# Patient Record
Sex: FEMALE | Race: WHITE | Employment: OTHER | URBAN - METROPOLITAN AREA
[De-identification: names, ages, dates, MRNs, and addresses within clinical notes are randomized per-mention and may not be internally consistent; named-entity substitution may affect disease eponyms.]

---

## 2017-01-19 ENCOUNTER — TRANSFERRED RECORDS (OUTPATIENT)
Dept: HEALTH INFORMATION MANAGEMENT | Facility: CLINIC | Age: 72
End: 2017-01-19

## 2017-01-20 ENCOUNTER — HOSPITAL ENCOUNTER (OUTPATIENT)
Facility: CLINIC | Age: 72
Setting detail: SPECIMEN
Discharge: HOME | End: 2017-01-20
Attending: FAMILY MEDICINE | Admitting: INTERNAL MEDICINE
Payer: MEDICARE

## 2017-01-20 ENCOUNTER — INFUSION THERAPY VISIT (OUTPATIENT)
Dept: INFUSION THERAPY | Facility: CLINIC | Age: 72
End: 2017-01-20
Attending: FAMILY MEDICINE
Payer: MEDICARE

## 2017-01-20 VITALS
HEART RATE: 86 BPM | RESPIRATION RATE: 18 BRPM | DIASTOLIC BLOOD PRESSURE: 65 MMHG | OXYGEN SATURATION: 98 % | TEMPERATURE: 97.8 F | SYSTOLIC BLOOD PRESSURE: 107 MMHG

## 2017-01-20 DIAGNOSIS — C54.1 ENDOMETRIAL CANCER (H): Primary | ICD-10-CM

## 2017-01-20 LAB
ABO + RH BLD: NORMAL
ABO + RH BLD: NORMAL
BLD GP AB SCN SERPL QL: NORMAL
BLD PROD TYP BPU: NORMAL
BLD PROD TYP BPU: NORMAL
BLD UNIT ID BPU: 0
BLOOD BANK CMNT PATIENT-IMP: NORMAL
BLOOD PRODUCT CODE: NORMAL
BPU ID: NORMAL
NUM BPU REQUESTED: 1
SPECIMEN EXP DATE BLD: NORMAL
TRANSFUSION STATUS PATIENT QL: NORMAL
TRANSFUSION STATUS PATIENT QL: NORMAL

## 2017-01-20 PROCEDURE — 36430 TRANSFUSION BLD/BLD COMPNT: CPT

## 2017-01-20 PROCEDURE — P9016 RBC LEUKOCYTES REDUCED: HCPCS | Performed by: INTERNAL MEDICINE

## 2017-01-20 PROCEDURE — 86923 COMPATIBILITY TEST ELECTRIC: CPT | Performed by: INTERNAL MEDICINE

## 2017-01-20 PROCEDURE — 86850 RBC ANTIBODY SCREEN: CPT | Performed by: INTERNAL MEDICINE

## 2017-01-20 PROCEDURE — 86901 BLOOD TYPING SEROLOGIC RH(D): CPT | Performed by: INTERNAL MEDICINE

## 2017-01-20 PROCEDURE — 86900 BLOOD TYPING SEROLOGIC ABO: CPT | Performed by: INTERNAL MEDICINE

## 2017-01-20 NOTE — PROGRESS NOTES
Infusion Nursing Note:  Jany Park presents today for 1 unit prbc.    Patient seen by provider today: No   present during visit today: Not Applicable.    Note: N/A.    Intravenous Access:  Lab draw site L chest, Needle type marquez, Gauge 20.  Labs drawn without difficulty.  Implanted Port.    Treatment Conditions:  Blood transfusion consent signed 1/20/17.  hgb 7.8.      Post Infusion Assessment:  Patient tolerated infusion without incident.  Blood return noted pre and post infusion.  Site patent and intact, free from redness, edema or discomfort.  No evidence of extravasations.  Access discontinued per protocol.    Discharge Plan:   Patient and/or family verbalized understanding of discharge instructions and all questions answered.  Copy of AVS reviewed with patient and/or family.  Patient will return prn for blood for next appointment.  Patient discharged in stable condition accompanied by: self and .  Departure Mode: Ambulatory.  Face to Face time: 10 minutes, review consent, reason for transfusion, side effects.    Estella Pastor RN

## 2017-03-06 ENCOUNTER — TRANSFERRED RECORDS (OUTPATIENT)
Dept: HEALTH INFORMATION MANAGEMENT | Facility: CLINIC | Age: 72
End: 2017-03-06

## 2017-03-06 ENCOUNTER — HOSPITAL ENCOUNTER (OUTPATIENT)
Dept: LAB | Facility: CLINIC | Age: 72
Discharge: HOME | End: 2017-03-06
Attending: INTERNAL MEDICINE
Payer: MEDICARE

## 2017-03-06 DIAGNOSIS — C54.1 ENDOMETRIAL CANCER (H): ICD-10-CM

## 2017-03-06 LAB
ABO + RH BLD: NORMAL
ABO + RH BLD: NORMAL
BLD GP AB SCN SERPL QL: NORMAL
BLD PROD TYP BPU: NORMAL
BLOOD BANK CMNT PATIENT-IMP: NORMAL
NUM BPU REQUESTED: 1
SPECIMEN EXP DATE BLD: NORMAL

## 2017-03-06 PROCEDURE — 86923 COMPATIBILITY TEST ELECTRIC: CPT | Performed by: INTERNAL MEDICINE

## 2017-03-06 PROCEDURE — 36591 DRAW BLOOD OFF VENOUS DEVICE: CPT

## 2017-03-06 PROCEDURE — 86901 BLOOD TYPING SEROLOGIC RH(D): CPT | Performed by: INTERNAL MEDICINE

## 2017-03-06 PROCEDURE — 86850 RBC ANTIBODY SCREEN: CPT | Performed by: INTERNAL MEDICINE

## 2017-03-06 PROCEDURE — 86900 BLOOD TYPING SEROLOGIC ABO: CPT | Performed by: INTERNAL MEDICINE

## 2017-03-06 NOTE — PROGRESS NOTES
"Maria Esther cath accessed with aseptic technique with Gripper needle 19 G X 3/4\". Good blood return, flushed with 10 ml's NS, amaya 6 ml waste then amaya 2 pink tubes. Labs amaya easily, flushed with 20 ml's of NS and then instilled Heparin 100 units per ml, total of 5 ml's and then deaccessed Maria Esther cath . Patient tolerated well. No bleeding noted at insertion site and dry sterile dressing applied. Patient had no question or concerns. Left ambulatory per self.  "

## 2017-03-07 ENCOUNTER — INFUSION THERAPY VISIT (OUTPATIENT)
Dept: INFUSION THERAPY | Facility: CLINIC | Age: 72
End: 2017-03-07
Attending: INTERNAL MEDICINE
Payer: MEDICARE

## 2017-03-07 VITALS
RESPIRATION RATE: 16 BRPM | OXYGEN SATURATION: 98 % | TEMPERATURE: 98.4 F | SYSTOLIC BLOOD PRESSURE: 129 MMHG | DIASTOLIC BLOOD PRESSURE: 83 MMHG | HEART RATE: 80 BPM

## 2017-03-07 DIAGNOSIS — C54.1 ENDOMETRIAL CANCER (H): Primary | ICD-10-CM

## 2017-03-07 LAB
BLD PROD TYP BPU: NORMAL
BLD UNIT ID BPU: 0
BLOOD PRODUCT CODE: NORMAL
BPU ID: NORMAL
TRANSFUSION STATUS PATIENT QL: NORMAL
TRANSFUSION STATUS PATIENT QL: NORMAL

## 2017-03-07 PROCEDURE — 25000128 H RX IP 250 OP 636: Performed by: INTERNAL MEDICINE

## 2017-03-07 PROCEDURE — P9016 RBC LEUKOCYTES REDUCED: HCPCS | Performed by: INTERNAL MEDICINE

## 2017-03-07 PROCEDURE — 36430 TRANSFUSION BLD/BLD COMPNT: CPT

## 2017-03-07 RX ORDER — HEPARIN SODIUM (PORCINE) LOCK FLUSH IV SOLN 100 UNIT/ML 100 UNIT/ML
500 SOLUTION INTRAVENOUS EVERY 8 HOURS
Status: DISCONTINUED | OUTPATIENT
Start: 2017-03-07 | End: 2017-03-07 | Stop reason: HOSPADM

## 2017-03-07 RX ADMIN — SODIUM CHLORIDE, PRESERVATIVE FREE 500 UNITS: 5 INJECTION INTRAVENOUS at 14:30

## 2017-03-07 NOTE — PROGRESS NOTES
Infusion Nursing Note:  Jany Park presents today for 1 unit packed red blood cells.    Patient seen by provider today: No   present during visit today: Not Applicable.    Intravenous Access:  Implanted Port.    Treatment Conditions:  Blood transfusion consent signed 1/20/17.  Hgb 7.7 3/6/17.      Post Infusion Assessment:  Patient tolerated infusion without incident.  Blood return noted pre and post infusion.  Site patent and intact, free from redness, edema or discomfort.  No evidence of extravasations.  Access discontinued per protocol.    Discharge Plan:   Patient declined prescription refills.  Copy of AVS reviewed with patient and/or family.  Patient will return prn for blood. No appt's scheduled at this time.      Tran Roth RN

## 2018-04-05 ENCOUNTER — TRANSFERRED RECORDS (OUTPATIENT)
Dept: HEALTH INFORMATION MANAGEMENT | Facility: CLINIC | Age: 73
End: 2018-04-05

## 2018-04-11 ENCOUNTER — TELEPHONE (OUTPATIENT)
Dept: INTERVENTIONAL RADIOLOGY/VASCULAR | Facility: CLINIC | Age: 73
End: 2018-04-11

## 2018-04-11 NOTE — TELEPHONE ENCOUNTER
Interventional Radiology   Interventional Radiology at [facility] has been requested to perform a Liver lesion biopsy from Dr Garcias.  Jany is a 72 year old woman with a PMH of hypertension, hyperlipidemia, asthma, fibromyalgia, GERD, DJD, and malignant melanoma without recurrence so far. 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 She had very intense chemotherapy with progression of disease per CT. Change to Paclitaxel and carboplatin 3/2017 seemed to help stabilize her disease but with increasing . The patient transitioned to a DNR/DNI but she would like to consider further treatment. Alternative treatments are being considered and a repeat biopsy is being requested to determine if she is a candidate for hormonal therapy or immunotherapy.   Reviewed imaging and clinical information with IR staff Dr Estrada who approved the biopsy with Ultrasound guidance.   Central scheduling has contacted the patient to schedule the biopsy and left a voicemail for the patient to call back.     Thanks Viktoria [facility] Interventional Radiology CNP ([phone])

## 2018-04-13 ENCOUNTER — HOSPITAL ENCOUNTER (OUTPATIENT)
Facility: CLINIC | Age: 73
Discharge: HOME | End: 2018-04-13
Attending: COLON & RECTAL SURGERY | Admitting: COLON & RECTAL SURGERY
Payer: MEDICARE

## 2018-04-13 ENCOUNTER — HOSPITAL ENCOUNTER (OUTPATIENT)
Dept: ULTRASOUND IMAGING | Facility: CLINIC | Age: 73
End: 2018-04-13
Attending: INTERNAL MEDICINE | Admitting: COLON & RECTAL SURGERY
Payer: MEDICARE

## 2018-04-13 VITALS
TEMPERATURE: 96 F | HEART RATE: 83 BPM | OXYGEN SATURATION: 93 % | SYSTOLIC BLOOD PRESSURE: 115 MMHG | DIASTOLIC BLOOD PRESSURE: 75 MMHG | RESPIRATION RATE: 18 BRPM

## 2018-04-13 DIAGNOSIS — C54.1: ICD-10-CM

## 2018-04-13 DIAGNOSIS — C79.11: ICD-10-CM

## 2018-04-13 LAB
INR PPP: 0.92 (ref 0.86–1.14)
PLATELET # BLD AUTO: 317 10E9/L (ref 150–450)

## 2018-04-13 PROCEDURE — 40000863 ZZH STATISTIC RADIOLOGY XRAY, US, CT, MAR, NM

## 2018-04-13 PROCEDURE — 88161 CYTOPATH SMEAR OTHER SOURCE: CPT | Mod: 26 | Performed by: RADIOLOGY

## 2018-04-13 PROCEDURE — 88341 IMHCHEM/IMCYTCHM EA ADD ANTB: CPT | Mod: 26 | Performed by: RADIOLOGY

## 2018-04-13 PROCEDURE — 88173 CYTOPATH EVAL FNA REPORT: CPT | Mod: 26 | Performed by: RADIOLOGY

## 2018-04-13 PROCEDURE — 88342 IMHCHEM/IMCYTCHM 1ST ANTB: CPT | Performed by: RADIOLOGY

## 2018-04-13 PROCEDURE — 88161 CYTOPATH SMEAR OTHER SOURCE: CPT | Performed by: RADIOLOGY

## 2018-04-13 PROCEDURE — 88342 IMHCHEM/IMCYTCHM 1ST ANTB: CPT | Mod: 26,XU | Performed by: RADIOLOGY

## 2018-04-13 PROCEDURE — 88360 TUMOR IMMUNOHISTOCHEM/MANUAL: CPT | Performed by: RADIOLOGY

## 2018-04-13 PROCEDURE — 88341 IMHCHEM/IMCYTCHM EA ADD ANTB: CPT | Performed by: RADIOLOGY

## 2018-04-13 PROCEDURE — 88173 CYTOPATH EVAL FNA REPORT: CPT | Performed by: RADIOLOGY

## 2018-04-13 PROCEDURE — 88305 TISSUE EXAM BY PATHOLOGIST: CPT | Performed by: RADIOLOGY

## 2018-04-13 PROCEDURE — 25000125 ZZHC RX 250: Performed by: RADIOLOGY

## 2018-04-13 PROCEDURE — 88360 TUMOR IMMUNOHISTOCHEM/MANUAL: CPT | Mod: 26 | Performed by: RADIOLOGY

## 2018-04-13 PROCEDURE — 88360 TUMOR IMMUNOHISTOCHEM/MANUAL: CPT | Mod: 26,76 | Performed by: RADIOLOGY

## 2018-04-13 PROCEDURE — 85610 PROTHROMBIN TIME: CPT | Performed by: RADIOLOGY

## 2018-04-13 PROCEDURE — 76942 ECHO GUIDE FOR BIOPSY: CPT

## 2018-04-13 PROCEDURE — 88305 TISSUE EXAM BY PATHOLOGIST: CPT | Mod: 26 | Performed by: RADIOLOGY

## 2018-04-13 PROCEDURE — 85049 AUTOMATED PLATELET COUNT: CPT | Performed by: RADIOLOGY

## 2018-04-13 RX ORDER — FENTANYL CITRATE 50 UG/ML
25-50 INJECTION, SOLUTION INTRAMUSCULAR; INTRAVENOUS EVERY 5 MIN PRN
Status: DISCONTINUED | OUTPATIENT
Start: 2018-04-13 | End: 2018-04-13 | Stop reason: HOSPADM

## 2018-04-13 RX ORDER — LIDOCAINE 40 MG/G
CREAM TOPICAL
Status: DISCONTINUED | OUTPATIENT
Start: 2018-04-13 | End: 2018-04-13 | Stop reason: HOSPADM

## 2018-04-13 RX ORDER — ACETAMINOPHEN 325 MG/1
650 TABLET ORAL EVERY 4 HOURS PRN
Status: DISCONTINUED | OUTPATIENT
Start: 2018-04-13 | End: 2018-04-13 | Stop reason: HOSPADM

## 2018-04-13 RX ORDER — LIDOCAINE HYDROCHLORIDE 10 MG/ML
6 INJECTION, SOLUTION EPIDURAL; INFILTRATION; INTRACAUDAL; PERINEURAL ONCE
Status: COMPLETED | OUTPATIENT
Start: 2018-04-13 | End: 2018-04-13

## 2018-04-13 RX ORDER — FLUMAZENIL 0.1 MG/ML
0.2 INJECTION, SOLUTION INTRAVENOUS
Status: DISCONTINUED | OUTPATIENT
Start: 2018-04-13 | End: 2018-04-13 | Stop reason: HOSPADM

## 2018-04-13 RX ORDER — NALOXONE HYDROCHLORIDE 0.4 MG/ML
.1-.4 INJECTION, SOLUTION INTRAMUSCULAR; INTRAVENOUS; SUBCUTANEOUS
Status: DISCONTINUED | OUTPATIENT
Start: 2018-04-13 | End: 2018-04-13 | Stop reason: HOSPADM

## 2018-04-13 RX ADMIN — LIDOCAINE HYDROCHLORIDE 6 ML: 10 INJECTION, SOLUTION INFILTRATION; PERINEURAL at 09:03

## 2018-04-13 NOTE — IP AVS SNAPSHOT
MRN:[id number]                      After Visit Summary   4/13/2018    Jany Park    MRN: [id number]           Visit Information        Department      4/13/2018  7:06 AM [facility]          Review of your medicines      UNREVIEWED medicines. Ask your doctor about these medicines        Dose / Directions    albuterol 90 MCG/ACT inhaler        Refills:  0       BIOTIN FORTE PO        Dose:  36778 mcg   Take 10,000 mcg by mouth daily   Refills:  0       CALTRATE 600 PO        2 daily   Refills:  0       ferrous gluconate 324 (38 Fe) MG tablet   Commonly known as:  FERGON   Used for:  Edema, Myalgia and myositis, unspecified, Mild persistent asthma, Female stress incontinence        Dose:  324 mg   Take 1 tablet by mouth daily (with breakfast).   Quantity:  30 tablet   Refills:  0       glutamine 500 MG capsule        Dose:  500 mg   Take 500 mg by mouth 4 times daily   Refills:  0       IPRATROPIUM BROMIDE IN        Refills:  0       LASIX PO        Dose:  20 mg   Take 20 mg by mouth daily   Refills:  0       lisinopril 10 MG tablet   Commonly known as:  PRINIVIL/ZESTRIL   Used for:  Unspecified essential hypertension        ONE DAILY   Quantity:  30   Refills:  2       MELOXICAM PO        Dose:  7.5 mg   Take 7.5 mg by mouth daily   Refills:  0       Multiple vitamin  s Tabs        Refills:  0       OMEPRAZOLE PO        Dose:  20 mg   Take 20 mg by mouth 2 times daily (before meals)   Refills:  0       traMADol 50 MG tablet   Commonly known as:  ULTRAM   Used for:  Knee pain, left, Failed total knee arthroplasty (H), S/P total knee arthroplasty        Dose:   mg   Take 1-2 tablets by mouth every 6 hours as needed.   Quantity:  60 tablet   Refills:  1       vitamin B complex with vitamin C Tabs tablet        Dose:  1 tablet   Take 1 tablet by mouth daily   Refills:  0                Protect others around you: Learn how to safely use, store and throw away your  medicines at www.disposemymeds.org.         Follow-ups after your visit        Your next 10 appointments already scheduled     Apr 13, 2018  8:30 AM CDT   (Arrive by 8:15 AM)   US BIOPSY LIVER with [facility] IMAGING NURSE, [facility]   [facility] Ultrasound ([facility])    [address] [place] [address]   [phone]           Tell us in advance if there s any chance you may be pregnant.  Bring a list of your medicines to the exam. Include vitamins, minerals and over-the-counter drugs.  No eating or drinking for 4 hours prior to exam.  If you take blood thinners, you may need to stop taking them a few days before treatment. Talk to your doctor before stopping these medicines. You will need a blood test the morning of your exam.   Stop taking Coumadin (warfarin) 3 days before your exam. Restart the day after your exam.   If you take aspirin, you may need to stop taking it 3 days before your scan.   If you take Plavix, Ticlid, Pletal or Persantine, you may need to stop taking them 5 days before your scan. Please talk to your doctor before stopping these medicines.  If you will receive sedation for this test (medicine to help you relax):   See your family doctor for an exam within 30 days of treatment.   Plan for an adult to drive you home and stay with you for at least 6 hours.   Follow the eating and drinking guidelines checked below:   No eating or drinking for 4 hours before your test. You may take medicine with small sips of water.   If you have diabetes:If you take insulin, call your diabetes care team. Do not take diabetes pills on the morning of your test. If you take metformin (Avandamet, Glucophage, Glucovance, Metaglip) and received contrast, wait 48 hours before re-starting this medicine.  Please call the Imaging Department at your exam site with any questions.               Care Instructions        Further instructions from your care team       **** Biopsy  Discharge Instructions     After you go home:      You may resume your normal diet    Have an adult stay with you for 6 hours if you received sedation       For 24 hours - due to the sedation you received:    Relax and take it easy    Do NOT make any important or legal decisions    Do NOT drive or operate machines at home or at work    Do NOT drink alcohol    Care of Puncture Site:      For the first 48 hrs, check your puncture site every couple hours while you are awake     You may remove/change the bandaid tomorrow    You may shower tomorrow    No tub baths, whirlpools or swimming until your puncture site has fully healed    Activity       You may go back to normal activity in 24 hours     Wait 48 hours before lifting, straining, exercise or other strenuous activity    Medicines:      You may resume all medications    Resume your Warfarin/Coumadin at your regular dose today. Follow up with your provider to have your INR rechecked    Resume your Platelet Inhibitors and Aspirin tomorrow at your regular dose    For minor pain, you may take Acetaminophen (Tylenol) or Ibuprofen (Advil)            Call the provider who ordered this test if:      Increased pain or a large or growing hard lump around the site    Blood or fluid is draining from the site    The site is red, swollen, hot or tender    Chills or a fever greater than 101 F (38 C)    Pain that is getting worse    Any questions or concerns    Call  911 or go to the Emergency Room if:      Severe pain or trouble breathing    Bleeding that you cannot control        If you have questions call:          [facility] @ [phone]      The provider who performed your procedure was _________________.             Additional Information About Your Visit        [employer] Information     [employer] lets you send messages to your doctor, view your test results, renew your prescriptions, schedule appointments and more. To sign up, go to  [email or web address] . Click on \"Log in\" on the left side of the screen, which will take you to the Welcome page. Then click on \"Sign up Now\" on the right side of the page.     You will be asked to enter the access code listed below, as well as some personal information. Please follow the directions to create your username and password.     Your access code is: [email or web address]  Expires: 2018  7:44 AM     Your access code will  in 90 days. If you need help or a new code, please call your [facility] clinic or [phone].        Care EveryWhere ID     This is your Care EveryWhere ID. This could be used by other organizations to access your [facility] medical records  [id number]        Your Vitals Were     Blood Pressure Pulse Temperature Respirations Pulse Oximetry       136/70 (BP Location: Left arm) 83 96  F (35.6  C) (Oral) 18 98%        Primary Care Provider Office Phone # Fax #    Selvin Car [phone]      Equal Access to Services     [facility]: Hadii krzysztof sargent hadkelsi Jauregui, waaxda adriano, qaybta beronica salcedo, coreen cain . So [facility] [phone].    ATENCIÓN: Si habla español, tiene a vanegas disposición servicios gratuitos de asistencia lingüística. [facility] [phone].    We comply with applicable federal civil rights laws and [place] laws. We do not discriminate on the basis of race, color, national origin, age, disability, sex, sexual orientation, or gender identity.            Thank you!     Thank you for choosing [facility] for your care. Our goal is always to provide you with excellent care. Hearing back from our patients is one way we can continue to improve our services. Please take a few minutes to complete the written survey that you may receive in the mail after you visit with us. Thank you!             Medication List: This is a list of all your medications and when to take them. Check marks below indicate your daily home " schedule. Keep this list as a reference.      Medications           Morning Afternoon Evening Bedtime As Needed    albuterol 90 MCG/ACT inhaler                                BIOTIN FORTE PO   Take 10,000 mcg by mouth daily                                CALTRATE 600 PO   2 daily                                ferrous gluconate 324 (38 Fe) MG tablet   Commonly known as:  FERGON   Take 1 tablet by mouth daily (with breakfast).                                glutamine 500 MG capsule   Take 500 mg by mouth 4 times daily                                IPRATROPIUM BROMIDE IN                                LASIX PO   Take 20 mg by mouth daily                                lisinopril 10 MG tablet   Commonly known as:  PRINIVIL/ZESTRIL   ONE DAILY                                MELOXICAM PO   Take 7.5 mg by mouth daily                                Multiple vitamin  s Tabs                                OMEPRAZOLE PO   Take 20 mg by mouth 2 times daily (before meals)                                traMADol 50 MG tablet   Commonly known as:  ULTRAM   Take 1-2 tablets by mouth every 6 hours as needed.                                vitamin B complex with vitamin C Tabs tablet   Take 1 tablet by mouth daily

## 2018-04-13 NOTE — IP AVS SNAPSHOT
[place] [address]    [place] [address]    Phone:  [phone]                                       After Visit Summary   4/13/2018    Jany Park    MRN: [id number]           After Visit Summary Signature Page     I have received my discharge instructions, and my questions have been answered. I have discussed any challenges I see with this plan with the nurse or doctor.    ..........................................................................................................................................  Patient/Patient Representative Signature      ..........................................................................................................................................  Patient Representative Print Name and Relationship to Patient    ..................................................               ................................................  Date                                            Time    ..........................................................................................................................................  Reviewed by Signature/Title    ...................................................              ..............................................  Date                                                            Time

## 2018-04-13 NOTE — DISCHARGE INSTRUCTIONS
**** Biopsy Discharge Instructions     After you go home:      You may resume your normal diet    Have an adult stay with you for 6 hours if you received sedation       For 24 hours - due to the sedation you received:    Relax and take it easy    Do NOT make any important or legal decisions    Do NOT drive or operate machines at home or at work    Do NOT drink alcohol    Care of Puncture Site:      For the first 48 hrs, check your puncture site every couple hours while you are awake     You may remove/change the bandaid tomorrow    You may shower tomorrow    No tub baths, whirlpools or swimming until your puncture site has fully healed    Activity       You may go back to normal activity in 24 hours     Wait 48 hours before lifting, straining, exercise or other strenuous activity    Medicines:      You may resume all medications    Resume your Warfarin/Coumadin at your regular dose today. Follow up with your provider to have your INR rechecked    Resume your Platelet Inhibitors and Aspirin tomorrow at your regular dose    For minor pain, you may take Acetaminophen (Tylenol) or Ibuprofen (Advil)            Call the provider who ordered this test if:      Increased pain or a large or growing hard lump around the site    Blood or fluid is draining from the site    The site is red, swollen, hot or tender    Chills or a fever greater than 101 F (38 C)    Pain that is getting worse    Any questions or concerns    Call  911 or go to the Emergency Room if:      Severe pain or trouble breathing    Bleeding that you cannot control        If you have questions call:          [facility] @ [phone]      The provider who performed your procedure was _________________.

## 2018-04-13 NOTE — PROGRESS NOTES
Band aid remains CDI, pain free.  D/c to home at 0950 in stable condition with instructions in hand.

## 2018-04-13 NOTE — PROGRESS NOTES
Return to CS.  Band aid intact, CDI.  No pain.  VSS.  Refused crackers, water given at request.  Will cont to monitor.

## 2018-04-13 NOTE — PROGRESS NOTES
Pt here for liver bx.  Assessment complete.  Labs WDL.  D/c instructions given pre procedure.  Call light in reach.

## 2018-04-13 NOTE — PROCEDURES
RADIOLOGY PROCEDURE NOTE  Patient name: Jany Park  MRN: [id number]  : 1945    Pre-procedure diagnosis: liver mets  Post-procedure diagnosis: Same    Procedure Date/Time: 2018  9:03 AM  Procedure: US guided liver biopsy  Estimated blood loss: None  Specimen(s) collected with description: 18ga cores  The patient tolerated the procedure well with no immediate complications.  Significant findings:liver mets    See imaging dictation for procedural details.    Provider name: Thee Rush  Assistant(s):None

## 2018-04-14 ENCOUNTER — HEALTH MAINTENANCE LETTER (OUTPATIENT)
Age: 73
End: 2018-04-14

## 2018-04-19 LAB — COPATH REPORT: NORMAL

## 2018-11-20 ENCOUNTER — TRANSFERRED RECORDS (OUTPATIENT)
Dept: HEALTH INFORMATION MANAGEMENT | Facility: CLINIC | Age: 73
End: 2018-11-20

## 2018-11-21 ENCOUNTER — HOSPITAL ENCOUNTER (EMERGENCY)
Facility: CLINIC | Age: 73
Discharge: HOME | End: 2018-11-21
Attending: EMERGENCY MEDICINE | Admitting: EMERGENCY MEDICINE
Payer: MEDICARE

## 2018-11-21 ENCOUNTER — TRANSFERRED RECORDS (OUTPATIENT)
Dept: HEALTH INFORMATION MANAGEMENT | Facility: CLINIC | Age: 73
End: 2018-11-21

## 2018-11-21 ENCOUNTER — HOSPITAL ENCOUNTER (OUTPATIENT)
Dept: LAB | Facility: CLINIC | Age: 73
Discharge: HOME | End: 2018-11-21
Attending: ALLERGY & IMMUNOLOGY | Admitting: ALLERGY & IMMUNOLOGY
Payer: MEDICARE

## 2018-11-21 ENCOUNTER — APPOINTMENT (OUTPATIENT)
Dept: CT IMAGING | Facility: CLINIC | Age: 73
End: 2018-11-21
Attending: EMERGENCY MEDICINE
Payer: MEDICARE

## 2018-11-21 VITALS
RESPIRATION RATE: 10 BRPM | OXYGEN SATURATION: 96 % | DIASTOLIC BLOOD PRESSURE: 82 MMHG | TEMPERATURE: 98.2 F | WEIGHT: 200 LBS | HEIGHT: 66 IN | BODY MASS INDEX: 32.14 KG/M2 | HEART RATE: 100 BPM | SYSTOLIC BLOOD PRESSURE: 129 MMHG

## 2018-11-21 DIAGNOSIS — R06.00 DYSPNEA, UNSPECIFIED TYPE: ICD-10-CM

## 2018-11-21 DIAGNOSIS — Z86.711 HISTORY OF PULMONARY EMBOLISM: ICD-10-CM

## 2018-11-21 DIAGNOSIS — R06.02 SHORTNESS OF BREATH: Primary | ICD-10-CM

## 2018-11-21 LAB
ANION GAP SERPL CALCULATED.3IONS-SCNC: 4 MMOL/L (ref 3–14)
BUN SERPL-MCNC: 23 MG/DL (ref 7–30)
CALCIUM SERPL-MCNC: 8.8 MG/DL (ref 8.5–10.1)
CHLORIDE SERPL-SCNC: 108 MMOL/L (ref 94–109)
CO2 SERPL-SCNC: 28 MMOL/L (ref 20–32)
CREAT SERPL-MCNC: 0.86 MG/DL (ref 0.52–1.04)
D DIMER PPP FEU-MCNC: 1.8 UG/ML FEU (ref 0–0.5)
DIFFERENTIAL METHOD BLD: ABNORMAL
EOSINOPHIL # BLD AUTO: 0.3 10E9/L (ref 0–0.7)
EOSINOPHIL NFR BLD AUTO: 4 %
ERYTHROCYTE [DISTWIDTH] IN BLOOD BY AUTOMATED COUNT: 13.4 % (ref 10–15)
GFR SERPL CREATININE-BSD FRML MDRD: NORMAL ML/MIN/1.7M2
GLUCOSE SERPL-MCNC: 73 MG/DL (ref 70–99)
HCT VFR BLD AUTO: 36.3 % (ref 35–47)
HGB BLD-MCNC: 11.5 G/DL (ref 11.7–15.7)
INR PPP: 1.05 (ref 0.86–1.14)
LYMPHOCYTES # BLD AUTO: 2.2 10E9/L (ref 0.8–5.3)
LYMPHOCYTES NFR BLD AUTO: 27 %
MCH RBC QN AUTO: 30.3 PG (ref 26.5–33)
MCHC RBC AUTO-ENTMCNC: 31.7 G/DL (ref 31.5–36.5)
MCV RBC AUTO: 96 FL (ref 78–100)
MONOCYTES # BLD AUTO: 0.7 10E9/L (ref 0–1.3)
MONOCYTES NFR BLD AUTO: 8 %
NEUTROPHILS # BLD AUTO: 5.1 10E9/L (ref 1.6–8.3)
NEUTROPHILS NFR BLD AUTO: 61 %
NT-PROBNP SERPL-MCNC: 196 PG/ML (ref 0–900)
PLATELET # BLD AUTO: 312 10E9/L (ref 150–450)
POTASSIUM SERPL-SCNC: 3.7 MMOL/L (ref 3.4–5.3)
RBC # BLD AUTO: 3.8 10E12/L (ref 3.8–5.2)
SODIUM SERPL-SCNC: 140 MMOL/L (ref 133–144)
TROPONIN I SERPL-MCNC: <0.015 UG/L (ref 0–0.04)
WBC # BLD AUTO: 8.3 10E9/L (ref 4–11)

## 2018-11-21 PROCEDURE — 85379 FIBRIN DEGRADATION QUANT: CPT | Performed by: ALLERGY & IMMUNOLOGY

## 2018-11-21 PROCEDURE — 84484 ASSAY OF TROPONIN QUANT: CPT | Performed by: EMERGENCY MEDICINE

## 2018-11-21 PROCEDURE — 93005 ELECTROCARDIOGRAM TRACING: CPT

## 2018-11-21 PROCEDURE — 85610 PROTHROMBIN TIME: CPT | Performed by: EMERGENCY MEDICINE

## 2018-11-21 PROCEDURE — 71260 CT THORAX DX C+: CPT

## 2018-11-21 PROCEDURE — 85025 COMPLETE CBC W/AUTO DIFF WBC: CPT | Performed by: EMERGENCY MEDICINE

## 2018-11-21 PROCEDURE — 25000128 H RX IP 250 OP 636: Performed by: EMERGENCY MEDICINE

## 2018-11-21 PROCEDURE — 80048 BASIC METABOLIC PNL TOTAL CA: CPT | Performed by: EMERGENCY MEDICINE

## 2018-11-21 PROCEDURE — 36415 COLL VENOUS BLD VENIPUNCTURE: CPT | Performed by: ALLERGY & IMMUNOLOGY

## 2018-11-21 PROCEDURE — 94640 AIRWAY INHALATION TREATMENT: CPT

## 2018-11-21 PROCEDURE — 25000125 ZZHC RX 250: Performed by: EMERGENCY MEDICINE

## 2018-11-21 PROCEDURE — 99285 EMERGENCY DEPT VISIT HI MDM: CPT | Mod: 25

## 2018-11-21 PROCEDURE — 83880 ASSAY OF NATRIURETIC PEPTIDE: CPT | Performed by: EMERGENCY MEDICINE

## 2018-11-21 RX ORDER — IOPAMIDOL 755 MG/ML
500 INJECTION, SOLUTION INTRAVASCULAR ONCE
Status: COMPLETED | OUTPATIENT
Start: 2018-11-21 | End: 2018-11-21

## 2018-11-21 RX ORDER — IPRATROPIUM BROMIDE AND ALBUTEROL SULFATE 2.5; .5 MG/3ML; MG/3ML
3 SOLUTION RESPIRATORY (INHALATION) ONCE
Status: COMPLETED | OUTPATIENT
Start: 2018-11-21 | End: 2018-11-21

## 2018-11-21 RX ADMIN — IOPAMIDOL 78 ML: 755 INJECTION, SOLUTION INTRAVENOUS at 18:22

## 2018-11-21 RX ADMIN — SODIUM CHLORIDE 96 ML: 9 INJECTION, SOLUTION INTRAVENOUS at 18:22

## 2018-11-21 RX ADMIN — IPRATROPIUM BROMIDE AND ALBUTEROL SULFATE 3 ML: .5; 3 SOLUTION RESPIRATORY (INHALATION) at 17:13

## 2018-11-21 ASSESSMENT — ENCOUNTER SYMPTOMS
SORE THROAT: 0
FEVER: 0
COUGH: 0
SHORTNESS OF BREATH: 1
ACTIVITY CHANGE: 0

## 2018-11-21 NOTE — ED TRIAGE NOTES
ABCs intact. Pt c/o increased SOB for the past few months. Pt is on hormone treatment for breast cancer since April. Pt saw her allergist today and had labs done. Pt's D-Dimer 1.8. Pt sent from clinic to R/O PE.

## 2018-11-21 NOTE — ED AVS SNAPSHOT
[facility] Emergency Department    [address]    [facility] [address]    Phone:  [phone]    Fax:  [phone]                                       Jany Park   MRN: [id number]    Department:  [facility] Emergency Department   Date of Visit:  11/21/2018           After Visit Summary Signature Page     I have received my discharge instructions, and my questions have been answered. I have discussed any challenges I see with this plan with the nurse or doctor.    ..........................................................................................................................................  Patient/Patient Representative Signature      ..........................................................................................................................................  Patient Representative Print Name and Relationship to Patient    ..................................................               ................................................  Date                                   Time    ..........................................................................................................................................  Reviewed by Signature/Title    ...................................................              ..............................................  Date                                               Time          22EPIC Rev 08/18

## 2018-11-21 NOTE — ED AVS SNAPSHOT
[facility] Emergency Department    [address] [place] [address]    Phone:  [phone]    Fax:  [phone]                                       Jany Park   MRN: [id number]    Department:  [facility] Emergency Department   Date of Visit:  11/21/2018           Patient Information     Date Of Birth          1945        Your diagnoses for this visit were:     Dyspnea, unspecified type        You were seen by Armando Hernandez MD.      Follow-up Information     Go to YOUR PULMONARY md.    Why:  NEXT WEEK        Discharge Instructions       Continue albuterol every 4 hours  Rest  Push fluids  Discharge Instructions  Asthma    Asthma is a condition causing narrowing and inflammation of the airways that can make it hard to breathe.  Asthma can also cause cough, wheezing, noisy breathing and tightness in the chest.  Asthma can be brought on or  triggered  by many things, including dust, mold, pollen, cigarette smoke, exercise, stress and infections (like the common cold).     Generally, every Emergency Department visit should have a follow-up clinic visit with either a primary or a specialty clinic/provider. Please follow-up as instructed by your emergency provider today.    Return to the Emergency Department if:    Your breathing gets worse.    You need to use your inhaler more often than every 4 hours, or cannot get relief from your inhaler.    You are very weak, or feel much more ill.    You develop new symptoms, such as chest pain.    You cough up blood.    You are vomiting (throwing up) enough that you cannot keep fluids or your medicine down.    What can I do to help myself?    Fill any prescriptions the provider gave you and take them right away--especially antibiotics. Be sure to finish the whole antibiotic prescription.    You may be given a prescription for an inhaler, which can help loosen tight air passages.  Use this as needed, but not more often  than directed. Inhalers work much better when used with a spacer.     You may be given a prescription for a steroid to reduce inflammation. Used long-term, these can have some side effects, but for short-term use they are safe. You may notice restlessness or increased appetite (eating more).        You may use non-prescription cough or cold medicines. Cough medicines may help, but do not make the cough go away completely.     Avoid smoke, because this can make you feel worse. If you smoke, this may be a good time to quit! Consider using nicotine lozenges, gum, or patches to reduce cravings.     If you have a fever, Tylenol  (acetaminophen), Motrin  (ibuprofen), or Advil  (ibuprofen), may help bring fever down and may help you feel more comfortable. Be sure to read and follow the package directions, and ask your provider if you have questions.    Be sure to get your flu shot each year.  For certain age groups, the pneumonia shot can help prevent pneumonia.  It is important that you follow up with your regular provider, to be sure that you are improving from this spell (an acute asthma exacerbation), and also to do what you can to keep from having trouble again. Sometimes you need long-term medicines to keep your asthma under control.   If you were given a prescription for medicine here today, be sure to read all of the information (including the package insert) that comes with your prescription.  This will include important information about the medicine, its side effects, and any warnings that you need to know about.  The pharmacist who fills the prescription can provide more information and answer questions you may have about the medicine.  If you have questions or concerns that the pharmacist cannot address, please call or return to the Emergency Department.   Remember that you can always come back to the Emergency Department if you are not able to see your regular provider in the amount of time listed above, if  you get any new symptoms, or if there is anything that worries you.    24 Hour Appointment Hotline       To make an appointment at any [facility], call [phone] ([phone]). If you don't have a family doctor or clinic, we will help you find one. [facility] are conveniently located to serve the needs of you and your family.             Review of your medicines      Our records show that you are taking the medicines listed below. If these are incorrect, please call your family doctor or clinic.        Dose / Directions Last dose taken    albuterol 90 MCG/ACT inhaler        Refills:  0        BIOTIN FORTE PO   Dose:  35775 mcg        Take 10,000 mcg by mouth daily   Refills:  0        CALTRATE 600 PO        2 daily   Refills:  0        ferrous gluconate 324 (38 Fe) MG tablet   Commonly known as:  FERGON   Dose:  324 mg   Quantity:  30 tablet        Take 1 tablet by mouth daily (with breakfast).   Refills:  0        glutamine 500 MG capsule   Dose:  500 mg        Take 500 mg by mouth 4 times daily   Refills:  0        IPRATROPIUM BROMIDE IN        Refills:  0        LASIX PO   Dose:  20 mg        Take 20 mg by mouth daily   Refills:  0        lisinopril 10 MG tablet   Commonly known as:  PRINIVIL/ZESTRIL   Quantity:  30        ONE DAILY   Refills:  2        MELOXICAM PO   Dose:  7.5 mg        Take 7.5 mg by mouth daily   Refills:  0        Multiple vitamin  s Tabs        Refills:  0        OMEPRAZOLE PO   Dose:  20 mg        Take 20 mg by mouth 2 times daily (before meals)   Refills:  0        traMADol 50 MG tablet   Commonly known as:  ULTRAM   Dose:   mg   Quantity:  60 tablet        Take 1-2 tablets by mouth every 6 hours as needed.   Refills:  1        vitamin B complex with vitamin C Tabs tablet   Dose:  1 tablet        Take 1 tablet by mouth daily   Refills:  0                Procedures and tests performed during your visit     Basic metabolic panel    CBC with platelets differential     CT Chest Pulmonary Embolism w Contrast    Cardiac Continuous Monitoring    EKG 12-lead, tracing only    INR    Nt probnp inpatient    Peripheral IV: Standard    Pulse oximetry nursing    Troponin I    Vital signs      Orders Needing Specimen Collection     None      Pending Results     Date and Time Order Name Status Description    11/21/2018 1652 EKG 12-lead, tracing only Preliminary             Pending Culture Results     No orders found from 11/19/2018 to 11/22/2018.            Pending Results Instructions     If you had any lab results that were not finalized at the time of your Discharge, you can call the ED Lab Result RN at [phone]. You will be contacted by this team for any positive Lab results or changes in treatment. The nurses are available 7 days a week from 10A to 6:30P.  You can leave a message 24 hours per day and they will return your call.        Test Results From Your Hospital Stay        11/21/2018  5:50 PM      Component Results     Component Value Ref Range & Units Status    WBC 8.3 4.0 - 11.0 10e9/L Final    RBC Count 3.80 3.8 - 5.2 10e12/L Final    Hemoglobin 11.5 (L) 11.7 - 15.7 g/dL Final    Hematocrit 36.3 35.0 - 47.0 % Final    MCV 96 78 - 100 fl Final    MCH 30.3 26.5 - 33.0 pg Final    MCHC 31.7 31.5 - 36.5 g/dL Final    RDW 13.4 10.0 - 15.0 % Final    Platelet Count 312 150 - 450 10e9/L Final    Diff Method Automated Method  Final    % Neutrophils 61.0 % Final    % Lymphocytes 27.0 % Final    % Monocytes 8.0 % Final    % Eosinophils 4.0 % Final    Absolute Neutrophil 5.1 1.6 - 8.3 10e9/L Final    Absolute Lymphocytes 2.2 0.8 - 5.3 10e9/L Final    Absolute Monocytes 0.7 0.0 - 1.3 10e9/L Final    Absolute Eosinophils 0.3 0.0 - 0.7 10e9/L Final         11/21/2018  6:11 PM      Component Results     Component Value Ref Range & Units Status    Sodium 140 133 - 144 mmol/L Final    Potassium 3.7 3.4 - 5.3 mmol/L Final    Chloride 108 94 - 109 mmol/L Final    Carbon Dioxide 28 20 - 32  mmol/L Final    Anion Gap 4 3 - 14 mmol/L Final    Glucose 73 70 - 99 mg/dL Final    Urea Nitrogen 23 7 - 30 mg/dL Final    Creatinine 0.86 0.52 - 1.04 mg/dL Final    GFR Estimate  >60 mL/min/1.7m2 Final    GFR not calculated, patient <16 years old.    Non  GFR Calc    GFR Estimate If Black  >60 mL/min/1.7m2 Final    GFR not calculated, patient <16 years old.     GFR Calc    Calcium 8.8 8.5 - 10.1 mg/dL Final         11/21/2018  5:41 PM      Component Results     Component Value Ref Range & Units Status    INR 1.05 0.86 - 1.14 Final         11/21/2018  5:56 PM      Component Results     Component Value Ref Range & Units Status    Troponin I ES <0.015 0.000 - 0.045 ug/L Final    The 99th percentile for upper reference range is 0.045 ug/L.  Troponin values   in the range of 0.045 - 0.120 ug/L may be associated with risks of adverse   clinical events.           11/21/2018  6:11 PM      Component Results     Component Value Ref Range & Units Status    N-Terminal Pro BNP Inpatient 196 0 - 900 pg/mL Final       Reference range shown and results flagged as abnormal are suggested inpatient   cut points for confirming diagnosis if CHF in an acute setting. Establishing a   baseline value for each individual patient is useful for follow-up. An   inpatient or emergency department NT-proPBNP <300 pg/mL effectively rules out   acute CHF, with 99% negative predictive value.  The outpatient non-acute reference range for ruling out CHF is:   0-125 pg/mL (age 18 to less than 75)   0-450 pg/mL (age 75 yrs and older)           11/21/2018  6:50 PM      Narrative     CT CHEST PULMONARY EMBOLISM WITH CONTRAST   11/21/2018 6:29 PM     HISTORY: Shortness of breath. High D-dimer.     TECHNIQUE: 78 mL Isovue-370. Radiation dose for this scan was reduced  using automated exposure control, adjustment of the mA and/or kV  according to patient size, or iterative reconstruction technique.    COMPARISON:  4/2/2018    FINDINGS: No evidence of pulmonary embolism or acute thoracic aortic  abnormality. No pneumothorax. No pleural or pericardial effusion. No  enlarged thoracic or axillary lymph nodes.    There are numerous scattered pulmonary nodules. A right upper lobe  nodule measures 0.4 cm (series 8, image 70), unchanged. A left lower  lobe pulmonary nodule measures 0.8 cm (series 8, image 171), not  significantly changed. Numerous other pulmonary nodules are not  significantly changed.    Images through the upper abdomen demonstrate innumerable liver masses  which enlarge the liver. This was better demonstrated on the prior  study with more appropriate timing of the contrast bolus for study of  the abdomen.    No sclerotic or lytic bone lesions are demonstrated.        Impression     IMPRESSION:  1. No evidence of pulmonary embolism or acute thoracic aortic  abnormality.  2. Metastatic disease demonstrated in the lungs and liver, grossly  similar to the prior study.    MARIAELENA ABDALLA MD                Clinical Quality Measure: Blood Pressure Screening     Your blood pressure was checked while you were in the emergency department today. The last reading we obtained was  BP: 129/82 . Please read the guidelines below about what these numbers mean and what you should do about them.  If your systolic blood pressure (the top number) is less than 120 and your diastolic blood pressure (the bottom number) is less than 80, then your blood pressure is normal. There is nothing more that you need to do about it.  If your systolic blood pressure (the top number) is 120-139 or your diastolic blood pressure (the bottom number) is 80-89, your blood pressure may be higher than it should be. You should have your blood pressure rechecked within a year by a primary care provider.  If your systolic blood pressure (the top number) is 140 or greater or your diastolic blood pressure (the bottom number) is 90 or greater, you may have high  "blood pressure. High blood pressure is treatable, but if left untreated over time it can put you at risk for heart attack, stroke, or kidney failure. You should have your blood pressure rechecked by a primary care provider within the next 4 weeks.  If your provider in the emergency department today gave you specific instructions to follow-up with your doctor or provider even sooner than that, you should follow that instruction and not wait for up to 4 weeks for your follow-up visit.        Thank you for choosing [facility]       Thank you for choosing [facility] for your care. Our goal is always to provide you with excellent care. Hearing back from our patients is one way we can continue to improve our services. Please take a few minutes to complete the written survey that you may receive in the mail after you visit with us. Thank you!        [facility] Information     [employer] lets you send messages to your doctor, view your test results, renew your prescriptions, schedule appointments and more. To sign up, go to [email or web address] . Click on \"Log in\" on the left side of the screen, which will take you to the Welcome page. Then click on \"Sign up Now\" on the right side of the page.     You will be asked to enter the access code listed below, as well as some personal information. Please follow the directions to create your username and password.     Your access code is: [email or web address]  Expires: 2019  7:24 PM     Your access code will  in 90 days. If you need help or a new code, please call your [facility] clinic or [phone].        Care EveryWhere ID     This is your Care EveryWhere ID. This could be used by other organizations to access your [facility] medical records  [id number]        Equal Access to Services     JUSTIN GERMAIN : luis Corey qaybta kaalmada adeegyada, waxay idiin hayaan adeeg kharash la'aan ah. So [facility] [phone].    ATENCIÓN: Si habla español, tiene a vanegas " disposición servicios gratuitos de asistencia lingüística. Maxim al [phone].    We comply with applicable federal civil rights laws and [place] laws. We do not discriminate on the basis of race, color, national origin, age, disability, sex, sexual orientation, or gender identity.            After Visit Summary       This is your record. Keep this with you and show to your community pharmacist(s) and doctor(s) at your next visit.

## 2018-11-21 NOTE — ED PROVIDER NOTES
History     Chief Complaint:  Shortness of Breath    HPI   Jany Park is a 73 year old female with a history of asthma and active cancer who presents to the emergency department today with shortness of breath. The patient reports that she has been having increased shortness of breath for the last few months that is constantly present. She thought this may be due to asthma, but during checkup today they noted her D Dimer was elevated and she was brought to the ED. She denies fever, cough, difficulty moving around. She denies sore throat, flu like symptoms. She denies recent travel, immobilizations, exposure to sick people.     D Dimer was 1.8.     Allergies:  Codeine    Medications:    Albuterol  Biotin   Caltrate   Fergon   Lasix  Glutamine   Ipratropium  Lisinopril  Meloxicam  Omeprazole   Ultram     Past Medical History:    Allergic rhinitis due to pollen  Anemia  Arthritis  Arthropathy, unspecified, site unspecified  Esophageal reflux  Endometrial cancer   Knee pain   Female incontinence   Fibromyalgia  Gastro-oesophageal reflux disease  Hypertension  Mild persistent asthma  Myalgia and myositis, unspecified  Osteopenia  Pain medication agreement    Past Surgical History:    Arthroplasty revision knee   delivery   Varicose vein surgery   Knee arthroplasty   Colonoscopy  Hysterectomy   Insert port vascular access     Family History:    Hypertension  Arthritis   Eye disorder   Psychotic disorder   Cancer     Social History:  The patient was accompanied to the ED by .  Smoking Status: Never  Alcohol Use: No    Marital Status:        Review of Systems   Constitutional: Negative for activity change and fever.   HENT: Negative for sore throat.    Respiratory: Positive for shortness of breath. Negative for cough.    All other systems reviewed and are negative.    Physical Exam     Patient Vitals for the past 24 hrs:   BP Temp Temp src Pulse Heart Rate Resp SpO2 Height Weight   18  "1800 113/66 - - - 107 11 95 % - -   11/21/18 1745 122/65 - - - 105 22 96 % - -   11/21/18 1730 116/69 - - - - - 96 % - -   11/21/18 1715 104/64 - - - - - 99 % - -   11/21/18 1700 119/82 - - - - - 100 % - -   11/21/18 1638 (!) 144/93 98.2  F (36.8  C) Temporal 100 - 18 97 % 1.676 m (5' 6\") 90.7 kg (200 lb)      Physical Exam   Constitutional: She appears well-developed and well-nourished.   HENT:   Right Ear: External ear normal.   Left Ear: External ear normal.   Mouth/Throat: Oropharynx is clear and moist. No oropharyngeal exudate.   TM's clear bilaterally   Eyes: Conjunctivae are normal. Pupils are equal, round, and reactive to light. No scleral icterus.   Neck: Normal range of motion. Neck supple. No JVD present.   Cardiovascular: Regular rhythm, normal heart sounds and intact distal pulses.  Exam reveals no gallop and no friction rub.    No murmur heard.  tachycardic   Pulmonary/Chest: Effort normal and breath sounds normal. No respiratory distress. She has no wheezes. She has no rales.   Abdominal: Soft. Bowel sounds are normal. She exhibits no distension and no mass. There is no tenderness.   Musculoskeletal: Normal range of motion. She exhibits no edema.   Neurological: She is alert.   Skin: Skin is warm and dry. No rash noted.   Psychiatric: She has a normal mood and affect.       Emergency Department Course     ECG:  Indication: shortness of breath   Completed at 1652.  Read at 1655.   Sinus tachycardia   Otherwise Normal ECG    Rate 103 bpm. MA interval 170. QRS duration 86. QT/QTc 338/442. P-R-T axes 49 40 36.     Imaging:  Radiology findings were communicated with the patient and family who voiced understanding of the findings.  CT Chest Pulmonary Embolism  IMPRESSION:  1. No evidence of pulmonary embolism or acute thoracic aortic abnormality.  2. Metastatic disease demonstrated in the lungs and liver, grossly similar to the prior study.  Report per radiology      Laboratory:  Laboratory findings were " "communicated with the patient and family who voiced understanding of the findings.  CBC: AWNL (WBC 8.3, HGB 11.5(L), )  BMP: AWNL (Creatinine 0.86)   INR: 1.05   Troponin (Collected 1714): <0.015   BNP: 196     Interventions:  1713: Duoneb, 3 mL, nebulization    1822: 0.9% NaCl 96mL IV Bolus     Emergency Department Course:  Nursing notes and vitals reviewed.  1647: I performed an exam of the patient as documented above.   IV was inserted and blood was drawn for laboratory testing, results above.  The patient was sent for a CT Chest while in the emergency department, results above.   Patient rechecked and updated. Patient is feeling better after Duoneb.   1857: Findings and plan explained to the Patient and spouse. Patient discharged home with instructions regarding supportive care, medications, and reasons to return. The importance of close follow-up was reviewed.    I personally reviewed the laboratory and imaging results with the Patient and spouse and answered all related questions prior to discharge.   Impression & Plan    Medical Decision Making:  Jany Park is a 73 year old female who was sent by her allergist after they found elevation of her D Dimer. She has had shortness of breath for several months now. She doesn't have any wheezing on exam, but after 1 Duoneb she felt \"a lot better\". I wonder if this is more of an asthma related issue than anything else. Her CT was negative for PE or aortic issues. She does have stable metastatic stable disease on CT. She did not exhibit any hypoxia and was feeling much improved on discharge. She does have an appointment with a pulmonologist on Monday next week, therefore I asked her to keep that appointment. I've instructed that she can do more frequent Nebs as needed. She can do up to every 4 hour albuterol if needed. For right now I will hold off on starting her on steroids. I will let the pulmonologist make that decision when she sees them on Monday. "         Diagnosis:    ICD-10-CM    1. Dyspnea, unspecified type R06.00        Disposition:  discharged to home    Scribe Disclosure:  I, Trista Jerrod, am serving as a scribe at 4:47 PM on 11/21/2018 to document services personally performed by Armando Hernandez MD based on my observations and the provider's statements to me.    11/21/2018   [facility] EMERGENCY DEPARTMENT       Armando Hernandez MD  11/21/18 2207

## 2018-11-22 NOTE — DISCHARGE INSTRUCTIONS
Continue albuterol every 4 hours  Rest  Push fluids  Discharge Instructions  Asthma    Asthma is a condition causing narrowing and inflammation of the airways that can make it hard to breathe.  Asthma can also cause cough, wheezing, noisy breathing and tightness in the chest.  Asthma can be brought on or  triggered  by many things, including dust, mold, pollen, cigarette smoke, exercise, stress and infections (like the common cold).     Generally, every Emergency Department visit should have a follow-up clinic visit with either a primary or a specialty clinic/provider. Please follow-up as instructed by your emergency provider today.    Return to the Emergency Department if:    Your breathing gets worse.    You need to use your inhaler more often than every 4 hours, or cannot get relief from your inhaler.    You are very weak, or feel much more ill.    You develop new symptoms, such as chest pain.    You cough up blood.    You are vomiting (throwing up) enough that you cannot keep fluids or your medicine down.    What can I do to help myself?    Fill any prescriptions the provider gave you and take them right away--especially antibiotics. Be sure to finish the whole antibiotic prescription.    You may be given a prescription for an inhaler, which can help loosen tight air passages.  Use this as needed, but not more often than directed. Inhalers work much better when used with a spacer.     You may be given a prescription for a steroid to reduce inflammation. Used long-term, these can have some side effects, but for short-term use they are safe. You may notice restlessness or increased appetite (eating more).        You may use non-prescription cough or cold medicines. Cough medicines may help, but do not make the cough go away completely.     Avoid smoke, because this can make you feel worse. If you smoke, this may be a good time to quit! Consider using nicotine lozenges, gum, or patches to reduce cravings.     If  you have a fever, Tylenol  (acetaminophen), Motrin  (ibuprofen), or Advil  (ibuprofen), may help bring fever down and may help you feel more comfortable. Be sure to read and follow the package directions, and ask your provider if you have questions.    Be sure to get your flu shot each year.  For certain age groups, the pneumonia shot can help prevent pneumonia.  It is important that you follow up with your regular provider, to be sure that you are improving from this spell (an acute asthma exacerbation), and also to do what you can to keep from having trouble again. Sometimes you need long-term medicines to keep your asthma under control.   If you were given a prescription for medicine here today, be sure to read all of the information (including the package insert) that comes with your prescription.  This will include important information about the medicine, its side effects, and any warnings that you need to know about.  The pharmacist who fills the prescription can provide more information and answer questions you may have about the medicine.  If you have questions or concerns that the pharmacist cannot address, please call or return to the Emergency Department.   Remember that you can always come back to the Emergency Department if you are not able to see your regular provider in the amount of time listed above, if you get any new symptoms, or if there is anything that worries you.

## 2018-11-23 LAB — INTERPRETATION ECG - MUSE: NORMAL

## 2018-12-21 ENCOUNTER — HOSPITAL ENCOUNTER (OUTPATIENT)
Dept: CARDIOLOGY | Facility: CLINIC | Age: 73
Discharge: HOME | End: 2018-12-21
Attending: INTERNAL MEDICINE | Admitting: INTERNAL MEDICINE
Payer: MEDICARE

## 2018-12-21 DIAGNOSIS — R06.9 UNSPECIFIED ABNORMALITIES OF BREATHING: ICD-10-CM

## 2018-12-21 DIAGNOSIS — R06.00 DYSPNEA: ICD-10-CM

## 2018-12-21 DIAGNOSIS — R60.9 EDEMA: ICD-10-CM

## 2018-12-21 PROCEDURE — 93306 TTE W/DOPPLER COMPLETE: CPT

## 2018-12-21 PROCEDURE — 93306 TTE W/DOPPLER COMPLETE: CPT | Mod: 26 | Performed by: INTERNAL MEDICINE

## 2020-06-08 ENCOUNTER — MEDICAL CORRESPONDENCE (OUTPATIENT)
Dept: HEALTH INFORMATION MANAGEMENT | Facility: CLINIC | Age: 75
End: 2020-06-08

## 2020-06-08 DIAGNOSIS — C54.1 ENDOMETRIAL SARCOMA (H): Primary | ICD-10-CM

## 2020-06-22 ENCOUNTER — HOSPITAL ENCOUNTER (OUTPATIENT)
Dept: CARDIOLOGY | Facility: CLINIC | Age: 75
Discharge: HOME | End: 2020-06-22
Attending: NURSE PRACTITIONER | Admitting: NURSE PRACTITIONER
Payer: MEDICARE

## 2020-06-22 ENCOUNTER — MEDICAL CORRESPONDENCE (OUTPATIENT)
Dept: HEALTH INFORMATION MANAGEMENT | Facility: CLINIC | Age: 75
End: 2020-06-22

## 2020-06-22 DIAGNOSIS — C54.1 ENDOMETRIAL SARCOMA (H): ICD-10-CM

## 2020-06-22 PROCEDURE — 93005 ELECTROCARDIOGRAM TRACING: CPT

## 2020-06-22 PROCEDURE — 93010 ELECTROCARDIOGRAM REPORT: CPT | Performed by: INTERNAL MEDICINE

## 2020-06-23 DIAGNOSIS — C54.1 ENDOMETRIAL SARCOMA (H): Primary | ICD-10-CM

## 2020-06-23 DIAGNOSIS — C54.1: ICD-10-CM

## 2020-06-25 LAB — INTERPRETATION ECG - MUSE: NORMAL

## 2020-07-13 ENCOUNTER — HOSPITAL ENCOUNTER (OUTPATIENT)
Dept: CARDIOLOGY | Facility: CLINIC | Age: 75
Discharge: HOME | End: 2020-07-13
Attending: INTERNAL MEDICINE | Admitting: INTERNAL MEDICINE
Payer: MEDICARE

## 2020-07-13 DIAGNOSIS — C54.1 ENDOMETRIAL SARCOMA (H): ICD-10-CM

## 2020-07-13 PROCEDURE — 93010 ELECTROCARDIOGRAM REPORT: CPT | Performed by: INTERNAL MEDICINE

## 2020-07-13 PROCEDURE — 93005 ELECTROCARDIOGRAM TRACING: CPT

## 2020-07-16 LAB — INTERPRETATION ECG - MUSE: NORMAL

## 2020-07-30 ENCOUNTER — APPOINTMENT (OUTPATIENT)
Dept: CT IMAGING | Facility: CLINIC | Age: 75
DRG: 372 | End: 2020-07-30
Attending: EMERGENCY MEDICINE
Payer: MEDICARE

## 2020-07-30 ENCOUNTER — HOSPITAL ENCOUNTER (INPATIENT)
Facility: CLINIC | Age: 75
LOS: 5 days | Discharge: HOME | DRG: 372 | End: 2020-08-04
Attending: EMERGENCY MEDICINE | Admitting: HOSPITALIST
Payer: MEDICARE

## 2020-07-30 DIAGNOSIS — C78.7 LIVER METASTASES: ICD-10-CM

## 2020-07-30 DIAGNOSIS — K82.9 GALLBLADDER DISEASE: ICD-10-CM

## 2020-07-30 DIAGNOSIS — C78.89 METASTASIS TO SPLEEN (H): ICD-10-CM

## 2020-07-30 DIAGNOSIS — T88.7XXA MEDICATION SIDE EFFECTS: ICD-10-CM

## 2020-07-30 DIAGNOSIS — E86.0 DEHYDRATION: ICD-10-CM

## 2020-07-30 DIAGNOSIS — R19.7 DIARRHEA, UNSPECIFIED TYPE: ICD-10-CM

## 2020-07-30 DIAGNOSIS — K51.00 PANCOLITIS (H): ICD-10-CM

## 2020-07-30 DIAGNOSIS — K43.9 VENTRAL HERNIA WITHOUT OBSTRUCTION OR GANGRENE: ICD-10-CM

## 2020-07-30 DIAGNOSIS — C78.6 PERITONEAL METASTASES: ICD-10-CM

## 2020-07-30 DIAGNOSIS — A04.72 C. DIFFICILE COLITIS: Primary | ICD-10-CM

## 2020-07-30 PROBLEM — K52.3 COLITIS, INDETERMINATE: Status: ACTIVE | Noted: 2020-07-30

## 2020-07-30 LAB
ALBUMIN SERPL-MCNC: 2 G/DL (ref 3.4–5)
ALP SERPL-CCNC: 195 U/L (ref 40–150)
ALT SERPL W P-5'-P-CCNC: 20 U/L (ref 0–50)
ANION GAP SERPL CALCULATED.3IONS-SCNC: 7 MMOL/L (ref 3–14)
AST SERPL W P-5'-P-CCNC: 11 U/L (ref 0–45)
BASOPHILS # BLD AUTO: 0 10E9/L (ref 0–0.2)
BASOPHILS NFR BLD AUTO: 0.1 %
BILIRUB SERPL-MCNC: 0.6 MG/DL (ref 0.2–1.3)
BUN SERPL-MCNC: 32 MG/DL (ref 7–30)
C DIFF TOX B STL QL: POSITIVE
CALCIUM SERPL-MCNC: 7.6 MG/DL (ref 8.5–10.1)
CHLORIDE SERPL-SCNC: 102 MMOL/L (ref 94–109)
CO2 SERPL-SCNC: 23 MMOL/L (ref 20–32)
CREAT SERPL-MCNC: 1.02 MG/DL (ref 0.52–1.04)
CRP SERPL-MCNC: 108 MG/L (ref 0–8)
DIFFERENTIAL METHOD BLD: ABNORMAL
EOSINOPHIL # BLD AUTO: 0 10E9/L (ref 0–0.7)
EOSINOPHIL NFR BLD AUTO: 0.1 %
ERYTHROCYTE [DISTWIDTH] IN BLOOD BY AUTOMATED COUNT: 14.6 % (ref 10–15)
GFR SERPL CREATININE-BSD FRML MDRD: 54 ML/MIN/{1.73_M2}
GLUCOSE SERPL-MCNC: 97 MG/DL (ref 70–99)
HCT VFR BLD AUTO: 34.9 % (ref 35–47)
HGB BLD-MCNC: 11.5 G/DL (ref 11.7–15.7)
IMM GRANULOCYTES # BLD: 0.1 10E9/L (ref 0–0.4)
IMM GRANULOCYTES NFR BLD: 0.5 %
LABORATORY COMMENT REPORT: NORMAL
LACTATE BLD-SCNC: 1.3 MMOL/L (ref 0.7–2)
LYMPHOCYTES # BLD AUTO: 1.4 10E9/L (ref 0.8–5.3)
LYMPHOCYTES NFR BLD AUTO: 6.8 %
MCH RBC QN AUTO: 27.8 PG (ref 26.5–33)
MCHC RBC AUTO-ENTMCNC: 33 G/DL (ref 31.5–36.5)
MCV RBC AUTO: 85 FL (ref 78–100)
MONOCYTES # BLD AUTO: 1.7 10E9/L (ref 0–1.3)
MONOCYTES NFR BLD AUTO: 8.5 %
NEUTROPHILS # BLD AUTO: 16.9 10E9/L (ref 1.6–8.3)
NEUTROPHILS NFR BLD AUTO: 84 %
NRBC # BLD AUTO: 0 10*3/UL
NRBC BLD AUTO-RTO: 0 /100
PLATELET # BLD AUTO: 425 10E9/L (ref 150–450)
POTASSIUM SERPL-SCNC: 4 MMOL/L (ref 3.4–5.3)
PROT SERPL-MCNC: 5.2 G/DL (ref 6.8–8.8)
RBC # BLD AUTO: 4.13 10E12/L (ref 3.8–5.2)
SARS-COV-2 RNA SPEC QL NAA+PROBE: NEGATIVE
SARS-COV-2 RNA SPEC QL NAA+PROBE: NORMAL
SODIUM SERPL-SCNC: 132 MMOL/L (ref 133–144)
SPECIMEN SOURCE: ABNORMAL
SPECIMEN SOURCE: NORMAL
SPECIMEN SOURCE: NORMAL
WBC # BLD AUTO: 20.1 10E9/L (ref 4–11)

## 2020-07-30 PROCEDURE — 87493 C DIFF AMPLIFIED PROBE: CPT | Performed by: EMERGENCY MEDICINE

## 2020-07-30 PROCEDURE — 25000128 H RX IP 250 OP 636: Performed by: EMERGENCY MEDICINE

## 2020-07-30 PROCEDURE — 25000132 ZZH RX MED GY IP 250 OP 250 PS 637: Performed by: PHYSICIAN ASSISTANT

## 2020-07-30 PROCEDURE — 12000011 ZZH R&B MS OVERFLOW

## 2020-07-30 PROCEDURE — 25800030 ZZH RX IP 258 OP 636: Performed by: HOSPITALIST

## 2020-07-30 PROCEDURE — 86140 C-REACTIVE PROTEIN: CPT | Performed by: EMERGENCY MEDICINE

## 2020-07-30 PROCEDURE — 80053 COMPREHEN METABOLIC PANEL: CPT | Performed by: EMERGENCY MEDICINE

## 2020-07-30 PROCEDURE — 83605 ASSAY OF LACTIC ACID: CPT | Performed by: EMERGENCY MEDICINE

## 2020-07-30 PROCEDURE — 87040 BLOOD CULTURE FOR BACTERIA: CPT | Performed by: EMERGENCY MEDICINE

## 2020-07-30 PROCEDURE — 25000125 ZZHC RX 250: Performed by: EMERGENCY MEDICINE

## 2020-07-30 PROCEDURE — 96361 HYDRATE IV INFUSION ADD-ON: CPT

## 2020-07-30 PROCEDURE — C9803 HOPD COVID-19 SPEC COLLECT: HCPCS

## 2020-07-30 PROCEDURE — 74177 CT ABD & PELVIS W/CONTRAST: CPT

## 2020-07-30 PROCEDURE — 87506 IADNA-DNA/RNA PROBE TQ 6-11: CPT | Performed by: EMERGENCY MEDICINE

## 2020-07-30 PROCEDURE — 96374 THER/PROPH/DIAG INJ IV PUSH: CPT | Mod: 59

## 2020-07-30 PROCEDURE — 36415 COLL VENOUS BLD VENIPUNCTURE: CPT | Performed by: EMERGENCY MEDICINE

## 2020-07-30 PROCEDURE — 25000132 ZZH RX MED GY IP 250 OP 250 PS 637: Performed by: HOSPITALIST

## 2020-07-30 PROCEDURE — U0003 INFECTIOUS AGENT DETECTION BY NUCLEIC ACID (DNA OR RNA); SEVERE ACUTE RESPIRATORY SYNDROME CORONAVIRUS 2 (SARS-COV-2) (CORONAVIRUS DISEASE [COVID-19]), AMPLIFIED PROBE TECHNIQUE, MAKING USE OF HIGH THROUGHPUT TECHNOLOGIES AS DESCRIBED BY CMS-2020-01-R: HCPCS | Performed by: EMERGENCY MEDICINE

## 2020-07-30 PROCEDURE — 99223 1ST HOSP IP/OBS HIGH 75: CPT | Mod: AI | Performed by: HOSPITALIST

## 2020-07-30 PROCEDURE — 25800030 ZZH RX IP 258 OP 636: Performed by: EMERGENCY MEDICINE

## 2020-07-30 PROCEDURE — 99285 EMERGENCY DEPT VISIT HI MDM: CPT | Mod: 25

## 2020-07-30 PROCEDURE — 85025 COMPLETE CBC W/AUTO DIFF WBC: CPT | Performed by: EMERGENCY MEDICINE

## 2020-07-30 RX ORDER — POTASSIUM CHLORIDE 1500 MG/1
20-40 TABLET, EXTENDED RELEASE ORAL
Status: DISCONTINUED | OUTPATIENT
Start: 2020-07-30 | End: 2020-08-04 | Stop reason: HOSPADM

## 2020-07-30 RX ORDER — CELECOXIB 100 MG/1
100 CAPSULE ORAL 2 TIMES DAILY
Status: DISCONTINUED | OUTPATIENT
Start: 2020-07-30 | End: 2020-08-04 | Stop reason: HOSPADM

## 2020-07-30 RX ORDER — MAGNESIUM SULFATE HEPTAHYDRATE 40 MG/ML
4 INJECTION, SOLUTION INTRAVENOUS EVERY 4 HOURS PRN
Status: DISCONTINUED | OUTPATIENT
Start: 2020-07-30 | End: 2020-08-04 | Stop reason: HOSPADM

## 2020-07-30 RX ORDER — BUDESONIDE AND FORMOTEROL FUMARATE DIHYDRATE 160; 4.5 UG/1; UG/1
2 AEROSOL RESPIRATORY (INHALATION) DAILY
COMMUNITY
Start: 2020-02-13 | End: 2024-01-01

## 2020-07-30 RX ORDER — ACETAMINOPHEN 650 MG/1
650 SUPPOSITORY RECTAL EVERY 4 HOURS PRN
Status: DISCONTINUED | OUTPATIENT
Start: 2020-07-30 | End: 2020-08-04 | Stop reason: HOSPADM

## 2020-07-30 RX ORDER — ONDANSETRON 2 MG/ML
4 INJECTION INTRAMUSCULAR; INTRAVENOUS EVERY 6 HOURS PRN
Status: DISCONTINUED | OUTPATIENT
Start: 2020-07-30 | End: 2020-08-04 | Stop reason: HOSPADM

## 2020-07-30 RX ORDER — POTASSIUM CL/LIDO/0.9 % NACL 10MEQ/0.1L
10 INTRAVENOUS SOLUTION, PIGGYBACK (ML) INTRAVENOUS
Status: DISCONTINUED | OUTPATIENT
Start: 2020-07-30 | End: 2020-08-04 | Stop reason: HOSPADM

## 2020-07-30 RX ORDER — SODIUM CHLORIDE 9 MG/ML
INJECTION, SOLUTION INTRAVENOUS CONTINUOUS
Status: DISCONTINUED | OUTPATIENT
Start: 2020-07-30 | End: 2020-08-01

## 2020-07-30 RX ORDER — ACETAMINOPHEN 325 MG/1
650 TABLET ORAL EVERY 4 HOURS PRN
Status: DISCONTINUED | OUTPATIENT
Start: 2020-07-30 | End: 2020-08-04 | Stop reason: HOSPADM

## 2020-07-30 RX ORDER — POTASSIUM CHLORIDE 1.5 G/1.58G
20-40 POWDER, FOR SOLUTION ORAL
Status: DISCONTINUED | OUTPATIENT
Start: 2020-07-30 | End: 2020-08-04 | Stop reason: HOSPADM

## 2020-07-30 RX ORDER — TRAMADOL HYDROCHLORIDE 50 MG/1
50-100 TABLET ORAL EVERY 6 HOURS PRN
Status: DISCONTINUED | OUTPATIENT
Start: 2020-07-30 | End: 2020-08-04 | Stop reason: HOSPADM

## 2020-07-30 RX ORDER — NALOXONE HYDROCHLORIDE 0.4 MG/ML
.1-.4 INJECTION, SOLUTION INTRAMUSCULAR; INTRAVENOUS; SUBCUTANEOUS
Status: DISCONTINUED | OUTPATIENT
Start: 2020-07-30 | End: 2020-08-04 | Stop reason: HOSPADM

## 2020-07-30 RX ORDER — IOPAMIDOL 755 MG/ML
101 INJECTION, SOLUTION INTRAVASCULAR ONCE
Status: COMPLETED | OUTPATIENT
Start: 2020-07-30 | End: 2020-07-30

## 2020-07-30 RX ORDER — POTASSIUM CHLORIDE 7.45 MG/ML
10 INJECTION INTRAVENOUS
Status: DISCONTINUED | OUTPATIENT
Start: 2020-07-30 | End: 2020-08-04 | Stop reason: HOSPADM

## 2020-07-30 RX ORDER — ONDANSETRON 4 MG/1
4 TABLET, ORALLY DISINTEGRATING ORAL EVERY 6 HOURS PRN
Status: DISCONTINUED | OUTPATIENT
Start: 2020-07-30 | End: 2020-08-04 | Stop reason: HOSPADM

## 2020-07-30 RX ORDER — DEXTROSE MONOHYDRATE, SODIUM CHLORIDE, AND POTASSIUM CHLORIDE 50; 1.49; 4.5 G/1000ML; G/1000ML; G/1000ML
INJECTION, SOLUTION INTRAVENOUS ONCE
Status: DISCONTINUED | OUTPATIENT
Start: 2020-07-30 | End: 2020-07-30

## 2020-07-30 RX ORDER — SODIUM CHLORIDE 9 MG/ML
INJECTION, SOLUTION INTRAVENOUS CONTINUOUS
Status: DISCONTINUED | OUTPATIENT
Start: 2020-07-30 | End: 2020-07-30

## 2020-07-30 RX ORDER — POTASSIUM CHLORIDE 29.8 MG/ML
20 INJECTION INTRAVENOUS
Status: DISCONTINUED | OUTPATIENT
Start: 2020-07-30 | End: 2020-07-30

## 2020-07-30 RX ORDER — VANCOMYCIN HYDROCHLORIDE 125 MG/1
125 CAPSULE ORAL 4 TIMES DAILY
Status: DISCONTINUED | OUTPATIENT
Start: 2020-07-30 | End: 2020-08-04 | Stop reason: HOSPADM

## 2020-07-30 RX ORDER — ONDANSETRON 2 MG/ML
4 INJECTION INTRAMUSCULAR; INTRAVENOUS ONCE
Status: COMPLETED | OUTPATIENT
Start: 2020-07-30 | End: 2020-07-30

## 2020-07-30 RX ORDER — LIDOCAINE 40 MG/G
CREAM TOPICAL
Status: DISCONTINUED | OUTPATIENT
Start: 2020-07-30 | End: 2020-08-04 | Stop reason: HOSPADM

## 2020-07-30 RX ADMIN — IOPAMIDOL 101 ML: 755 INJECTION, SOLUTION INTRAVENOUS at 15:35

## 2020-07-30 RX ADMIN — CELECOXIB 100 MG: 100 CAPSULE ORAL at 21:26

## 2020-07-30 RX ADMIN — SODIUM CHLORIDE 69 ML: 9 INJECTION, SOLUTION INTRAVENOUS at 15:35

## 2020-07-30 RX ADMIN — SODIUM CHLORIDE, PRESERVATIVE FREE: 5 INJECTION INTRAVENOUS at 18:43

## 2020-07-30 RX ADMIN — SODIUM CHLORIDE 1000 ML: 9 INJECTION, SOLUTION INTRAVENOUS at 14:35

## 2020-07-30 RX ADMIN — SODIUM CHLORIDE, POTASSIUM CHLORIDE, SODIUM LACTATE AND CALCIUM CHLORIDE 1000 ML: 600; 310; 30; 20 INJECTION, SOLUTION INTRAVENOUS at 14:35

## 2020-07-30 RX ADMIN — VANCOMYCIN HYDROCHLORIDE 125 MG: 125 CAPSULE ORAL at 21:26

## 2020-07-30 RX ADMIN — ONDANSETRON 4 MG: 2 INJECTION INTRAMUSCULAR; INTRAVENOUS at 14:35

## 2020-07-30 ASSESSMENT — ENCOUNTER SYMPTOMS
BLOOD IN STOOL: 0
ABDOMINAL PAIN: 1
VOMITING: 0
WEAKNESS: 1
NAUSEA: 1
FEVER: 1
DIARRHEA: 1
SHORTNESS OF BREATH: 0
MYALGIAS: 0
COUGH: 0

## 2020-07-30 ASSESSMENT — MIFFLIN-ST. JEOR: SCORE: 1266.31

## 2020-07-30 ASSESSMENT — ACTIVITIES OF DAILY LIVING (ADL): ADLS_ACUITY_SCORE: 15

## 2020-07-30 NOTE — ED PROVIDER NOTES
History     Chief Complaint:  Diarrhea    HPI   Jany Park is a 75 year old female with a history of endometrial cancer, hypertension, and hyperlipidemia who presents with diarrhea. The patient states that she has had 2.5 weeks on diarrhea, leading to nausea without vomiting and weakness. She states that she has already had 6-7 episodes today with abdominal pain only with intermittent cramping. She also reports a fever reaching 101.7 this morning that was lowered to 100.5 after treatment with Tylenol. She denies any history of CDIF. The patient reports that she was started on a new oral endometrial cancer treatment, Lenvatinib, by her oncologist, Dr. Katerine MD at the beginning of . She is also receiving a Keytruda infusion every three weeks. The patient states that she consulted with her oncologist, who stopped the Lenvatinib and began her on Prednisone to treat the diarrhea, without relief. She ended the Prednisone last week. She reports that she had a hysterectomy in  and that her endometrial cancer has spread to her colon and liver. She denies any shortness of breath, cough, chest pain, myalgia or blood in stool. She reports loss of taste, but states it is a known side effect of her medications.     Allergies:  Codeine  Cephalexin   Trazodone      Medications:    Ultram   Lisinopril   Mobic   Symbicort  Prilosec  Albuterol   Compazine   Lasix   Ambien   Aspirin 81 mg   Lenvatinib   Keytruda    Past Medical History:    Endometrial cancer  Arthritis   Esophageal reflux  GERD  Hypertension   Asthma   DJD  Fibromyalgia   Osteopenia   Hyperlipidemia   Female stress incontinence    Past Surgical History:    Arthroplasty revision knee   delivery   Total knee arthroplasty rt and lt   Colonoscopy thru stoma   Hysterectomy   Insert port vascular access     Family History:    Father: hypertension, arthritis, cataracts  Mother: alzheimer's disease  Brother: cancer     Social History:  Smoking  status: no  Alcohol use: no  Drug use: no  The patient presents to the emergency department alone by personal vehicle     PCP: Selvin Car  Marital Status:   [2]    Review of Systems   Constitutional: Positive for fever.   Respiratory: Negative for cough and shortness of breath.    Cardiovascular: Negative for chest pain.   Gastrointestinal: Positive for abdominal pain, diarrhea and nausea. Negative for blood in stool and vomiting.   Musculoskeletal: Negative for myalgias.   Neurological: Positive for weakness.   All other systems reviewed and are negative.      Physical Exam     Patient Vitals for the past 24 hrs:   BP Temp Temp src Pulse Heart Rate Resp SpO2   07/30/20 1308 98/60 97.8  F (36.6  C) Oral 115 115 20 100 %       Physical Exam  General: Resting uncomfortably on the gurney  Head:  The scalp, face, and head appear normal  Eyes:  The pupils are equal, round, and reactive to light    There is no nystagmus    Extraocular muscles are intact    Conjunctivae and sclerae are normal  ENT:    The nose is normal    Pinnae are normal    The oropharynx is normal    Uvula is in the midline  Neck:  Normal range of motion    There is no rigidity noted    There is no midline cervical spine pain/tenderness    Trachea is in the midline    No mass is detected  CV:  Tachycardic rate and underlying rhythm     Normal S1/S2, no S3/S4    No pathological murmur detected  Resp:  Lungs are clear    There is no tachypnea    Non-labored    No rales    No wheezing   GI:  Abdomen is soft, there is no rigidity    There is a well healed surgical scar to the low abdomen. No focal area of pain.     There is mild hepatomegaly    There is a probable ventral hernia in the midline    No distension    No tympani    No rebound tenderness     Non-surgical without peritoneal features  MS:  Normal muscular tone    Symmetric motor strength    No major joint effusions    No asymmetric leg swelling, no calf tenderness  Skin:  No rash  or acute skin lesions noted    There is a port noted to the upper chest.   Neuro: Speech is normal and fluent  Psych:  Awake. Alert.      Normal affect.  Appropriate interactions.  Lymph: No anterior cervical lymphadenopathy noted    Emergency Department Course   Imaging:  Radiology findings were communicated with the patient who voiced understanding of the findings.    CT Abdomen Pelvic w Contrast  1. Diffuse colonic wall prominence and ill definition which could be  related to nonspecific colitis.  2. Nodular soft tissue 3.8 x 1.4 x 4.2 mass between the spleen and  splenic flexure of the colon which may represent peritoneal  metastasis.  3. Numerous hepatic metastasis. There is also a hypodense lesion  anteriorly in the spleen which is increased to 3.7 cm from 2.7 cm in  2018.  4. Anterior abdominal midline broad-based hernia containing a portion  of the transverse colon. No evidence of bowel incarceration or  obstruction.  5. Marked gallbladder distention of uncertain significance.  6. Left lower lobe 0.7 cm pulmonary nodule, stable since 4/2/2018 and  therefore presumably benign.    As read by Radiology.    Laboratory:  Laboratory findings were communicated with the patient who voiced understanding of the findings.    CMP: Sodium: 132 (L), Urea nitrogen: 32 (H), GFR Estimate: 54 (L), calcium: 7.6 (L), albumin: 2.0 (L), protein total: 5.2 (L), alkaline phosphatase: 195 (H) o/w WNL (Creatinine 1.02)    CBC: WBC 20.1 (H), HGB 11.5 (L) o/w WNL ()    Lactic acid whole blood: 1.3     Blood culture x2: pending     Clostridium difficile toxin B PCR: pending    Enteric bacteria and virus panel by [facility] stool: pending     Interventions:  1435 NS 1L IV Bolus  1435 Lactated ringers Bolus 1L IV  1435 Zofran 4 mg IV    Emergency Department Course:  Past medical records, nursing notes, and vitals reviewed.  1401: I performed an exam of the patient and obtained history, as documented above.     IV inserted and blood  drawn.    The patient was sent for a abdominal and pelvic CT while in the emergency department, results above.     4:21 PM: I rechecked the patient. I reviewed the results with the Patient and answered all related questions prior to admission.     4:21 PM   I discussed the patient with Dr. To, of hospitalist.      Impression & Plan   Medical Decision Making:  This patient presents to the emergency department with approximately 2 weeks of diarrhea as noted above.  It was believed that this diarrhea was attributed to the lenvatinib however that medication was stopped about a week ago and the patient still having fairly significant diarrhea.  There is no recent antibiotic use.  No history of C. difficile.  The patient does have extensive metastatic disease from endometrial cancer.  She has metastases in the liver and spleen and possibly a peritoneal metastasis as well.  She comes in mainly with fairly recalcitrant diarrhea.  She has some intermittent abdominal cramps but there is really no significant abdominal pain or focality.    The patient underwent CT scan of the abdomen which shows extensive metastases to the liver.  A splenic metastasis.  A probable peritoneal metastasis.  There is a distended gallbladder of unclear significance.  The possibility of a calculus cholecystitis is in the differential diagnosis but does not entirely fit with the clinical presentation.  Patient does have a leukocytosis and trace left shift.  Her monocytes are elevated as well.  The differential diagnosis includes viral etiologies, COVID-19, bacterial etiologies, and C. difficile colitis.  The patient has enteric panel and C. difficile pending at this time.    The patient has no cardiopulmonary symptoms.  There is no upper respiratory tract symptoms.  COVID-19 is less likely.  The patient does have prerenal azotemia and dehydration.  She is hydrated in the emergency department with 2 L of crystalloid and this will be  continued.    Patient's lactate is not significantly elevated, there is no evidence of severe sepsis or septic shock at this juncture.    The patient's Keytruda and lenvatinib have been stopped at this time.  Lenvatinib has a very high incidence of decreased appetite, nausea and a 40 to 60% incidence of diarrhea.  1 would think that the diarrhea would have settled down by now if it was caused by the lenvatinib    Diagnosis:    ICD-10-CM    1. Pancolitis (H)  K51.00    2. Liver metastases (H)  C78.7    3. Gallbladder disease  K82.9    4. 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 Peritoneal metastases (H)  C78.6    6. Ventral hernia without obstruction or gangrene  K43.9    7. Dehydration  E86.0    8. Diarrhea, unspecified type  R19.7    9. Medication side effects  T88.7XXA        Disposition:  Admitted to the hospitalist service, Dr. Yousuf Henderson  7/30/2020    EMERGENCY DEPARTMENT  Scribe Disclosure:  I, Danna Henderson, am serving as a scribe at 2:01 PM on 7/30/2020 to document services personally performed by Abran Elizondo MD based on my observations and the provider's statements to me.          Abran Eliznodo MD  07/30/20 2913

## 2020-07-30 NOTE — H&P
[facility]    History and Physical - Hospitalist Service       Date of Admission:  7/30/2020    Assessment & Plan   Jany Park is a 75 year old female admitted on 7/30/2020 with pancolitis and history of 2.5 weeks of watery diarrhea and fever that started today.    Diarrhea, watery  Pancolitis on CT  Distended gallbladder, nontender exam (likely incidental, unrelated finding)  2 and half weeks of watery diarrhea, nausea without vomiting, and generalized weakness.  Some cramping but not much abdominal pain.  Fever started this morning.  As below patient was on Keytruda and lenvatinib, oncologist started prednisone about 2 weeks ago because the diarrhea but this did not improve anything.  The past 5 days she has been off her prednisone and lenvatinib.  - Enteric pathogens including C. difficile pending from ED  - COVID-19 pending, this will be symptomatic particular due to the fever this morning although I have low suspicion of COVID-19 at this point. If negative, COVID precautions can be discontinued.   - Status post IV fluid resuscitation in ED, will continue maintenance fluid  - Electrolyte monitoring and replacement    History of endometrial cancer status post hysterectomy in 2015  Metastases to the colon and liver  Patient was follows with Dr. Garcias or oncology.  Patient recently on Keytruda and lenvatinib.  Diarrhea noted to start about 2 and half weeks ago and oncology started prednisone with no improvement.  The past week she has been off of lenvatinib and prednisone.  Some nausea without vomiting not much significant abdominal pain.  Fever started this morning.  - We will consult oncology to alert that the patient is hospitalized    Incidentally seen LLL 7mm nodule  Stable since April 2018.    Anterior midline hernia, non incarcerated  Nontender exam. Incidental finding. Consider surgery consultation if symptomatic.     Hypertension  Hyperlipidemia  Stable upon admission.   Appears to previously been on lisinopril which was recently stopped.  PRN Lasix noted and will not be ordered at this time.  - Continue to monitor    Degenerative joint disease  PTA meloxicam continued for now, unclear if this could play a role in her colitis however seems less likely.  PRN tramadol prn to continue.    Asthma  Stable upon admission.  - PTA inhaler to continue prn    GERD  Stable upon admission.  - PTA PPI to continue      Diet: regular  DVT Prophylaxis: Pneumatic Compression Devices  Ochoa Catheter: not present  Code Status: Full Code -discussed in detail and confirmed upon admission  Rule Out COVID-19 Handoff:  Jany is a LOW SUSPICION PUI.  Follow these instructions:    If COVID test positive -> continue isolation precautions    If COVID test negative -> discontinue COVID-specific isolation precautions       Disposition Plan   Expected discharge: 2 days possibly, pending improvement and test results  Entered: Vinod To MD 07/30/2020, 5:16 PM     The patient's care was discussed with the Patient and ED MD.    Vinod To MD  [facility]    ______________________________________________________________________    Chief Complaint   Watery diarrhea and fever     History is obtained from the patient    History of Present Illness   Jany Park is a very pleasant 75 year old female with history of endometrial cancer status post hysterectomy 2015 and metastases in the abdomen recently on Keytruda and lenvatinib, GERD, hypertension, hyperlipidemia, arthritis and asthma who presented to the ED with 2 and half weeks of watery diarrhea.  Associated with this was nausea and weakness but she has not had any vomiting.  She had between 5 and 10 episodes a day with some cramping prior to bowel movement.  This morning was the first issue to fever and was as high as 101.7 at home before Tylenol decreased it to around 100.  She denies any chest pain, palpitations, shortness of  breath, coughing, headache, significant abdominal pain, dysuria or lower extremity swelling.  She has been taking her medications as directed.  She follows with Dr. Garcias for oncology and was started on lenvatinib beginning of last month in addition to Keytruda infusion every 3 weeks.  Her oncologist started her on prednisone for about a week ending 5 days ago while continuing her lenvatinib because the diarrhea however this is that improvement.  Since that time she has been off of lenvatinib and prednisone.  She has no known sick contacts or recent travel.  Of note she does report a loss of taste but this is a known side effect of her cancer medication.    In the ED she was seen by Dr. Elizondo with whom I discussed the case.  Patient is afebrile, hemodynamically stable and saturating normally on room air.  She has had a few sips of water and been able to tolerated okay.  She has had 2 more bouts of watery diarrhea in the ED.  She has a leukocytosis of 20.1 her sodium is mildly low at 132.  Additionally BUN 32 creatinine 1.02.  Albumin low at 2.0, bilirubin 0.6, alkaline phosphatase 195, other transaminases normal.  Lactic acid 1.3.  He has received IV fluids with some improvement of weakness sensation.  Enteric pathogens including C. difficile are pending.  CT abdomen pelvis completed-details as below-essentially showing pancolitis either infectious or inflammatory possibly, metastases as below which patient appears to know about, distended gallbladder, and a left lower lobe 0.7 cm pulmonary nodule which appears stable since April 2018.  Plan for now is admission for continued IV fluid hydration, enteric pathogen results, and COVID-19 results.    Review of Systems    The 10 point Review of Systems is negative other than noted in the HPI or here.     Past Medical History    I have reviewed this patient's medical history and updated it with pertinent information if needed.   Past Medical History:   Diagnosis Date      "Allergic rhinitis due to pollen      Anemia      Arthritis      Arthropathy, unspecified, site unspecified     Follows with rheumatologist, Dr Romo     Cancer (H)     endometrial cancer     Esophageal reflux      Fibromyalgia      Gastro-oesophageal reflux disease      Hypertension      Mild persistent asthma 2005     Myalgia and myositis, unspecified     Fibromyalgia      Osteopenia      Pain medication agreement        Past Surgical History   I have reviewed this patient's surgical history and updated it with pertinent information if needed.  Past Surgical History:   Procedure Laterality Date     ARTHROPLASTY REVISION KNEE  2013    Procedure: ARTHROPLASTY REVISION KNEE;  REVISION LEFT TOTAL KNEE (Biomet);  Surgeon: Efrain Lopez MD;  Location:  OR       DELIVERY ONLY       C NONSPECIFIC PROCEDURE  2005    Left lower leg varicose vein surgery     C TOTAL KNEE ARTHROPLASTY      Rt. knee     C TOTAL KNEE ARTHROPLASTY      Lt. Knee     HC COLONOSCOPY THRU STOMA, DIAGNOSTIC  2005    Normal. Had \"twisting\" of colon requiring medical attention after procedure, no surgery.      HYSTERECTOMY       INSERT PORT VASCULAR ACCESS N/A 10/2/2015    Procedure: INSERT PORT VASCULAR ACCESS;  Surgeon: Christopher Gamez MD;  Location:  OR       Social History   I have reviewed this patient's social history and updated it with pertinent information if needed.      Family History   I have reviewed this patient's family history and updated it with pertinent information if needed.  Family History   Problem Relation Age of Onset     Hypertension Father          age 91.      Arthritis Father      Eye Disorder Father         cataracts     Psychotic Disorder Mother         Alzheimers,  age 83     Cancer Brother         cancer (mesothelioma),  age 64       Prior to Admission Medications   Prior to Admission Medications   Prescriptions Last Dose Informant Patient " Reported? Taking?   ALBUTEROL 90 MCG/ACT IN AERS 7/30/2020 at 0800  No Yes   Patient taking differently: Inhale 1-2 puffs into the lungs every 6 hours as needed for shortness of breath / dyspnea    Biotin (BIOTIN FORTE) 5 MG TABS 7/30/2020 at 0800  Yes Yes   Sig: Take 10,000 mcg by mouth daily    Furosemide (LASIX PO) More than a month at Unknown time  Yes No   Sig: Take 20 mg by mouth daily as needed    LISINOPRIL 10 MG OR TABS   No No   Sig: ONE DAILY   MULTIPLE VITAMINS OR TABS 7/30/2020 at 0800  No Yes   Patient taking differently: Take 1 tablet by mouth daily    budesonide-formoterol (SYMBICORT) 160-4.5 MCG/ACT Inhaler 7/30/2020 at 0800  Yes Yes   Sig: Inhale 2 puffs into the lungs daily   ferrous gluconate (FERGON) 324 (38 FE) MG tablet More than a month at Unknown time  No No   Sig: Take 1 tablet by mouth daily (with breakfast).   meloxicam (MOBIC) 7.5 MG tablet 7/30/2020 at 0800  Yes Yes   Sig: Take 7.5 mg by mouth 2 times daily    omeprazole (PRILOSEC) 40 MG DR capsule 7/30/2020 at 0800  Yes Yes   Sig: Take 40 mg by mouth every morning    traMADol (ULTRAM) 50 MG tablet 7/30/2020 at 1200  No Yes   Sig: Take 1-2 tablets by mouth every 6 hours as needed.      Facility-Administered Medications: None     Allergies   Allergies   Allergen Reactions     Codeine Nausea and Vomiting       Physical Exam   Vital Signs: Temp: 97.8  F (36.6  C) Temp src: Oral BP: 125/77 Pulse: 108 Heart Rate: 115 Resp: 20 SpO2: 100 %      Weight: 0 lbs 0 oz    Gen: NAD, pleasant  HEENT: Normocephalic, EOMI, MMM  Resp: no crackles,  no wheezes, no increased work of resp  CV: S1S2 heard, reg rhythm, reg rate, no pedal edema  Abdo: soft, nontender, nondistended, bowel sounds present  Ext: calves nontender, well perfused  Neuro: AAOx3, CN grossly intact, no facial asymmetry      Data   Data reviewed today: I reviewed all medications, new labs and imaging results over the last 24 hours. I personally reviewed no images or EKG's  today.    Recent Labs   Lab 07/30/20  1343   WBC 20.1*   HGB 11.5*   MCV 85      *   POTASSIUM 4.0   CHLORIDE 102   CO2 23   BUN 32*   CR 1.02   ANIONGAP 7   LOUISA 7.6*   GLC 97   ALBUMIN 2.0*   PROTTOTAL 5.2*   BILITOTAL 0.6   ALKPHOS 195*   ALT 20   AST 11     Recent Results (from the past 24 hour(s))   CT Abdomen Pelvis w Contrast    Narrative    CT ABDOMEN AND PELVIS WITH CONTRAST 7/30/2020 3:40 PM     HISTORY: Severe diarrhea, 2 weeks, abdominal cramps.  History of  metastatic endometrial cancer to liver.  Colitis.    COMPARISON: 11/21/2018 chest CT    CONTRAST DOSE:  101mL Isovue-[id number]    Radiation dose for this scan was reduced using automated exposure  control, adjustment of the mA and/or kV according to patient size, or  iterative reconstruction technique.    FINDINGS: Numerous diffuse metastasis are noted throughout the liver.  3.7 cm hypodense lesion is noted within the anterior aspect of the  spleen, increased from 2.7 cm in 2018. The gallbladder is markedly  distended but otherwise unremarkable. The kidneys, adrenal glands, and  pancreas appear within normal limits. Broad-based anterior midline  abdominal supraumbilical midline hernia is noted with a portion of the  transverse colon protruding into the hernia. Diffuse colonic wall  prominence is noted. There is no evidence of bowel obstruction. No  free peritoneal fluid or air. Nodular 3.8 x 1.7 x 4.2 cm soft tissue  mass is noted between the splenic flexure and spleen with a small  eccentric calcification. Pelvic contents appear within normal limits.      Impression    IMPRESSION:  1. Diffuse colonic wall prominence and ill definition which could be  related to nonspecific colitis.  2. Nodular soft tissue 3.8 x 1.4 x 4.2 mass between the spleen and  splenic flexure of the colon which may represent peritoneal  metastasis.  3. Numerous hepatic metastasis. There is also a hypodense lesion  anteriorly in the spleen which is increased to 3.7 cm  from 2.7 cm in  2018.  4. Anterior abdominal midline broad-based hernia containing a portion  of the transverse colon. No evidence of bowel incarceration or  obstruction.  5. Marked gallbladder distention of uncertain significance.  6. Left lower lobe 0.7 cm pulmonary nodule, stable since 4/2/2018 and  therefore presumably benign.    REYNALDO REINOSO MD

## 2020-07-30 NOTE — PHARMACY-ADMISSION MEDICATION HISTORY
Pharmacy Medication History  Admission medication history interview status for the 7/30/2020  admission is complete. See EPIC admission navigator for prior to admission medications     Medication history sources: Patient and Surescripts  Medication history source reliability: Good  Adherence assessment: Good    Significant changes made to the medication list:    ---Patient states lisinopril is currently on hold and has not x3 days.     Medication reconciliation completed by provider prior to medication history? No    Time spent in this activity: 10 minutes      Prior to Admission medications    Medication Sig Last Dose Taking? Auth Provider   ALBUTEROL 90 MCG/ACT IN AERS  7/30/2020 at 0800 Yes    Biotin (BIOTIN FORTE) 5 MG TABS Take 10,000 mcg by mouth daily  7/30/2020 at 0800 Yes Reported, Patient   budesonide-formoterol (SYMBICORT) 160-4.5 MCG/ACT Inhaler Inhale 2 puffs into the lungs daily 7/30/2020 at 0800 Yes Unknown, Entered By History   meloxicam (MOBIC) 7.5 MG tablet Take 7.5 mg by mouth 2 times daily  7/30/2020 at 0800 Yes Reported, Patient   MULTIPLE VITAMINS OR TABS  7/30/2020 at 0800 Yes    omeprazole (PRILOSEC) 40 MG DR capsule Take 40 mg by mouth every morning  7/30/2020 at 0800 Yes Reported, Patient   traMADol (ULTRAM) 50 MG tablet Take 1-2 tablets by mouth every 6 hours as needed. 7/30/2020 at 1200 Yes Ric Gupta PA-C   ferrous gluconate (FERGON) 324 (38 FE) MG tablet Take 1 tablet by mouth daily (with breakfast). More than a month at Unknown time  Efrain Lopez MD   Furosemide (LASIX PO) Take 20 mg by mouth daily as needed  More than a month at Unknown time  Reported, Patient   LISINOPRIL 10 MG OR TABS ONE DAILY   Alek Medina MD

## 2020-07-30 NOTE — PROGRESS NOTES
RECEIVING UNIT ED HANDOFF REVIEW    ED Nurse Handoff Report was reviewed by: Christiane Turner RN on July 30, 2020 at 5:54 PM

## 2020-07-30 NOTE — ED NOTES
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 Diarrhea for the past 3 days, fever of 101.7 today  Focused Assessment:  Alert, oriented.      Treatments and/or interventions provided: labs, imaging  Patient's response to treatments and/or interventions:   Labs Ordered and Resulted from Time of ED Arrival Up to the Time of Departure from the ED   CBC WITH PLATELETS DIFFERENTIAL - Abnormal; Notable for the following components:       Result Value    WBC 20.1 (*)     Hemoglobin 11.5 (*)     Hematocrit 34.9 (*)     Absolute Neutrophil 16.9 (*)     Absolute Monocytes 1.7 (*)     All other components within normal limits   COMPREHENSIVE METABOLIC PANEL - Abnormal; Notable for the following components:    Sodium 132 (*)     Urea Nitrogen 32 (*)     GFR Estimate 54 (*)     Calcium 7.6 (*)     Albumin 2.0 (*)     Protein Total 5.2 (*)     Alkaline Phosphatase 195 (*)     All other components within normal limits   LACTIC ACID WHOLE BLOOD   CRP INFLAMMATION   COVID-19 VIRUS (CORONAVIRUS) BY PCR   IV ACCESS   BLOOD CULTURE   ENTERIC BACTERIA AND VIRUS PANEL BY MINGO STOOL   CLOSTRIDIUM DIFFICILE TOXIN B   BLOOD CULTURE         To be done/followed up on inpatient unit:      Does this patient have any cognitive concerns?: none    Activity level - Baseline/Home:  Independent  Activity Level - Current:   Stand with Assist    Patient's Preferred language: English   Needed?: No    Isolation: None and Other: r/o covid  Infection: Not Applicable  R/0  covid  Bariatric?: No    Vital Signs:   Vitals:    07/30/20 1308 07/30/20 1400 07/30/20 1500   BP: 98/60 123/64 125/77   Pulse: 115 110 108   Resp: 20     Temp: 97.8  F (36.6  C)     TempSrc: Oral     SpO2: 100% 100% 100%       Cardiac Rhythm:     Was the PSS-3 completed:   Yes  What interventions are required if any?               Family Comments:   OBS brochure/video discussed/provided to patient/family: N/A              Name of person given brochure if not patient:               Relationship to patient:     For the majority of the shift this patient's behavior was Green.   Behavioral interventions performed were .    ED NURSE PHONE NUMBER: [phone]

## 2020-07-31 LAB
ANION GAP SERPL CALCULATED.3IONS-SCNC: 6 MMOL/L (ref 3–14)
BUN SERPL-MCNC: 21 MG/DL (ref 7–30)
C COLI+JEJUNI+LARI FUSA STL QL NAA+PROBE: NOT DETECTED
CALCIUM SERPL-MCNC: 7.3 MG/DL (ref 8.5–10.1)
CHLORIDE SERPL-SCNC: 103 MMOL/L (ref 94–109)
CO2 SERPL-SCNC: 23 MMOL/L (ref 20–32)
CREAT SERPL-MCNC: 0.84 MG/DL (ref 0.52–1.04)
EC STX1 GENE STL QL NAA+PROBE: NOT DETECTED
EC STX2 GENE STL QL NAA+PROBE: NOT DETECTED
ENTERIC PATHOGEN COMMENT: NORMAL
ERYTHROCYTE [DISTWIDTH] IN BLOOD BY AUTOMATED COUNT: 14.6 % (ref 10–15)
GFR SERPL CREATININE-BSD FRML MDRD: 68 ML/MIN/{1.73_M2}
GLUCOSE SERPL-MCNC: 84 MG/DL (ref 70–99)
HCT VFR BLD AUTO: 32.3 % (ref 35–47)
HGB BLD-MCNC: 10.5 G/DL (ref 11.7–15.7)
LACTATE BLD-SCNC: 1 MMOL/L (ref 0.7–2)
MAGNESIUM SERPL-MCNC: 1.2 MG/DL (ref 1.6–2.3)
MCH RBC QN AUTO: 27.7 PG (ref 26.5–33)
MCHC RBC AUTO-ENTMCNC: 32.5 G/DL (ref 31.5–36.5)
MCV RBC AUTO: 85 FL (ref 78–100)
NOROV GI+II ORF1-ORF2 JNC STL QL NAA+PR: NOT DETECTED
PLATELET # BLD AUTO: 415 10E9/L (ref 150–450)
POTASSIUM SERPL-SCNC: 3.6 MMOL/L (ref 3.4–5.3)
RBC # BLD AUTO: 3.79 10E12/L (ref 3.8–5.2)
RVA NSP5 STL QL NAA+PROBE: NOT DETECTED
SALMONELLA SP RPOD STL QL NAA+PROBE: NOT DETECTED
SHIGELLA SP+EIEC IPAH STL QL NAA+PROBE: NOT DETECTED
SODIUM SERPL-SCNC: 132 MMOL/L (ref 133–144)
V CHOL+PARA RFBL+TRKH+TNAA STL QL NAA+PR: NOT DETECTED
WBC # BLD AUTO: 17.4 10E9/L (ref 4–11)
Y ENTERO RECN STL QL NAA+PROBE: NOT DETECTED

## 2020-07-31 PROCEDURE — 83735 ASSAY OF MAGNESIUM: CPT | Performed by: HOSPITALIST

## 2020-07-31 PROCEDURE — 25000132 ZZH RX MED GY IP 250 OP 250 PS 637: Performed by: PHYSICIAN ASSISTANT

## 2020-07-31 PROCEDURE — 83605 ASSAY OF LACTIC ACID: CPT | Performed by: HOSPITALIST

## 2020-07-31 PROCEDURE — 80048 BASIC METABOLIC PNL TOTAL CA: CPT | Performed by: HOSPITALIST

## 2020-07-31 PROCEDURE — 85027 COMPLETE CBC AUTOMATED: CPT | Performed by: HOSPITALIST

## 2020-07-31 PROCEDURE — 99232 SBSQ HOSP IP/OBS MODERATE 35: CPT | Performed by: HOSPITALIST

## 2020-07-31 PROCEDURE — 12000000 ZZH R&B MED SURG/OB

## 2020-07-31 PROCEDURE — 25800030 ZZH RX IP 258 OP 636: Performed by: HOSPITALIST

## 2020-07-31 PROCEDURE — 25000132 ZZH RX MED GY IP 250 OP 250 PS 637: Performed by: HOSPITALIST

## 2020-07-31 RX ADMIN — FLUTICASONE FUROATE AND VILANTEROL TRIFENATATE 1 PUFF: 200; 25 POWDER RESPIRATORY (INHALATION) at 08:27

## 2020-07-31 RX ADMIN — VANCOMYCIN HYDROCHLORIDE 125 MG: 125 CAPSULE ORAL at 11:49

## 2020-07-31 RX ADMIN — SODIUM CHLORIDE, PRESERVATIVE FREE: 5 INJECTION INTRAVENOUS at 04:39

## 2020-07-31 RX ADMIN — OMEPRAZOLE 40 MG: 20 CAPSULE, DELAYED RELEASE ORAL at 08:24

## 2020-07-31 RX ADMIN — TRAMADOL HYDROCHLORIDE 100 MG: 50 TABLET, FILM COATED ORAL at 15:26

## 2020-07-31 RX ADMIN — TRAMADOL HYDROCHLORIDE 100 MG: 50 TABLET, FILM COATED ORAL at 22:04

## 2020-07-31 RX ADMIN — VANCOMYCIN HYDROCHLORIDE 125 MG: 125 CAPSULE ORAL at 08:24

## 2020-07-31 RX ADMIN — VANCOMYCIN HYDROCHLORIDE 125 MG: 125 CAPSULE ORAL at 17:20

## 2020-07-31 RX ADMIN — CELECOXIB 100 MG: 100 CAPSULE ORAL at 08:25

## 2020-07-31 RX ADMIN — TRAMADOL HYDROCHLORIDE 100 MG: 50 TABLET, FILM COATED ORAL at 08:26

## 2020-07-31 RX ADMIN — VANCOMYCIN HYDROCHLORIDE 125 MG: 125 CAPSULE ORAL at 21:48

## 2020-07-31 RX ADMIN — CELECOXIB 100 MG: 100 CAPSULE ORAL at 21:48

## 2020-07-31 RX ADMIN — SODIUM CHLORIDE, PRESERVATIVE FREE: 5 INJECTION INTRAVENOUS at 14:33

## 2020-07-31 ASSESSMENT — ACTIVITIES OF DAILY LIVING (ADL)
AMBULATION: 0-->INDEPENDENT
TRANSFERRING: 2-->ASSISTIVE PERSON
ADLS_ACUITY_SCORE: 17
ADLS_ACUITY_SCORE: 19
BATHING: 0-->INDEPENDENT
TOILETING: 2-->ASSISTIVE PERSON
COGNITION: 0 - NO COGNITION ISSUES REPORTED
WHICH_OF_THE_ABOVE_FUNCTIONAL_RISKS_HAD_A_RECENT_ONSET_OR_CHANGE?: TOILETING
FALL_HISTORY_WITHIN_LAST_SIX_MONTHS: NO
ADLS_ACUITY_SCORE: 17
SWALLOWING: 0-->SWALLOWS FOODS/LIQUIDS WITHOUT DIFFICULTY
ADLS_ACUITY_SCORE: 18
ADLS_ACUITY_SCORE: 17
RETIRED_EATING: 0-->INDEPENDENT
RETIRED_COMMUNICATION: 0-->UNDERSTANDS/COMMUNICATES WITHOUT DIFFICULTY
DRESS: 0-->INDEPENDENT
ADLS_ACUITY_SCORE: 17

## 2020-07-31 NOTE — CONSULTS
[facility]    Oncology Consultation     Date of Admission:  7/30/2020  Date of Consult (When I saw the patient): 07/31/20    Assessment & Plan   Jany Park is a 75 year old female who was admitted on 7/30/2020. I was asked to see the patient for endometrial cancer.    Endometrial Cancer  --s/p debulking surgery 2/2015 followed by paclitaxel and carboplatin  --liver metastases noted 8/2015  --s/p doxil, topotecan, further paclitaxel and carboplatin, hormone therapy with megace/tamoxifen  --began pembrolizumab with lenvatinib 6/1/10  --CT 7/30 noted with colitis and malignancy  --will have CT on admission compared to prior CT SI from about 2-3 months ago to evaluate disease response  --hold current treatment until colitis resolved, and can be re-evaluated as out patient    Colitis  --developed 2-3 weeks prior to admission  --did not respond to empiric streroids for 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    Leukocytosis  --reactive  --monitor    COVID  --negative    Jason Garcias        Code Status    Full Code    Primary Care Physician   Selvin Torres    History of Present Illness   Jany Park is a 75 year old female who presents with diarrhea    Past Medical History   I have reviewed this patient's medical history and updated it with pertinent information if needed.   Past Medical History:   Diagnosis Date     Allergic rhinitis due to pollen      Anemia      Arthritis      Arthropathy, unspecified, site unspecified     Follows with rheumatologist, Dr Romo     Cancer (H)     endometrial cancer     Esophageal reflux      Fibromyalgia      Gastro-oesophageal reflux disease      Hypertension      Mild persistent asthma 4/1/2005     Myalgia and myositis, unspecified     Fibromyalgia      Osteopenia      Pain medication agreement   "      Past Surgical History   I have reviewed this patient's surgical history and updated it with pertinent information if needed.  Past Surgical History:   Procedure Laterality Date     ARTHROPLASTY REVISION KNEE  2013    Procedure: ARTHROPLASTY REVISION KNEE;  REVISION LEFT TOTAL KNEE (Biomet);  Surgeon: Efrain Lopez MD;  Location:  OR     C  DELIVERY ONLY       C NONSPECIFIC PROCEDURE  2005    Left lower leg varicose vein surgery     C TOTAL KNEE ARTHROPLASTY      Rt. knee     C TOTAL KNEE ARTHROPLASTY      Lt. Knee     HC COLONOSCOPY THRU STOMA, DIAGNOSTIC  2005    Normal. Had \"twisting\" of colon requiring medical attention after procedure, no surgery.      HYSTERECTOMY       INSERT PORT VASCULAR ACCESS N/A 10/2/2015    Procedure: INSERT PORT VASCULAR ACCESS;  Surgeon: Christopher Gamez MD;  Location:  OR       Prior to Admission Medications   Prior to Admission Medications   Prescriptions Last Dose Informant Patient Reported? Taking?   ALBUTEROL 90 MCG/ACT IN AERS 2020 at 0800  No Yes   Patient taking differently: Inhale 1-2 puffs into the lungs every 6 hours as needed for shortness of breath / dyspnea    Biotin (BIOTIN FORTE) 5 MG TABS 2020 at 0800  Yes Yes   Sig: Take 10,000 mcg by mouth daily    Furosemide (LASIX PO) More than a month at Unknown time  Yes No   Sig: Take 20 mg by mouth daily as needed    LISINOPRIL 10 MG OR TABS   No No   Sig: ONE DAILY   MULTIPLE VITAMINS OR TABS 2020 at 0800  No Yes   Patient taking differently: Take 1 tablet by mouth daily    budesonide-formoterol (SYMBICORT) 160-4.5 MCG/ACT Inhaler 2020 at 0800  Yes Yes   Sig: Inhale 2 puffs into the lungs daily   ferrous gluconate (FERGON) 324 (38 FE) MG tablet More than a month at Unknown time  No No   Sig: Take 1 tablet by mouth daily (with breakfast).   meloxicam (MOBIC) 7.5 MG tablet 2020 at 0800  Yes Yes   Sig: Take 7.5 mg by mouth 2 times daily  "   omeprazole (PRILOSEC) 40 MG DR capsule 2020 at 0800  Yes Yes   Sig: Take 40 mg by mouth every morning    traMADol (ULTRAM) 50 MG tablet 2020 at 1200  No Yes   Sig: Take 1-2 tablets by mouth every 6 hours as needed.      Facility-Administered Medications: None     Allergies   Allergies   Allergen Reactions     Codeine Nausea and Vomiting       Social History   I have reviewed this patient's social history and updated it with pertinent information if needed. Jany Park  reports that she has never smoked. She does not have any smokeless tobacco history on file. She reports that she does not drink alcohol or use drugs.    Family History   I have reviewed this patient's family history and updated it with pertinent information if needed.   Family History   Problem Relation Age of Onset     Hypertension Father          age 91.      Arthritis Father      Eye Disorder Father         cataracts     Psychotic Disorder Mother         Alzheimers,  age 83     Cancer Brother         cancer (mesothelioma),  age 64       Review of Systems   The 5 point Review of Systems is negative other than noted in the HPI or here.     Physical Exam   Temp: 96.4  F (35.8  C) Temp src: Oral BP: 108/62 Pulse: 108 Heart Rate: 108 Resp: 18 SpO2: 96 % O2 Device: None (Room air)    Vital Signs with Ranges  Temp:  [96.4  F (35.8  C)-98.7  F (37.1  C)] 96.4  F (35.8  C)  Pulse:  [108-115] 108  Heart Rate:  [106-126] 108  Resp:  [16-20] 18  BP: ()/(59-77) 108/62  SpO2:  [95 %-100 %] 96 %  180 lbs 15.96 oz    Constitutional: awake, cooperative, no acute distress  GI:  soft, non-distended, non-tender  Musculoskeletal: no pedal edema    Data   Results for orders placed or performed during the hospital encounter of 20 (from the past 24 hour(s))   CBC with platelets differential   Result Value Ref Range    WBC 20.1 (H) 4.0 - 11.0 10e9/L    RBC Count 4.13 3.8 - 5.2 10e12/L    Hemoglobin 11.5 (L) 11.7 - 15.7 g/dL     Hematocrit 34.9 (L) 35.0 - 47.0 %    MCV 85 78 - 100 fl    MCH 27.8 26.5 - 33.0 pg    MCHC 33.0 31.5 - 36.5 g/dL    RDW 14.6 10.0 - 15.0 %    Platelet Count 425 150 - 450 10e9/L    Diff Method Automated Method     % Neutrophils 84.0 %    % Lymphocytes 6.8 %    % Monocytes 8.5 %    % Eosinophils 0.1 %    % Basophils 0.1 %    % Immature Granulocytes 0.5 %    Nucleated RBCs 0 0 /100    Absolute Neutrophil 16.9 (H) 1.6 - 8.3 10e9/L    Absolute Lymphocytes 1.4 0.8 - 5.3 10e9/L    Absolute Monocytes 1.7 (H) 0.0 - 1.3 10e9/L    Absolute Eosinophils 0.0 0.0 - 0.7 10e9/L    Absolute Basophils 0.0 0.0 - 0.2 10e9/L    Abs Immature Granulocytes 0.1 0 - 0.4 10e9/L    Absolute Nucleated RBC 0.0    Comprehensive metabolic panel   Result Value Ref Range    Sodium 132 (L) 133 - 144 mmol/L    Potassium 4.0 3.4 - 5.3 mmol/L    Chloride 102 94 - 109 mmol/L    Carbon Dioxide 23 20 - 32 mmol/L    Anion Gap 7 3 - 14 mmol/L    Glucose 97 70 - 99 mg/dL    Urea Nitrogen 32 (H) 7 - 30 mg/dL    Creatinine 1.02 0.52 - 1.04 mg/dL    GFR Estimate 54 (L) >60 mL/min/[1.73_m2]    GFR Estimate If Black 63 >60 mL/min/[1.73_m2]    Calcium 7.6 (L) 8.5 - 10.1 mg/dL    Bilirubin Total 0.6 0.2 - 1.3 mg/dL    Albumin 2.0 (L) 3.4 - 5.0 g/dL    Protein Total 5.2 (L) 6.8 - 8.8 g/dL    Alkaline Phosphatase 195 (H) 40 - 150 U/L    ALT 20 0 - 50 U/L    AST 11 0 - 45 U/L   Lactic acid whole blood   Result Value Ref Range    Lactic Acid 1.3 0.7 - 2.0 mmol/L   Blood culture    Specimen: Blood    Port~Chest   Result Value Ref Range    Specimen Description Blood Port Chest     Culture Micro No growth after 14 hours    CRP inflammation   Result Value Ref Range    CRP Inflammation 108.0 (H) 0.0 - 8.0 mg/L   Enteric Bacteria and Virus Panel by MINGO Stool    Specimen: Feces   Result Value Ref Range    Campylobacter group by MINGO Not Detected NDET^Not Detected    Salmonella species by MINGO Not Detected NDET^Not Detected    Shigella species by MINGO Not Detected NDET^Not  Detected    Vibrio group by MINGO Not Detected NDET^Not Detected    Rotavirus A by MINGO Not Detected NDET^Not Detected    Shiga toxin 1 gene by MINGO Not Detected NDET^Not Detected    Shiga toxin 2 gene by MINGO Not Detected NDET^Not Detected    Norovirus I and II by MINGO Not Detected NDET^Not Detected    Yersinia enterocolitica by MINGO Not Detected NDET^Not Detected    Enteric pathogen comment       Testing performed by multiplexed, qualitative PCR using the [employer] Enteric   Pathogens Nucleic Acid Test. Results should not be used as the sole basis for diagnosis,   treatment, or other patient management decisions.     Clostridium difficile toxin B PCR    Specimen: Feces   Result Value Ref Range    Specimen Description Feces     C Diff Toxin B PCR Positive (A) NEG^Negative   CT Abdomen Pelvis w Contrast    Narrative    CT ABDOMEN AND PELVIS WITH CONTRAST 7/30/2020 3:40 PM     HISTORY: Severe diarrhea, 2 weeks, abdominal cramps.  History of  metastatic endometrial cancer to liver.  Colitis.    COMPARISON: 11/21/2018 chest CT    CONTRAST DOSE:  101mL [id number]    Radiation dose for this scan was reduced using automated exposure  control, adjustment of the mA and/or kV according to patient size, or  iterative reconstruction technique.    FINDINGS: Numerous diffuse metastasis are noted throughout the liver.  3.7 cm hypodense lesion is noted within the anterior aspect of the  spleen, increased from 2.7 cm in 2018. The gallbladder is markedly  distended but otherwise unremarkable. The kidneys, adrenal glands, and  pancreas appear within normal limits. Broad-based anterior midline  abdominal supraumbilical midline hernia is noted with a portion of the  transverse colon protruding into the hernia. Diffuse colonic wall  prominence is noted. There is no evidence of bowel obstruction. No  free peritoneal fluid or air. Nodular 3.8 x 1.7 x 4.2 cm soft tissue  mass is noted between the splenic flexure and spleen with a  small  eccentric calcification. Pelvic contents appear within normal limits.      Impression    IMPRESSION:  1. Diffuse colonic wall prominence and ill definition which could be  related to nonspecific colitis.  2. Nodular soft tissue 3.8 x 1.4 x 4.2 mass between the spleen and  splenic flexure of the colon which may represent peritoneal  metastasis.  3. Numerous hepatic metastasis. There is also a hypodense lesion  anteriorly in the spleen which is increased to 3.7 cm from 2.7 cm in  2018.  4. Anterior abdominal midline broad-based hernia containing a portion  of the transverse colon. No evidence of bowel incarceration or  obstruction.  5. Marked gallbladder distention of uncertain significance.  6. Left lower lobe 0.7 cm pulmonary nodule, stable since 4/2/2018 and  therefore presumably benign.    REYNALDO REINOSO MD   Symptomatic COVID-19 Virus (Coronavirus) by PCR    Specimen: Nasopharyngeal   Result Value Ref Range    COVID-19 Virus PCR to [facility] - Source Nasopharyngeal     COVID-19 Virus PCR to [facility] - Result       Test received-See reflex to IDDL test SARS CoV2 (COVID-19) Virus RT-PCR   SARS-CoV-2 COVID-19 Virus (Coronavirus) RT-PCR Nasopharyngeal    Specimen: Nasopharyngeal   Result Value Ref Range    SARS-CoV-2 Virus Specimen Source Nasopharyngeal     SARS-CoV-2 PCR Result NEGATIVE     SARS-CoV-2 PCR Comment       Testing was performed using the Xpert Xpress SARS-CoV-2 Assay on the Cepheid Gene-Xpert   Instrument Systems. Additional information about this Emergency Use Authorization (EUA)   assay can be found via the Lab Guide.     Blood culture    Specimen: Blood   Result Value Ref Range    Specimen Description Blood     Special Requests Right Arm     Culture Micro No growth after 8 hours    Lactic acid whole blood   Result Value Ref Range    Lactic Acid 1.0 0.7 - 2.0 mmol/L   Basic metabolic panel   Result Value Ref Range    Sodium 132 (L) 133 - 144 mmol/L    Potassium 3.6 3.4 - 5.3 mmol/L    Chloride 103  94 - 109 mmol/L    Carbon Dioxide 23 20 - 32 mmol/L    Anion Gap 6 3 - 14 mmol/L    Glucose 84 70 - 99 mg/dL    Urea Nitrogen 21 7 - 30 mg/dL    Creatinine 0.84 0.52 - 1.04 mg/dL    GFR Estimate 68 >60 mL/min/[1.73_m2]    GFR Estimate If Black 79 >60 mL/min/[1.73_m2]    Calcium 7.3 (L) 8.5 - 10.1 mg/dL   CBC with platelets   Result Value Ref Range    WBC 17.4 (H) 4.0 - 11.0 10e9/L    RBC Count 3.79 (L) 3.8 - 5.2 10e12/L    Hemoglobin 10.5 (L) 11.7 - 15.7 g/dL    Hematocrit 32.3 (L) 35.0 - 47.0 %    MCV 85 78 - 100 fl    MCH 27.7 26.5 - 33.0 pg    MCHC 32.5 31.5 - 36.5 g/dL    RDW 14.6 10.0 - 15.0 %    Platelet Count 415 150 - 450 10e9/L

## 2020-07-31 NOTE — PROGRESS NOTES
[facility]    Cdiff +. Ordered oral vancomycin 125mg 4/xday.    Nancy Renner), PA-C  Hospitalist [facility]  Pager: [phone]

## 2020-07-31 NOTE — PLAN OF CARE
COVID neg. C.diff results: positive: MD notified: Vanco started. Aox4. VSS on RA. SBA + GB. Reg diet: tolerating. Loose BM x3: bedside commode. No c/o pain. Home medications locked in med room. L port infusing:  mL/hr. Hema/oncology consult pending. Labs to be drawn in AM. Okay to transfer. Continue to monitor.

## 2020-07-31 NOTE — PROGRESS NOTES
A&O, VsS,RA. Denied pain. SBA with transfer to [facility]. Frequent LS. C-diff positive. On oral abx. Report given to station 88 RN, Patient will be moved to  soon.

## 2020-07-31 NOTE — PROGRESS NOTES
[facility]    Medicine Progress Note - Hospitalist Service       Date of Admission:  7/30/2020  Assessment & Plan   C difficile colitis   2 and half weeks of watery diarrhea, nausea without vomiting, and generalized weakness.  Some cramping but not much abdominal pain.  Fever started this morning.  As below patient was on Keytruda and lenvatinib, oncologist started prednisone about 2 weeks ago because the diarrhea but this did not improve anything.  The past 5 days she has been off her prednisone and lenvatinib.  Positive for c diff, started oral vanco  - complete course of oral vancomycin  - enteric precautions  - continue IVF today     History of endometrial cancer status post hysterectomy in 2015  Metastases to the colon and liver  Patient was follows with Dr. Garcias or oncology.  Patient recently on Keytruda and lenvatinib.  Diarrhea noted to start about 2 and half weeks ago and oncology started prednisone with no improvement.  The past week she has been off of lenvatinib and prednisone.    Plan  - MOHPA rounded     Incidentally seen LLL 7mm nodule  Stable since April 2018.     Anterior midline hernia, non incarcerated  Nontender exam. Incidental finding. Consider surgery consultation if symptomatic.      Hypertension  Hyperlipidemia  Stable upon admission.  Appears to previously been on lisinopril which was recently stopped.  PRN Lasix noted and will not be ordered at this time.  - Continue to monitor     Degenerative joint disease  PTA meloxicam continued for now, unclear if this could play a role in her colitis however seems less likely.  PRN tramadol prn to continue.     Asthma  Stable upon admission.  - PTA inhaler to continue prn     GERD  Stable upon admission.  - PTA PPI to continue          Diet: Combination Diet Regular Diet Adult    DVT Prophylaxis: Pneumatic Compression Devices  Ochoa Catheter: not present  Code Status: Full Code           Disposition Plan   Expected discharge: 1-3  days, recommended to prior living arrangement once diarrhea improving and can maintain better oral intake.  Entered: Jason Ann DO 07/31/2020, 4:31 PM       The patient's care was discussed with the Patient.    Jason Ann DO  Hospitalist Service  [facility]    ______________________________________________________________________    Interval History   Frequent BM, not tolerating much orally. Started vancomycin    Data reviewed today: I reviewed all medications, new labs and imaging results over the last 24 hours. I personally reviewed no images or EKG's today.    Physical Exam   Vital Signs: Temp: 96.5  F (35.8  C) Temp src: Oral BP: 111/66   Heart Rate: 104 Resp: 18 SpO2: 98 % O2 Device: None (Room air)    Weight: 180 lbs 15.96 oz  Gen: NAD, pleasant  HEENT: Normocephalic, EOMI, MMM  Resp: no crackles,  no wheezes, no increased work of resp  CV: S1S2 heard, reg rhythm, reg rate, no pedal edema  Abdo: soft, nontender, nondistended, bowel sounds present  Ext: calves nontender, well perfused  Neuro: AAOx3, CN grossly intact, no facial asymmetry    Data   Recent Labs   Lab 07/31/20  0600 07/30/20  1343   WBC 17.4* 20.1*   HGB 10.5* 11.5*   MCV 85 85    425   * 132*   POTASSIUM 3.6 4.0   CHLORIDE 103 102   CO2 23 23   BUN 21 32*   CR 0.84 1.02   ANIONGAP 6 7   LOUISA 7.3* 7.6*   GLC 84 97   ALBUMIN  --  2.0*   PROTTOTAL  --  5.2*   BILITOTAL  --  0.6   ALKPHOS  --  195*   ALT  --  20   AST  --  11     No results found for this or any previous visit (from the past 24 hour(s)).  Medications     sodium chloride 100 mL/hr at 07/31/20 1433       celecoxib  100 mg Oral BID     fluticasone-vilanterol  1 puff Inhalation Daily     omeprazole  40 mg Oral QAM     sodium chloride (PF)  3 mL Intracatheter Q8H     vancomycin  125 mg Oral 4x Daily

## 2020-07-31 NOTE — PLAN OF CARE
Enteric precautions maintained. A&Ox4. Sepsis protocol fired, lactic acid came back at 1.0. Tachycardic, all other VSS on RA. Up SBA to BSC. Incontinent of bowel at times. Loose/watery stool x5. IVF @100 via port. Awaiting morning labs. C/o abdominal cramping after having bowel movement x1. Denies SOB, nausea, dizziness/lightheadedness. Tolerating regular diet. Hematology/oncology consult in place.

## 2020-08-01 LAB
ANION GAP SERPL CALCULATED.3IONS-SCNC: 5 MMOL/L (ref 3–14)
BUN SERPL-MCNC: 15 MG/DL (ref 7–30)
CALCIUM SERPL-MCNC: 7.4 MG/DL (ref 8.5–10.1)
CHLORIDE SERPL-SCNC: 107 MMOL/L (ref 94–109)
CO2 SERPL-SCNC: 24 MMOL/L (ref 20–32)
CREAT SERPL-MCNC: 0.74 MG/DL (ref 0.52–1.04)
ERYTHROCYTE [DISTWIDTH] IN BLOOD BY AUTOMATED COUNT: 14.7 % (ref 10–15)
GFR SERPL CREATININE-BSD FRML MDRD: 80 ML/MIN/{1.73_M2}
GLUCOSE SERPL-MCNC: 96 MG/DL (ref 70–99)
HCT VFR BLD AUTO: 32.6 % (ref 35–47)
HGB BLD-MCNC: 10.7 G/DL (ref 11.7–15.7)
LACTATE BLD-SCNC: 0.8 MMOL/L (ref 0.7–2)
MAGNESIUM SERPL-MCNC: 2.3 MG/DL (ref 1.6–2.3)
MCH RBC QN AUTO: 28 PG (ref 26.5–33)
MCHC RBC AUTO-ENTMCNC: 32.8 G/DL (ref 31.5–36.5)
MCV RBC AUTO: 85 FL (ref 78–100)
PLATELET # BLD AUTO: 410 10E9/L (ref 150–450)
POTASSIUM SERPL-SCNC: 3.3 MMOL/L (ref 3.4–5.3)
POTASSIUM SERPL-SCNC: 3.9 MMOL/L (ref 3.4–5.3)
RBC # BLD AUTO: 3.82 10E12/L (ref 3.8–5.2)
SODIUM SERPL-SCNC: 136 MMOL/L (ref 133–144)
WBC # BLD AUTO: 12.5 10E9/L (ref 4–11)

## 2020-08-01 PROCEDURE — 25800030 ZZH RX IP 258 OP 636: Performed by: HOSPITALIST

## 2020-08-01 PROCEDURE — 83605 ASSAY OF LACTIC ACID: CPT | Performed by: INTERNAL MEDICINE

## 2020-08-01 PROCEDURE — 84132 ASSAY OF SERUM POTASSIUM: CPT | Performed by: INTERNAL MEDICINE

## 2020-08-01 PROCEDURE — 25000132 ZZH RX MED GY IP 250 OP 250 PS 637: Performed by: HOSPITALIST

## 2020-08-01 PROCEDURE — 25000132 ZZH RX MED GY IP 250 OP 250 PS 637: Performed by: INTERNAL MEDICINE

## 2020-08-01 PROCEDURE — 99232 SBSQ HOSP IP/OBS MODERATE 35: CPT | Performed by: INTERNAL MEDICINE

## 2020-08-01 PROCEDURE — 12000000 ZZH R&B MED SURG/OB

## 2020-08-01 PROCEDURE — 25000128 H RX IP 250 OP 636: Performed by: HOSPITALIST

## 2020-08-01 PROCEDURE — 80048 BASIC METABOLIC PNL TOTAL CA: CPT | Performed by: HOSPITALIST

## 2020-08-01 PROCEDURE — 83735 ASSAY OF MAGNESIUM: CPT | Performed by: HOSPITALIST

## 2020-08-01 PROCEDURE — 25000128 H RX IP 250 OP 636: Performed by: INTERNAL MEDICINE

## 2020-08-01 PROCEDURE — 25000132 ZZH RX MED GY IP 250 OP 250 PS 637: Performed by: PHYSICIAN ASSISTANT

## 2020-08-01 PROCEDURE — 85027 COMPLETE CBC AUTOMATED: CPT | Performed by: HOSPITALIST

## 2020-08-01 RX ORDER — HEPARIN SODIUM,PORCINE 10 UNIT/ML
5-10 VIAL (ML) INTRAVENOUS EVERY 24 HOURS
Status: DISCONTINUED | OUTPATIENT
Start: 2020-08-01 | End: 2020-08-04 | Stop reason: HOSPADM

## 2020-08-01 RX ORDER — HEPARIN SODIUM (PORCINE) LOCK FLUSH IV SOLN 100 UNIT/ML 100 UNIT/ML
5 SOLUTION INTRAVENOUS
Status: DISCONTINUED | OUTPATIENT
Start: 2020-08-01 | End: 2020-08-04 | Stop reason: HOSPADM

## 2020-08-01 RX ORDER — HEPARIN SODIUM,PORCINE 10 UNIT/ML
5-10 VIAL (ML) INTRAVENOUS
Status: DISCONTINUED | OUTPATIENT
Start: 2020-08-01 | End: 2020-08-04 | Stop reason: HOSPADM

## 2020-08-01 RX ADMIN — VANCOMYCIN HYDROCHLORIDE 125 MG: 125 CAPSULE ORAL at 09:07

## 2020-08-01 RX ADMIN — TRAMADOL HYDROCHLORIDE 100 MG: 50 TABLET, FILM COATED ORAL at 15:24

## 2020-08-01 RX ADMIN — Medication 5 ML: at 17:09

## 2020-08-01 RX ADMIN — POTASSIUM CHLORIDE 20 MEQ: 1500 TABLET, EXTENDED RELEASE ORAL at 12:05

## 2020-08-01 RX ADMIN — TRAMADOL HYDROCHLORIDE 100 MG: 50 TABLET, FILM COATED ORAL at 09:07

## 2020-08-01 RX ADMIN — OMEPRAZOLE 40 MG: 20 CAPSULE, DELAYED RELEASE ORAL at 09:07

## 2020-08-01 RX ADMIN — CELECOXIB 100 MG: 100 CAPSULE ORAL at 09:07

## 2020-08-01 RX ADMIN — VANCOMYCIN HYDROCHLORIDE 125 MG: 125 CAPSULE ORAL at 12:05

## 2020-08-01 RX ADMIN — VANCOMYCIN HYDROCHLORIDE 125 MG: 125 CAPSULE ORAL at 19:57

## 2020-08-01 RX ADMIN — ACETAMINOPHEN 650 MG: 325 TABLET, FILM COATED ORAL at 12:05

## 2020-08-01 RX ADMIN — VANCOMYCIN HYDROCHLORIDE 125 MG: 125 CAPSULE ORAL at 15:24

## 2020-08-01 RX ADMIN — MAGNESIUM SULFATE IN WATER 4 G: 40 INJECTION, SOLUTION INTRAVENOUS at 02:57

## 2020-08-01 RX ADMIN — CELECOXIB 100 MG: 100 CAPSULE ORAL at 19:57

## 2020-08-01 RX ADMIN — POTASSIUM CHLORIDE 40 MEQ: 1500 TABLET, EXTENDED RELEASE ORAL at 09:07

## 2020-08-01 RX ADMIN — TRAMADOL HYDROCHLORIDE 100 MG: 50 TABLET, FILM COATED ORAL at 21:13

## 2020-08-01 RX ADMIN — SODIUM CHLORIDE, PRESERVATIVE FREE: 5 INJECTION INTRAVENOUS at 00:03

## 2020-08-01 RX ADMIN — Medication 1 SPRAY: at 23:09

## 2020-08-01 ASSESSMENT — ACTIVITIES OF DAILY LIVING (ADL)
ADLS_ACUITY_SCORE: 17
ADLS_ACUITY_SCORE: 17
ADLS_ACUITY_SCORE: 19
ADLS_ACUITY_SCORE: 17

## 2020-08-01 ASSESSMENT — MIFFLIN-ST. JEOR: SCORE: 1291.27

## 2020-08-01 NOTE — PROGRESS NOTES
[facility]    Oncology Progress Note    Date of Service (when I saw the patient): 08/01/2020     Assessment & Plan   Jany Park is a 75 year old female who was admitted on 7/30/2020.       Endometrial Cancer  --s/p debulking surgery 2/2015 followed by paclitaxel and carboplatin  --liver metastases noted 8/2015  --s/p doxil, topotecan, further paclitaxel and carboplatin, hormone therapy with megace/tamoxifen  --began pembrolizumab with lenvatinib 6/1/10  --CT 7/30 noted with colitis and malignancy  --will have CT on admission compared to prior CT SI from about 2-3 months ago to evaluate disease response  --hold current treatment until colitis resolved, and can be re-evaluated as out patient     Colitis  --developed 2-3 weeks prior to admission  --did not respond to empiric streroids for 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     Leukocytosis  --reactive  --monitor    Anemia  --due to acute/chronic disease  --monitor     COVID  --negative    Disposition: Home on PO vanco once diarrhea controlled and tolerating PO per hospitalist. Hold chemotherapy and follow up with me in 1-2 weeks    Code Status: Full Code    Jason Garcias    Interval History   improving    Physical Exam   Temp: 96.1  F (35.6  C) Temp src: Oral BP: 118/74   Heart Rate: 90 Resp: 16 SpO2: 97 % O2 Device: None (Room air)    Vitals:    07/30/20 1829 08/01/20 0719   Weight: 82.1 kg (181 lb) 84.6 kg (186 lb 8 oz)     Vital Signs with Ranges  Temp:  [96.1  F (35.6  C)-98.4  F (36.9  C)] 96.1  F (35.6  C)  Heart Rate:  [] 90  Resp:  [16-18] 16  BP: (107-118)/(64-74) 118/74  SpO2:  [96 %-98 %] 97 %  No intake/output data recorded.    Constitutional: awake, cooperative, no apparent distress  Musculoskeletal: no pedal edema    Medications       celecoxib  100 mg Oral BID      fluticasone-vilanterol  1 puff Inhalation Daily     omeprazole  40 mg Oral QAM     sodium chloride (PF)  3 mL Intracatheter Q8H     vancomycin  125 mg Oral 4x Daily       Data   Results for orders placed or performed during the hospital encounter of 07/30/20 (from the past 24 hour(s))   Lactic acid level STAT   Result Value Ref Range    Lactate for Sepsis Protocol 0.8 0.7 - 2.0 mmol/L   CBC with platelets   Result Value Ref Range    WBC 12.5 (H) 4.0 - 11.0 10e9/L    RBC Count 3.82 3.8 - 5.2 10e12/L    Hemoglobin 10.7 (L) 11.7 - 15.7 g/dL    Hematocrit 32.6 (L) 35.0 - 47.0 %    MCV 85 78 - 100 fl    MCH 28.0 26.5 - 33.0 pg    MCHC 32.8 31.5 - 36.5 g/dL    RDW 14.7 10.0 - 15.0 %    Platelet Count 410 150 - 450 10e9/L   Basic metabolic panel   Result Value Ref Range    Sodium 136 133 - 144 mmol/L    Potassium 3.3 (L) 3.4 - 5.3 mmol/L    Chloride 107 94 - 109 mmol/L    Carbon Dioxide 24 20 - 32 mmol/L    Anion Gap 5 3 - 14 mmol/L    Glucose 96 70 - 99 mg/dL    Urea Nitrogen 15 7 - 30 mg/dL    Creatinine 0.74 0.52 - 1.04 mg/dL    GFR Estimate 80 >60 mL/min/[1.73_m2]    GFR Estimate If Black >90 >60 mL/min/[1.73_m2]    Calcium 7.4 (L) 8.5 - 10.1 mg/dL   Magnesium   Result Value Ref Range    Magnesium 2.3 1.6 - 2.3 mg/dL

## 2020-08-01 NOTE — PLAN OF CARE
A&Ox4. VSS, ex tachycardia. C/o pain from fibromyalgia & arthritis; PRN Tramadol given x1, effective. Regular diet; tolerating well. Continent of B&B; using BSC, frequent loose stools. BM x3-4 this shift. Lactic fired, 0.8. Mg 1.2; replaced. L port infusing NS @ 100mL/hr. Up independently in room. Discharge pending.

## 2020-08-01 NOTE — PROGRESS NOTES
[facility]    Hospitalist Progress Note    Assessment & Plan   Jany Park is a 75 year old female with PMHx of hypertension, hyperlipidemia, hx of endometrial Ca with mets to the liver and colon, asthma and GERD who was admitted on 7/30/2020 with findings of pancolitis dt cdiff.    C difficile colitis   Presented with 2wk hx of watery diarrhea, abd cramping, nausea (no vomiting) and generalized weakness. Is on treatment for cancer as below, with Keytruda and lenvatinib. Had been started on course of steroids per oncology for her diarrhea but didn't help. Reportedly developed a fever on the morning of admission. On admission, was afebrile and hemodynamically stable. WBC 20.1 (in setting of steroids), lactate nl. . Started on IVFs. Stool +cdiff so was started on oral vancomycin. Enteric virus/bacteria panel neg. Blood cultures neg. WBC improved.   -- cont oral vancomycin -- anticipate 10-14d course  -- supportive cares as needed, will discontinue IVFs and encourage po intake today  -- enteric precautions      Hx of endometrial cancer s/p  Hysterectomy/debulking surgery in 2015  Metastases to the colon and liver  Follows with Dr. Garcias of [facility].  Recently on treatment with Keytruda and lenvatinib. 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   -- [facility] following this hospitalization, to compare CT done on admission to prior CTs to monitor disease response  -- current treatments on hold until colitis resolved -- will follow up in clinic after disease     Incidentally seen LLL 7mm nodule  Stable since 4/2018     Anterior midline hernia, non incarcerated  Incidental finding. Nontender exam.   Consider surgery consultation if symptomatic.      Hypertension  Hyperlipidemia  PTA meds: lisinopril 10mg daily but reports this had been recently stoped  Also takes Lasix as needed  -- stable off  "meds this stay, resume as needed    Asthma  Chronic and stable with inhalers. No s/sx of exacerbation.     GERD  Chronic and stable on PPI    DJD  Chronic and stable with Mobic BID and Tramadol prn    Asymptomatic COVID19 swab neg on 7/30    FEN: discontinue IVFs, K 3.3 -- replace, lytes stable, regular diet  DVT Prophylaxis: PCDs  Code Status: Full Code    Disposition: Anticipate discharge home tomorrow if condition continues to improve and tolerating po intake    Ivett Hensley    Interval History   Seen this morning. Feels a little foggy this morning but was up 5 times overnight dt ongoing loose stools. Denies abd pain/n/v. Appetite okay -- mostly taking liquids, food \"doesn't seem to want to go down\". Mostly been ambulating to/from commode.    -Data reviewed today: I reviewed all new labs and imaging results over the last 24 hours. I personally reviewed no images or EKG's today.    Physical Exam   Temp: 96.1  F (35.6  C) Temp src: Oral BP: 118/74   Heart Rate: 90 Resp: 16 SpO2: 97 % O2 Device: None (Room air)    Vitals:    07/30/20 1829 08/01/20 0719   Weight: 82.1 kg (181 lb) 84.6 kg (186 lb 8 oz)     Vital Signs with Ranges  Temp:  [96.1  F (35.6  C)-98.4  F (36.9  C)] 96.1  F (35.6  C)  Heart Rate:  [] 90  Resp:  [16-18] 16  BP: (107-118)/(64-74) 118/74  SpO2:  [96 %-98 %] 97 %  No intake/output data recorded.    Constitutional: Resting comfortably, alert and answering questions appropriately, NAD  Respiratory: CTAB, no wheeze/rales/rhonchi, no increased work of breathing  Cardiovascular: HRRR, no MGR, no LE edema  GI: S, NT, ND, +BS  Skin/Integumen: warm/dry  Other: +port in L chest wall free of erythema    Medications     sodium chloride 100 mL/hr at 08/01/20 0003       celecoxib  100 mg Oral BID     fluticasone-vilanterol  1 puff Inhalation Daily     omeprazole  40 mg Oral QAM     sodium chloride (PF)  3 mL Intracatheter Q8H     vancomycin  125 mg Oral 4x Daily       Data   Recent Labs   Lab " 08/01/20  0619 07/31/20  0600 07/30/20  1343   WBC 12.5* 17.4* 20.1*   HGB 10.7* 10.5* 11.5*   MCV 85 85 85    415 425    132* 132*   POTASSIUM 3.3* 3.6 4.0   CHLORIDE 107 103 102   CO2 24 23 23   BUN 15 21 32*   CR 0.74 0.84 1.02   ANIONGAP 5 6 7   LOUISA 7.4* 7.3* 7.6*   GLC 96 84 97   ALBUMIN  --   --  2.0*   PROTTOTAL  --   --  5.2*   BILITOTAL  --   --  0.6   ALKPHOS  --   --  195*   ALT  --   --  20   AST  --   --  11       No results found for this or any previous visit (from the past 24 hour(s)).

## 2020-08-01 NOTE — PLAN OF CARE
Shift Note: VSS, afebrile, baseline generalized pain d/t RA, Takes Tramadol q6h at home, she gets anxious about missing a dose. Stools and appetite are better today, she's up independently in room, using toilet during day, wants BSC at bedtime. K+ was 3.3 today, replaced and re-check is pending, IVF stopped and port HL. Will likely be able to go home tomorrow or mon.

## 2020-08-01 NOTE — PROGRESS NOTES
Transfer from OBS. A/O and indep to [facility], pt will call if needs assist, no alarm. Admit w-Diarrhea [id number], thought d/t chemo, now found C-Diff, started Vanco PO yesterday, says stools are already slowing. VSS, chronic pain, takes her Tramadol TID at home, it is ordered PRN here. Likely will be able to discharge home when stools are better controlled

## 2020-08-02 LAB
ANION GAP SERPL CALCULATED.3IONS-SCNC: 6 MMOL/L (ref 3–14)
BASOPHILS # BLD AUTO: 0 10E9/L (ref 0–0.2)
BASOPHILS NFR BLD AUTO: 0.3 %
BUN SERPL-MCNC: 16 MG/DL (ref 7–30)
CALCIUM SERPL-MCNC: 7.6 MG/DL (ref 8.5–10.1)
CHLORIDE SERPL-SCNC: 104 MMOL/L (ref 94–109)
CO2 SERPL-SCNC: 23 MMOL/L (ref 20–32)
CREAT SERPL-MCNC: 0.83 MG/DL (ref 0.52–1.04)
DIFFERENTIAL METHOD BLD: ABNORMAL
EOSINOPHIL # BLD AUTO: 0.2 10E9/L (ref 0–0.7)
EOSINOPHIL NFR BLD AUTO: 1.6 %
ERYTHROCYTE [DISTWIDTH] IN BLOOD BY AUTOMATED COUNT: 14.6 % (ref 10–15)
GFR SERPL CREATININE-BSD FRML MDRD: 69 ML/MIN/{1.73_M2}
GLUCOSE SERPL-MCNC: 90 MG/DL (ref 70–99)
HCT VFR BLD AUTO: 34.4 % (ref 35–47)
HGB BLD-MCNC: 11.3 G/DL (ref 11.7–15.7)
IMM GRANULOCYTES # BLD: 0.1 10E9/L (ref 0–0.4)
IMM GRANULOCYTES NFR BLD: 0.5 %
LYMPHOCYTES # BLD AUTO: 0.9 10E9/L (ref 0.8–5.3)
LYMPHOCYTES NFR BLD AUTO: 7.5 %
MAGNESIUM SERPL-MCNC: 1.7 MG/DL (ref 1.6–2.3)
MCH RBC QN AUTO: 28 PG (ref 26.5–33)
MCHC RBC AUTO-ENTMCNC: 32.8 G/DL (ref 31.5–36.5)
MCV RBC AUTO: 85 FL (ref 78–100)
MONOCYTES # BLD AUTO: 1.2 10E9/L (ref 0–1.3)
MONOCYTES NFR BLD AUTO: 9.8 %
NEUTROPHILS # BLD AUTO: 9.6 10E9/L (ref 1.6–8.3)
NEUTROPHILS NFR BLD AUTO: 80.3 %
NRBC # BLD AUTO: 0 10*3/UL
NRBC BLD AUTO-RTO: 0 /100
PLATELET # BLD AUTO: 532 10E9/L (ref 150–450)
POTASSIUM SERPL-SCNC: 3.9 MMOL/L (ref 3.4–5.3)
RBC # BLD AUTO: 4.03 10E12/L (ref 3.8–5.2)
SODIUM SERPL-SCNC: 133 MMOL/L (ref 133–144)
WBC # BLD AUTO: 11.9 10E9/L (ref 4–11)

## 2020-08-02 PROCEDURE — 25000132 ZZH RX MED GY IP 250 OP 250 PS 637: Performed by: PHYSICIAN ASSISTANT

## 2020-08-02 PROCEDURE — 83735 ASSAY OF MAGNESIUM: CPT | Performed by: INTERNAL MEDICINE

## 2020-08-02 PROCEDURE — 80048 BASIC METABOLIC PNL TOTAL CA: CPT | Performed by: INTERNAL MEDICINE

## 2020-08-02 PROCEDURE — 25000128 H RX IP 250 OP 636: Performed by: HOSPITALIST

## 2020-08-02 PROCEDURE — 25000128 H RX IP 250 OP 636: Performed by: INTERNAL MEDICINE

## 2020-08-02 PROCEDURE — 12000000 ZZH R&B MED SURG/OB

## 2020-08-02 PROCEDURE — 99232 SBSQ HOSP IP/OBS MODERATE 35: CPT | Performed by: INTERNAL MEDICINE

## 2020-08-02 PROCEDURE — 25000132 ZZH RX MED GY IP 250 OP 250 PS 637: Performed by: HOSPITALIST

## 2020-08-02 PROCEDURE — 85025 COMPLETE CBC W/AUTO DIFF WBC: CPT | Performed by: INTERNAL MEDICINE

## 2020-08-02 RX ADMIN — VANCOMYCIN HYDROCHLORIDE 125 MG: 125 CAPSULE ORAL at 16:30

## 2020-08-02 RX ADMIN — TRAMADOL HYDROCHLORIDE 100 MG: 50 TABLET, FILM COATED ORAL at 08:16

## 2020-08-02 RX ADMIN — TRAMADOL HYDROCHLORIDE 100 MG: 50 TABLET, FILM COATED ORAL at 15:06

## 2020-08-02 RX ADMIN — TRAMADOL HYDROCHLORIDE 50 MG: 50 TABLET, FILM COATED ORAL at 20:36

## 2020-08-02 RX ADMIN — VANCOMYCIN HYDROCHLORIDE 125 MG: 125 CAPSULE ORAL at 19:44

## 2020-08-02 RX ADMIN — ONDANSETRON 4 MG: 4 TABLET, ORALLY DISINTEGRATING ORAL at 08:14

## 2020-08-02 RX ADMIN — Medication 5 ML: at 05:58

## 2020-08-02 RX ADMIN — CELECOXIB 100 MG: 100 CAPSULE ORAL at 19:45

## 2020-08-02 RX ADMIN — VANCOMYCIN HYDROCHLORIDE 125 MG: 125 CAPSULE ORAL at 12:45

## 2020-08-02 RX ADMIN — CELECOXIB 100 MG: 100 CAPSULE ORAL at 08:16

## 2020-08-02 RX ADMIN — FLUTICASONE FUROATE AND VILANTEROL TRIFENATATE 1 PUFF: 200; 25 POWDER RESPIRATORY (INHALATION) at 09:00

## 2020-08-02 RX ADMIN — VANCOMYCIN HYDROCHLORIDE 125 MG: 125 CAPSULE ORAL at 08:16

## 2020-08-02 RX ADMIN — OMEPRAZOLE 40 MG: 20 CAPSULE, DELAYED RELEASE ORAL at 08:16

## 2020-08-02 RX ADMIN — ONDANSETRON 4 MG: 4 TABLET, ORALLY DISINTEGRATING ORAL at 23:00

## 2020-08-02 ASSESSMENT — ACTIVITIES OF DAILY LIVING (ADL)
ADLS_ACUITY_SCORE: 17
ADLS_ACUITY_SCORE: 15
ADLS_ACUITY_SCORE: 17
ADLS_ACUITY_SCORE: 17
ADLS_ACUITY_SCORE: 15
ADLS_ACUITY_SCORE: 15

## 2020-08-02 ASSESSMENT — MIFFLIN-ST. JEOR: SCORE: 1271.76

## 2020-08-02 NOTE — PROGRESS NOTES
[facility]    Hospitalist Progress Note    Assessment & Plan   Jany Park is a 75 year old female with PMHx of hypertension, hyperlipidemia, hx of endometrial Ca with mets to the liver and colon, asthma and GERD who was admitted on 7/30/2020 with findings of pancolitis dt cdiff.    C difficile colitis   Presented with 2wk hx of watery diarrhea, abd cramping, nausea (no vomiting) and generalized weakness. Is on treatment for cancer as below, with Keytruda and lenvatinib. Had been started on course of steroids per oncology for her diarrhea but didn't help. Reportedly developed a fever on the morning of admission. On admission, was afebrile and hemodynamically stable. WBC 20.1 (in setting of steroids), lactate nl. . Started on IVFs. Stool +cdiff so was started on oral vancomycin. Enteric virus/bacteria panel neg. Blood cultures neg. WBC improved.   -- cont oral vancomycin (started 7/31) -- anticipate 10-14d course  -- supportive cares as needed, encourage po intake  -- enteric precautions      Hx of endometrial cancer s/p  Hysterectomy/debulking surgery in 2015  Metastases to the colon and liver  Follows with Dr. Garcias of [facility].  Recently on treatment with Keytruda and lenvatinib. 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   -- [facility] following this hospitalization, to compare CT done on admission to prior CTs to monitor disease response  -- current treatments on hold until colitis resolved -- will follow up in clinic after disease     Incidentally seen LLL 7mm nodule  Stable since 4/2018     Anterior midline hernia, non incarcerated  Incidental finding. Nontender exam.   Consider surgery consultation if symptomatic.      Hypertension  Hyperlipidemia  PTA meds: lisinopril 10mg daily but reports this had been recently stoped  Also takes Lasix as needed  -- stable off meds this stay,  resume as needed    Asthma  Chronic and stable with inhalers. No s/sx of exacerbation.     GERD  Chronic and stable on PPI    DJD  Chronic and stable with Mobic BID and Tramadol prn    Asymptomatic COVID19 swab neg on 7/30    FEN: IVFs dc'd on 8/1, lytes stable, regular diet   DVT Prophylaxis: PCDs   Code Status: Full Code     Disposition: Anticipate discharge home in next 1-2d, pending improvement in stools and adequate po intake.    Ivett Hensley    Interval History    Seen this afternoon. Not feeling as good today as she did yesterday. Didn't sleep well so has been napping on and off today. Ongoing loose stools and some bloating. Appetite not great, mostly taking liquids and few bites of solids. No n/v. No cp/sob/cough. Mostly ambulating around room.     -Data reviewed today: I reviewed all new labs and imaging results over the last 24 hours. I personally reviewed no images or EKG's today.    Physical Exam   Temp: 98.2  F (36.8  C) Temp src: Oral BP: 114/74   Heart Rate: 98 Resp: 16 SpO2: 96 % O2 Device: None (Room air)    Vitals:    07/30/20 1829 08/01/20 0719 08/02/20 0607   Weight: 82.1 kg (181 lb) 84.6 kg (186 lb 8 oz) 82.6 kg (182 lb 3.2 oz)     Vital Signs with Ranges  Temp:  [96.2  F (35.7  C)-98.2  F (36.8  C)] 98.2  F (36.8  C)  Heart Rate:  [98] 98  Resp:  [16] 16  BP: (114-130)/(74-75) 114/74  SpO2:  [96 %-97 %] 96 %  I/O last 3 completed shifts:  In: 240 [P.O.:240]  Out: -     Constitutional: Resting comfortably, alert and answering questions appropriately, NAD  Respiratory: CTAB, no wheeze/rales/rhonchi, no increased work of breathing  Cardiovascular: HRRR, no MGR, no LE edema  GI: S, NT, ND, +BS  Skin/Integumen: warm/dry  Other: +port in L chest wall free of erythema    Medications       celecoxib  100 mg Oral BID     fluticasone-vilanterol  1 puff Inhalation Daily     heparin  5 mL Intracatheter Q28 Days     heparin lock flush  5-10 mL Intracatheter Q24H     omeprazole  40 mg Oral QAM      vancomycin  125 mg Oral 4x Daily       Data   Recent Labs   Lab 08/02/20  0600 08/01/20  1700 08/01/20  0619 07/31/20  0600 07/30/20  1343   WBC 11.9*  --  12.5* 17.4* 20.1*   HGB 11.3*  --  10.7* 10.5* 11.5*   MCV 85  --  85 85 85   *  --  410 415 425     --  136 132* 132*   POTASSIUM 3.9 3.9 3.3* 3.6 4.0   CHLORIDE 104  --  107 103 102   CO2 23  --  24 23 23   BUN 16  --  15 21 32*   CR 0.83  --  0.74 0.84 1.02   ANIONGAP 6  --  5 6 7   LOUISA 7.6*  --  7.4* 7.3* 7.6*   GLC 90  --  96 84 97   ALBUMIN  --   --   --   --  2.0*   PROTTOTAL  --   --   --   --  5.2*   BILITOTAL  --   --   --   --  0.6   ALKPHOS  --   --   --   --  195*   ALT  --   --   --   --  20   AST  --   --   --   --  11       No results found for this or any previous visit (from the past 24 hour(s)).

## 2020-08-02 NOTE — PLAN OF CARE
VSS on RA. Generalized pain, gave prn tramadol x1. C/o dry mouth, gave prn biotene spray. Up indep in room. Using BSC during the night, but ambulates to toilet during the day. Gave education handout on c.diff, questions encouraged/answered. Port-hep locked. Possible discharge home today or Monday.

## 2020-08-02 NOTE — PLAN OF CARE
"Shift Note: VSS, afebrile, pain controlled w-\"scheduled\" Ultram. Up indep to toilet, and ambulating in room, tired and not feeling as well as yesterday. K+ and mag were good today, appetite was a little better. Hoping to feel well enough to go home tomorrow.  "

## 2020-08-02 NOTE — PROGRESS NOTES
[facility]    Oncology Progress Note    Date of Service (when I saw the patient): 08/02/2020     Assessment & Plan   Jany Park is a 75 year old female who was admitted on 7/30/2020.       Endometrial Cancer  --s/p debulking surgery 2/2015 followed by paclitaxel and carboplatin  --liver metastases noted 8/2015  --s/p doxil, topotecan, further paclitaxel and carboplatin, hormone therapy with megace/tamoxifen  --began pembrolizumab with lenvatinib 6/1/10  --CT 7/30 noted with colitis and malignancy  --will have CT on admission compared to prior CT SI from about 2-3 months ago to evaluate disease response  --hold current treatment until colitis resolved, and can be re-evaluated as out patient     Colitis  --developed 2-3 weeks prior to admission  --did not respond to empiric streroids for 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     Leukocytosis  --reactive  --monitor    Anemia  --due to acute/chronic disease  --monitor     COVID  --negative    Disposition: Home on PO vanco once diarrhea controlled and tolerating PO per hospitalist. Hold chemotherapy and follow up with me in 1-2 weeks    Code Status: Full Code    Jason Garcias    Interval History   Improving but a little more abdominal bloating and still with multiple loose stools    Physical Exam   Temp: 98.2  F (36.8  C) Temp src: Oral BP: 114/74   Heart Rate: 98 Resp: 16 SpO2: 96 % O2 Device: None (Room air)    Vitals:    07/30/20 1829 08/01/20 0719 08/02/20 0607   Weight: 82.1 kg (181 lb) 84.6 kg (186 lb 8 oz) 82.6 kg (182 lb 3.2 oz)     Vital Signs with Ranges  Temp:  [96.2  F (35.7  C)-98.2  F (36.8  C)] 98.2  F (36.8  C)  Heart Rate:  [98] 98  Resp:  [16] 16  BP: (114-130)/(74-75) 114/74  SpO2:  [96 %-97 %] 96 %  I/O last 3 completed shifts:  In: 240 [P.O.:240]  Out: -     Constitutional: awake,  cooperative, no apparent distress  Abdomen: mild distended, non tender  Musculoskeletal: no pedal edema    Medications       celecoxib  100 mg Oral BID     fluticasone-vilanterol  1 puff Inhalation Daily     heparin  5 mL Intracatheter Q28 Days     heparin lock flush  5-10 mL Intracatheter Q24H     omeprazole  40 mg Oral QAM     vancomycin  125 mg Oral 4x Daily       Data   Results for orders placed or performed during the hospital encounter of 07/30/20 (from the past 24 hour(s))   Potassium   Result Value Ref Range    Potassium 3.9 3.4 - 5.3 mmol/L   Basic metabolic panel   Result Value Ref Range    Sodium 133 133 - 144 mmol/L    Potassium 3.9 3.4 - 5.3 mmol/L    Chloride 104 94 - 109 mmol/L    Carbon Dioxide 23 20 - 32 mmol/L    Anion Gap 6 3 - 14 mmol/L    Glucose 90 70 - 99 mg/dL    Urea Nitrogen 16 7 - 30 mg/dL    Creatinine 0.83 0.52 - 1.04 mg/dL    GFR Estimate 69 >60 mL/min/[1.73_m2]    GFR Estimate If Black 80 >60 mL/min/[1.73_m2]    Calcium 7.6 (L) 8.5 - 10.1 mg/dL   CBC with platelets differential   Result Value Ref Range    WBC 11.9 (H) 4.0 - 11.0 10e9/L    RBC Count 4.03 3.8 - 5.2 10e12/L    Hemoglobin 11.3 (L) 11.7 - 15.7 g/dL    Hematocrit 34.4 (L) 35.0 - 47.0 %    MCV 85 78 - 100 fl    MCH 28.0 26.5 - 33.0 pg    MCHC 32.8 31.5 - 36.5 g/dL    RDW 14.6 10.0 - 15.0 %    Platelet Count 532 (H) 150 - 450 10e9/L    Diff Method Automated Method     % Neutrophils 80.3 %    % Lymphocytes 7.5 %    % Monocytes 9.8 %    % Eosinophils 1.6 %    % Basophils 0.3 %    % Immature Granulocytes 0.5 %    Nucleated RBCs 0 0 /100    Absolute Neutrophil 9.6 (H) 1.6 - 8.3 10e9/L    Absolute Lymphocytes 0.9 0.8 - 5.3 10e9/L    Absolute Monocytes 1.2 0.0 - 1.3 10e9/L    Absolute Eosinophils 0.2 0.0 - 0.7 10e9/L    Absolute Basophils 0.0 0.0 - 0.2 10e9/L    Abs Immature Granulocytes 0.1 0 - 0.4 10e9/L    Absolute Nucleated RBC 0.0    Magnesium   Result Value Ref Range    Magnesium 1.7 1.6 - 2.3 mg/dL

## 2020-08-03 PROCEDURE — 12000000 ZZH R&B MED SURG/OB

## 2020-08-03 PROCEDURE — 25000128 H RX IP 250 OP 636: Performed by: INTERNAL MEDICINE

## 2020-08-03 PROCEDURE — 25000132 ZZH RX MED GY IP 250 OP 250 PS 637: Performed by: PHYSICIAN ASSISTANT

## 2020-08-03 PROCEDURE — 25000132 ZZH RX MED GY IP 250 OP 250 PS 637: Performed by: HOSPITALIST

## 2020-08-03 PROCEDURE — 99232 SBSQ HOSP IP/OBS MODERATE 35: CPT | Performed by: INTERNAL MEDICINE

## 2020-08-03 RX ADMIN — VANCOMYCIN HYDROCHLORIDE 125 MG: 125 CAPSULE ORAL at 08:14

## 2020-08-03 RX ADMIN — CELECOXIB 100 MG: 100 CAPSULE ORAL at 19:54

## 2020-08-03 RX ADMIN — TRAMADOL HYDROCHLORIDE 100 MG: 50 TABLET, FILM COATED ORAL at 22:12

## 2020-08-03 RX ADMIN — ACETAMINOPHEN 650 MG: 325 TABLET, FILM COATED ORAL at 19:59

## 2020-08-03 RX ADMIN — OMEPRAZOLE 40 MG: 20 CAPSULE, DELAYED RELEASE ORAL at 08:14

## 2020-08-03 RX ADMIN — Medication 5 ML: at 11:57

## 2020-08-03 RX ADMIN — VANCOMYCIN HYDROCHLORIDE 125 MG: 125 CAPSULE ORAL at 16:09

## 2020-08-03 RX ADMIN — VANCOMYCIN HYDROCHLORIDE 125 MG: 125 CAPSULE ORAL at 19:54

## 2020-08-03 RX ADMIN — ACETAMINOPHEN 650 MG: 325 TABLET, FILM COATED ORAL at 03:34

## 2020-08-03 RX ADMIN — TRAMADOL HYDROCHLORIDE 50 MG: 50 TABLET, FILM COATED ORAL at 08:19

## 2020-08-03 RX ADMIN — VANCOMYCIN HYDROCHLORIDE 125 MG: 125 CAPSULE ORAL at 11:56

## 2020-08-03 RX ADMIN — FLUTICASONE FUROATE AND VILANTEROL TRIFENATATE 1 PUFF: 200; 25 POWDER RESPIRATORY (INHALATION) at 08:15

## 2020-08-03 RX ADMIN — TRAMADOL HYDROCHLORIDE 100 MG: 50 TABLET, FILM COATED ORAL at 15:25

## 2020-08-03 RX ADMIN — CELECOXIB 100 MG: 100 CAPSULE ORAL at 08:14

## 2020-08-03 ASSESSMENT — ACTIVITIES OF DAILY LIVING (ADL)
ADLS_ACUITY_SCORE: 15

## 2020-08-03 ASSESSMENT — MIFFLIN-ST. JEOR: SCORE: 1274.94

## 2020-08-03 NOTE — PLAN OF CARE
Aox4. VSS on RA. C/o generalized pain during the evening gave prn tramadol x1-effective. During the night, pt having more lower abdominal pain, post stool, gave prn tylenol x1 and heat pack. Had mild nausesa- gave prn PO zofran- with relief. Had 4 loose stool on writers shift. Up independently in room. Regular diet-minimal appetite yesterday. Port-hep locked. Discharge pending improvement in stool and PO intake.

## 2020-08-03 NOTE — PLAN OF CARE
VSS. Patient feeling very fatigued and wiped out today. 2 stools so far today. Difficult to track UO d/t stool. Intermittent nausea. Appetite fair. Up independently to BR. Port hep locked. Tramadol given x1 for fibromyalgia pain. Plan likely for discharge tomorrow pending progress. Continue to monitor.

## 2020-08-03 NOTE — PROGRESS NOTES
[facility]    Hospitalist Progress Note    Assessment & Plan   Jany Park is a 75 year old female with PMHx of hypertension, hyperlipidemia, hx of endometrial Ca with mets to the liver and colon, asthma and GERD who was admitted on 7/30/2020 with findings of pancolitis dt cdiff.    C difficile colitis   Presented with 2wk hx of watery diarrhea, abd cramping, nausea (no vomiting) and generalized weakness. Is on treatment for cancer as below, with Keytruda and lenvatinib. Had been started on course of steroids per oncology for her diarrhea but didn't help. Reportedly developed a fever on the morning of admission. On admission, was afebrile and hemodynamically stable. WBC 20.1 (in setting of steroids), lactate nl. . Started on IVFs. Stool +cdiff so was started on oral vancomycin. Enteric virus/bacteria panel neg. Blood cultures neg. WBC improved.   -- cont oral vancomycin (started 7/31) -- anticipate 10-14d course  -- supportive cares as needed, encourage po intake  -- enteric precautions      Hx of endometrial cancer s/p  Hysterectomy/debulking surgery in 2015  Metastases to the colon and liver  Follows with Dr. Garcias of [facility].  Recently on treatment with Keytruda and lenvatinib. 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   -- [facility] following this hospitalization, to compare CT done on admission to prior CTs to monitor disease response  -- current treatments on hold until colitis resolved -- will follow up in clinic after disease     Incidentally seen LLL 7mm nodule  Stable since 4/2018     Anterior midline hernia, non incarcerated  Incidental finding. Nontender exam.   Consider surgery consultation if symptomatic.      Hypertension  Hyperlipidemia  PTA meds: lisinopril 10mg daily but reports this had been recently stoped  Also takes Lasix as needed  -- stable off meds this stay,  resume as needed    Asthma  Chronic and stable with inhalers. No s/sx of exacerbation.     GERD  Chronic and stable on PPI    DJD  Chronic and stable with Mobic BID and Tramadol prn    Asymptomatic COVID19 swab neg on 7/30    FEN: IVFs dc'd on 8/1, lytes stable, regular diet   DVT Prophylaxis: PCDs   Code Status: Full Code     Disposition: Will monitor in hospital one more day. Anticipate discharge home tomorrow if condition continues to improve and consistently taking po.     Ivett Hensley    Interval History    Seen this morning. Feeling better, has brighter affect this morning. Stooling frequency starting to decrease -- notes only 4 stools in past 12hrs. PO intake starting to improve. No cp/sob/cough.    -Data reviewed today: I reviewed all new labs and imaging results over the last 24 hours. I personally reviewed no images or EKG's today.    Physical Exam   Temp: 96.3  F (35.7  C) Temp src: Oral BP: 115/65   Heart Rate: 93 Resp: 16 SpO2: 97 % O2 Device: None (Room air)    Vitals:    08/01/20 0719 08/02/20 0607 08/03/20 0509   Weight: 84.6 kg (186 lb 8 oz) 82.6 kg (182 lb 3.2 oz) 83 kg (182 lb 14.4 oz)     Vital Signs with Ranges  Temp:  [96.3  F (35.7  C)-98.2  F (36.8  C)] 96.3  F (35.7  C)  Heart Rate:  [] 93  Resp:  [16-18] 16  BP: (111-138)/(65-84) 115/65  SpO2:  [97 %] 97 %  No intake/output data recorded.    Constitutional: Resting comfortably, alert and answering questions appropriately, NAD  Respiratory: CTAB, no wheeze/rales/rhonchi, no increased work of breathing  Cardiovascular: HRRR, no MGR, no LE edema  GI: S, NT, ND, +BS  Skin/Integumen: warm/dry  Other: +port in L chest wall free of erythema    Medications       celecoxib  100 mg Oral BID     fluticasone-vilanterol  1 puff Inhalation Daily     heparin  5 mL Intracatheter Q28 Days     heparin lock flush  5-10 mL Intracatheter Q24H     omeprazole  40 mg Oral QAM     vancomycin  125 mg Oral 4x Daily       Data   Recent Labs   Lab  08/02/20  0600 08/01/20  1700 08/01/20  0619 07/31/20  0600 07/30/20  1343   WBC 11.9*  --  12.5* 17.4* 20.1*   HGB 11.3*  --  10.7* 10.5* 11.5*   MCV 85  --  85 85 85   *  --  410 415 425     --  136 132* 132*   POTASSIUM 3.9 3.9 3.3* 3.6 4.0   CHLORIDE 104  --  107 103 102   CO2 23  --  24 23 23   BUN 16  --  15 21 32*   CR 0.83  --  0.74 0.84 1.02   ANIONGAP 6  --  5 6 7   LOUISA 7.6*  --  7.4* 7.3* 7.6*   GLC 90  --  96 84 97   ALBUMIN  --   --   --   --  2.0*   PROTTOTAL  --   --   --   --  5.2*   BILITOTAL  --   --   --   --  0.6   ALKPHOS  --   --   --   --  195*   ALT  --   --   --   --  20   AST  --   --   --   --  11       No results found for this or any previous visit (from the past 24 hour(s)).

## 2020-08-03 NOTE — PROGRESS NOTES
Progress Note     Primary Oncologist/Hematologist:  Dr. Garcias          Assessment and Plan:New actions/orders today (08/03/2020) are underlined.     Jany Park is a 75 year old female who was admitted on 7/30/2020.      Endometrial Cancer  --s/p debulking surgery 2/2015 followed by paclitaxel and carboplatin  --liver metastases noted 8/2015  --s/p doxil, topotecan, further paclitaxel and carboplatin, hormone therapy with megace/tamoxifen  --began pembrolizumab with lenvatinib 6/1/10  --CT 7/30 noted with colitis and malignancy  --will have CT on admission compared to prior CT SI from about 2-3 months ago to evaluate disease response  --hold current treatment until colitis resolved, and can be re-evaluated as an outpatient     Colitis  --developed 2-3 weeks prior to admission  --did not respond to empiric streroids for 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     Leukocytosis  --reactive  --monitor     Anemia  --due to acute/chronic disease  --monitor     COVID  --negative     Disposition: Home on PO vanco once diarrhea controlled and tolerating PO per hospitalist. Hold chemotherapy and follow up with Dr. Garcias in 1-2 weeks    Anastacio KU, CNP  [facility]  [phone] (office), [phone] (cell)        Interval History:     Still having diarrhea and GI upset although it is better. Up in room and feeling okay. Legs are swollen.              Review of Systems:     The 5 point Review of Systems is negative other than noted in the HPI             Medications:   Scheduled Medications    celecoxib  100 mg Oral BID     fluticasone-vilanterol  1 puff Inhalation Daily     heparin  5 mL Intracatheter Q28 Days     heparin lock flush  5-10 mL Intracatheter Q24H     omeprazole  40 mg Oral QAM     vancomycin  125 mg Oral 4x Daily     PRN  "Medications  acetaminophen, acetaminophen, artificial saliva, heparin lock flush, lidocaine 4%, lidocaine (buffered or not buffered), magnesium sulfate, melatonin, naloxone, ondansetron **OR** ondansetron, potassium chloride, potassium chloride with lidocaine, potassium chloride, potassium chloride, sodium chloride (PF), sodium chloride (PF), traMADol               Physical Exam:   Vitals were reviewed  Blood pressure 115/70, pulse 105, temperature 98.4  F (36.9  C), temperature source Oral, resp. rate 18, height 1.562 m (5' 1.5\"), weight 83 kg (182 lb 14.4 oz), SpO2 99 %.  Wt Readings from Last 4 Encounters:   08/03/20 83 kg (182 lb 14.4 oz)   11/21/18 90.7 kg (200 lb)   03/25/16 86.2 kg (190 lb)   10/02/15 89.7 kg (197 lb 12.8 oz)       No intake/output data recorded.      Constitutional: Awake, alert, cooperative, no apparent distress     Lungs: Clear to auscultation bilaterally, no crackles or wheezing   Cardiovascular: Regular rate and rhythm, normal S1 and S2, and no murmur noted   Abdomen: Hyperactive bowel sounds, soft, non-distended, non-tender   Skin: Bilateral lower extremity edema.   Other:               Data:   All laboratory data and imaging studies reviewed.    CMP  Recent Labs   Lab 08/02/20  0600 08/01/20  1700 08/01/20  0619 07/31/20  0600 07/30/20  1343     --  136 132* 132*   POTASSIUM 3.9 3.9 3.3* 3.6 4.0   CHLORIDE 104  --  107 103 102   CO2 23  --  24 23 23   ANIONGAP 6  --  5 6 7   GLC 90  --  96 84 97   BUN 16  --  15 21 32*   CR 0.83  --  0.74 0.84 1.02   GFRESTIMATED 69  --  80 68 54*   GFRESTBLACK 80  --  >90 79 63   LOUISA 7.6*  --  7.4* 7.3* 7.6*   MAG 1.7  --  2.3 1.2*  --    PROTTOTAL  --   --   --   --  5.2*   ALBUMIN  --   --   --   --  2.0*   BILITOTAL  --   --   --   --  0.6   ALKPHOS  --   --   --   --  195*   AST  --   --   --   --  11   ALT  --   --   --   --  20     CBC  Recent Labs   Lab 08/02/20  0600 08/01/20 0619 07/31/20  0600 07/30/20  1343   WBC 11.9* 12.5* 17.4* " 20.1*   RBC 4.03 3.82 3.79* 4.13   HGB 11.3* 10.7* 10.5* 11.5*   HCT 34.4* 32.6* 32.3* 34.9*   MCV 85 85 85 85   MCH 28.0 28.0 27.7 27.8   MCHC 32.8 32.8 32.5 33.0   RDW 14.6 14.7 14.6 14.6   * [phone]

## 2020-08-04 VITALS
SYSTOLIC BLOOD PRESSURE: 133 MMHG | HEIGHT: 62 IN | HEART RATE: 105 BPM | RESPIRATION RATE: 16 BRPM | OXYGEN SATURATION: 99 % | WEIGHT: 181 LBS | BODY MASS INDEX: 33.31 KG/M2 | TEMPERATURE: 95.6 F | DIASTOLIC BLOOD PRESSURE: 78 MMHG

## 2020-08-04 LAB
MAGNESIUM SERPL-MCNC: 1.3 MG/DL (ref 1.6–2.3)
POTASSIUM SERPL-SCNC: 3.8 MMOL/L (ref 3.4–5.3)

## 2020-08-04 PROCEDURE — 84132 ASSAY OF SERUM POTASSIUM: CPT | Performed by: INTERNAL MEDICINE

## 2020-08-04 PROCEDURE — 25000128 H RX IP 250 OP 636: Performed by: HOSPITALIST

## 2020-08-04 PROCEDURE — 25000128 H RX IP 250 OP 636: Performed by: INTERNAL MEDICINE

## 2020-08-04 PROCEDURE — 25000132 ZZH RX MED GY IP 250 OP 250 PS 637: Performed by: PHYSICIAN ASSISTANT

## 2020-08-04 PROCEDURE — 25000132 ZZH RX MED GY IP 250 OP 250 PS 637: Performed by: HOSPITALIST

## 2020-08-04 PROCEDURE — 99239 HOSP IP/OBS DSCHRG MGMT >30: CPT | Performed by: INTERNAL MEDICINE

## 2020-08-04 PROCEDURE — 83735 ASSAY OF MAGNESIUM: CPT | Performed by: INTERNAL MEDICINE

## 2020-08-04 RX ORDER — VANCOMYCIN HYDROCHLORIDE 125 MG/1
125 CAPSULE ORAL 4 TIMES DAILY
Qty: 40 CAPSULE | Refills: 0 | Status: SHIPPED | OUTPATIENT
Start: 2020-08-04 | End: 2020-08-14

## 2020-08-04 RX ORDER — ALBUTEROL SULFATE 90 UG/1
2 AEROSOL, METERED RESPIRATORY (INHALATION) 4 TIMES DAILY PRN
Status: DISCONTINUED | OUTPATIENT
Start: 2020-08-04 | End: 2020-08-04 | Stop reason: HOSPADM

## 2020-08-04 RX ADMIN — ACETAMINOPHEN 650 MG: 325 TABLET, FILM COATED ORAL at 01:55

## 2020-08-04 RX ADMIN — FLUTICASONE FUROATE AND VILANTEROL TRIFENATATE 1 PUFF: 200; 25 POWDER RESPIRATORY (INHALATION) at 09:21

## 2020-08-04 RX ADMIN — CELECOXIB 100 MG: 100 CAPSULE ORAL at 08:15

## 2020-08-04 RX ADMIN — VANCOMYCIN HYDROCHLORIDE 125 MG: 125 CAPSULE ORAL at 08:14

## 2020-08-04 RX ADMIN — HEPARIN SODIUM (PORCINE) LOCK FLUSH IV SOLN 100 UNIT/ML 5 ML: 100 SOLUTION at 11:54

## 2020-08-04 RX ADMIN — VANCOMYCIN HYDROCHLORIDE 125 MG: 125 CAPSULE ORAL at 12:19

## 2020-08-04 RX ADMIN — ALBUTEROL SULFATE 2 PUFF: 90 INHALANT RESPIRATORY (INHALATION) at 04:16

## 2020-08-04 RX ADMIN — MAGNESIUM SULFATE IN WATER 4 G: 40 INJECTION, SOLUTION INTRAVENOUS at 09:21

## 2020-08-04 RX ADMIN — HEPARIN SODIUM (PORCINE) LOCK FLUSH IV SOLN 100 UNIT/ML 5 ML: 100 SOLUTION at 12:20

## 2020-08-04 RX ADMIN — TRAMADOL HYDROCHLORIDE 100 MG: 50 TABLET, FILM COATED ORAL at 08:14

## 2020-08-04 RX ADMIN — OMEPRAZOLE 40 MG: 20 CAPSULE, DELAYED RELEASE ORAL at 08:14

## 2020-08-04 RX ADMIN — Medication 5 ML: at 06:30

## 2020-08-04 ASSESSMENT — ACTIVITIES OF DAILY LIVING (ADL)
ADLS_ACUITY_SCORE: 15

## 2020-08-04 ASSESSMENT — MIFFLIN-ST. JEOR: SCORE: 1266.32

## 2020-08-04 NOTE — PLAN OF CARE
VSS, no fevers, AVS reviewed and questions answered. C-Diff education re-inforced, Wooo filled and delivered to patient and port hep/locked and de-accessed. She will follow up w/ONC clinic and her PCP as needed.

## 2020-08-04 NOTE — PROVIDER NOTIFICATION
Brief update:    Paged re: pt request to use home inhaler     Albuterol inhaler ordered, ginna for pharmacy to relabel home inhaler for hospital use    Baldev Vazquez MD  4:05 AM

## 2020-08-04 NOTE — PROGRESS NOTES
[facility]  88    SH contacted Pt per length of stay. Pt shared she will be leaving soon today and is very excited to be going home. Pt named no SH needs at this time, but is aware of SH availability.     SH will remain available as needed.     Connie Constantino  Chaplain Resident

## 2020-08-04 NOTE — PLAN OF CARE
A/ox4. VSS on RA. C/o lower abdominal pain-gave prn tylenol/heat pack. Pt requested PTA albuterol inhaler for some SOB. Gave inhaler x1-with relief.  LS clear. BS active, +flatus. Voiding via bathroom. Up independent. +2 edema to BLE. Port-hep locked. Had 2 loose stools overnight. Regular diet- per pt report, has not had much of an appetite, been sticking with thin liquids. Possible discharge today.

## 2020-08-04 NOTE — DISCHARGE SUMMARY
[facility]    Discharge Summary  Hospitalist    Date of Admission:  7/30/2020  Date of Discharge:  8/4/2020  Discharging Provider: Ivett Hensley    Discharge Diagnoses   C diff colitis   Hx of endometrial cancer s/p hysterectomy/debulking surgery in 2015  Metastases to the colon and liver  Incidentally seen LLL 7mm nodule  Anterior midline hernia, non incarcerated  Hypertension  Hyperlipidemia  Asthma  GERD  DJD  Asymptomatic COVID19 swab neg on 7/30    History of Present Illness   Jany Park is a 75 year old female with PMHx of hypertension, hyperlipidemia, hx of endometrial Ca with mets to the liver and colon, asthma and GERD who was admitted on 7/30/2020 with findings of pancolitis dt cdiff.    Hospital Course   Jany Park was admitted on 7/30/2020.  The following problems were addressed during her hospitalization:    C diff colitis   Presented with 2wk hx of watery diarrhea, abd cramping, nausea (no vomiting) and generalized weakness. Is on treatment for cancer as below, with Keytruda and lenvatinib. Had been started on course of steroids per oncology for her diarrhea but didn't help. Reportedly developed a fever on the morning of admission. On admission, was afebrile and hemodynamically stable. WBC 20.1 (in setting of steroids), lactate nl. . Started on IVFs. Stool +cdiff so was started on oral vancomycin. Enteric virus/bacteria panel neg. Blood cultures neg. WBC improved.     Will complete course of oral vancomycin after discharge. Encouraged oral intake. Follow up with oncology in clinic.       Hx of endometrial cancer s/p hysterectomy/debulking surgery in 2015  Metastases to the colon and liver  Follows with Dr. Garcias of [facility].  Recently on treatment with Keytruda and lenvatinib. 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     [facility]. Current treatments on hold until colitis resolved -- will follow up in clinic after disease     Incidentally seen LLL 7mm nodule  Stable since 4/2018     Anterior midline hernia, non incarcerated  Incidental finding. Nontender exam.      Hypertension  Hyperlipidemia  PTA meds: lisinopril 10mg daily but reports this had been recently stoped  Also takes Lasix as needed  Remained stable off meds this stay, resume as needed after discharge.     Asthma  Chronic and stable with inhalers. No s/sx of exacerbation.      GERD  Chronic and stable on PPI     DJD  Chronic and stable with Mobic BID and Tramadol prn     Asymptomatic COVID19 swab neg on 7/30    Ivett Hensley DO    Code Status   Full Code       Primary Care Physician   Selvin Torres    Physical Exam   Temp: 96.8  F (36  C) Temp src: Oral BP: 112/69 Pulse: 105 Heart Rate: 81 Resp: 16 SpO2: 98 % O2 Device: None (Room air)    Vitals:    08/02/20 0607 08/03/20 0509 08/04/20 0500   Weight: 82.6 kg (182 lb 3.2 oz) 83 kg (182 lb 14.4 oz) 82.1 kg (181 lb)     Vital Signs with Ranges  Temp:  [96.3  F (35.7  C)-98.4  F (36.9  C)] 96.8  F (36  C)  Pulse:  [105] 105  Heart Rate:  [81-93] 81  Resp:  [16-18] 16  BP: (112-115)/(65-70) 112/69  SpO2:  [97 %-99 %] 98 %  No intake/output data recorded.    General: Resting comfortably, alert and answering questions appropriately, NAD  Respiratory: CTAB, no wheeze/rales/rhonchi, no increased work of breathing  Cardiovascular: HRRR, no MGR, no LE edema  GI: S, NT, ND, +BS  Skin/Integumen: warm/dry  Other:  +port in L chest wall free of erythema    Discharge Disposition   Discharged to home  Condition at discharge: Stable    Consultations This Hospital Stay   HEMATOLOGY & ONCOLOGY IP CONSULT    Time Spent on this Encounter   Ivett PEREIRA DO, personally saw the patient today and spent greater than 30 minutes discharging this patient.    Discharge Orders      Reason for your hospital stay     Evaluation of your loose stools, which was found to be secondary to an infection called cdiff. You were started on treatment with an oral antibiotic called vancomycin.     Follow-up and recommended labs and tests     Follow up with Dr. Garcias or colleagues in clinic as advised.   Follow up with your PCP as needed.     Activity    Your activity upon discharge: activity as tolerated     Diet    Follow this diet upon discharge: Regular     Discharge Medications   Current Discharge Medication List      START taking these medications    Details   vancomycin (VANCOCIN) 125 MG capsule Take 1 capsule (125 mg) by mouth 4 times daily for 10 days  Qty: 40 capsule, Refills: 0    Associated Diagnoses: C. difficile colitis         CONTINUE these medications which have NOT CHANGED    Details   ALBUTEROL 90 MCG/ACT IN AERS       Biotin (BIOTIN FORTE) 5 MG TABS Take 10,000 mcg by mouth daily       budesonide-formoterol (SYMBICORT) 160-4.5 MCG/ACT Inhaler Inhale 2 puffs into the lungs daily      meloxicam (MOBIC) 7.5 MG tablet Take 7.5 mg by mouth 2 times daily       MULTIPLE VITAMINS OR TABS       omeprazole (PRILOSEC) 40 MG DR capsule Take 40 mg by mouth every morning       traMADol (ULTRAM) 50 MG tablet Take 1-2 tablets by mouth every 6 hours as needed.  Qty: 60 tablet, Refills: 1    Associated Diagnoses: Knee pain, left; Failed total knee arthroplasty (H); S/P total knee arthroplasty      ferrous gluconate (FERGON) 324 (38 FE) MG tablet Take 1 tablet by mouth daily (with breakfast).  Qty: 30 tablet, Refills: 0    Associated Diagnoses: Edema; Myalgia and myositis, unspecified; Mild persistent asthma; Female stress incontinence      Furosemide (LASIX PO) Take 20 mg by mouth daily as needed          STOP taking these medications       LISINOPRIL 10 MG OR TABS Comments:   Reason for Stopping:             Allergies   Allergies   Allergen Reactions     Codeine Nausea and Vomiting     Data   Most Recent 3 CBC's:  Recent Labs   Lab  Test 08/02/20  0600 08/01/20  0619 07/31/20  0600   WBC 11.9* 12.5* 17.4*   HGB 11.3* 10.7* 10.5*   MCV 85 85 85   * 410 415      Most Recent 3 BMP's:  Recent Labs   Lab Test 08/04/20  0630 08/02/20  0600 08/01/20  1700 08/01/20  0619 07/31/20  0600   NA  --  133  --  136 132*   POTASSIUM 3.8 3.9 3.9 3.3* 3.6   CHLORIDE  --  104  --  107 103   CO2  --  23  --  24 23   BUN  --  16  --  15 21   CR  --  0.83  --  0.74 0.84   ANIONGAP  --  6  --  5 6   LOUISA  --  7.6*  --  7.4* 7.3*   GLC  --  90  --  96 84     Most Recent 2 LFT's:  Recent Labs   Lab Test 07/30/20  1343   AST 11   ALT 20   ALKPHOS 195*   BILITOTAL 0.6     Most Recent 6 Bacteria Isolates From Any Culture (See EPIC Reports for Culture Details):  Recent Labs   Lab Test 07/30/20 2003 07/30/20  1343 05/22/13  0821 05/22/13  0820   CULT No growth after 5 days No growth after 5 days No anaerobes isolated  No growth No anaerobes isolated  No anaerobes isolated  No growth  No growth       Results for orders placed or performed during the hospital encounter of 07/30/20   CT Abdomen Pelvis w Contrast    Addendum: 8/3/2020    SHELBI CONCEPCION  Accession # [id number]    The original report on this patient was dictated by Dr. Henry. A CT  of the chest, abdomen and pelvis from [facility]  dated 5/11/2020 has become available for comparison. The 7 mm  indeterminate nodule in the left posterior lung base has not changed  since the prior exam. The number and size of hepatic metastatic  lesions has probably not changed. A metastatic lesion in the anterior  spleen also appears stable. Diffuse thickening of the colonic wall on  the current exam is a new finding since the prior study. Anterior  abdominal wall hernia is unchanged. Gallbladder distention is new  since the prior exam. There is a region of focal gallbladder wall  thickening which could be a tumor related to the gallbladder wall  (image 38 of series 3 on the current exam). This is  unchanged.  Overall, diffuse colonic wall thickening and gallbladder distention  are new but the remainder of the scan is unchanged.    JAMES ST MD (Date of Addendum: 8/3/2020 12:17 PM)        JAMES ST MD      Narrative    CT ABDOMEN AND PELVIS WITH CONTRAST 7/30/2020 3:40 PM     HISTORY: Severe diarrhea, 2 weeks, abdominal cramps.  History of  metastatic endometrial cancer to liver.  Colitis.    COMPARISON: 11/21/2018 chest CT    CONTRAST DOSE:  101mL Isovue-370    Radiation dose for this scan was reduced using automated exposure  control, adjustment of the mA and/or kV according to patient size, or  iterative reconstruction technique.    FINDINGS: Numerous diffuse metastasis are noted throughout the liver.  3.7 cm hypodense lesion is noted within the anterior aspect of the  spleen, increased from 2.7 cm in 2018. The gallbladder is markedly  distended but otherwise unremarkable. The kidneys, adrenal glands, and  pancreas appear within normal limits. Broad-based anterior midline  abdominal supraumbilical midline hernia is noted with a portion of the  transverse colon protruding into the hernia. Diffuse colonic wall  prominence is noted. There is no evidence of bowel obstruction. No  free peritoneal fluid or air. Nodular 3.8 x 1.7 x 4.2 cm soft tissue  mass is noted between the splenic flexure and spleen with a small  eccentric calcification. Pelvic contents appear within normal limits.      Impression    IMPRESSION:  1. Diffuse colonic wall prominence and ill definition which could be  related to nonspecific colitis.  2. Nodular soft tissue 3.8 x 1.4 x 4.2 mass between the spleen and  splenic flexure of the colon which may represent peritoneal  metastasis.  3. Numerous hepatic metastasis. There is also a hypodense lesion  anteriorly in the spleen which is increased to 3.7 cm from 2.7 cm in  2018.  4. Anterior abdominal midline broad-based hernia containing a portion  of the transverse colon. No  evidence of bowel incarceration or  obstruction.  5. Marked gallbladder distention of uncertain significance.  6. Left lower lobe 0.7 cm pulmonary nodule, stable since 4/2/2018 and  therefore presumably benign.    REYNALDO REINOSO MD

## 2020-08-04 NOTE — PROGRESS NOTES
Progress Note     Primary Oncologist/Hematologist:  Dr. Garcias          Assessment and Plan:New actions/orders today (08/04/2020) are underlined.     Jany Park is a 75 year old female who was admitted on 7/30/2020.      Endometrial Cancer  --s/p debulking surgery 2/2015 followed by paclitaxel and carboplatin  --liver metastases noted 8/2015  --s/p doxil, topotecan, further paclitaxel and carboplatin, hormone therapy with megace/tamoxifen  --began pembrolizumab with lenvatinib 6/1/10  --CT 7/30 noted with colitis and malignancy  --CT on admission compared to prior CT SI in May 2020 shows stable disease  --Has appointment 8/26/2020, but will try to move up a week. The clinic will call her with an appointment day and time.     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     Leukocytosis  --reactive  --monitor     Anemia  --due to acute/chronic disease  --monitor     COVID  --negative     Disposition: Home on PO vanco once diarrhea controlled and tolerating PO per hospitalist. Hold chemotherapy and follow up with Dr. Garcias in 1-2 weeks    Anastacio KU, CNP  [facility]  [phone] (office), [phone] (cell)        Interval History:     Less diarrhea. Mild abdominal discomfort. Ready to go home.              Review of Systems:     The 5 point Review of Systems is negative other than noted in the HPI             Medications:   Scheduled Medications    celecoxib  100 mg Oral BID     fluticasone-vilanterol  1 puff Inhalation Daily     heparin  5 mL Intracatheter Q28 Days     heparin lock flush  5-10 mL Intracatheter Q24H     omeprazole  40 mg Oral QAM     vancomycin  125 mg Oral 4x Daily     PRN Medications  acetaminophen, acetaminophen, albuterol,  "artificial saliva, heparin lock flush, lidocaine 4%, lidocaine (buffered or not buffered), magnesium sulfate, melatonin, naloxone, ondansetron **OR** ondansetron, potassium chloride, potassium chloride with lidocaine, potassium chloride, potassium chloride, sodium chloride (PF), sodium chloride (PF), traMADol               Physical Exam:   Vitals were reviewed  Blood pressure 133/78, pulse 105, temperature 95.6  F (35.3  C), temperature source Oral, resp. rate 16, height 1.562 m (5' 1.5\"), weight 82.1 kg (181 lb), SpO2 99 %.  Wt Readings from Last 4 Encounters:   08/04/20 82.1 kg (181 lb)   11/21/18 90.7 kg (200 lb)   03/25/16 86.2 kg (190 lb)   10/02/15 89.7 kg (197 lb 12.8 oz)       No intake/output data recorded.      Constitutional: Awake, alert, cooperative, no apparent distress     Lungs: Clear to auscultation bilaterally, no crackles or wheezing   Cardiovascular: Regular rate and rhythm, normal S1 and S2, and no murmur noted   Abdomen: Normal bowel sounds, soft, non-distended, non-tender   Skin: Bilateral lower extremity edema   Other:               Data:   All laboratory data and imaging studies reviewed.    CMP  Recent Labs   Lab 08/04/20  0630 08/02/20  0600 08/01/20  1700 08/01/20  0619 07/31/20  0600 07/30/20  1343   NA  --  133  --  136 132* 132*   POTASSIUM 3.8 3.9 3.9 3.3* 3.6 4.0   CHLORIDE  --  104  --  107 103 102   CO2  --  23  --  24 23 23   ANIONGAP  --  6  --  5 6 7   GLC  --  90  --  96 84 97   BUN  --  16  --  15 21 32*   CR  --  0.83  --  0.74 0.84 1.02   GFRESTIMATED  --  69  --  80 68 54*   GFRESTBLACK  --  80  --  >90 79 63   LOUISA  --  7.6*  --  7.4* 7.3* 7.6*   MAG 1.3* 1.7  --  2.3 1.2*  --    PROTTOTAL  --   --   --   --   --  5.2*   ALBUMIN  --   --   --   --   --  2.0*   BILITOTAL  --   --   --   --   --  0.6   ALKPHOS  --   --   --   --   --  195*   AST  --   --   --   --   --  11   ALT  --   --   --   --   --  20     CBC  Recent Labs   Lab 08/02/20  0600 08/01/20  0619 " 07/31/20  0600 07/30/20  1343   WBC 11.9* 12.5* 17.4* 20.1*   RBC 4.03 3.82 3.79* 4.13   HGB 11.3* 10.7* 10.5* 11.5*   HCT 34.4* 32.6* 32.3* 34.9*   MCV 85 85 85 85   MCH 28.0 28.0 27.7 27.8   MCHC 32.8 32.8 32.5 33.0   RDW 14.6 14.7 14.6 14.6   * [phone]

## 2020-08-04 NOTE — PROVIDER NOTIFICATION
MD Notification    Notified Person: MD    Notified Person Name: Dr. Vazquez    Notification Date/Time: 8/4/2020., 04:00am    Notification Interaction: telephone    Purpose of Notification: pt requesting to use PTA albuterol inhaler    Orders Received: PRN albuterol inhaler 4 times daily, okay for pharmacy to relabel home inhaler for hospital use.     Comments:

## 2020-08-04 NOTE — PLAN OF CARE
A&Ox4. VSS on RA. Up independently in room to bathroom. 3 loose stools today per pt. Continue enteric precautions. C/o fibromyalgia pain, tramadol given X2. No N/V. Port HL. +2 BLE edema. Firmness to umbilicus d/t hernia or scar tissue from past hysterectomy. Regular diet, tolerating well. Plan to discharge home on PO vanco tomorrow.

## 2020-08-05 LAB
BACTERIA SPEC CULT: NO GROWTH
BACTERIA SPEC CULT: NO GROWTH
Lab: NORMAL
SPECIMEN SOURCE: NORMAL
SPECIMEN SOURCE: NORMAL

## 2021-09-08 ENCOUNTER — TRANSFERRED RECORDS (OUTPATIENT)
Dept: HEALTH INFORMATION MANAGEMENT | Facility: CLINIC | Age: 76
End: 2021-09-08

## 2024-01-01 ENCOUNTER — APPOINTMENT (OUTPATIENT)
Dept: OCCUPATIONAL THERAPY | Facility: CLINIC | Age: 79
DRG: 683 | End: 2024-01-01
Attending: INTERNAL MEDICINE
Payer: MEDICARE

## 2024-01-01 ENCOUNTER — APPOINTMENT (OUTPATIENT)
Dept: OCCUPATIONAL THERAPY | Facility: CLINIC | Age: 79
DRG: 683 | End: 2024-01-01
Payer: MEDICARE

## 2024-01-01 ENCOUNTER — APPOINTMENT (OUTPATIENT)
Dept: PHYSICAL THERAPY | Facility: CLINIC | Age: 79
DRG: 683 | End: 2024-01-01
Payer: MEDICARE

## 2024-01-01 ENCOUNTER — APPOINTMENT (OUTPATIENT)
Dept: GENERAL RADIOLOGY | Facility: CLINIC | Age: 79
DRG: 683 | End: 2024-01-01
Attending: INTERNAL MEDICINE
Payer: MEDICARE

## 2024-01-01 ENCOUNTER — APPOINTMENT (OUTPATIENT)
Dept: ULTRASOUND IMAGING | Facility: CLINIC | Age: 79
DRG: 683 | End: 2024-01-01
Attending: INTERNAL MEDICINE
Payer: MEDICARE

## 2024-01-01 ENCOUNTER — APPOINTMENT (OUTPATIENT)
Dept: CT IMAGING | Facility: CLINIC | Age: 79
DRG: 683 | End: 2024-01-01
Attending: INTERNAL MEDICINE
Payer: MEDICARE

## 2024-01-01 ENCOUNTER — APPOINTMENT (OUTPATIENT)
Dept: MRI IMAGING | Facility: CLINIC | Age: 79
DRG: 683 | End: 2024-01-01
Attending: INTERNAL MEDICINE
Payer: MEDICARE

## 2024-01-01 ENCOUNTER — APPOINTMENT (OUTPATIENT)
Dept: PHYSICAL THERAPY | Facility: CLINIC | Age: 79
DRG: 683 | End: 2024-01-01
Attending: INTERNAL MEDICINE
Payer: MEDICARE

## 2024-01-01 ENCOUNTER — HOSPITAL ENCOUNTER (INPATIENT)
Facility: CLINIC | Age: 79
LOS: 17 days | Discharge: HOSPICE | DRG: 683 | End: 2024-09-23
Attending: EMERGENCY MEDICINE | Admitting: INTERNAL MEDICINE
Payer: MEDICARE

## 2024-01-01 VITALS
WEIGHT: 169.4 LBS | OXYGEN SATURATION: 98 % | BODY MASS INDEX: 27.23 KG/M2 | HEIGHT: 66 IN | TEMPERATURE: 97.1 F | SYSTOLIC BLOOD PRESSURE: 122 MMHG | DIASTOLIC BLOOD PRESSURE: 70 MMHG | HEART RATE: 89 BPM | RESPIRATION RATE: 16 BRPM

## 2024-01-01 DIAGNOSIS — K21.9 GASTROESOPHAGEAL REFLUX DISEASE WITHOUT ESOPHAGITIS: ICD-10-CM

## 2024-01-01 DIAGNOSIS — D72.829 LEUKOCYTOSIS, UNSPECIFIED TYPE: ICD-10-CM

## 2024-01-01 DIAGNOSIS — N17.9 ACUTE KIDNEY INJURY (H): ICD-10-CM

## 2024-01-01 DIAGNOSIS — R10.84 ABDOMINAL PAIN, GENERALIZED: ICD-10-CM

## 2024-01-01 DIAGNOSIS — S31.000A WOUND OF SACRAL REGION, INITIAL ENCOUNTER: ICD-10-CM

## 2024-01-01 DIAGNOSIS — R16.0 HEPATOMEGALY: ICD-10-CM

## 2024-01-01 DIAGNOSIS — K59.03 DRUG-INDUCED CONSTIPATION: Primary | ICD-10-CM

## 2024-01-01 DIAGNOSIS — C54.1 ENDOMETRIAL CANCER (H): ICD-10-CM

## 2024-01-01 DIAGNOSIS — R53.81 PHYSICAL DECONDITIONING: ICD-10-CM

## 2024-01-01 LAB
ALBUMIN SERPL BCG-MCNC: 2.6 G/DL (ref 3.5–5.2)
ALBUMIN SERPL BCG-MCNC: 2.7 G/DL (ref 3.5–5.2)
ALBUMIN SERPL BCG-MCNC: 2.8 G/DL (ref 3.5–5.2)
ALBUMIN SERPL BCG-MCNC: 3 G/DL (ref 3.5–5.2)
ALBUMIN SERPL BCG-MCNC: 3 G/DL (ref 3.5–5.2)
ALBUMIN SERPL BCG-MCNC: 3.4 G/DL (ref 3.5–5.2)
ALBUMIN UR-MCNC: NEGATIVE MG/DL
ALP SERPL-CCNC: 1427 U/L (ref 40–150)
ALP SERPL-CCNC: 1440 U/L (ref 40–150)
ALP SERPL-CCNC: 1498 U/L (ref 40–150)
ALP SERPL-CCNC: 1529 U/L (ref 40–150)
ALP SERPL-CCNC: 1542 U/L (ref 40–150)
ALP SERPL-CCNC: 1633 U/L (ref 40–150)
ALP SERPL-CCNC: 1859 U/L (ref 40–150)
ALP SERPL-CCNC: 1875 U/L (ref 40–150)
ALP SERPL-CCNC: 2272 U/L (ref 40–150)
ALT SERPL W P-5'-P-CCNC: 16 U/L (ref 0–50)
ALT SERPL W P-5'-P-CCNC: 17 U/L (ref 0–50)
ALT SERPL W P-5'-P-CCNC: 18 U/L (ref 0–50)
ALT SERPL W P-5'-P-CCNC: 18 U/L (ref 0–50)
ALT SERPL W P-5'-P-CCNC: 22 U/L (ref 0–50)
ALT SERPL W P-5'-P-CCNC: 23 U/L (ref 0–50)
ALT SERPL W P-5'-P-CCNC: 28 U/L (ref 0–50)
ANION GAP SERPL CALCULATED.3IONS-SCNC: 11 MMOL/L (ref 7–15)
ANION GAP SERPL CALCULATED.3IONS-SCNC: 12 MMOL/L (ref 7–15)
ANION GAP SERPL CALCULATED.3IONS-SCNC: 12 MMOL/L (ref 7–15)
ANION GAP SERPL CALCULATED.3IONS-SCNC: 13 MMOL/L (ref 7–15)
ANION GAP SERPL CALCULATED.3IONS-SCNC: 14 MMOL/L (ref 7–15)
ANION GAP SERPL CALCULATED.3IONS-SCNC: 14 MMOL/L (ref 7–15)
ANION GAP SERPL CALCULATED.3IONS-SCNC: 16 MMOL/L (ref 7–15)
ANION GAP SERPL CALCULATED.3IONS-SCNC: 18 MMOL/L (ref 7–15)
APPEARANCE FLD: CLEAR
APPEARANCE UR: CLEAR
AST SERPL W P-5'-P-CCNC: 103 U/L (ref 0–45)
AST SERPL W P-5'-P-CCNC: 147 U/L (ref 0–45)
AST SERPL W P-5'-P-CCNC: 58 U/L (ref 0–45)
AST SERPL W P-5'-P-CCNC: 59 U/L (ref 0–45)
AST SERPL W P-5'-P-CCNC: 69 U/L (ref 0–45)
AST SERPL W P-5'-P-CCNC: 70 U/L (ref 0–45)
AST SERPL W P-5'-P-CCNC: 70 U/L (ref 0–45)
AST SERPL W P-5'-P-CCNC: 77 U/L (ref 0–45)
AST SERPL W P-5'-P-CCNC: 98 U/L (ref 0–45)
ATRIAL RATE - MUSE: 88 BPM
BACTERIA PLR CULT: NO GROWTH
BASOPHILS # BLD AUTO: 0 10E3/UL (ref 0–0.2)
BASOPHILS # BLD AUTO: 0.1 10E3/UL (ref 0–0.2)
BASOPHILS NFR BLD AUTO: 0 %
BASOPHILS NFR BLD AUTO: 1 %
BASOPHILS NFR BLD AUTO: 1 %
BILIRUB DIRECT SERPL-MCNC: 0.84 MG/DL (ref 0–0.3)
BILIRUB DIRECT SERPL-MCNC: 0.99 MG/DL (ref 0–0.3)
BILIRUB DIRECT SERPL-MCNC: 1.46 MG/DL (ref 0–0.3)
BILIRUB DIRECT SERPL-MCNC: 2.18 MG/DL (ref 0–0.3)
BILIRUB SERPL-MCNC: 1.2 MG/DL
BILIRUB SERPL-MCNC: 1.4 MG/DL
BILIRUB SERPL-MCNC: 1.4 MG/DL
BILIRUB SERPL-MCNC: 1.6 MG/DL
BILIRUB SERPL-MCNC: 1.9 MG/DL
BILIRUB SERPL-MCNC: 2.1 MG/DL
BILIRUB SERPL-MCNC: 2.6 MG/DL
BILIRUB SERPL-MCNC: 2.7 MG/DL
BILIRUB SERPL-MCNC: 3.4 MG/DL
BILIRUB UR QL STRIP: NEGATIVE
BUN SERPL-MCNC: 13.2 MG/DL (ref 8–23)
BUN SERPL-MCNC: 13.5 MG/DL (ref 8–23)
BUN SERPL-MCNC: 14 MG/DL (ref 8–23)
BUN SERPL-MCNC: 15.4 MG/DL (ref 8–23)
BUN SERPL-MCNC: 19.8 MG/DL (ref 8–23)
BUN SERPL-MCNC: 20.3 MG/DL (ref 8–23)
BUN SERPL-MCNC: 20.8 MG/DL (ref 8–23)
BUN SERPL-MCNC: 26.4 MG/DL (ref 8–23)
BUN SERPL-MCNC: 27.4 MG/DL (ref 8–23)
BUN SERPL-MCNC: 34.3 MG/DL (ref 8–23)
BUN SERPL-MCNC: 36.7 MG/DL (ref 8–23)
BUN SERPL-MCNC: 42.4 MG/DL (ref 8–23)
BUN SERPL-MCNC: 49.5 MG/DL (ref 8–23)
CALCIUM SERPL-MCNC: 7.7 MG/DL (ref 8.8–10.4)
CALCIUM SERPL-MCNC: 7.8 MG/DL (ref 8.8–10.4)
CALCIUM SERPL-MCNC: 7.9 MG/DL (ref 8.8–10.4)
CALCIUM SERPL-MCNC: 8.2 MG/DL (ref 8.8–10.4)
CALCIUM SERPL-MCNC: 8.2 MG/DL (ref 8.8–10.4)
CALCIUM SERPL-MCNC: 8.3 MG/DL (ref 8.8–10.4)
CALCIUM SERPL-MCNC: 8.7 MG/DL (ref 8.8–10.4)
CELL COUNT BODY FLUID SOURCE: NORMAL
CHLORIDE SERPL-SCNC: 100 MMOL/L (ref 98–107)
CHLORIDE SERPL-SCNC: 86 MMOL/L (ref 98–107)
CHLORIDE SERPL-SCNC: 87 MMOL/L (ref 98–107)
CHLORIDE SERPL-SCNC: 88 MMOL/L (ref 98–107)
CHLORIDE SERPL-SCNC: 88 MMOL/L (ref 98–107)
CHLORIDE SERPL-SCNC: 89 MMOL/L (ref 98–107)
CHLORIDE SERPL-SCNC: 91 MMOL/L (ref 98–107)
CHLORIDE SERPL-SCNC: 93 MMOL/L (ref 98–107)
CHLORIDE SERPL-SCNC: 95 MMOL/L (ref 98–107)
CHLORIDE SERPL-SCNC: 95 MMOL/L (ref 98–107)
CHLORIDE SERPL-SCNC: 97 MMOL/L (ref 98–107)
CHLORIDE SERPL-SCNC: 97 MMOL/L (ref 98–107)
CHLORIDE SERPL-SCNC: 98 MMOL/L (ref 98–107)
COLOR FLD: YELLOW
COLOR UR AUTO: YELLOW
CREAT SERPL-MCNC: 0.73 MG/DL (ref 0.51–0.95)
CREAT SERPL-MCNC: 0.76 MG/DL (ref 0.51–0.95)
CREAT SERPL-MCNC: 0.81 MG/DL (ref 0.51–0.95)
CREAT SERPL-MCNC: 0.85 MG/DL (ref 0.51–0.95)
CREAT SERPL-MCNC: 1.01 MG/DL (ref 0.51–0.95)
CREAT SERPL-MCNC: 1.02 MG/DL (ref 0.51–0.95)
CREAT SERPL-MCNC: 1.03 MG/DL (ref 0.51–0.95)
CREAT SERPL-MCNC: 1.04 MG/DL (ref 0.51–0.95)
CREAT SERPL-MCNC: 1.05 MG/DL (ref 0.51–0.95)
CREAT SERPL-MCNC: 1.07 MG/DL (ref 0.51–0.95)
CREAT SERPL-MCNC: 1.18 MG/DL (ref 0.51–0.95)
CREAT SERPL-MCNC: 1.68 MG/DL (ref 0.51–0.95)
CREAT SERPL-MCNC: 2.1 MG/DL (ref 0.51–0.95)
DIASTOLIC BLOOD PRESSURE - MUSE: NORMAL MMHG
EGFRCR SERPLBLD CKD-EPI 2021: 24 ML/MIN/1.73M2
EGFRCR SERPLBLD CKD-EPI 2021: 31 ML/MIN/1.73M2
EGFRCR SERPLBLD CKD-EPI 2021: 47 ML/MIN/1.73M2
EGFRCR SERPLBLD CKD-EPI 2021: 53 ML/MIN/1.73M2
EGFRCR SERPLBLD CKD-EPI 2021: 54 ML/MIN/1.73M2
EGFRCR SERPLBLD CKD-EPI 2021: 55 ML/MIN/1.73M2
EGFRCR SERPLBLD CKD-EPI 2021: 55 ML/MIN/1.73M2
EGFRCR SERPLBLD CKD-EPI 2021: 56 ML/MIN/1.73M2
EGFRCR SERPLBLD CKD-EPI 2021: 57 ML/MIN/1.73M2
EGFRCR SERPLBLD CKD-EPI 2021: 70 ML/MIN/1.73M2
EGFRCR SERPLBLD CKD-EPI 2021: 74 ML/MIN/1.73M2
EGFRCR SERPLBLD CKD-EPI 2021: 80 ML/MIN/1.73M2
EGFRCR SERPLBLD CKD-EPI 2021: 84 ML/MIN/1.73M2
EOSINOPHIL # BLD AUTO: 0 10E3/UL (ref 0–0.7)
EOSINOPHIL # BLD AUTO: 0.1 10E3/UL (ref 0–0.7)
EOSINOPHIL # BLD AUTO: 0.1 10E3/UL (ref 0–0.7)
EOSINOPHIL NFR BLD AUTO: 0 %
EOSINOPHIL NFR BLD AUTO: 1 %
EOSINOPHIL NFR BLD AUTO: 1 %
ERYTHROCYTE [DISTWIDTH] IN BLOOD BY AUTOMATED COUNT: 14.7 % (ref 10–15)
ERYTHROCYTE [DISTWIDTH] IN BLOOD BY AUTOMATED COUNT: 14.9 % (ref 10–15)
ERYTHROCYTE [DISTWIDTH] IN BLOOD BY AUTOMATED COUNT: 15.2 % (ref 10–15)
ERYTHROCYTE [DISTWIDTH] IN BLOOD BY AUTOMATED COUNT: 15.2 % (ref 10–15)
ERYTHROCYTE [DISTWIDTH] IN BLOOD BY AUTOMATED COUNT: 15.3 % (ref 10–15)
ERYTHROCYTE [DISTWIDTH] IN BLOOD BY AUTOMATED COUNT: 15.4 % (ref 10–15)
ERYTHROCYTE [DISTWIDTH] IN BLOOD BY AUTOMATED COUNT: 15.4 % (ref 10–15)
ERYTHROCYTE [DISTWIDTH] IN BLOOD BY AUTOMATED COUNT: 15.7 % (ref 10–15)
GLUCOSE BODY FLUID SOURCE: NORMAL
GLUCOSE FLD-MCNC: 105 MG/DL
GLUCOSE SERPL-MCNC: 100 MG/DL (ref 70–99)
GLUCOSE SERPL-MCNC: 101 MG/DL (ref 70–99)
GLUCOSE SERPL-MCNC: 104 MG/DL (ref 70–99)
GLUCOSE SERPL-MCNC: 104 MG/DL (ref 70–99)
GLUCOSE SERPL-MCNC: 115 MG/DL (ref 70–99)
GLUCOSE SERPL-MCNC: 115 MG/DL (ref 70–99)
GLUCOSE SERPL-MCNC: 124 MG/DL (ref 70–99)
GLUCOSE SERPL-MCNC: 127 MG/DL (ref 70–99)
GLUCOSE SERPL-MCNC: 89 MG/DL (ref 70–99)
GLUCOSE SERPL-MCNC: 92 MG/DL (ref 70–99)
GLUCOSE SERPL-MCNC: 93 MG/DL (ref 70–99)
GLUCOSE SERPL-MCNC: 96 MG/DL (ref 70–99)
GLUCOSE SERPL-MCNC: 99 MG/DL (ref 70–99)
GLUCOSE UR STRIP-MCNC: NEGATIVE MG/DL
GRAM STAIN RESULT: NORMAL
GRAM STAIN RESULT: NORMAL
HCO3 SERPL-SCNC: 23 MMOL/L (ref 22–29)
HCO3 SERPL-SCNC: 24 MMOL/L (ref 22–29)
HCO3 SERPL-SCNC: 25 MMOL/L (ref 22–29)
HCO3 SERPL-SCNC: 26 MMOL/L (ref 22–29)
HCT VFR BLD AUTO: 28.7 % (ref 35–47)
HCT VFR BLD AUTO: 29.1 % (ref 35–47)
HCT VFR BLD AUTO: 29.2 % (ref 35–47)
HCT VFR BLD AUTO: 29.6 % (ref 35–47)
HCT VFR BLD AUTO: 30.4 % (ref 35–47)
HCT VFR BLD AUTO: 30.7 % (ref 35–47)
HCT VFR BLD AUTO: 30.7 % (ref 35–47)
HCT VFR BLD AUTO: 30.8 % (ref 35–47)
HCT VFR BLD AUTO: 31.1 % (ref 35–47)
HCT VFR BLD AUTO: 31.4 % (ref 35–47)
HCT VFR BLD AUTO: 31.4 % (ref 35–47)
HCT VFR BLD AUTO: 32.4 % (ref 35–47)
HGB BLD-MCNC: 10 G/DL (ref 11.7–15.7)
HGB BLD-MCNC: 10 G/DL (ref 11.7–15.7)
HGB BLD-MCNC: 10.1 G/DL (ref 11.7–15.7)
HGB BLD-MCNC: 10.1 G/DL (ref 11.7–15.7)
HGB BLD-MCNC: 10.3 G/DL (ref 11.7–15.7)
HGB BLD-MCNC: 10.3 G/DL (ref 11.7–15.7)
HGB BLD-MCNC: 10.5 G/DL (ref 11.7–15.7)
HGB BLD-MCNC: 9.2 G/DL (ref 11.7–15.7)
HGB BLD-MCNC: 9.6 G/DL (ref 11.7–15.7)
HGB BLD-MCNC: 9.9 G/DL (ref 11.7–15.7)
HGB UR QL STRIP: NEGATIVE
HYALINE CASTS: 19 /LPF
IMM GRANULOCYTES # BLD: 0.1 10E3/UL
IMM GRANULOCYTES # BLD: 0.2 10E3/UL
IMM GRANULOCYTES NFR BLD: 1 %
INTERPRETATION ECG - MUSE: NORMAL
KETONES UR STRIP-MCNC: NEGATIVE MG/DL
LD BODY BODY FLUID SOURCE: NORMAL
LDH FLD L TO P-CCNC: 257 U/L
LEUKOCYTE ESTERASE UR QL STRIP: ABNORMAL
LYMPHOCYTES # BLD AUTO: 1 10E3/UL (ref 0.8–5.3)
LYMPHOCYTES # BLD AUTO: 1.1 10E3/UL (ref 0.8–5.3)
LYMPHOCYTES # BLD AUTO: 1.3 10E3/UL (ref 0.8–5.3)
LYMPHOCYTES # BLD AUTO: 1.4 10E3/UL (ref 0.8–5.3)
LYMPHOCYTES NFR BLD AUTO: 14 %
LYMPHOCYTES NFR BLD AUTO: 15 %
LYMPHOCYTES NFR BLD AUTO: 5 %
LYMPHOCYTES NFR BLD AUTO: 6 %
LYMPHOCYTES NFR BLD AUTO: 7 %
LYMPHOCYTES NFR BLD AUTO: 8 %
MCH RBC QN AUTO: 27.8 PG (ref 26.5–33)
MCH RBC QN AUTO: 27.8 PG (ref 26.5–33)
MCH RBC QN AUTO: 28 PG (ref 26.5–33)
MCH RBC QN AUTO: 28 PG (ref 26.5–33)
MCH RBC QN AUTO: 28.4 PG (ref 26.5–33)
MCH RBC QN AUTO: 28.4 PG (ref 26.5–33)
MCH RBC QN AUTO: 28.5 PG (ref 26.5–33)
MCH RBC QN AUTO: 28.5 PG (ref 26.5–33)
MCH RBC QN AUTO: 28.7 PG (ref 26.5–33)
MCH RBC QN AUTO: 28.8 PG (ref 26.5–33)
MCH RBC QN AUTO: 29.1 PG (ref 26.5–33)
MCH RBC QN AUTO: 29.3 PG (ref 26.5–33)
MCHC RBC AUTO-ENTMCNC: 31.5 G/DL (ref 31.5–36.5)
MCHC RBC AUTO-ENTMCNC: 31.6 G/DL (ref 31.5–36.5)
MCHC RBC AUTO-ENTMCNC: 32.1 G/DL (ref 31.5–36.5)
MCHC RBC AUTO-ENTMCNC: 32.4 G/DL (ref 31.5–36.5)
MCHC RBC AUTO-ENTMCNC: 32.5 G/DL (ref 31.5–36.5)
MCHC RBC AUTO-ENTMCNC: 32.5 G/DL (ref 31.5–36.5)
MCHC RBC AUTO-ENTMCNC: 32.8 G/DL (ref 31.5–36.5)
MCHC RBC AUTO-ENTMCNC: 32.9 G/DL (ref 31.5–36.5)
MCHC RBC AUTO-ENTMCNC: 33.6 G/DL (ref 31.5–36.5)
MCHC RBC AUTO-ENTMCNC: 33.8 G/DL (ref 31.5–36.5)
MCHC RBC AUTO-ENTMCNC: 33.9 G/DL (ref 31.5–36.5)
MCHC RBC AUTO-ENTMCNC: 34 G/DL (ref 31.5–36.5)
MCV RBC AUTO: 84 FL (ref 78–100)
MCV RBC AUTO: 85 FL (ref 78–100)
MCV RBC AUTO: 87 FL (ref 78–100)
MCV RBC AUTO: 87 FL (ref 78–100)
MCV RBC AUTO: 88 FL (ref 78–100)
MCV RBC AUTO: 89 FL (ref 78–100)
MCV RBC AUTO: 89 FL (ref 78–100)
MCV RBC AUTO: 90 FL (ref 78–100)
MONOCYTES # BLD AUTO: 0.9 10E3/UL (ref 0–1.3)
MONOCYTES # BLD AUTO: 0.9 10E3/UL (ref 0–1.3)
MONOCYTES # BLD AUTO: 1 10E3/UL (ref 0–1.3)
MONOCYTES # BLD AUTO: 1.3 10E3/UL (ref 0–1.3)
MONOCYTES # BLD AUTO: 1.5 10E3/UL (ref 0–1.3)
MONOCYTES # BLD AUTO: 1.5 10E3/UL (ref 0–1.3)
MONOCYTES NFR BLD AUTO: 10 %
MONOCYTES NFR BLD AUTO: 10 %
MONOCYTES NFR BLD AUTO: 8 %
MONOCYTES NFR BLD AUTO: 9 %
NEUTROPHILS # BLD AUTO: 13 10E3/UL (ref 1.6–8.3)
NEUTROPHILS # BLD AUTO: 15 10E3/UL (ref 1.6–8.3)
NEUTROPHILS # BLD AUTO: 15.8 10E3/UL (ref 1.6–8.3)
NEUTROPHILS # BLD AUTO: 6.7 10E3/UL (ref 1.6–8.3)
NEUTROPHILS # BLD AUTO: 7.1 10E3/UL (ref 1.6–8.3)
NEUTROPHILS # BLD AUTO: 9.8 10E3/UL (ref 1.6–8.3)
NEUTROPHILS NFR BLD AUTO: 73 %
NEUTROPHILS NFR BLD AUTO: 74 %
NEUTROPHILS NFR BLD AUTO: 81 %
NEUTROPHILS NFR BLD AUTO: 83 %
NEUTROPHILS NFR BLD AUTO: 84 %
NEUTROPHILS NFR BLD AUTO: 85 %
NITRATE UR QL: NEGATIVE
NRBC # BLD AUTO: 0 10E3/UL
NRBC BLD AUTO-RTO: 0 /100
OSMOLALITY SERPL: 270 MMOL/KG (ref 280–301)
OSMOLALITY UR: 333 MMOL/KG (ref 100–1200)
OSMOLALITY UR: 520 MMOL/KG (ref 100–1200)
P AXIS - MUSE: 44 DEGREES
PH UR STRIP: 5.5 [PH] (ref 5–7)
PLATELET # BLD AUTO: 515 10E3/UL (ref 150–450)
PLATELET # BLD AUTO: 517 10E3/UL (ref 150–450)
PLATELET # BLD AUTO: 542 10E3/UL (ref 150–450)
PLATELET # BLD AUTO: 562 10E3/UL (ref 150–450)
PLATELET # BLD AUTO: 577 10E3/UL (ref 150–450)
PLATELET # BLD AUTO: 588 10E3/UL (ref 150–450)
PLATELET # BLD AUTO: 590 10E3/UL (ref 150–450)
PLATELET # BLD AUTO: 599 10E3/UL (ref 150–450)
PLATELET # BLD AUTO: 603 10E3/UL (ref 150–450)
PLATELET # BLD AUTO: 610 10E3/UL (ref 150–450)
PLATELET # BLD AUTO: 618 10E3/UL (ref 150–450)
PLATELET # BLD AUTO: 625 10E3/UL (ref 150–450)
POTASSIUM SERPL-SCNC: 3 MMOL/L (ref 3.4–5.3)
POTASSIUM SERPL-SCNC: 3.5 MMOL/L (ref 3.4–5.3)
POTASSIUM SERPL-SCNC: 3.6 MMOL/L (ref 3.4–5.3)
POTASSIUM SERPL-SCNC: 3.6 MMOL/L (ref 3.4–5.3)
POTASSIUM SERPL-SCNC: 3.7 MMOL/L (ref 3.4–5.3)
POTASSIUM SERPL-SCNC: 3.7 MMOL/L (ref 3.4–5.3)
POTASSIUM SERPL-SCNC: 3.8 MMOL/L (ref 3.4–5.3)
POTASSIUM SERPL-SCNC: 3.9 MMOL/L (ref 3.4–5.3)
POTASSIUM SERPL-SCNC: 3.9 MMOL/L (ref 3.4–5.3)
POTASSIUM SERPL-SCNC: 4 MMOL/L (ref 3.4–5.3)
POTASSIUM SERPL-SCNC: 4 MMOL/L (ref 3.4–5.3)
POTASSIUM SERPL-SCNC: 4.1 MMOL/L (ref 3.4–5.3)
POTASSIUM SERPL-SCNC: 4.4 MMOL/L (ref 3.4–5.3)
POTASSIUM SERPL-SCNC: 4.4 MMOL/L (ref 3.4–5.3)
PR INTERVAL - MUSE: 176 MS
PROCALCITONIN SERPL IA-MCNC: 1.06 NG/ML
PROT FLD-MCNC: 2 G/DL
PROT SERPL-MCNC: 5.8 G/DL (ref 6.4–8.3)
PROT SERPL-MCNC: 5.8 G/DL (ref 6.4–8.3)
PROT SERPL-MCNC: 5.9 G/DL (ref 6.4–8.3)
PROT SERPL-MCNC: 5.9 G/DL (ref 6.4–8.3)
PROT SERPL-MCNC: 6 G/DL (ref 6.4–8.3)
PROT SERPL-MCNC: 6 G/DL (ref 6.4–8.3)
PROT SERPL-MCNC: 6.1 G/DL (ref 6.4–8.3)
PROT SERPL-MCNC: 6.1 G/DL (ref 6.4–8.3)
PROT SERPL-MCNC: 6.8 G/DL (ref 6.4–8.3)
PROTEIN BODY FLUID SOURCE: NORMAL
QRS DURATION - MUSE: 94 MS
QT - MUSE: 390 MS
QTC - MUSE: 471 MS
R AXIS - MUSE: 23 DEGREES
RBC # BLD AUTO: 3.31 10E6/UL (ref 3.8–5.2)
RBC # BLD AUTO: 3.45 10E6/UL (ref 3.8–5.2)
RBC # BLD AUTO: 3.45 10E6/UL (ref 3.8–5.2)
RBC # BLD AUTO: 3.47 10E6/UL (ref 3.8–5.2)
RBC # BLD AUTO: 3.48 10E6/UL (ref 3.8–5.2)
RBC # BLD AUTO: 3.51 10E6/UL (ref 3.8–5.2)
RBC # BLD AUTO: 3.52 10E6/UL (ref 3.8–5.2)
RBC # BLD AUTO: 3.53 10E6/UL (ref 3.8–5.2)
RBC # BLD AUTO: 3.54 10E6/UL (ref 3.8–5.2)
RBC # BLD AUTO: 3.59 10E6/UL (ref 3.8–5.2)
RBC # BLD AUTO: 3.61 10E6/UL (ref 3.8–5.2)
RBC # BLD AUTO: 3.65 10E6/UL (ref 3.8–5.2)
RBC URINE: <1 /HPF
SODIUM SERPL-SCNC: 123 MMOL/L (ref 135–145)
SODIUM SERPL-SCNC: 124 MMOL/L (ref 135–145)
SODIUM SERPL-SCNC: 125 MMOL/L (ref 135–145)
SODIUM SERPL-SCNC: 126 MMOL/L (ref 135–145)
SODIUM SERPL-SCNC: 127 MMOL/L (ref 135–145)
SODIUM SERPL-SCNC: 128 MMOL/L (ref 135–145)
SODIUM SERPL-SCNC: 131 MMOL/L (ref 135–145)
SODIUM SERPL-SCNC: 133 MMOL/L (ref 135–145)
SODIUM SERPL-SCNC: 134 MMOL/L (ref 135–145)
SODIUM SERPL-SCNC: 135 MMOL/L (ref 135–145)
SODIUM SERPL-SCNC: 136 MMOL/L (ref 135–145)
SODIUM UR-SCNC: 45 MMOL/L
SODIUM UR-SCNC: <20 MMOL/L
SP GR UR STRIP: 1.01 (ref 1–1.03)
SQUAMOUS EPITHELIAL: <1 /HPF
SYSTOLIC BLOOD PRESSURE - MUSE: NORMAL MMHG
T AXIS - MUSE: 31 DEGREES
UROBILINOGEN UR STRIP-MCNC: 2 MG/DL
VENTRICULAR RATE- MUSE: 88 BPM
WBC # BLD AUTO: 10.4 10E3/UL (ref 4–11)
WBC # BLD AUTO: 10.9 10E3/UL (ref 4–11)
WBC # BLD AUTO: 12.1 10E3/UL (ref 4–11)
WBC # BLD AUTO: 13.3 10E3/UL (ref 4–11)
WBC # BLD AUTO: 15.7 10E3/UL (ref 4–11)
WBC # BLD AUTO: 15.9 10E3/UL (ref 4–11)
WBC # BLD AUTO: 17.8 10E3/UL (ref 4–11)
WBC # BLD AUTO: 18.5 10E3/UL (ref 4–11)
WBC # BLD AUTO: 18.8 10E3/UL (ref 4–11)
WBC # BLD AUTO: 8.7 10E3/UL (ref 4–11)
WBC # BLD AUTO: 9.1 10E3/UL (ref 4–11)
WBC # BLD AUTO: 9.7 10E3/UL (ref 4–11)
WBC # FLD AUTO: NORMAL 10*3/UL
WBC URINE: 6 /HPF

## 2024-01-01 PROCEDURE — 258N000003 HC RX IP 258 OP 636: Performed by: STUDENT IN AN ORGANIZED HEALTH CARE EDUCATION/TRAINING PROGRAM

## 2024-01-01 PROCEDURE — 71045 X-RAY EXAM CHEST 1 VIEW: CPT

## 2024-01-01 PROCEDURE — 120N000001 HC R&B MED SURG/OB

## 2024-01-01 PROCEDURE — 99223 1ST HOSP IP/OBS HIGH 75: CPT | Performed by: STUDENT IN AN ORGANIZED HEALTH CARE EDUCATION/TRAINING PROGRAM

## 2024-01-01 PROCEDURE — 250N000011 HC RX IP 250 OP 636: Performed by: INTERNAL MEDICINE

## 2024-01-01 PROCEDURE — 97110 THERAPEUTIC EXERCISES: CPT | Mod: GP

## 2024-01-01 PROCEDURE — 99239 HOSP IP/OBS DSCHRG MGMT >30: CPT | Performed by: INTERNAL MEDICINE

## 2024-01-01 PROCEDURE — 99232 SBSQ HOSP IP/OBS MODERATE 35: CPT | Performed by: INTERNAL MEDICINE

## 2024-01-01 PROCEDURE — 80048 BASIC METABOLIC PNL TOTAL CA: CPT | Performed by: INTERNAL MEDICINE

## 2024-01-01 PROCEDURE — 85027 COMPLETE CBC AUTOMATED: CPT | Performed by: INTERNAL MEDICINE

## 2024-01-01 PROCEDURE — 250N000013 HC RX MED GY IP 250 OP 250 PS 637: Performed by: INTERNAL MEDICINE

## 2024-01-01 PROCEDURE — 89051 BODY FLUID CELL COUNT: CPT | Performed by: INTERNAL MEDICINE

## 2024-01-01 PROCEDURE — 97140 MANUAL THERAPY 1/> REGIONS: CPT | Mod: GO

## 2024-01-01 PROCEDURE — A9585 GADOBUTROL INJECTION: HCPCS | Performed by: INTERNAL MEDICINE

## 2024-01-01 PROCEDURE — 258N000003 HC RX IP 258 OP 636: Performed by: INTERNAL MEDICINE

## 2024-01-01 PROCEDURE — 49083 ABD PARACENTESIS W/IMAGING: CPT

## 2024-01-01 PROCEDURE — 97530 THERAPEUTIC ACTIVITIES: CPT | Mod: GP

## 2024-01-01 PROCEDURE — 250N000013 HC RX MED GY IP 250 OP 250 PS 637: Performed by: NURSE PRACTITIONER

## 2024-01-01 PROCEDURE — 84295 ASSAY OF SERUM SODIUM: CPT | Performed by: INTERNAL MEDICINE

## 2024-01-01 PROCEDURE — 84145 PROCALCITONIN (PCT): CPT | Performed by: INTERNAL MEDICINE

## 2024-01-01 PROCEDURE — 250N000009 HC RX 250: Performed by: INTERNAL MEDICINE

## 2024-01-01 PROCEDURE — G0378 HOSPITAL OBSERVATION PER HR: HCPCS

## 2024-01-01 PROCEDURE — 97110 THERAPEUTIC EXERCISES: CPT | Mod: GP | Performed by: PHYSICAL THERAPIST

## 2024-01-01 PROCEDURE — 93005 ELECTROCARDIOGRAM TRACING: CPT

## 2024-01-01 PROCEDURE — 85025 COMPLETE CBC W/AUTO DIFF WBC: CPT | Performed by: INTERNAL MEDICINE

## 2024-01-01 PROCEDURE — 70553 MRI BRAIN STEM W/O & W/DYE: CPT | Mod: MG

## 2024-01-01 PROCEDURE — 99232 SBSQ HOSP IP/OBS MODERATE 35: CPT | Performed by: STUDENT IN AN ORGANIZED HEALTH CARE EDUCATION/TRAINING PROGRAM

## 2024-01-01 PROCEDURE — 97530 THERAPEUTIC ACTIVITIES: CPT | Mod: GO | Performed by: OCCUPATIONAL THERAPIST

## 2024-01-01 PROCEDURE — 97535 SELF CARE MNGMENT TRAINING: CPT | Mod: GO | Performed by: OCCUPATIONAL THERAPIST

## 2024-01-01 PROCEDURE — 32555 ASPIRATE PLEURA W/ IMAGING: CPT

## 2024-01-01 PROCEDURE — 85025 COMPLETE CBC W/AUTO DIFF WBC: CPT | Performed by: EMERGENCY MEDICINE

## 2024-01-01 PROCEDURE — 97116 GAIT TRAINING THERAPY: CPT | Mod: GP

## 2024-01-01 PROCEDURE — 255N000002 HC RX 255 OP 636: Performed by: INTERNAL MEDICINE

## 2024-01-01 PROCEDURE — 96360 HYDRATION IV INFUSION INIT: CPT

## 2024-01-01 PROCEDURE — 97165 OT EVAL LOW COMPLEX 30 MIN: CPT | Mod: GO

## 2024-01-01 PROCEDURE — 99233 SBSQ HOSP IP/OBS HIGH 50: CPT | Performed by: INTERNAL MEDICINE

## 2024-01-01 PROCEDURE — 81003 URINALYSIS AUTO W/O SCOPE: CPT | Performed by: EMERGENCY MEDICINE

## 2024-01-01 PROCEDURE — 83615 LACTATE (LD) (LDH) ENZYME: CPT | Performed by: INTERNAL MEDICINE

## 2024-01-01 PROCEDURE — 97535 SELF CARE MNGMENT TRAINING: CPT | Mod: GO

## 2024-01-01 PROCEDURE — 84132 ASSAY OF SERUM POTASSIUM: CPT | Performed by: INTERNAL MEDICINE

## 2024-01-01 PROCEDURE — 84132 ASSAY OF SERUM POTASSIUM: CPT | Performed by: STUDENT IN AN ORGANIZED HEALTH CARE EDUCATION/TRAINING PROGRAM

## 2024-01-01 PROCEDURE — 99223 1ST HOSP IP/OBS HIGH 75: CPT | Mod: AI | Performed by: INTERNAL MEDICINE

## 2024-01-01 PROCEDURE — 84300 ASSAY OF URINE SODIUM: CPT | Performed by: STUDENT IN AN ORGANIZED HEALTH CARE EDUCATION/TRAINING PROGRAM

## 2024-01-01 PROCEDURE — 82945 GLUCOSE OTHER FLUID: CPT | Performed by: INTERNAL MEDICINE

## 2024-01-01 PROCEDURE — 250N000013 HC RX MED GY IP 250 OP 250 PS 637: Performed by: STUDENT IN AN ORGANIZED HEALTH CARE EDUCATION/TRAINING PROGRAM

## 2024-01-01 PROCEDURE — 85004 AUTOMATED DIFF WBC COUNT: CPT | Performed by: INTERNAL MEDICINE

## 2024-01-01 PROCEDURE — 36415 COLL VENOUS BLD VENIPUNCTURE: CPT | Performed by: EMERGENCY MEDICINE

## 2024-01-01 PROCEDURE — 71260 CT THORAX DX C+: CPT | Mod: MG

## 2024-01-01 PROCEDURE — 250N000013 HC RX MED GY IP 250 OP 250 PS 637: Performed by: HOSPITALIST

## 2024-01-01 PROCEDURE — 83930 ASSAY OF BLOOD OSMOLALITY: CPT | Performed by: INTERNAL MEDICINE

## 2024-01-01 PROCEDURE — 82040 ASSAY OF SERUM ALBUMIN: CPT | Performed by: EMERGENCY MEDICINE

## 2024-01-01 PROCEDURE — 93010 ELECTROCARDIOGRAM REPORT: CPT | Performed by: INTERNAL MEDICINE

## 2024-01-01 PROCEDURE — 80053 COMPREHEN METABOLIC PANEL: CPT | Performed by: INTERNAL MEDICINE

## 2024-01-01 PROCEDURE — 96361 HYDRATE IV INFUSION ADD-ON: CPT

## 2024-01-01 PROCEDURE — 83935 ASSAY OF URINE OSMOLALITY: CPT | Performed by: STUDENT IN AN ORGANIZED HEALTH CARE EDUCATION/TRAINING PROGRAM

## 2024-01-01 PROCEDURE — 82435 ASSAY OF BLOOD CHLORIDE: CPT | Performed by: INTERNAL MEDICINE

## 2024-01-01 PROCEDURE — 84155 ASSAY OF PROTEIN SERUM: CPT | Performed by: INTERNAL MEDICINE

## 2024-01-01 PROCEDURE — 84295 ASSAY OF SERUM SODIUM: CPT | Performed by: STUDENT IN AN ORGANIZED HEALTH CARE EDUCATION/TRAINING PROGRAM

## 2024-01-01 PROCEDURE — G0463 HOSPITAL OUTPT CLINIC VISIT: HCPCS

## 2024-01-01 PROCEDURE — 83935 ASSAY OF URINE OSMOLALITY: CPT | Performed by: INTERNAL MEDICINE

## 2024-01-01 PROCEDURE — 258N000003 HC RX IP 258 OP 636: Performed by: EMERGENCY MEDICINE

## 2024-01-01 PROCEDURE — 82248 BILIRUBIN DIRECT: CPT | Performed by: INTERNAL MEDICINE

## 2024-01-01 PROCEDURE — 84300 ASSAY OF URINE SODIUM: CPT | Performed by: INTERNAL MEDICINE

## 2024-01-01 PROCEDURE — 250N000009 HC RX 250: Performed by: RADIOLOGY

## 2024-01-01 PROCEDURE — 85014 HEMATOCRIT: CPT | Performed by: INTERNAL MEDICINE

## 2024-01-01 PROCEDURE — 84157 ASSAY OF PROTEIN OTHER: CPT | Performed by: INTERNAL MEDICINE

## 2024-01-01 PROCEDURE — 87205 SMEAR GRAM STAIN: CPT | Performed by: INTERNAL MEDICINE

## 2024-01-01 PROCEDURE — 99285 EMERGENCY DEPT VISIT HI MDM: CPT | Mod: 25

## 2024-01-01 PROCEDURE — 97161 PT EVAL LOW COMPLEX 20 MIN: CPT | Mod: GP

## 2024-01-01 PROCEDURE — 97530 THERAPEUTIC ACTIVITIES: CPT | Mod: GO

## 2024-01-01 PROCEDURE — 0W993ZZ DRAINAGE OF RIGHT PLEURAL CAVITY, PERCUTANEOUS APPROACH: ICD-10-PCS | Performed by: RADIOLOGY

## 2024-01-01 RX ORDER — BISACODYL 10 MG
10 SUPPOSITORY, RECTAL RECTAL
Qty: 10 SUPPOSITORY | Refills: 0 | Status: SHIPPED | OUTPATIENT
Start: 2024-01-01

## 2024-01-01 RX ORDER — CALCIUM CARBONATE 500 MG/1
500-1000 TABLET, CHEWABLE ORAL 3 TIMES DAILY PRN
Status: DISCONTINUED | OUTPATIENT
Start: 2024-01-01 | End: 2024-01-01 | Stop reason: HOSPADM

## 2024-01-01 RX ORDER — OXYCODONE HYDROCHLORIDE 5 MG/1
5 TABLET ORAL EVERY 8 HOURS PRN
Qty: 15 TABLET | Refills: 0 | Status: SHIPPED | OUTPATIENT
Start: 2024-01-01 | End: 2024-01-01

## 2024-01-01 RX ORDER — POLYETHYLENE GLYCOL 3350 17 G/17G
17 POWDER, FOR SOLUTION ORAL 2 TIMES DAILY
Status: DISCONTINUED | OUTPATIENT
Start: 2024-01-01 | End: 2024-01-01

## 2024-01-01 RX ORDER — POLYETHYLENE GLYCOL 3350 17 G/17G
17 POWDER, FOR SOLUTION ORAL 2 TIMES DAILY PRN
Status: DISCONTINUED | OUTPATIENT
Start: 2024-01-01 | End: 2024-01-01

## 2024-01-01 RX ORDER — OXYCODONE HYDROCHLORIDE 5 MG/1
5 TABLET ORAL EVERY 8 HOURS PRN
Status: ON HOLD | COMMUNITY
End: 2024-01-01

## 2024-01-01 RX ORDER — NALOXONE HYDROCHLORIDE 0.4 MG/ML
0.2 INJECTION, SOLUTION INTRAMUSCULAR; INTRAVENOUS; SUBCUTANEOUS
Status: DISCONTINUED | OUTPATIENT
Start: 2024-01-01 | End: 2024-01-01 | Stop reason: HOSPADM

## 2024-01-01 RX ORDER — POTASSIUM CHLORIDE 29.8 MG/ML
20 INJECTION INTRAVENOUS
Status: COMPLETED | OUTPATIENT
Start: 2024-01-01 | End: 2024-01-01

## 2024-01-01 RX ORDER — AMOXICILLIN 250 MG
1 CAPSULE ORAL 2 TIMES DAILY PRN
Status: DISCONTINUED | OUTPATIENT
Start: 2024-01-01 | End: 2024-01-01

## 2024-01-01 RX ORDER — POLYETHYLENE GLYCOL 3350 17 G/17G
17 POWDER, FOR SOLUTION ORAL DAILY
Status: DISCONTINUED | OUTPATIENT
Start: 2024-01-01 | End: 2024-01-01

## 2024-01-01 RX ORDER — ASPIRIN 81 MG/1
81 TABLET ORAL DAILY
COMMUNITY

## 2024-01-01 RX ORDER — NALOXONE HYDROCHLORIDE 0.4 MG/ML
0.1 INJECTION, SOLUTION INTRAMUSCULAR; INTRAVENOUS; SUBCUTANEOUS
Status: DISCONTINUED | OUTPATIENT
Start: 2024-01-01 | End: 2024-01-01 | Stop reason: HOSPADM

## 2024-01-01 RX ORDER — PROCHLORPERAZINE MALEATE 5 MG
5 TABLET ORAL EVERY 6 HOURS PRN
Status: DISCONTINUED | OUTPATIENT
Start: 2024-01-01 | End: 2024-01-01 | Stop reason: HOSPADM

## 2024-01-01 RX ORDER — HEPARIN SODIUM (PORCINE) LOCK FLUSH IV SOLN 100 UNIT/ML 100 UNIT/ML
5-10 SOLUTION INTRAVENOUS
Status: DISCONTINUED | OUTPATIENT
Start: 2024-01-01 | End: 2024-01-01 | Stop reason: HOSPADM

## 2024-01-01 RX ORDER — NALOXONE HYDROCHLORIDE 0.4 MG/ML
0.4 INJECTION, SOLUTION INTRAMUSCULAR; INTRAVENOUS; SUBCUTANEOUS
Status: DISCONTINUED | OUTPATIENT
Start: 2024-01-01 | End: 2024-01-01

## 2024-01-01 RX ORDER — SUCRALFATE ORAL 1 G/10ML
1 SUSPENSION ORAL
Qty: 1200 ML | Refills: 1 | Status: SHIPPED | OUTPATIENT
Start: 2024-01-01

## 2024-01-01 RX ORDER — HEPARIN SODIUM,PORCINE 10 UNIT/ML
5-10 VIAL (ML) INTRAVENOUS
Status: DISCONTINUED | OUTPATIENT
Start: 2024-01-01 | End: 2024-01-01 | Stop reason: HOSPADM

## 2024-01-01 RX ORDER — PROCHLORPERAZINE MALEATE 10 MG
10 TABLET ORAL EVERY 6 HOURS PRN
COMMUNITY

## 2024-01-01 RX ORDER — SUCRALFATE ORAL 1 G/10ML
1 SUSPENSION ORAL
Status: DISCONTINUED | OUTPATIENT
Start: 2024-01-01 | End: 2024-01-01 | Stop reason: HOSPADM

## 2024-01-01 RX ORDER — CIPROFLOXACIN 500 MG/1
500 TABLET, FILM COATED ORAL 2 TIMES DAILY
Qty: 4 TABLET | Refills: 0 | Status: SHIPPED | OUTPATIENT
Start: 2024-01-01

## 2024-01-01 RX ORDER — LIDOCAINE HYDROCHLORIDE 10 MG/ML
10 INJECTION, SOLUTION EPIDURAL; INFILTRATION; INTRACAUDAL; PERINEURAL ONCE
Status: COMPLETED | OUTPATIENT
Start: 2024-01-01 | End: 2024-01-01

## 2024-01-01 RX ORDER — FLUTICASONE PROPIONATE AND SALMETEROL 250; 50 UG/1; UG/1
1-2 POWDER RESPIRATORY (INHALATION) DAILY
COMMUNITY

## 2024-01-01 RX ORDER — TRAMADOL HYDROCHLORIDE 50 MG/1
100 TABLET ORAL EVERY 8 HOURS
Qty: 10 TABLET | Refills: 0 | Status: SHIPPED | OUTPATIENT
Start: 2024-01-01

## 2024-01-01 RX ORDER — CARBOXYMETHYLCELLULOSE SODIUM 5 MG/ML
2 SOLUTION/ DROPS OPHTHALMIC 4 TIMES DAILY PRN
Status: DISCONTINUED | OUTPATIENT
Start: 2024-01-01 | End: 2024-01-01

## 2024-01-01 RX ORDER — LORAZEPAM 0.5 MG/1
0.5 TABLET ORAL ONCE
Status: COMPLETED | OUTPATIENT
Start: 2024-01-01 | End: 2024-01-01

## 2024-01-01 RX ORDER — TRAMADOL HYDROCHLORIDE 50 MG/1
50 TABLET ORAL ONCE
Status: COMPLETED | OUTPATIENT
Start: 2024-01-01 | End: 2024-01-01

## 2024-01-01 RX ORDER — FLUORIDE TOOTHPASTE
10 TOOTHPASTE DENTAL 4 TIMES DAILY PRN
Status: DISCONTINUED | OUTPATIENT
Start: 2024-01-01 | End: 2024-01-01 | Stop reason: HOSPADM

## 2024-01-01 RX ORDER — HYDROMORPHONE HYDROCHLORIDE 1 MG/ML
.3-.5 INJECTION, SOLUTION INTRAMUSCULAR; INTRAVENOUS; SUBCUTANEOUS EVERY 4 HOURS PRN
Status: DISCONTINUED | OUTPATIENT
Start: 2024-01-01 | End: 2024-01-01 | Stop reason: HOSPADM

## 2024-01-01 RX ORDER — FLUTICASONE PROPIONATE 50 MCG
2 SPRAY, SUSPENSION (ML) NASAL DAILY PRN
COMMUNITY

## 2024-01-01 RX ORDER — BISACODYL 10 MG
10 SUPPOSITORY, RECTAL RECTAL DAILY
Status: COMPLETED | OUTPATIENT
Start: 2024-01-01 | End: 2024-01-01

## 2024-01-01 RX ORDER — METHYLPHENIDATE HYDROCHLORIDE 10 MG/1
10 TABLET ORAL 2 TIMES DAILY
Status: DISCONTINUED | OUTPATIENT
Start: 2024-01-01 | End: 2024-01-01

## 2024-01-01 RX ORDER — ATROPINE SULFATE 10 MG/ML
2 SOLUTION/ DROPS OPHTHALMIC EVERY 4 HOURS PRN
Status: DISCONTINUED | OUTPATIENT
Start: 2024-01-01 | End: 2024-01-01 | Stop reason: HOSPADM

## 2024-01-01 RX ORDER — PANTOPRAZOLE SODIUM 40 MG/1
40 TABLET, DELAYED RELEASE ORAL
Status: DISCONTINUED | OUTPATIENT
Start: 2024-01-01 | End: 2024-01-01 | Stop reason: HOSPADM

## 2024-01-01 RX ORDER — CALCIUM CARBONATE 500 MG/1
2 TABLET, CHEWABLE ORAL 3 TIMES DAILY PRN
Qty: 60 TABLET | Refills: 1 | Status: SHIPPED | OUTPATIENT
Start: 2024-01-01

## 2024-01-01 RX ORDER — POLYETHYLENE GLYCOL 3350 17 G/17G
17 POWDER, FOR SOLUTION ORAL DAILY
Qty: 510 G | Refills: 0 | Status: SHIPPED | OUTPATIENT
Start: 2024-01-01

## 2024-01-01 RX ORDER — POLYETHYLENE GLYCOL 3350 17 G/17G
17 POWDER, FOR SOLUTION ORAL 2 TIMES DAILY
Status: DISCONTINUED | OUTPATIENT
Start: 2024-01-01 | End: 2024-01-01 | Stop reason: HOSPADM

## 2024-01-01 RX ORDER — OXYCODONE HYDROCHLORIDE 5 MG/1
5 TABLET ORAL EVERY 4 HOURS PRN
Status: DISCONTINUED | OUTPATIENT
Start: 2024-01-01 | End: 2024-01-01

## 2024-01-01 RX ORDER — NALOXONE HYDROCHLORIDE 0.4 MG/ML
0.2 INJECTION, SOLUTION INTRAMUSCULAR; INTRAVENOUS; SUBCUTANEOUS
Status: DISCONTINUED | OUTPATIENT
Start: 2024-01-01 | End: 2024-01-01

## 2024-01-01 RX ORDER — GADOBUTROL 604.72 MG/ML
7 INJECTION INTRAVENOUS ONCE
Status: COMPLETED | OUTPATIENT
Start: 2024-01-01 | End: 2024-01-01

## 2024-01-01 RX ORDER — SODIUM CHLORIDE 9 MG/ML
INJECTION, SOLUTION INTRAVENOUS CONTINUOUS
Status: ACTIVE | OUTPATIENT
Start: 2024-01-01 | End: 2024-01-01

## 2024-01-01 RX ORDER — BISACODYL 10 MG
10 SUPPOSITORY, RECTAL RECTAL
Status: DISCONTINUED | OUTPATIENT
Start: 2024-01-01 | End: 2024-01-01 | Stop reason: HOSPADM

## 2024-01-01 RX ORDER — POTASSIUM CHLORIDE 7.45 MG/ML
10 INJECTION INTRAVENOUS
Status: DISCONTINUED | OUTPATIENT
Start: 2024-01-01 | End: 2024-01-01 | Stop reason: ALTCHOICE

## 2024-01-01 RX ORDER — HEPARIN SODIUM,PORCINE 10 UNIT/ML
5-10 VIAL (ML) INTRAVENOUS EVERY 24 HOURS
Status: DISCONTINUED | OUTPATIENT
Start: 2024-01-01 | End: 2024-01-01 | Stop reason: HOSPADM

## 2024-01-01 RX ORDER — LISINOPRIL 10 MG/1
10 TABLET ORAL DAILY
COMMUNITY

## 2024-01-01 RX ORDER — AMOXICILLIN 250 MG
1-2 CAPSULE ORAL 2 TIMES DAILY
Status: DISCONTINUED | OUTPATIENT
Start: 2024-01-01 | End: 2024-01-01 | Stop reason: HOSPADM

## 2024-01-01 RX ORDER — CEFTRIAXONE 1 G/1
1 INJECTION, POWDER, FOR SOLUTION INTRAMUSCULAR; INTRAVENOUS EVERY 24 HOURS
Status: COMPLETED | OUTPATIENT
Start: 2024-01-01 | End: 2024-01-01

## 2024-01-01 RX ORDER — POLYETHYLENE GLYCOL 3350 17 G/17G
17 POWDER, FOR SOLUTION ORAL DAILY PRN
Status: DISCONTINUED | OUTPATIENT
Start: 2024-01-01 | End: 2024-01-01

## 2024-01-01 RX ORDER — BUMETANIDE 1 MG/1
1 TABLET ORAL 2 TIMES DAILY PRN
Status: ON HOLD | COMMUNITY
End: 2024-01-01

## 2024-01-01 RX ORDER — ONDANSETRON 2 MG/ML
4 INJECTION INTRAMUSCULAR; INTRAVENOUS EVERY 6 HOURS PRN
Status: DISCONTINUED | OUTPATIENT
Start: 2024-01-01 | End: 2024-01-01 | Stop reason: HOSPADM

## 2024-01-01 RX ORDER — SODIUM CHLORIDE 1 G/1
1 TABLET ORAL
Status: DISCONTINUED | OUTPATIENT
Start: 2024-01-01 | End: 2024-01-01 | Stop reason: HOSPADM

## 2024-01-01 RX ORDER — TRAMADOL HYDROCHLORIDE 50 MG/1
50 TABLET ORAL 3 TIMES DAILY
Status: DISCONTINUED | OUTPATIENT
Start: 2024-01-01 | End: 2024-01-01

## 2024-01-01 RX ORDER — METHYLPHENIDATE HYDROCHLORIDE 10 MG/1
10 TABLET ORAL 2 TIMES DAILY
Status: DISCONTINUED | OUTPATIENT
Start: 2024-01-01 | End: 2024-01-01 | Stop reason: HOSPADM

## 2024-01-01 RX ORDER — ALBUTEROL SULFATE 90 UG/1
2 AEROSOL, METERED RESPIRATORY (INHALATION) EVERY 6 HOURS PRN
Status: DISCONTINUED | OUTPATIENT
Start: 2024-01-01 | End: 2024-01-01 | Stop reason: HOSPADM

## 2024-01-01 RX ORDER — ONDANSETRON 4 MG/1
4 TABLET, ORALLY DISINTEGRATING ORAL EVERY 6 HOURS PRN
Status: DISCONTINUED | OUTPATIENT
Start: 2024-01-01 | End: 2024-01-01 | Stop reason: HOSPADM

## 2024-01-01 RX ORDER — HYDRALAZINE HYDROCHLORIDE 20 MG/ML
10 INJECTION INTRAMUSCULAR; INTRAVENOUS EVERY 4 HOURS PRN
Status: DISCONTINUED | OUTPATIENT
Start: 2024-01-01 | End: 2024-01-01 | Stop reason: HOSPADM

## 2024-01-01 RX ORDER — ASPIRIN 81 MG/1
81 TABLET ORAL DAILY
Status: DISCONTINUED | OUTPATIENT
Start: 2024-01-01 | End: 2024-01-01 | Stop reason: HOSPADM

## 2024-01-01 RX ORDER — METHYLPHENIDATE HYDROCHLORIDE 10 MG/1
10 TABLET ORAL 2 TIMES DAILY PRN
COMMUNITY

## 2024-01-01 RX ORDER — SENNOSIDES 8.6 MG
1 TABLET ORAL 2 TIMES DAILY PRN
Status: DISCONTINUED | OUTPATIENT
Start: 2024-01-01 | End: 2024-01-01 | Stop reason: HOSPADM

## 2024-01-01 RX ORDER — IOPAMIDOL 755 MG/ML
84 INJECTION, SOLUTION INTRAVASCULAR ONCE
Status: COMPLETED | OUTPATIENT
Start: 2024-01-01 | End: 2024-01-01

## 2024-01-01 RX ORDER — POLYETHYLENE GLYCOL 3350 17 G/17G
17 POWDER, FOR SOLUTION ORAL DAILY
Qty: 510 G | Refills: 1 | Status: SHIPPED | OUTPATIENT
Start: 2024-01-01

## 2024-01-01 RX ORDER — HYDROMORPHONE HCL IN WATER/PF 6 MG/30 ML
0.4 PATIENT CONTROLLED ANALGESIA SYRINGE INTRAVENOUS
Status: DISCONTINUED | OUTPATIENT
Start: 2024-01-01 | End: 2024-01-01

## 2024-01-01 RX ORDER — ONDANSETRON 8 MG/1
8 TABLET, ORALLY DISINTEGRATING ORAL EVERY 8 HOURS PRN
COMMUNITY

## 2024-01-01 RX ORDER — BISACODYL 10 MG
10 SUPPOSITORY, RECTAL RECTAL DAILY PRN
Status: DISCONTINUED | OUTPATIENT
Start: 2024-01-01 | End: 2024-01-01 | Stop reason: HOSPADM

## 2024-01-01 RX ORDER — TRAMADOL HYDROCHLORIDE 50 MG/1
50 TABLET ORAL EVERY 6 HOURS PRN
Status: DISCONTINUED | OUTPATIENT
Start: 2024-01-01 | End: 2024-01-01

## 2024-01-01 RX ORDER — CLOBETASOL PROPIONATE 0.5 MG/G
CREAM TOPICAL DAILY PRN
COMMUNITY

## 2024-01-01 RX ORDER — AMOXICILLIN 250 MG
1-2 CAPSULE ORAL 2 TIMES DAILY
Status: DISCONTINUED | OUTPATIENT
Start: 2024-01-01 | End: 2024-01-01

## 2024-01-01 RX ORDER — BIOTIN 5 MG
1 TABLET ORAL DAILY
Status: DISCONTINUED | OUTPATIENT
Start: 2024-01-01 | End: 2024-01-01

## 2024-01-01 RX ORDER — ACETAMINOPHEN 325 MG/1
650 TABLET ORAL EVERY 4 HOURS PRN
Status: DISCONTINUED | OUTPATIENT
Start: 2024-01-01 | End: 2024-01-01

## 2024-01-01 RX ORDER — AMOXICILLIN 250 MG
1-2 CAPSULE ORAL 2 TIMES DAILY PRN
Qty: 60 TABLET | Refills: 1 | Status: SHIPPED | OUTPATIENT
Start: 2024-01-01

## 2024-01-01 RX ORDER — CARBOXYMETHYLCELLULOSE SODIUM 5 MG/ML
1-2 SOLUTION/ DROPS OPHTHALMIC
Status: DISCONTINUED | OUTPATIENT
Start: 2024-01-01 | End: 2024-01-01 | Stop reason: HOSPADM

## 2024-01-01 RX ORDER — FLUTICASONE FUROATE AND VILANTEROL 100; 25 UG/1; UG/1
1 POWDER RESPIRATORY (INHALATION) DAILY
Status: DISCONTINUED | OUTPATIENT
Start: 2024-01-01 | End: 2024-01-01 | Stop reason: HOSPADM

## 2024-01-01 RX ORDER — HYDROMORPHONE HCL IN WATER/PF 6 MG/30 ML
0.2 PATIENT CONTROLLED ANALGESIA SYRINGE INTRAVENOUS
Status: DISCONTINUED | OUTPATIENT
Start: 2024-01-01 | End: 2024-01-01

## 2024-01-01 RX ORDER — POLYETHYLENE GLYCOL 3350 17 G/17G
17 POWDER, FOR SOLUTION ORAL DAILY PRN
Status: DISCONTINUED | OUTPATIENT
Start: 2024-01-01 | End: 2024-01-01 | Stop reason: HOSPADM

## 2024-01-01 RX ORDER — OXYCODONE HYDROCHLORIDE 5 MG/1
5 TABLET ORAL EVERY 8 HOURS PRN
Qty: 10 TABLET | Refills: 0 | Status: SHIPPED | OUTPATIENT
Start: 2024-01-01

## 2024-01-01 RX ORDER — LORAZEPAM 2 MG/ML
1 INJECTION INTRAMUSCULAR ONCE
Status: DISCONTINUED | OUTPATIENT
Start: 2024-01-01 | End: 2024-01-01

## 2024-01-01 RX ORDER — TRAMADOL HYDROCHLORIDE 50 MG/1
100 TABLET ORAL EVERY 8 HOURS
Status: DISCONTINUED | OUTPATIENT
Start: 2024-01-01 | End: 2024-01-01

## 2024-01-01 RX ORDER — HYDRALAZINE HYDROCHLORIDE 10 MG/1
10 TABLET, FILM COATED ORAL EVERY 4 HOURS PRN
Status: DISCONTINUED | OUTPATIENT
Start: 2024-01-01 | End: 2024-01-01 | Stop reason: HOSPADM

## 2024-01-01 RX ORDER — AMOXICILLIN 250 MG
2 CAPSULE ORAL 2 TIMES DAILY PRN
Status: DISCONTINUED | OUTPATIENT
Start: 2024-01-01 | End: 2024-01-01

## 2024-01-01 RX ORDER — PROCHLORPERAZINE 25 MG
12.5 SUPPOSITORY, RECTAL RECTAL EVERY 12 HOURS PRN
Status: DISCONTINUED | OUTPATIENT
Start: 2024-01-01 | End: 2024-01-01 | Stop reason: HOSPADM

## 2024-01-01 RX ORDER — MINERAL OIL/HYDROPHIL PETROLAT
OINTMENT (GRAM) TOPICAL
Status: DISCONTINUED | OUTPATIENT
Start: 2024-01-01 | End: 2024-01-01 | Stop reason: HOSPADM

## 2024-01-01 RX ORDER — ATROPINE SULFATE 10 MG/ML
2 SOLUTION/ DROPS OPHTHALMIC EVERY 4 HOURS PRN
Qty: 1 ML | Refills: 0 | Status: SHIPPED | OUTPATIENT
Start: 2024-01-01

## 2024-01-01 RX ORDER — CEFTRIAXONE 1 G/1
1 INJECTION, POWDER, FOR SOLUTION INTRAMUSCULAR; INTRAVENOUS EVERY 24 HOURS
Status: DISCONTINUED | OUTPATIENT
Start: 2024-01-01 | End: 2024-01-01

## 2024-01-01 RX ORDER — SODIUM CHLORIDE, SODIUM LACTATE, POTASSIUM CHLORIDE, CALCIUM CHLORIDE 600; 310; 30; 20 MG/100ML; MG/100ML; MG/100ML; MG/100ML
INJECTION, SOLUTION INTRAVENOUS CONTINUOUS
Status: DISCONTINUED | OUTPATIENT
Start: 2024-01-01 | End: 2024-01-01

## 2024-01-01 RX ORDER — ACETAMINOPHEN 650 MG/1
650 SUPPOSITORY RECTAL EVERY 4 HOURS PRN
Status: DISCONTINUED | OUTPATIENT
Start: 2024-01-01 | End: 2024-01-01

## 2024-01-01 RX ORDER — TRAMADOL HYDROCHLORIDE 50 MG/1
100 TABLET ORAL 3 TIMES DAILY
Status: DISCONTINUED | OUTPATIENT
Start: 2024-01-01 | End: 2024-01-01 | Stop reason: HOSPADM

## 2024-01-01 RX ORDER — AMOXICILLIN 250 MG
1-2 CAPSULE ORAL 2 TIMES DAILY PRN
Status: DISCONTINUED | OUTPATIENT
Start: 2024-01-01 | End: 2024-01-01 | Stop reason: HOSPADM

## 2024-01-01 RX ORDER — LIDOCAINE HYDROCHLORIDE 10 MG/ML
10 INJECTION, SOLUTION EPIDURAL; INFILTRATION; INTRACAUDAL; PERINEURAL ONCE
Status: DISCONTINUED | OUTPATIENT
Start: 2024-01-01 | End: 2024-01-01 | Stop reason: HOSPADM

## 2024-01-01 RX ORDER — IPRATROPIUM BROMIDE 21 UG/1
2 SPRAY, METERED NASAL EVERY 12 HOURS
COMMUNITY

## 2024-01-01 RX ORDER — TRAMADOL HYDROCHLORIDE 50 MG/1
100 TABLET ORAL EVERY 8 HOURS
Status: ON HOLD | COMMUNITY
End: 2024-01-01

## 2024-01-01 RX ORDER — BUMETANIDE 1 MG/1
1 TABLET ORAL
Status: DISCONTINUED | OUTPATIENT
Start: 2024-01-01 | End: 2024-01-01 | Stop reason: HOSPADM

## 2024-01-01 RX ORDER — METHYLPHENIDATE HYDROCHLORIDE 10 MG/1
10 TABLET ORAL DAILY PRN
Status: DISCONTINUED | OUTPATIENT
Start: 2024-01-01 | End: 2024-01-01 | Stop reason: HOSPADM

## 2024-01-01 RX ADMIN — TRAMADOL HYDROCHLORIDE 100 MG: 50 TABLET, COATED ORAL at 16:44

## 2024-01-01 RX ADMIN — TRAMADOL HYDROCHLORIDE 100 MG: 50 TABLET, COATED ORAL at 01:13

## 2024-01-01 RX ADMIN — SODIUM CHLORIDE: 9 INJECTION, SOLUTION INTRAVENOUS at 14:23

## 2024-01-01 RX ADMIN — TRAMADOL HYDROCHLORIDE 100 MG: 50 TABLET, COATED ORAL at 16:33

## 2024-01-01 RX ADMIN — TRAMADOL HYDROCHLORIDE 100 MG: 50 TABLET, COATED ORAL at 21:39

## 2024-01-01 RX ADMIN — ASPIRIN 81 MG: 81 TABLET, COATED ORAL at 09:19

## 2024-01-01 RX ADMIN — TRAMADOL HYDROCHLORIDE 50 MG: 50 TABLET, COATED ORAL at 09:27

## 2024-01-01 RX ADMIN — Medication 5 ML: at 05:55

## 2024-01-01 RX ADMIN — PANTOPRAZOLE SODIUM 40 MG: 40 TABLET, DELAYED RELEASE ORAL at 16:18

## 2024-01-01 RX ADMIN — CARBOXYMETHYLCELLULOSE SODIUM 2 DROP: 5 SOLUTION/ DROPS OPHTHALMIC at 10:13

## 2024-01-01 RX ADMIN — GADOBUTROL 7 ML: 604.72 INJECTION INTRAVENOUS at 20:47

## 2024-01-01 RX ADMIN — SUCRALFATE 1 G: 1 SUSPENSION ORAL at 12:07

## 2024-01-01 RX ADMIN — Medication 5 ML: at 05:56

## 2024-01-01 RX ADMIN — Medication 5 ML: at 08:32

## 2024-01-01 RX ADMIN — HYDROMORPHONE HYDROCHLORIDE 0.3 MG: 1 INJECTION, SOLUTION INTRAMUSCULAR; INTRAVENOUS; SUBCUTANEOUS at 23:29

## 2024-01-01 RX ADMIN — Medication 5 ML: at 06:49

## 2024-01-01 RX ADMIN — METHYLPHENIDATE HYDROCHLORIDE 10 MG: 10 TABLET ORAL at 12:52

## 2024-01-01 RX ADMIN — TRAMADOL HYDROCHLORIDE 50 MG: 50 TABLET, COATED ORAL at 22:32

## 2024-01-01 RX ADMIN — FLUTICASONE FUROATE AND VILANTEROL TRIFENATATE 1 PUFF: 100; 25 POWDER RESPIRATORY (INHALATION) at 08:23

## 2024-01-01 RX ADMIN — POLYETHYLENE GLYCOL 3350 17 G: 17 POWDER, FOR SOLUTION ORAL at 21:39

## 2024-01-01 RX ADMIN — Medication 5 ML: at 19:26

## 2024-01-01 RX ADMIN — SUCRALFATE 1 G: 1 SUSPENSION ORAL at 22:14

## 2024-01-01 RX ADMIN — ASPIRIN 81 MG: 81 TABLET, COATED ORAL at 08:23

## 2024-01-01 RX ADMIN — CALCIUM CARBONATE (ANTACID) CHEW TAB 500 MG 1000 MG: 500 CHEW TAB at 19:44

## 2024-01-01 RX ADMIN — Medication 5 ML: at 08:57

## 2024-01-01 RX ADMIN — SUCRALFATE 1 G: 1 SUSPENSION ORAL at 17:01

## 2024-01-01 RX ADMIN — FLUTICASONE FUROATE AND VILANTEROL TRIFENATATE 1 PUFF: 100; 25 POWDER RESPIRATORY (INHALATION) at 10:21

## 2024-01-01 RX ADMIN — METHYLPHENIDATE HYDROCHLORIDE 10 MG: 10 TABLET ORAL at 10:03

## 2024-01-01 RX ADMIN — PANTOPRAZOLE SODIUM 40 MG: 40 TABLET, DELAYED RELEASE ORAL at 16:00

## 2024-01-01 RX ADMIN — PANTOPRAZOLE SODIUM 40 MG: 40 TABLET, DELAYED RELEASE ORAL at 05:52

## 2024-01-01 RX ADMIN — SENNOSIDES AND DOCUSATE SODIUM 2 TABLET: 50; 8.6 TABLET ORAL at 09:27

## 2024-01-01 RX ADMIN — ASPIRIN 81 MG: 81 TABLET, COATED ORAL at 09:07

## 2024-01-01 RX ADMIN — SUCRALFATE 1 G: 1 SUSPENSION ORAL at 22:04

## 2024-01-01 RX ADMIN — HYDROMORPHONE HYDROCHLORIDE 0.5 MG: 1 INJECTION, SOLUTION INTRAMUSCULAR; INTRAVENOUS; SUBCUTANEOUS at 06:42

## 2024-01-01 RX ADMIN — TRAMADOL HYDROCHLORIDE 50 MG: 50 TABLET, COATED ORAL at 22:04

## 2024-01-01 RX ADMIN — ONDANSETRON 4 MG: 4 TABLET, ORALLY DISINTEGRATING ORAL at 06:31

## 2024-01-01 RX ADMIN — LIDOCAINE HYDROCHLORIDE 10 ML: 10 INJECTION, SOLUTION EPIDURAL; INFILTRATION; INTRACAUDAL; PERINEURAL at 15:42

## 2024-01-01 RX ADMIN — ASPIRIN 81 MG: 81 TABLET, COATED ORAL at 09:27

## 2024-01-01 RX ADMIN — Medication 5 ML: at 12:52

## 2024-01-01 RX ADMIN — METHYLPHENIDATE HYDROCHLORIDE 10 MG: 10 TABLET ORAL at 08:00

## 2024-01-01 RX ADMIN — CARBOXYMETHYLCELLULOSE SODIUM 2 DROP: 5 SOLUTION/ DROPS OPHTHALMIC at 12:52

## 2024-01-01 RX ADMIN — HEPARIN SODIUM (PORCINE) LOCK FLUSH IV SOLN 100 UNIT/ML 5 ML: 100 SOLUTION at 10:43

## 2024-01-01 RX ADMIN — ASPIRIN 81 MG: 81 TABLET, COATED ORAL at 11:27

## 2024-01-01 RX ADMIN — CARBOXYMETHYLCELLULOSE SODIUM 2 DROP: 5 SOLUTION/ DROPS OPHTHALMIC at 21:31

## 2024-01-01 RX ADMIN — SUCRALFATE 1 G: 1 SUSPENSION ORAL at 12:18

## 2024-01-01 RX ADMIN — Medication 5 ML: at 17:47

## 2024-01-01 RX ADMIN — ONDANSETRON 4 MG: 4 TABLET, ORALLY DISINTEGRATING ORAL at 07:55

## 2024-01-01 RX ADMIN — TRAMADOL HYDROCHLORIDE 50 MG: 50 TABLET, COATED ORAL at 08:00

## 2024-01-01 RX ADMIN — FLUTICASONE FUROATE AND VILANTEROL TRIFENATATE 1 PUFF: 100; 25 POWDER RESPIRATORY (INHALATION) at 08:10

## 2024-01-01 RX ADMIN — Medication 5 ML: at 14:53

## 2024-01-01 RX ADMIN — SUCRALFATE 1 G: 1 SUSPENSION ORAL at 17:07

## 2024-01-01 RX ADMIN — SENNOSIDES AND DOCUSATE SODIUM 2 TABLET: 50; 8.6 TABLET ORAL at 21:05

## 2024-01-01 RX ADMIN — SODIUM CHLORIDE TAB 1 GM 1 G: 1 TAB at 09:00

## 2024-01-01 RX ADMIN — Medication 10 ML: at 13:06

## 2024-01-01 RX ADMIN — Medication 5 ML: at 23:53

## 2024-01-01 RX ADMIN — PANTOPRAZOLE SODIUM 40 MG: 40 TABLET, DELAYED RELEASE ORAL at 16:23

## 2024-01-01 RX ADMIN — TRAMADOL HYDROCHLORIDE 50 MG: 50 TABLET, COATED ORAL at 17:00

## 2024-01-01 RX ADMIN — PANTOPRAZOLE SODIUM 40 MG: 40 TABLET, DELAYED RELEASE ORAL at 09:10

## 2024-01-01 RX ADMIN — PROCHLORPERAZINE MALEATE 5 MG: 5 TABLET ORAL at 10:02

## 2024-01-01 RX ADMIN — SODIUM CHLORIDE TAB 1 GM 1 G: 1 TAB at 09:19

## 2024-01-01 RX ADMIN — POTASSIUM CHLORIDE 20 MEQ: 29.8 INJECTION, SOLUTION INTRAVENOUS at 14:42

## 2024-01-01 RX ADMIN — Medication 5 ML: at 05:40

## 2024-01-01 RX ADMIN — TRAMADOL HYDROCHLORIDE 50 MG: 50 TABLET, COATED ORAL at 09:48

## 2024-01-01 RX ADMIN — Medication 5 ML: at 21:59

## 2024-01-01 RX ADMIN — Medication 5 ML: at 06:00

## 2024-01-01 RX ADMIN — BUMETANIDE 1 MG: 1 TABLET ORAL at 09:06

## 2024-01-01 RX ADMIN — TRAMADOL HYDROCHLORIDE 50 MG: 50 TABLET, COATED ORAL at 09:06

## 2024-01-01 RX ADMIN — PANTOPRAZOLE SODIUM 40 MG: 40 TABLET, DELAYED RELEASE ORAL at 16:34

## 2024-01-01 RX ADMIN — CEFTRIAXONE SODIUM 1 G: 1 INJECTION, POWDER, FOR SOLUTION INTRAMUSCULAR; INTRAVENOUS at 11:37

## 2024-01-01 RX ADMIN — SENNOSIDES AND DOCUSATE SODIUM 2 TABLET: 50; 8.6 TABLET ORAL at 21:17

## 2024-01-01 RX ADMIN — TRAMADOL HYDROCHLORIDE 50 MG: 50 TABLET, COATED ORAL at 22:13

## 2024-01-01 RX ADMIN — PANTOPRAZOLE SODIUM 40 MG: 40 TABLET, DELAYED RELEASE ORAL at 15:31

## 2024-01-01 RX ADMIN — SODIUM CHLORIDE TAB 1 GM 1 G: 1 TAB at 18:09

## 2024-01-01 RX ADMIN — SENNOSIDES AND DOCUSATE SODIUM 1 TABLET: 50; 8.6 TABLET ORAL at 09:26

## 2024-01-01 RX ADMIN — PANTOPRAZOLE SODIUM 40 MG: 40 TABLET, DELAYED RELEASE ORAL at 16:36

## 2024-01-01 RX ADMIN — HYDROMORPHONE HYDROCHLORIDE 0.5 MG: 1 INJECTION, SOLUTION INTRAMUSCULAR; INTRAVENOUS; SUBCUTANEOUS at 23:46

## 2024-01-01 RX ADMIN — PANTOPRAZOLE SODIUM 40 MG: 40 TABLET, DELAYED RELEASE ORAL at 06:46

## 2024-01-01 RX ADMIN — Medication 15 G: at 21:15

## 2024-01-01 RX ADMIN — MICONAZOLE NITRATE: 2 POWDER TOPICAL at 21:18

## 2024-01-01 RX ADMIN — TRAMADOL HYDROCHLORIDE 50 MG: 50 TABLET, COATED ORAL at 15:31

## 2024-01-01 RX ADMIN — PANTOPRAZOLE SODIUM 40 MG: 40 TABLET, DELAYED RELEASE ORAL at 16:44

## 2024-01-01 RX ADMIN — TRAMADOL HYDROCHLORIDE 50 MG: 50 TABLET, COATED ORAL at 23:01

## 2024-01-01 RX ADMIN — ONDANSETRON 4 MG: 2 INJECTION INTRAMUSCULAR; INTRAVENOUS at 03:07

## 2024-01-01 RX ADMIN — SUCRALFATE 1 G: 1 SUSPENSION ORAL at 21:05

## 2024-01-01 RX ADMIN — OXYCODONE HYDROCHLORIDE 2.5 MG: 5 TABLET ORAL at 17:49

## 2024-01-01 RX ADMIN — PANTOPRAZOLE SODIUM 40 MG: 40 TABLET, DELAYED RELEASE ORAL at 06:28

## 2024-01-01 RX ADMIN — TRAMADOL HYDROCHLORIDE 50 MG: 50 TABLET, COATED ORAL at 16:16

## 2024-01-01 RX ADMIN — SUCRALFATE 1 G: 1 SUSPENSION ORAL at 15:31

## 2024-01-01 RX ADMIN — SUCRALFATE 1 G: 1 SUSPENSION ORAL at 06:46

## 2024-01-01 RX ADMIN — PANTOPRAZOLE SODIUM 40 MG: 40 TABLET, DELAYED RELEASE ORAL at 16:16

## 2024-01-01 RX ADMIN — OXYCODONE HYDROCHLORIDE 5 MG: 5 TABLET ORAL at 03:50

## 2024-01-01 RX ADMIN — POLYETHYLENE GLYCOL 3350 17 G: 17 POWDER, FOR SOLUTION ORAL at 20:37

## 2024-01-01 RX ADMIN — HYDROMORPHONE HYDROCHLORIDE 0.5 MG: 1 INJECTION, SOLUTION INTRAMUSCULAR; INTRAVENOUS; SUBCUTANEOUS at 00:03

## 2024-01-01 RX ADMIN — ALBUTEROL SULFATE 2 PUFF: 108 INHALANT RESPIRATORY (INHALATION) at 12:26

## 2024-01-01 RX ADMIN — METHYLPHENIDATE HYDROCHLORIDE 10 MG: 10 TABLET ORAL at 11:50

## 2024-01-01 RX ADMIN — TRAMADOL HYDROCHLORIDE 50 MG: 50 TABLET, COATED ORAL at 09:07

## 2024-01-01 RX ADMIN — Medication 5 ML: at 06:09

## 2024-01-01 RX ADMIN — SODIUM CHLORIDE, POTASSIUM CHLORIDE, SODIUM LACTATE AND CALCIUM CHLORIDE: 600; 310; 30; 20 INJECTION, SOLUTION INTRAVENOUS at 12:01

## 2024-01-01 RX ADMIN — Medication 5 ML: at 06:35

## 2024-01-01 RX ADMIN — POLYETHYLENE GLYCOL 3350 17 G: 17 POWDER, FOR SOLUTION ORAL at 02:51

## 2024-01-01 RX ADMIN — Medication 5 ML: at 09:39

## 2024-01-01 RX ADMIN — ONDANSETRON 4 MG: 4 TABLET, ORALLY DISINTEGRATING ORAL at 20:06

## 2024-01-01 RX ADMIN — SUCRALFATE 1 G: 1 SUSPENSION ORAL at 16:44

## 2024-01-01 RX ADMIN — SODIUM CHLORIDE TAB 1 GM 1 G: 1 TAB at 13:45

## 2024-01-01 RX ADMIN — Medication 5 ML: at 13:25

## 2024-01-01 RX ADMIN — SENNOSIDES AND DOCUSATE SODIUM 2 TABLET: 50; 8.6 TABLET ORAL at 22:30

## 2024-01-01 RX ADMIN — SENNOSIDES AND DOCUSATE SODIUM 1 TABLET: 50; 8.6 TABLET ORAL at 21:00

## 2024-01-01 RX ADMIN — HYDROMORPHONE HYDROCHLORIDE 0.5 MG: 1 INJECTION, SOLUTION INTRAMUSCULAR; INTRAVENOUS; SUBCUTANEOUS at 23:53

## 2024-01-01 RX ADMIN — FLUTICASONE FUROATE AND VILANTEROL TRIFENATATE 1 PUFF: 100; 25 POWDER RESPIRATORY (INHALATION) at 09:50

## 2024-01-01 RX ADMIN — POLYETHYLENE GLYCOL 3350 17 G: 17 POWDER, FOR SOLUTION ORAL at 09:28

## 2024-01-01 RX ADMIN — ONDANSETRON 4 MG: 2 INJECTION INTRAMUSCULAR; INTRAVENOUS at 05:40

## 2024-01-01 RX ADMIN — OXYCODONE HYDROCHLORIDE 2.5 MG: 5 TABLET ORAL at 10:02

## 2024-01-01 RX ADMIN — TRAMADOL HYDROCHLORIDE 100 MG: 50 TABLET, COATED ORAL at 09:26

## 2024-01-01 RX ADMIN — Medication 5 ML: at 17:34

## 2024-01-01 RX ADMIN — PANTOPRAZOLE SODIUM 40 MG: 40 TABLET, DELAYED RELEASE ORAL at 15:46

## 2024-01-01 RX ADMIN — MICONAZOLE NITRATE: 2 POWDER TOPICAL at 21:41

## 2024-01-01 RX ADMIN — Medication 5 ML: at 03:46

## 2024-01-01 RX ADMIN — SUCRALFATE 1 G: 1 SUSPENSION ORAL at 06:42

## 2024-01-01 RX ADMIN — SUCRALFATE 1 G: 1 SUSPENSION ORAL at 21:18

## 2024-01-01 RX ADMIN — POLYETHYLENE GLYCOL 3350 17 G: 17 POWDER, FOR SOLUTION ORAL at 12:26

## 2024-01-01 RX ADMIN — HYDROMORPHONE HYDROCHLORIDE 0.5 MG: 1 INJECTION, SOLUTION INTRAMUSCULAR; INTRAVENOUS; SUBCUTANEOUS at 19:44

## 2024-01-01 RX ADMIN — FLUTICASONE FUROATE AND VILANTEROL TRIFENATATE 1 PUFF: 100; 25 POWDER RESPIRATORY (INHALATION) at 09:06

## 2024-01-01 RX ADMIN — PANTOPRAZOLE SODIUM 40 MG: 40 TABLET, DELAYED RELEASE ORAL at 06:34

## 2024-01-01 RX ADMIN — SUCRALFATE 1 G: 1 SUSPENSION ORAL at 06:28

## 2024-01-01 RX ADMIN — SUCRALFATE 1 G: 1 SUSPENSION ORAL at 22:31

## 2024-01-01 RX ADMIN — SUCRALFATE 1 G: 1 SUSPENSION ORAL at 16:19

## 2024-01-01 RX ADMIN — SODIUM CHLORIDE TAB 1 GM 1 G: 1 TAB at 12:05

## 2024-01-01 RX ADMIN — TRAMADOL HYDROCHLORIDE 100 MG: 50 TABLET, COATED ORAL at 22:14

## 2024-01-01 RX ADMIN — SENNOSIDES AND DOCUSATE SODIUM 1 TABLET: 50; 8.6 TABLET ORAL at 22:14

## 2024-01-01 RX ADMIN — ONDANSETRON 4 MG: 4 TABLET, ORALLY DISINTEGRATING ORAL at 04:42

## 2024-01-01 RX ADMIN — SUCRALFATE 1 G: 1 SUSPENSION ORAL at 06:09

## 2024-01-01 RX ADMIN — HYDROMORPHONE HYDROCHLORIDE 0.5 MG: 1 INJECTION, SOLUTION INTRAMUSCULAR; INTRAVENOUS; SUBCUTANEOUS at 01:06

## 2024-01-01 RX ADMIN — TRAMADOL HYDROCHLORIDE 50 MG: 50 TABLET, COATED ORAL at 17:01

## 2024-01-01 RX ADMIN — PANTOPRAZOLE SODIUM 40 MG: 40 TABLET, DELAYED RELEASE ORAL at 16:27

## 2024-01-01 RX ADMIN — PANTOPRAZOLE SODIUM 40 MG: 40 TABLET, DELAYED RELEASE ORAL at 06:26

## 2024-01-01 RX ADMIN — FLUTICASONE FUROATE AND VILANTEROL TRIFENATATE 1 PUFF: 100; 25 POWDER RESPIRATORY (INHALATION) at 09:00

## 2024-01-01 RX ADMIN — MICONAZOLE NITRATE: 2 POWDER TOPICAL at 08:21

## 2024-01-01 RX ADMIN — TRAMADOL HYDROCHLORIDE 100 MG: 50 TABLET, COATED ORAL at 21:19

## 2024-01-01 RX ADMIN — POLYETHYLENE GLYCOL 3350 17 G: 17 POWDER, FOR SOLUTION ORAL at 09:19

## 2024-01-01 RX ADMIN — SUCRALFATE 1 G: 1 SUSPENSION ORAL at 21:39

## 2024-01-01 RX ADMIN — Medication 5 ML: at 22:33

## 2024-01-01 RX ADMIN — ONDANSETRON 4 MG: 2 INJECTION INTRAMUSCULAR; INTRAVENOUS at 14:53

## 2024-01-01 RX ADMIN — POTASSIUM CHLORIDE 20 MEQ: 29.8 INJECTION, SOLUTION INTRAVENOUS at 16:38

## 2024-01-01 RX ADMIN — TRAMADOL HYDROCHLORIDE 100 MG: 50 TABLET, COATED ORAL at 09:01

## 2024-01-01 RX ADMIN — ALBUTEROL SULFATE 2 PUFF: 108 INHALANT RESPIRATORY (INHALATION) at 20:55

## 2024-01-01 RX ADMIN — BUMETANIDE 1 MG: 1 TABLET ORAL at 08:29

## 2024-01-01 RX ADMIN — TRAMADOL HYDROCHLORIDE 50 MG: 50 TABLET, COATED ORAL at 16:23

## 2024-01-01 RX ADMIN — TRAMADOL HYDROCHLORIDE 100 MG: 50 TABLET, COATED ORAL at 09:00

## 2024-01-01 RX ADMIN — Medication 5 ML: at 22:42

## 2024-01-01 RX ADMIN — PANTOPRAZOLE SODIUM 40 MG: 40 TABLET, DELAYED RELEASE ORAL at 05:56

## 2024-01-01 RX ADMIN — SUCRALFATE 1 G: 1 SUSPENSION ORAL at 09:06

## 2024-01-01 RX ADMIN — SENNOSIDES AND DOCUSATE SODIUM 1 TABLET: 50; 8.6 TABLET ORAL at 22:23

## 2024-01-01 RX ADMIN — TRAMADOL HYDROCHLORIDE 100 MG: 50 TABLET, COATED ORAL at 08:17

## 2024-01-01 RX ADMIN — PANTOPRAZOLE SODIUM 40 MG: 40 TABLET, DELAYED RELEASE ORAL at 15:43

## 2024-01-01 RX ADMIN — TRAMADOL HYDROCHLORIDE 50 MG: 50 TABLET, COATED ORAL at 21:05

## 2024-01-01 RX ADMIN — POLYETHYLENE GLYCOL 3350 17 G: 17 POWDER, FOR SOLUTION ORAL at 21:18

## 2024-01-01 RX ADMIN — SUCRALFATE 1 G: 1 SUSPENSION ORAL at 07:01

## 2024-01-01 RX ADMIN — PANTOPRAZOLE SODIUM 40 MG: 40 TABLET, DELAYED RELEASE ORAL at 06:10

## 2024-01-01 RX ADMIN — LORAZEPAM 0.5 MG: 0.5 TABLET ORAL at 19:03

## 2024-01-01 RX ADMIN — TRAMADOL HYDROCHLORIDE 50 MG: 50 TABLET, COATED ORAL at 08:23

## 2024-01-01 RX ADMIN — HYDROMORPHONE HYDROCHLORIDE 0.5 MG: 1 INJECTION, SOLUTION INTRAMUSCULAR; INTRAVENOUS; SUBCUTANEOUS at 22:42

## 2024-01-01 RX ADMIN — TRAMADOL HYDROCHLORIDE 100 MG: 50 TABLET, COATED ORAL at 15:43

## 2024-01-01 RX ADMIN — PANTOPRAZOLE SODIUM 40 MG: 40 TABLET, DELAYED RELEASE ORAL at 08:32

## 2024-01-01 RX ADMIN — METHYLPHENIDATE HYDROCHLORIDE 10 MG: 10 TABLET ORAL at 11:26

## 2024-01-01 RX ADMIN — TRAMADOL HYDROCHLORIDE 100 MG: 50 TABLET, COATED ORAL at 09:19

## 2024-01-01 RX ADMIN — Medication 5 ML: at 12:43

## 2024-01-01 RX ADMIN — METHYLPHENIDATE HYDROCHLORIDE 10 MG: 10 TABLET ORAL at 10:21

## 2024-01-01 RX ADMIN — ONDANSETRON 4 MG: 4 TABLET, ORALLY DISINTEGRATING ORAL at 16:38

## 2024-01-01 RX ADMIN — ASPIRIN 81 MG: 81 TABLET, COATED ORAL at 09:43

## 2024-01-01 RX ADMIN — Medication 5 ML: at 09:46

## 2024-01-01 RX ADMIN — METHYLPHENIDATE HYDROCHLORIDE 10 MG: 10 TABLET ORAL at 08:43

## 2024-01-01 RX ADMIN — FLUTICASONE FUROATE AND VILANTEROL TRIFENATATE 1 PUFF: 100; 25 POWDER RESPIRATORY (INHALATION) at 09:01

## 2024-01-01 RX ADMIN — SUCRALFATE 1 G: 1 SUSPENSION ORAL at 06:48

## 2024-01-01 RX ADMIN — CEFTRIAXONE SODIUM 1 G: 1 INJECTION, POWDER, FOR SOLUTION INTRAMUSCULAR; INTRAVENOUS at 12:30

## 2024-01-01 RX ADMIN — Medication 5 ML: at 15:51

## 2024-01-01 RX ADMIN — Medication 5 ML: at 03:07

## 2024-01-01 RX ADMIN — PANTOPRAZOLE SODIUM 40 MG: 40 TABLET, DELAYED RELEASE ORAL at 16:46

## 2024-01-01 RX ADMIN — Medication 5 ML: at 05:23

## 2024-01-01 RX ADMIN — FLUTICASONE FUROATE AND VILANTEROL TRIFENATATE 1 PUFF: 100; 25 POWDER RESPIRATORY (INHALATION) at 08:33

## 2024-01-01 RX ADMIN — ASPIRIN 81 MG: 81 TABLET, COATED ORAL at 09:48

## 2024-01-01 RX ADMIN — SUCRALFATE 1 G: 1 SUSPENSION ORAL at 05:53

## 2024-01-01 RX ADMIN — TRAMADOL HYDROCHLORIDE 100 MG: 50 TABLET, COATED ORAL at 16:19

## 2024-01-01 RX ADMIN — ONDANSETRON 4 MG: 4 TABLET, ORALLY DISINTEGRATING ORAL at 09:00

## 2024-01-01 RX ADMIN — TRAMADOL HYDROCHLORIDE 50 MG: 50 TABLET, COATED ORAL at 16:34

## 2024-01-01 RX ADMIN — FLUTICASONE FUROATE AND VILANTEROL TRIFENATATE 1 PUFF: 100; 25 POWDER RESPIRATORY (INHALATION) at 09:20

## 2024-01-01 RX ADMIN — HYDROMORPHONE HYDROCHLORIDE 0.5 MG: 1 INJECTION, SOLUTION INTRAMUSCULAR; INTRAVENOUS; SUBCUTANEOUS at 03:46

## 2024-01-01 RX ADMIN — FLUTICASONE FUROATE AND VILANTEROL TRIFENATATE 1 PUFF: 100; 25 POWDER RESPIRATORY (INHALATION) at 10:44

## 2024-01-01 RX ADMIN — PROCHLORPERAZINE EDISYLATE 5 MG: 5 INJECTION INTRAMUSCULAR; INTRAVENOUS at 21:17

## 2024-01-01 RX ADMIN — Medication 5 ML: at 12:18

## 2024-01-01 RX ADMIN — SODIUM CHLORIDE TAB 1 GM 1 G: 1 TAB at 18:03

## 2024-01-01 RX ADMIN — PANTOPRAZOLE SODIUM 40 MG: 40 TABLET, DELAYED RELEASE ORAL at 17:01

## 2024-01-01 RX ADMIN — SUCRALFATE 1 G: 1 SUSPENSION ORAL at 21:22

## 2024-01-01 RX ADMIN — PANTOPRAZOLE SODIUM 40 MG: 40 TABLET, DELAYED RELEASE ORAL at 17:00

## 2024-01-01 RX ADMIN — Medication 5 ML: at 23:46

## 2024-01-01 RX ADMIN — ONDANSETRON 4 MG: 2 INJECTION INTRAMUSCULAR; INTRAVENOUS at 06:46

## 2024-01-01 RX ADMIN — SUCRALFATE 1 G: 1 SUSPENSION ORAL at 06:34

## 2024-01-01 RX ADMIN — SENNOSIDES AND DOCUSATE SODIUM 1 TABLET: 50; 8.6 TABLET ORAL at 08:21

## 2024-01-01 RX ADMIN — ONDANSETRON 4 MG: 4 TABLET, ORALLY DISINTEGRATING ORAL at 23:06

## 2024-01-01 RX ADMIN — ONDANSETRON 4 MG: 2 INJECTION INTRAMUSCULAR; INTRAVENOUS at 03:08

## 2024-01-01 RX ADMIN — SUCRALFATE 1 G: 1 SUSPENSION ORAL at 21:20

## 2024-01-01 RX ADMIN — TRAMADOL HYDROCHLORIDE 50 MG: 50 TABLET, COATED ORAL at 08:48

## 2024-01-01 RX ADMIN — FLUTICASONE FUROATE AND VILANTEROL TRIFENATATE 1 PUFF: 100; 25 POWDER RESPIRATORY (INHALATION) at 08:57

## 2024-01-01 RX ADMIN — SENNOSIDES AND DOCUSATE SODIUM 2 TABLET: 50; 8.6 TABLET ORAL at 22:17

## 2024-01-01 RX ADMIN — CALCIUM CARBONATE (ANTACID) CHEW TAB 500 MG 1000 MG: 500 CHEW TAB at 00:06

## 2024-01-01 RX ADMIN — SENNOSIDES AND DOCUSATE SODIUM 2 TABLET: 50; 8.6 TABLET ORAL at 09:35

## 2024-01-01 RX ADMIN — SUCRALFATE 1 G: 1 SUSPENSION ORAL at 16:22

## 2024-01-01 RX ADMIN — SENNOSIDES AND DOCUSATE SODIUM 1 TABLET: 50; 8.6 TABLET ORAL at 21:58

## 2024-01-01 RX ADMIN — CALCIUM CARBONATE (ANTACID) CHEW TAB 500 MG 500 MG: 500 CHEW TAB at 08:48

## 2024-01-01 RX ADMIN — ONDANSETRON 4 MG: 4 TABLET, ORALLY DISINTEGRATING ORAL at 09:25

## 2024-01-01 RX ADMIN — PANTOPRAZOLE SODIUM 40 MG: 40 TABLET, DELAYED RELEASE ORAL at 17:07

## 2024-01-01 RX ADMIN — POLYETHYLENE GLYCOL 3350 17 G: 17 POWDER, FOR SOLUTION ORAL at 09:35

## 2024-01-01 RX ADMIN — HYDROMORPHONE HYDROCHLORIDE 0.5 MG: 1 INJECTION, SOLUTION INTRAMUSCULAR; INTRAVENOUS; SUBCUTANEOUS at 19:26

## 2024-01-01 RX ADMIN — TRAMADOL HYDROCHLORIDE 50 MG: 50 TABLET, COATED ORAL at 09:43

## 2024-01-01 RX ADMIN — CEFTRIAXONE SODIUM 1 G: 1 INJECTION, POWDER, FOR SOLUTION INTRAMUSCULAR; INTRAVENOUS at 12:37

## 2024-01-01 RX ADMIN — TRAMADOL HYDROCHLORIDE 50 MG: 50 TABLET, COATED ORAL at 08:29

## 2024-01-01 RX ADMIN — SODIUM CHLORIDE 66 ML: 9 INJECTION, SOLUTION INTRAVENOUS at 23:15

## 2024-01-01 RX ADMIN — SUCRALFATE 1 G: 1 SUSPENSION ORAL at 11:41

## 2024-01-01 RX ADMIN — METHYLPHENIDATE HYDROCHLORIDE 10 MG: 10 TABLET ORAL at 13:00

## 2024-01-01 RX ADMIN — TRAMADOL HYDROCHLORIDE 50 MG: 50 TABLET, COATED ORAL at 21:58

## 2024-01-01 RX ADMIN — MICONAZOLE NITRATE: 2 POWDER TOPICAL at 11:25

## 2024-01-01 RX ADMIN — SENNOSIDES AND DOCUSATE SODIUM 1 TABLET: 50; 8.6 TABLET ORAL at 09:00

## 2024-01-01 RX ADMIN — PANTOPRAZOLE SODIUM 40 MG: 40 TABLET, DELAYED RELEASE ORAL at 05:23

## 2024-01-01 RX ADMIN — SUCRALFATE 1 G: 1 SUSPENSION ORAL at 17:00

## 2024-01-01 RX ADMIN — ONDANSETRON 4 MG: 2 INJECTION INTRAMUSCULAR; INTRAVENOUS at 08:30

## 2024-01-01 RX ADMIN — BUMETANIDE 1 MG: 1 TABLET ORAL at 09:07

## 2024-01-01 RX ADMIN — TRAMADOL HYDROCHLORIDE 100 MG: 50 TABLET, COATED ORAL at 08:08

## 2024-01-01 RX ADMIN — TRAMADOL HYDROCHLORIDE 100 MG: 50 TABLET, COATED ORAL at 21:22

## 2024-01-01 RX ADMIN — CARBOXYMETHYLCELLULOSE SODIUM 2 DROP: 5 SOLUTION/ DROPS OPHTHALMIC at 18:10

## 2024-01-01 RX ADMIN — TRAMADOL HYDROCHLORIDE 50 MG: 50 TABLET, COATED ORAL at 16:00

## 2024-01-01 RX ADMIN — SUCRALFATE 1 G: 1 SUSPENSION ORAL at 21:17

## 2024-01-01 RX ADMIN — PANTOPRAZOLE SODIUM 40 MG: 40 TABLET, DELAYED RELEASE ORAL at 05:55

## 2024-01-01 RX ADMIN — FLUTICASONE FUROATE AND VILANTEROL TRIFENATATE 1 PUFF: 100; 25 POWDER RESPIRATORY (INHALATION) at 09:43

## 2024-01-01 RX ADMIN — ASPIRIN 81 MG: 81 TABLET, COATED ORAL at 09:05

## 2024-01-01 RX ADMIN — TRAMADOL HYDROCHLORIDE 50 MG: 50 TABLET, COATED ORAL at 22:23

## 2024-01-01 RX ADMIN — ONDANSETRON 4 MG: 4 TABLET, ORALLY DISINTEGRATING ORAL at 19:25

## 2024-01-01 RX ADMIN — Medication 5 ML: at 09:32

## 2024-01-01 RX ADMIN — Medication 5 ML: at 06:28

## 2024-01-01 RX ADMIN — TRAMADOL HYDROCHLORIDE 50 MG: 50 TABLET, COATED ORAL at 16:22

## 2024-01-01 RX ADMIN — FLUTICASONE FUROATE AND VILANTEROL TRIFENATATE 1 PUFF: 100; 25 POWDER RESPIRATORY (INHALATION) at 09:26

## 2024-01-01 RX ADMIN — SUCRALFATE 1 G: 1 SUSPENSION ORAL at 16:18

## 2024-01-01 RX ADMIN — SENNOSIDES AND DOCUSATE SODIUM 1 TABLET: 50; 8.6 TABLET ORAL at 21:22

## 2024-01-01 RX ADMIN — Medication 5 ML: at 14:54

## 2024-01-01 RX ADMIN — PANTOPRAZOLE SODIUM 40 MG: 40 TABLET, DELAYED RELEASE ORAL at 06:37

## 2024-01-01 RX ADMIN — Medication 5 ML: at 05:52

## 2024-01-01 RX ADMIN — TRAMADOL HYDROCHLORIDE 50 MG: 50 TABLET, COATED ORAL at 22:17

## 2024-01-01 RX ADMIN — BUMETANIDE 1 MG: 1 TABLET ORAL at 16:22

## 2024-01-01 RX ADMIN — PROCHLORPERAZINE EDISYLATE 5 MG: 5 INJECTION INTRAMUSCULAR; INTRAVENOUS at 02:24

## 2024-01-01 RX ADMIN — SENNOSIDES AND DOCUSATE SODIUM 2 TABLET: 50; 8.6 TABLET ORAL at 20:37

## 2024-01-01 RX ADMIN — SENNOSIDES AND DOCUSATE SODIUM 2 TABLET: 50; 8.6 TABLET ORAL at 21:39

## 2024-01-01 RX ADMIN — METHYLPHENIDATE HYDROCHLORIDE 10 MG: 10 TABLET ORAL at 08:17

## 2024-01-01 RX ADMIN — SUCRALFATE 1 G: 1 SUSPENSION ORAL at 06:40

## 2024-01-01 RX ADMIN — BISACODYL 10 MG: 10 SUPPOSITORY RECTAL at 09:54

## 2024-01-01 RX ADMIN — SUCRALFATE 1 G: 1 SUSPENSION ORAL at 22:26

## 2024-01-01 RX ADMIN — MICONAZOLE NITRATE: 2 POWDER TOPICAL at 09:38

## 2024-01-01 RX ADMIN — SENNOSIDES AND DOCUSATE SODIUM 1 TABLET: 50; 8.6 TABLET ORAL at 21:18

## 2024-01-01 RX ADMIN — TRAMADOL HYDROCHLORIDE 50 MG: 50 TABLET, COATED ORAL at 16:36

## 2024-01-01 RX ADMIN — SUCRALFATE 1 G: 1 SUSPENSION ORAL at 22:13

## 2024-01-01 RX ADMIN — TRAMADOL HYDROCHLORIDE 100 MG: 50 TABLET, COATED ORAL at 21:15

## 2024-01-01 RX ADMIN — TRAMADOL HYDROCHLORIDE 50 MG: 50 TABLET, COATED ORAL at 21:17

## 2024-01-01 RX ADMIN — Medication 5 ML: at 19:43

## 2024-01-01 RX ADMIN — CEFTRIAXONE SODIUM 1 G: 1 INJECTION, POWDER, FOR SOLUTION INTRAMUSCULAR; INTRAVENOUS at 12:40

## 2024-01-01 RX ADMIN — ASPIRIN 81 MG: 81 TABLET, COATED ORAL at 09:26

## 2024-01-01 RX ADMIN — Medication 5 ML: at 06:44

## 2024-01-01 RX ADMIN — Medication 15 G: at 09:26

## 2024-01-01 RX ADMIN — TRAMADOL HYDROCHLORIDE 50 MG: 50 TABLET, COATED ORAL at 22:47

## 2024-01-01 RX ADMIN — METHYLPHENIDATE HYDROCHLORIDE 10 MG: 10 TABLET ORAL at 12:29

## 2024-01-01 RX ADMIN — SUCRALFATE 1 G: 1 SUSPENSION ORAL at 08:30

## 2024-01-01 RX ADMIN — SENNOSIDES AND DOCUSATE SODIUM 2 TABLET: 50; 8.6 TABLET ORAL at 03:51

## 2024-01-01 RX ADMIN — FLUTICASONE FUROATE AND VILANTEROL TRIFENATATE 1 PUFF: 100; 25 POWDER RESPIRATORY (INHALATION) at 08:00

## 2024-01-01 RX ADMIN — SUCRALFATE 1 G: 1 SUSPENSION ORAL at 12:33

## 2024-01-01 RX ADMIN — ASPIRIN 81 MG: 81 TABLET, COATED ORAL at 08:48

## 2024-01-01 RX ADMIN — HYDROMORPHONE HYDROCHLORIDE 0.5 MG: 1 INJECTION, SOLUTION INTRAMUSCULAR; INTRAVENOUS; SUBCUTANEOUS at 22:33

## 2024-01-01 RX ADMIN — TRAMADOL HYDROCHLORIDE 50 MG: 50 TABLET, COATED ORAL at 21:28

## 2024-01-01 RX ADMIN — SODIUM CHLORIDE 500 ML: 9 INJECTION, SOLUTION INTRAVENOUS at 10:49

## 2024-01-01 RX ADMIN — PANTOPRAZOLE SODIUM 40 MG: 40 TABLET, DELAYED RELEASE ORAL at 06:45

## 2024-01-01 RX ADMIN — LIDOCAINE HYDROCHLORIDE 10 ML: 10 INJECTION, SOLUTION EPIDURAL; INFILTRATION; INTRACAUDAL; PERINEURAL at 15:58

## 2024-01-01 RX ADMIN — SUCRALFATE 1 G: 1 SUSPENSION ORAL at 12:30

## 2024-01-01 RX ADMIN — PANTOPRAZOLE SODIUM 40 MG: 40 TABLET, DELAYED RELEASE ORAL at 06:40

## 2024-01-01 RX ADMIN — HEPARIN SODIUM (PORCINE) LOCK FLUSH IV SOLN 100 UNIT/ML 5 ML: 100 SOLUTION at 07:03

## 2024-01-01 RX ADMIN — TRAMADOL HYDROCHLORIDE 100 MG: 50 TABLET, COATED ORAL at 16:46

## 2024-01-01 RX ADMIN — SODIUM CHLORIDE TAB 1 GM 1 G: 1 TAB at 18:05

## 2024-01-01 RX ADMIN — ONDANSETRON 4 MG: 4 TABLET, ORALLY DISINTEGRATING ORAL at 06:17

## 2024-01-01 RX ADMIN — BUMETANIDE 1 MG: 1 TABLET ORAL at 16:16

## 2024-01-01 RX ADMIN — PROCHLORPERAZINE EDISYLATE 5 MG: 5 INJECTION INTRAMUSCULAR; INTRAVENOUS at 11:37

## 2024-01-01 RX ADMIN — METHYLPHENIDATE HYDROCHLORIDE 10 MG: 10 TABLET ORAL at 14:42

## 2024-01-01 RX ADMIN — Medication 5 ML: at 06:26

## 2024-01-01 RX ADMIN — CEFTRIAXONE SODIUM 1 G: 1 INJECTION, POWDER, FOR SOLUTION INTRAMUSCULAR; INTRAVENOUS at 11:41

## 2024-01-01 RX ADMIN — ONDANSETRON 4 MG: 2 INJECTION INTRAMUSCULAR; INTRAVENOUS at 09:46

## 2024-01-01 RX ADMIN — SUCRALFATE 1 G: 1 SUSPENSION ORAL at 21:15

## 2024-01-01 RX ADMIN — ASPIRIN 81 MG: 81 TABLET, COATED ORAL at 08:59

## 2024-01-01 RX ADMIN — TRAMADOL HYDROCHLORIDE 100 MG: 50 TABLET, COATED ORAL at 17:07

## 2024-01-01 RX ADMIN — SENNOSIDES AND DOCUSATE SODIUM 2 TABLET: 50; 8.6 TABLET ORAL at 09:19

## 2024-01-01 RX ADMIN — Medication 5 ML: at 06:42

## 2024-01-01 RX ADMIN — TRAMADOL HYDROCHLORIDE 100 MG: 50 TABLET, COATED ORAL at 21:20

## 2024-01-01 RX ADMIN — PANTOPRAZOLE SODIUM 40 MG: 40 TABLET, DELAYED RELEASE ORAL at 06:42

## 2024-01-01 RX ADMIN — ONDANSETRON 4 MG: 2 INJECTION INTRAMUSCULAR; INTRAVENOUS at 09:12

## 2024-01-01 RX ADMIN — CALCIUM CARBONATE (ANTACID) CHEW TAB 500 MG 1000 MG: 500 CHEW TAB at 22:13

## 2024-01-01 RX ADMIN — PANTOPRAZOLE SODIUM 40 MG: 40 TABLET, DELAYED RELEASE ORAL at 08:10

## 2024-01-01 RX ADMIN — ASPIRIN 81 MG: 81 TABLET, COATED ORAL at 16:00

## 2024-01-01 RX ADMIN — CARBOXYMETHYLCELLULOSE SODIUM 2 DROP: 5 SOLUTION/ DROPS OPHTHALMIC at 12:05

## 2024-01-01 RX ADMIN — SODIUM CHLORIDE: 9 INJECTION, SOLUTION INTRAVENOUS at 04:32

## 2024-01-01 RX ADMIN — CARBOXYMETHYLCELLULOSE SODIUM 2 DROP: 5 SOLUTION/ DROPS OPHTHALMIC at 16:55

## 2024-01-01 RX ADMIN — Medication 5 ML: at 02:29

## 2024-01-01 RX ADMIN — TRAMADOL HYDROCHLORIDE 50 MG: 50 TABLET, COATED ORAL at 22:26

## 2024-01-01 RX ADMIN — SENNOSIDES AND DOCUSATE SODIUM 1 TABLET: 50; 8.6 TABLET ORAL at 09:07

## 2024-01-01 RX ADMIN — SODIUM CHLORIDE 1000 ML: 9 INJECTION, SOLUTION INTRAVENOUS at 18:32

## 2024-01-01 RX ADMIN — ONDANSETRON 4 MG: 2 INJECTION INTRAMUSCULAR; INTRAVENOUS at 23:09

## 2024-01-01 RX ADMIN — SENNOSIDES AND DOCUSATE SODIUM 1 TABLET: 50; 8.6 TABLET ORAL at 21:16

## 2024-01-01 RX ADMIN — Medication 5 ML: at 09:12

## 2024-01-01 RX ADMIN — ONDANSETRON 4 MG: 2 INJECTION INTRAMUSCULAR; INTRAVENOUS at 15:09

## 2024-01-01 RX ADMIN — OXYCODONE HYDROCHLORIDE 5 MG: 5 TABLET ORAL at 00:05

## 2024-01-01 RX ADMIN — ASPIRIN 81 MG: 81 TABLET, COATED ORAL at 08:00

## 2024-01-01 RX ADMIN — PROCHLORPERAZINE MALEATE 5 MG: 5 TABLET ORAL at 14:14

## 2024-01-01 RX ADMIN — ONDANSETRON 4 MG: 2 INJECTION INTRAMUSCULAR; INTRAVENOUS at 09:27

## 2024-01-01 RX ADMIN — SUCRALFATE 1 G: 1 SUSPENSION ORAL at 11:23

## 2024-01-01 RX ADMIN — ONDANSETRON 4 MG: 2 INJECTION INTRAMUSCULAR; INTRAVENOUS at 09:36

## 2024-01-01 RX ADMIN — ASPIRIN 81 MG: 81 TABLET, COATED ORAL at 08:29

## 2024-01-01 RX ADMIN — MICONAZOLE NITRATE: 2 POWDER TOPICAL at 21:08

## 2024-01-01 RX ADMIN — PANTOPRAZOLE SODIUM 40 MG: 40 TABLET, DELAYED RELEASE ORAL at 06:49

## 2024-01-01 RX ADMIN — SUCRALFATE 1 G: 1 SUSPENSION ORAL at 08:48

## 2024-01-01 RX ADMIN — SODIUM CHLORIDE TAB 1 GM 1 G: 1 TAB at 12:19

## 2024-01-01 RX ADMIN — POTASSIUM CHLORIDE 20 MEQ: 29.8 INJECTION, SOLUTION INTRAVENOUS at 15:46

## 2024-01-01 RX ADMIN — SUCRALFATE 1 G: 1 SUSPENSION ORAL at 16:17

## 2024-01-01 RX ADMIN — PANTOPRAZOLE SODIUM 40 MG: 40 TABLET, DELAYED RELEASE ORAL at 16:22

## 2024-01-01 RX ADMIN — SUCRALFATE 1 G: 1 SUSPENSION ORAL at 12:37

## 2024-01-01 RX ADMIN — SODIUM CHLORIDE, POTASSIUM CHLORIDE, SODIUM LACTATE AND CALCIUM CHLORIDE: 600; 310; 30; 20 INJECTION, SOLUTION INTRAVENOUS at 03:44

## 2024-01-01 RX ADMIN — IOPAMIDOL 84 ML: 755 INJECTION, SOLUTION INTRAVENOUS at 23:15

## 2024-01-01 RX ADMIN — SUCRALFATE 1 G: 1 SUSPENSION ORAL at 12:43

## 2024-01-01 RX ADMIN — ASPIRIN 81 MG: 81 TABLET, COATED ORAL at 09:35

## 2024-01-01 RX ADMIN — SENNOSIDES AND DOCUSATE SODIUM 2 TABLET: 50; 8.6 TABLET ORAL at 08:09

## 2024-01-01 RX ADMIN — SODIUM CHLORIDE, POTASSIUM CHLORIDE, SODIUM LACTATE AND CALCIUM CHLORIDE: 600; 310; 30; 20 INJECTION, SOLUTION INTRAVENOUS at 20:41

## 2024-01-01 ASSESSMENT — ACTIVITIES OF DAILY LIVING (ADL)
ADLS_ACUITY_SCORE: 35
ADLS_ACUITY_SCORE: 27
ADLS_ACUITY_SCORE: 28
ADLS_ACUITY_SCORE: 25
ADLS_ACUITY_SCORE: 28
ADLS_ACUITY_SCORE: 27
ADLS_ACUITY_SCORE: 32
ADLS_ACUITY_SCORE: 34
ADLS_ACUITY_SCORE: 32
ADLS_ACUITY_SCORE: 34
ADLS_ACUITY_SCORE: 25
ADLS_ACUITY_SCORE: 31
ADLS_ACUITY_SCORE: 32
ADLS_ACUITY_SCORE: 33
ADLS_ACUITY_SCORE: 31
ADLS_ACUITY_SCORE: 33
ADLS_ACUITY_SCORE: 25
ADLS_ACUITY_SCORE: 32
ADLS_ACUITY_SCORE: 27
ADLS_ACUITY_SCORE: 33
ADLS_ACUITY_SCORE: 27
ADLS_ACUITY_SCORE: 27
ADLS_ACUITY_SCORE: 32
ADLS_ACUITY_SCORE: 31
ADLS_ACUITY_SCORE: 31
ADLS_ACUITY_SCORE: 27
ADLS_ACUITY_SCORE: 32
ADLS_ACUITY_SCORE: 27
ADLS_ACUITY_SCORE: 28
ADLS_ACUITY_SCORE: 33
ADLS_ACUITY_SCORE: 32
ADLS_ACUITY_SCORE: 31
ADLS_ACUITY_SCORE: 33
ADLS_ACUITY_SCORE: 32
ADLS_ACUITY_SCORE: 32
ADLS_ACUITY_SCORE: 27
ADLS_ACUITY_SCORE: 32
ADLS_ACUITY_SCORE: 31
ADLS_ACUITY_SCORE: 32
ADLS_ACUITY_SCORE: 27
ADLS_ACUITY_SCORE: 31
ADLS_ACUITY_SCORE: 28
ADLS_ACUITY_SCORE: 31
ADLS_ACUITY_SCORE: 25
ADLS_ACUITY_SCORE: 33
ADLS_ACUITY_SCORE: 37
ADLS_ACUITY_SCORE: 25
ADLS_ACUITY_SCORE: 32
ADLS_ACUITY_SCORE: 33
ADLS_ACUITY_SCORE: 27
ADLS_ACUITY_SCORE: 32
ADLS_ACUITY_SCORE: 33
ADLS_ACUITY_SCORE: 25
ADLS_ACUITY_SCORE: 25
ADLS_ACUITY_SCORE: 31
ADLS_ACUITY_SCORE: 30
ADLS_ACUITY_SCORE: 27
ADLS_ACUITY_SCORE: 33
ADLS_ACUITY_SCORE: 27
ADLS_ACUITY_SCORE: 31
ADLS_ACUITY_SCORE: 32
ADLS_ACUITY_SCORE: 31
DRESSING/BATHING_DIFFICULTY: NO
ADLS_ACUITY_SCORE: 25
ADLS_ACUITY_SCORE: 33
ADLS_ACUITY_SCORE: 27
ADLS_ACUITY_SCORE: 28
ADLS_ACUITY_SCORE: 25
CONCENTRATING,_REMEMBERING_OR_MAKING_DECISIONS_DIFFICULTY: NO
ADLS_ACUITY_SCORE: 27
ADLS_ACUITY_SCORE: 32
ADLS_ACUITY_SCORE: 32
ADLS_ACUITY_SCORE: 25
DIFFICULTY_COMMUNICATING: NO
ADLS_ACUITY_SCORE: 25
ADLS_ACUITY_SCORE: 33
ADLS_ACUITY_SCORE: 32
ADLS_ACUITY_SCORE: 27
ADLS_ACUITY_SCORE: 32
ADLS_ACUITY_SCORE: 31
ADLS_ACUITY_SCORE: 39
ADLS_ACUITY_SCORE: 25
HEARING_DIFFICULTY_OR_DEAF: NO
ADLS_ACUITY_SCORE: 25
ADLS_ACUITY_SCORE: 27
ADLS_ACUITY_SCORE: 32
ADLS_ACUITY_SCORE: 28
ADLS_ACUITY_SCORE: 33
ADLS_ACUITY_SCORE: 31
ADLS_ACUITY_SCORE: 25
ADLS_ACUITY_SCORE: 31
ADLS_ACUITY_SCORE: 25
ADLS_ACUITY_SCORE: 27
ADLS_ACUITY_SCORE: 33
ADLS_ACUITY_SCORE: 31
ADLS_ACUITY_SCORE: 27
ADLS_ACUITY_SCORE: 32
ADLS_ACUITY_SCORE: 33
WALKING_OR_CLIMBING_STAIRS_DIFFICULTY: NO
ADLS_ACUITY_SCORE: 28
ADLS_ACUITY_SCORE: 33
ADLS_ACUITY_SCORE: 33
ADLS_ACUITY_SCORE: 31
ADLS_ACUITY_SCORE: 33
ADLS_ACUITY_SCORE: 30
ADLS_ACUITY_SCORE: 27
VISION_MANAGEMENT: GLASSES
ADLS_ACUITY_SCORE: 31
ADLS_ACUITY_SCORE: 31
ADLS_ACUITY_SCORE: 32
ADLS_ACUITY_SCORE: 34
ADLS_ACUITY_SCORE: 32
FALL_HISTORY_WITHIN_LAST_SIX_MONTHS: NO
ADLS_ACUITY_SCORE: 31
ADLS_ACUITY_SCORE: 31
DIFFICULTY_EATING/SWALLOWING: NO
ADLS_ACUITY_SCORE: 25
ADLS_ACUITY_SCORE: 32
ADLS_ACUITY_SCORE: 27
ADLS_ACUITY_SCORE: 27
ADLS_ACUITY_SCORE: 25
ADLS_ACUITY_SCORE: 27
ADLS_ACUITY_SCORE: 33
ADLS_ACUITY_SCORE: 31
ADLS_ACUITY_SCORE: 33
ADLS_ACUITY_SCORE: 27
ADLS_ACUITY_SCORE: 25
ADLS_ACUITY_SCORE: 31
ADLS_ACUITY_SCORE: 27
ADLS_ACUITY_SCORE: 27
ADLS_ACUITY_SCORE: 28
ADLS_ACUITY_SCORE: 25
ADLS_ACUITY_SCORE: 33
ADLS_ACUITY_SCORE: 32
ADLS_ACUITY_SCORE: 32
ADLS_ACUITY_SCORE: 31
ADLS_ACUITY_SCORE: 33
ADLS_ACUITY_SCORE: 27
ADLS_ACUITY_SCORE: 37
ADLS_ACUITY_SCORE: 34
ADLS_ACUITY_SCORE: 32
ADLS_ACUITY_SCORE: 28
ADLS_ACUITY_SCORE: 25
ADLS_ACUITY_SCORE: 28
ADLS_ACUITY_SCORE: 27
ADLS_ACUITY_SCORE: 39
ADLS_ACUITY_SCORE: 32
ADLS_ACUITY_SCORE: 35
ADLS_ACUITY_SCORE: 25
ADLS_ACUITY_SCORE: 31
ADLS_ACUITY_SCORE: 32
ADLS_ACUITY_SCORE: 31
ADLS_ACUITY_SCORE: 33
ADLS_ACUITY_SCORE: 32
ADLS_ACUITY_SCORE: 33
ADLS_ACUITY_SCORE: 33
ADLS_ACUITY_SCORE: 31
ADLS_ACUITY_SCORE: 25
ADLS_ACUITY_SCORE: 25
ADLS_ACUITY_SCORE: 31
ADLS_ACUITY_SCORE: 33
ADLS_ACUITY_SCORE: 32
ADLS_ACUITY_SCORE: 25
ADLS_ACUITY_SCORE: 32
ADLS_ACUITY_SCORE: 28
ADLS_ACUITY_SCORE: 31
ADLS_ACUITY_SCORE: 28
ADLS_ACUITY_SCORE: 32
ADLS_ACUITY_SCORE: 27
ADLS_ACUITY_SCORE: 28
ADLS_ACUITY_SCORE: 33
ADLS_ACUITY_SCORE: 28
ADLS_ACUITY_SCORE: 27
ADLS_ACUITY_SCORE: 31
ADLS_ACUITY_SCORE: 32
ADLS_ACUITY_SCORE: 33
ADLS_ACUITY_SCORE: 33
ADLS_ACUITY_SCORE: 25
ADLS_ACUITY_SCORE: 39
ADLS_ACUITY_SCORE: 28
ADLS_ACUITY_SCORE: 31
ADLS_ACUITY_SCORE: 33
ADLS_ACUITY_SCORE: 31
ADLS_ACUITY_SCORE: 31
ADLS_ACUITY_SCORE: 25
ADLS_ACUITY_SCORE: 33
ADLS_ACUITY_SCORE: 33
ADLS_ACUITY_SCORE: 27
ADLS_ACUITY_SCORE: 28
ADLS_ACUITY_SCORE: 31
ADLS_ACUITY_SCORE: 32
ADLS_ACUITY_SCORE: 35
ADLS_ACUITY_SCORE: 25
ADLS_ACUITY_SCORE: 32
ADLS_ACUITY_SCORE: 31
ADLS_ACUITY_SCORE: 27
ADLS_ACUITY_SCORE: 33
ADLS_ACUITY_SCORE: 33
ADLS_ACUITY_SCORE: 28
ADLS_ACUITY_SCORE: 33
ADLS_ACUITY_SCORE: 25
ADLS_ACUITY_SCORE: 33
ADLS_ACUITY_SCORE: 31
ADLS_ACUITY_SCORE: 33
ADLS_ACUITY_SCORE: 27
ADLS_ACUITY_SCORE: 31
ADLS_ACUITY_SCORE: 28
ADLS_ACUITY_SCORE: 32
ADLS_ACUITY_SCORE: 27
ADLS_ACUITY_SCORE: 33
ADLS_ACUITY_SCORE: 32
ADLS_ACUITY_SCORE: 33
ADLS_ACUITY_SCORE: 25
ADLS_ACUITY_SCORE: 31
ADLS_ACUITY_SCORE: 33
ADLS_ACUITY_SCORE: 33
ADLS_ACUITY_SCORE: 28
ADLS_ACUITY_SCORE: 31
ADLS_ACUITY_SCORE: 32
DEPENDENT_IADLS:: CLEANING;COOKING;LAUNDRY;SHOPPING;TRANSPORTATION
ADLS_ACUITY_SCORE: 31
ADLS_ACUITY_SCORE: 28
ADLS_ACUITY_SCORE: 32
ADLS_ACUITY_SCORE: 28
ADLS_ACUITY_SCORE: 31
ADLS_ACUITY_SCORE: 31
ADLS_ACUITY_SCORE: 28
ADLS_ACUITY_SCORE: 39
ADLS_ACUITY_SCORE: 27
ADLS_ACUITY_SCORE: 31
ADLS_ACUITY_SCORE: 27
ADLS_ACUITY_SCORE: 28
ADLS_ACUITY_SCORE: 37
ADLS_ACUITY_SCORE: 28
ADLS_ACUITY_SCORE: 39
ADLS_ACUITY_SCORE: 27
ADLS_ACUITY_SCORE: 32
ADLS_ACUITY_SCORE: 32
ADLS_ACUITY_SCORE: 35
ADLS_ACUITY_SCORE: 28
ADLS_ACUITY_SCORE: 27
ADLS_ACUITY_SCORE: 33
ADLS_ACUITY_SCORE: 32
ADLS_ACUITY_SCORE: 25
ADLS_ACUITY_SCORE: 31
ADLS_ACUITY_SCORE: 39
ADLS_ACUITY_SCORE: 34
ADLS_ACUITY_SCORE: 32
ADLS_ACUITY_SCORE: 31
ADLS_ACUITY_SCORE: 33
ADLS_ACUITY_SCORE: 39
ADLS_ACUITY_SCORE: 25
WEAR_GLASSES_OR_BLIND: YES
DOING_ERRANDS_INDEPENDENTLY_DIFFICULTY: NO
ADLS_ACUITY_SCORE: 37
ADLS_ACUITY_SCORE: 28
ADLS_ACUITY_SCORE: 27
ADLS_ACUITY_SCORE: 31
ADLS_ACUITY_SCORE: 28
ADLS_ACUITY_SCORE: 33
ADLS_ACUITY_SCORE: 28
ADLS_ACUITY_SCORE: 37
ADLS_ACUITY_SCORE: 34
ADLS_ACUITY_SCORE: 25
ADLS_ACUITY_SCORE: 33
ADLS_ACUITY_SCORE: 31
ADLS_ACUITY_SCORE: 25
ADLS_ACUITY_SCORE: 32
ADLS_ACUITY_SCORE: 33
ADLS_ACUITY_SCORE: 27
ADLS_ACUITY_SCORE: 25
ADLS_ACUITY_SCORE: 31
ADLS_ACUITY_SCORE: 31
ADLS_ACUITY_SCORE: 32
ADLS_ACUITY_SCORE: 32
ADLS_ACUITY_SCORE: 28
ADLS_ACUITY_SCORE: 25
ADLS_ACUITY_SCORE: 31
ADLS_ACUITY_SCORE: 31
ADLS_ACUITY_SCORE: 27
ADLS_ACUITY_SCORE: 33
ADLS_ACUITY_SCORE: 25
ADLS_ACUITY_SCORE: 25
ADLS_ACUITY_SCORE: 37
ADLS_ACUITY_SCORE: 31
ADLS_ACUITY_SCORE: 33
ADLS_ACUITY_SCORE: 27
ADLS_ACUITY_SCORE: 33
ADLS_ACUITY_SCORE: 33
CHANGE_IN_FUNCTIONAL_STATUS_SINCE_ONSET_OF_CURRENT_ILLNESS/INJURY: NO
ADLS_ACUITY_SCORE: 25
ADLS_ACUITY_SCORE: 39
ADLS_ACUITY_SCORE: 37
ADLS_ACUITY_SCORE: 28
ADLS_ACUITY_SCORE: 34
ADLS_ACUITY_SCORE: 33
ADLS_ACUITY_SCORE: 32
TOILETING_ISSUES: NO
ADLS_ACUITY_SCORE: 28
ADLS_ACUITY_SCORE: 32
ADLS_ACUITY_SCORE: 33
ADLS_ACUITY_SCORE: 31
ADLS_ACUITY_SCORE: 32
ADLS_ACUITY_SCORE: 33
ADLS_ACUITY_SCORE: 31
ADLS_ACUITY_SCORE: 35
ADLS_ACUITY_SCORE: 33
ADLS_ACUITY_SCORE: 25
ADLS_ACUITY_SCORE: 31
ADLS_ACUITY_SCORE: 32
ADLS_ACUITY_SCORE: 31
ADLS_ACUITY_SCORE: 25
ADLS_ACUITY_SCORE: 31
ADLS_ACUITY_SCORE: 39
ADLS_ACUITY_SCORE: 25
ADLS_ACUITY_SCORE: 33
ADLS_ACUITY_SCORE: 27
ADLS_ACUITY_SCORE: 32
ADLS_ACUITY_SCORE: 27
ADLS_ACUITY_SCORE: 31
ADLS_ACUITY_SCORE: 31
ADLS_ACUITY_SCORE: 37
ADLS_ACUITY_SCORE: 25
ADLS_ACUITY_SCORE: 32
ADLS_ACUITY_SCORE: 25
ADLS_ACUITY_SCORE: 32
ADLS_ACUITY_SCORE: 27
ADLS_ACUITY_SCORE: 33
ADLS_ACUITY_SCORE: 25
ADLS_ACUITY_SCORE: 25
ADLS_ACUITY_SCORE: 27
ADLS_ACUITY_SCORE: 31
ADLS_ACUITY_SCORE: 32
ADLS_ACUITY_SCORE: 31
ADLS_ACUITY_SCORE: 33
ADLS_ACUITY_SCORE: 27
ADLS_ACUITY_SCORE: 27
ADLS_ACUITY_SCORE: 28
ADLS_ACUITY_SCORE: 31
ADLS_ACUITY_SCORE: 32
ADLS_ACUITY_SCORE: 31
ADLS_ACUITY_SCORE: 34
EQUIPMENT_CURRENTLY_USED_AT_HOME: WALKER, ROLLING
ADLS_ACUITY_SCORE: 27
ADLS_ACUITY_SCORE: 25
ADLS_ACUITY_SCORE: 25
ADLS_ACUITY_SCORE: 32
ADLS_ACUITY_SCORE: 34
ADLS_ACUITY_SCORE: 35
ADLS_ACUITY_SCORE: 25
ADLS_ACUITY_SCORE: 33
ADLS_ACUITY_SCORE: 32
ADLS_ACUITY_SCORE: 33
ADLS_ACUITY_SCORE: 33
ADLS_ACUITY_SCORE: 31
ADLS_ACUITY_SCORE: 25
ADLS_ACUITY_SCORE: 32
ADLS_ACUITY_SCORE: 32
ADLS_ACUITY_SCORE: 31
ADLS_ACUITY_SCORE: 27
ADLS_ACUITY_SCORE: 31
ADLS_ACUITY_SCORE: 33
ADLS_ACUITY_SCORE: 33
ADLS_ACUITY_SCORE: 27
ADLS_ACUITY_SCORE: 25
ADLS_ACUITY_SCORE: 28
ADLS_ACUITY_SCORE: 25
ADLS_ACUITY_SCORE: 31
ADLS_ACUITY_SCORE: 32
ADLS_ACUITY_SCORE: 32
ADLS_ACUITY_SCORE: 33
ADLS_ACUITY_SCORE: 27
ADLS_ACUITY_SCORE: 27
ADLS_ACUITY_SCORE: 32
ADLS_ACUITY_SCORE: 27
ADLS_ACUITY_SCORE: 32
ADLS_ACUITY_SCORE: 31
ADLS_ACUITY_SCORE: 32
ADLS_ACUITY_SCORE: 32
ADLS_ACUITY_SCORE: 31
ADLS_ACUITY_SCORE: 27
ADLS_ACUITY_SCORE: 33
ADLS_ACUITY_SCORE: 31
ADLS_ACUITY_SCORE: 33
ADLS_ACUITY_SCORE: 32
ADLS_ACUITY_SCORE: 39
ADLS_ACUITY_SCORE: 34
ADLS_ACUITY_SCORE: 32
ADLS_ACUITY_SCORE: 27
ADLS_ACUITY_SCORE: 35
ADLS_ACUITY_SCORE: 32
ADLS_ACUITY_SCORE: 32
ADLS_ACUITY_SCORE: 27
ADLS_ACUITY_SCORE: 33
ADLS_ACUITY_SCORE: 33
ADLS_ACUITY_SCORE: 31
ADLS_ACUITY_SCORE: 32
ADLS_ACUITY_SCORE: 28
ADLS_ACUITY_SCORE: 39
ADLS_ACUITY_SCORE: 31
ADLS_ACUITY_SCORE: 25
ADLS_ACUITY_SCORE: 39
ADLS_ACUITY_SCORE: 25
ADLS_ACUITY_SCORE: 31
ADLS_ACUITY_SCORE: 27
ADLS_ACUITY_SCORE: 32
ADLS_ACUITY_SCORE: 31
ADLS_ACUITY_SCORE: 27
ADLS_ACUITY_SCORE: 31
ADLS_ACUITY_SCORE: 25
ADLS_ACUITY_SCORE: 31
ADLS_ACUITY_SCORE: 25

## 2024-01-01 ASSESSMENT — COLUMBIA-SUICIDE SEVERITY RATING SCALE - C-SSRS
2. HAVE YOU ACTUALLY HAD ANY THOUGHTS OF KILLING YOURSELF IN THE PAST MONTH?: NO
6. HAVE YOU EVER DONE ANYTHING, STARTED TO DO ANYTHING, OR PREPARED TO DO ANYTHING TO END YOUR LIFE?: NO
1. IN THE PAST MONTH, HAVE YOU WISHED YOU WERE DEAD OR WISHED YOU COULD GO TO SLEEP AND NOT WAKE UP?: NO

## 2024-09-05 PROBLEM — N17.9 ACUTE KIDNEY INJURY (H): Status: ACTIVE | Noted: 2024-01-01

## 2024-09-05 NOTE — ED PROVIDER NOTES
"  Emergency Department Note      History of Present Illness     Chief Complaint   Abnormal Labs      HPI   Jany Park is a 79 year old female with a history of endometrial carcinoma who presents due to abnormal labs. She is accompanied with her . Patient has had endometrial carcinoma diagnosed in 2015. Her last chemotherapy was in May and was supposed to start again on Monday. Her oncologist is Dr. Patrick Dreyer from [facility]. Yesterday, patient had blood tests including kidney and liver labs, as well as a bone biopsy. From these results, they found that the liver, kidney, and bone biopsy results were abnormal. She was told to come to the ER due to this. Jany has also been feeling \"rotten\" for the past month. She has been experiencing dry heaves that started 3 weeks ago which got worse in the past 1.5 weeks. She notes being unable to keep any food or drink down.  Today, she has had just half of a bagel. In addition, patient endorses constipation, weight loss - although she is unsure how much she has lost, and lymphedema. Jany denies diarrhea.     Independent Historian   None    Review of External Notes   Hgb 11.2 3/11/24.    Past Medical History     Medical History and Problem List   Acquired lymphedema of lower extremity  Adenocarcinoma of endometrium  Agranulocytosis due to and not concurrent with administration of antineoplastic agent  Anemia  Inflammatory disease of mucous membrane  Mucositis following chemotherapy  Neutropenia due to and following chemotherapy  Asthma  Candidiasis of mouth disorder  Chronic constipation  Blood coagulation disorder  Dehydration  DJD  Dyspnea on exertion  Non-menopausal hot flash  Hypertension  Hyperlipidemia  Long-term current use of selective estrogen receptor modulator  Lymphedema  Malignant melanoma of back and tibia  Malignant neoplasms  Skin cancer  Narcotic dependence  Neurogenic pruritus  Osteopenia  Endometrial " "cancer  GERD  Colitis  Knee pain    Medications   Diphenhydramine  Prednisone  Vancomycin  Famotidine  Ventolin  Alendronate  Bumex  Flonase  Hydrochlorothiazide  Zestril  Mobic  Lasix  Prilosec  tramadol    Surgical History   Past Surgical History:   Procedure Laterality Date    ARTHROPLASTY REVISION KNEE  2013    Procedure: ARTHROPLASTY REVISION KNEE;  REVISION LEFT TOTAL KNEE (Biomet);  Surgeon: Efrain Lopez MD;  Location:  OR      DELIVERY ONLY      C TOTAL KNEE ARTHROPLASTY      Rt. knee    C TOTAL KNEE ARTHROPLASTY      Lt. Knee    HC COLONOSCOPY THRU STOMA, DIAGNOSTIC  2005    Normal. Had \"twisting\" of colon requiring medical attention after procedure, no surgery.     HYSTERECTOMY      INSERT PORT VASCULAR ACCESS N/A 10/2/2015    Procedure: INSERT PORT VASCULAR ACCESS;  Surgeon: Christopher Gamez MD;  Location:  OR    [facility] NONSPECIFIC PROCEDURE  2005    Left lower leg varicose vein surgery       Physical Exam     Patient Vitals for the past 24 hrs:   BP Temp Temp src Pulse Resp SpO2 Weight   24 1638 -- -- -- -- 18 -- --   24 1637 100/55 97.2  F (36.2  C) Temporal 95 -- 100 % 72.6 kg (160 lb)     Physical Exam  General: alert, lying comfortably on gurney, accompanied by .  Generally weak.  HENT: mucous membranes dry  CV: regular rate, regular rhythm  Resp: normal effort, coarse breath sounds in bilateral lung bases.  GI: abdomen soft but firm.  No RUQ tenderness or [place] sign, mild abdominal distension and mild diffuse tenderness.  MSK: no bony tenderness  Skin: appropriately warm and dry  Extremities: bilateral chronic appearing lower extremity edema, right greater than left.  Neuro: alert, clear speech, oriented  Psych: normal mood and affect      Diagnostics     Lab Results   Labs Ordered and Resulted from Time of ED Arrival to Time of ED Departure   COMPREHENSIVE METABOLIC PANEL - Abnormal       Result Value    Sodium 134 (*)     " Potassium 4.4      Carbon Dioxide (CO2) 25      Anion Gap 18 (*)     Urea Nitrogen 49.5 (*)     Creatinine 2.10 (*)     GFR Estimate 24 (*)     Calcium 8.7 (*)     Chloride 91 (*)     Glucose 96      Alkaline Phosphatase 1,633 (*)     AST 70 (*)     ALT 18      Protein Total 6.8      Albumin 3.4 (*)     Bilirubin Total 1.6 (*)    ROUTINE UA WITH MICROSCOPIC REFLEX TO CULTURE - Abnormal    Color Urine Yellow      Appearance Urine Clear      Glucose Urine Negative      Bilirubin Urine Negative      Ketones Urine Negative      Specific Gravity Urine 1.015      Blood Urine Negative      pH Urine 5.5      Protein Albumin Urine Negative      Urobilinogen Urine 2.0      Nitrite Urine Negative      Leukocyte Esterase Urine Small (*)     RBC Urine <1      WBC Urine 6 (*)     Squamous Epithelials Urine <1      Hyaline Casts Urine 19 (*)    CBC WITH PLATELETS AND DIFFERENTIAL - Abnormal    WBC Count 9.1      RBC Count 3.59 (*)     Hemoglobin 10.3 (*)     Hematocrit 31.4 (*)     MCV 88      MCH 28.7      MCHC 32.8      RDW 14.7      Platelet Count 577 (*)     % Neutrophils 74      % Lymphocytes 14      % Monocytes 10      % Eosinophils 0      % Basophils 1      % Immature Granulocytes 1      NRBCs per 100 WBC 0      Absolute Neutrophils 6.7      Absolute Lymphocytes 1.3      Absolute Monocytes 0.9      Absolute Eosinophils 0.0      Absolute Basophils 0.1      Absolute Immature Granulocytes 0.1      Absolute NRBCs 0.0       Imaging   No orders to display       EKG   none    Independent Interpretation   None    ED Course      Medications Administered   Medications   lactated ringers BOLUS 1,000 mL (0 mLs Intravenous Hold 9/5/24 1829)   lactated ringers infusion (has no administration in time range)   sodium chloride 0.9% BOLUS 1,000 mL (1,000 mLs Intravenous $New Bag 9/5/24 1839)       Procedures   Procedures     Discussion of Management   Admitting Hospitalist, Dr. Jojo MAYEN Oncology    ED Course   ED Course as of 09/06/24  0221   Thu Sep 05, 2024   1815 I obtained history and examined the patient as noted above.     1847 I re-checked patient. She states she feels better after the fluids.    2038 I spoke with Dr. Uribe of the hospitalist team regarding the patient, who accepted the patient for admission.   2043 I spoke to [facility] who noted that alk phos was 1200, creat 2.8 from 1.2 5 weeks ago.   has been increasing.  CT showed worsening disease, liver mets.  Bone marrow bx on 9/3, nothing overly concerning there.       Additional Documentation  None    Medical Decision Making / Diagnosis     CMS Diagnoses: None    MIPS       None    MDM   Jany Park is a 79 year old female with a history of endometrial carcinoma currently undergoing chemotherapy, presenting today because of lab abnormalities.  On exam, the patient has normal vitals.  She has signs of spacing, with lower extremity edema.  Fortunately, no evidence for significant pulmonary edema, given normal oxygen saturation no increased work of breathing.  Labs in the ED demonstrate elevated creatinine, very elevated alkaline phosphatase, and elevated bilirubin.  Patient states she is feeling better following IV fluids.  I reviewed recent workup with oncologist on-call for [facility], who notes that the patient had a recent CT scan that demonstrated metastatic disease to the liver, as well as other labs showing that she had elevated creatinine and alkaline phosphatase.  She will be admitted for continued workup, IV fluids, and oncology evaluation.    Disposition   The patient was admitted to the hospital.     Diagnosis     ICD-10-CM    1. Acute kidney injury (H24)  N17.9       2. Hepatomegaly  R16.0          Scribe Disclosure:  I, Yvette Berkowitz, am serving as a scribe at 6:11 PM on 9/5/2024 to document services personally performed by Ivon Willingham MD, based on my observations and the provider's statements to me.        Ivon Willingham,  MD  09/06/24 0249

## 2024-09-05 NOTE — ED TRIAGE NOTES
Pt has endometrial CA and had labs and bine marrow bx yesterday. Pt was told to go to ED as her liver and kidney labs were bad,      Triage Assessment (Adult)       Row Name 09/05/24 1963          Triage Assessment    Airway WDL WDL        Respiratory WDL    Respiratory WDL WDL        Skin Circulation/Temperature WDL    Skin Circulation/Temperature WDL WDL        Cardiac WDL    Cardiac WDL WDL        Peripheral/Neurovascular WDL    Peripheral Neurovascular WDL WDL        Cognitive/Neuro/Behavioral WDL    Cognitive/Neuro/Behavioral WDL WDL

## 2024-09-06 PROBLEM — R16.0 HEPATOMEGALY: Status: ACTIVE | Noted: 2024-01-01

## 2024-09-06 NOTE — PLAN OF CARE
Orientation: A&Ox4  Aggression Stop Light: green  Activity: SBA in room  Diet/BS Checks: Regular diet, poor appetite d/t N/V  Tele:  N/A  IV Access/Drains: Chest Port  infusing NS @ 75 mL/hr. R PIV SL  Pain Management: C/o of pain at biopsy site, managed with PRN oxycodone x2 and hot packs  Abnormal VS/Results: VSS on RA. Na= 133, CR= 1.68, Plt = 515, LFTs elevated  Bowel/Bladder: Continent of B/B; Pt had no BM this shift  Skin/Wounds: Scattered bruising and scabs, +2-+3 edema to BLE   Consults: Hem/onc  D/C Disposition: Pending  Other Info: C/o of N/V, PRN oral compazine given x1. MRI ordered, one time dose of Ativan given for anxiety/claustrophobia

## 2024-09-06 NOTE — PROGRESS NOTES
RECEIVING UNIT ED HANDOFF REVIEW    ED Nurse Handoff Report was reviewed by: Jayne Robledo RN on September 5, 2024 at 10:33 PM

## 2024-09-06 NOTE — UTILIZATION REVIEW
Admission Status; Secondary Review Determination         Under the authority of the Utilization Management Committee, the utilization review process indicated a secondary review on the above patient.  The review outcome is based on review of the medical records, discussions with staff, and applying clinical experience noted on the date of the review.        (x)      Inpatient Status Appropriate - This patient's medical care is consistent with medical management for inpatient care and reasonable inpatient medical practice.     RATIONALE FOR DETERMINATION   The patient is a 79-year-old female admitted on 9/5/2024.  Patient came to the ED because of nausea and vomiting.  She is found to have an elevated creatinine of 2.10 and BUN of 49.5.  She has a history of endometrial cancer and based on exam and lab test is suspected to have metastatic involvement of the liver.  Patient has had poor intake and is suspected to have prerenal aggravation of elevation of creatinine.  Patient is receiving intravenous fluids for rehydration.  Heme-onc was consulted and suspect that anemia and fatigue are due to malignancy and treatment.  The patient has had multiple courses of chemotherapy.  Based on patient requiring additional treatment to improve oral intake and maintain hydration along with additional potential treatment for underlying oncologic condition, recommend switching from observation to inpatient status.  The patient will require additional midnight stay or more for stabilization.      The severity of illness, intensity of service provided, expected LOS and risk for adverse outcome make the care complex, high risk and appropriate for hospital admission.        The information on this document is developed by the utilization review team in order for the business office to ensure compliance.  This only denotes the appropriateness of proper admission status and does not reflect the quality of care rendered.         The  definitions of Inpatient Status and Observation Status used in making the determination above are those provided in the CMS Coverage Manual, Chapter 1 and Chapter 6, section 70.4.      Sincerely,     Edilberto Phillips MD  Physician Advisor  Utilization Review/ Case Management  [facility].

## 2024-09-06 NOTE — CONSULTS
Consultation    Jany Park MRN# [id number]   YOB: 1945 Age: 79 year old   Date of Admission: 9/5/2024  Requesting physician: Dr. Uribe  Reason for consult: Endometrial cancer           Assessment and Plan:     Imelda is a 79 year old admitted with THELMA in the setting of poor intake in the past few weeks. She has been on a chemotherapy holiday since 5/2024.    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  --9/2015 doxil chemotherapy  --10/2016 topotecan chemotherapy  --3/2017 resumed paclitaxel with carboplatin  --4/2018 began megace alternating every 3 weeks with tamoxifen  --switched to continuous megace 8/31/18  --Resumed paclitaxel with carboplatin 4/25/19  --Began carboplatin desensitization protocol 7/1/19  --responded but opted for a chemotherapy hiatus 9/2019  --6/1/20 began pembrolizumab with lenvatinib with the pembrolizumab administered every 3 weeks and the lenvatinib daily. Began lenvatinib at 10 mg daily, and with cycle 2 increased to full dose of 20 mg daily  --Both pembro and lenvatinib were held from late June 2020 to 9/14/20  due to diarrhea   --9/14/20  resumed Lenvatinib (at dose reduction of 10mg) and pembrolizumab.   --Continued pembrolizumab and current dose of lenvatinib at 10 mg daily   -- slowly rising and CT C/A/P 9/3/21 with mild enlargement of hepatic metastases (for example 6.5 x 5.6 vs 6.0 x 5.2, 8.3 x 5.3 vs 7.9 x 5.1 cm), mildly increased adenopathy up to 1.3 from 1.1 cm, other wise with stable disease  --9/8/21 discontinue pembrolizumab and Lenvatinib  --10/4/21 began paclitaxel and carboplatin, cycled every 3 weeks with dose adjustments as she last received in 2019  - 4/11/22 establish care with me and with stable disease on CT scans 4/8/22 we decided to hold her chemotherapy over the  summer.  - Restarted treatment 10/18/22, bevacizumab added 11/29/22.   - 2/9/23 Taxol/carbo doses reduced by 15% for escalating fatigue.   - CT CAP 5/2/23 stable disease -> treatment holiday  - slight progression radiographically and increase in tumor markers -> restart carboplatin paclitaxel and bevacizumab 11/16/23   - CT CAP 2/27/24 with mild improvement in all disease, tumor marker slight decrease   - 7/29/24 increase in size of mets as well as increased tumor marker while off chemotherapy for the summer   - CT 9/4/2024 showed disease progression in the liver and lymph nodes  - She was due to resume treatment on 9/10/2024 with paclitaxel and bevacizumab    THELMA  - Baseline 0.8-0.9 in clinic, but up to 2.8 on 9/4/2024  - Did have CT with contrast on 9/4/2024, no hydronephrosis on that day  - Likely due to nausea and poor intake  - IV hydration has been initiated with improvement in renal function     Anemia / fatigue   - Due to malignancy and its treatment  - Started on EPO 11/29/22, Hg increased to 11.0 and she feels much less fatigued   - 11/14/23 -> IV iron   - Fatigue negatively affecting her QOL, slight dose reduction without noticeable improvement   - EPO for Hg <10  - Hg has not improved as much as we would expect on chemotherapy holiday    - Given her prior chemotherapy history we must look for evidence of MDS, bone marrow biopsy on 9/3 did not show MDS    LFT abnormalities  - Has known liver metastases which likely impact liver function    Anastacio KU, CNP  [facility]  [phone] (office)              Chief Complaint:   Abnormal Labs         History of Present Illness:   Imelda is a 79 year old admitted with THELMA in the setting of poor intake in the past few weeks. She has been on a chemotherapy holiday since 5/2024.    Imelda has not felt well the past 3 weeks. She has been nauseated with dry heaves. She is not eating much. She was told to come in after having been found to have abnormal  labs. She finds prochlorperazine somewhat helpful of all the antiemetics she has at home.    She had a bone marrow biopsy recently to evaluate marrow function due to ongoing anemia. She does have some pain after the biopsy. She is needing oxycodone given liver and renal issues making OTC options of limited use.    She told me the plan was to resume chemotherapy next week as she has been on a treatment holiday.    ONCOLOGIC HISTORY/ TREATMENT SUMMARY  DIAGNOSIS: Metastatic Endometrial Cancer  1. 2/11/15: Total abdominal hysterectomy bilateral salpingo-oophorectomy, bilateral complete pelvic and paraaortic lymphadenectomy, omentectomy, radical tumor debulking, and a rectosigmoid resection with a primary reanastomosis. Final histopathology revealed a high-grade serous carcinoma which originated in the endometrium. She had a second primary in the endometrium within a polyp, which was a clear cell carcinoma, but that was noninvasive. The serous carcinoma of the endometrium metastasized to both her pelvic and paraaortic lymph nodes, her colon (partial obstruction), and most of the peritoneal surfaces in the abdomen. Her omentum also had quite a lot of tumor in it.  2. 3/12/2015: CT chest abdomen pelvis showed numerous hepatic masses consistent with metastasis, multiple bilateral pulmonary nodules consistent with metastasis, and a prominent lobulated mass in the spleen compatible with metastatic disease. Her  level was elevated at 270.  3. 3/19/15: chemotherapy w/ carboplatin and Taxol (with Neulasta support). ,  4. 8/5/15: normalization of her  level (30) with minimal change in her systemic metastases on CT scan.,  5. 8/10/15: Biopsy of liver lesion demonstrating high grade serous adenocarcinoma.,  6. 9/14/15 - 2/11/16: 2nd line treatment with Doxil. Best response was stable disease.,  7. 3/2016: [facility] testing demonstrating multiple alterations including AKT2 amplification, CCND amplification, FGF23 and  FGF6 amplification, and TP53 mutation. However, she was not a candidate for STAR clinical trial.,  8. 9/2016 with progressive disease on CT scan and a rising  level, Topotecan was recommended  9. 10/6/16: began topotecan chemotherapy,  10. 12/20/16: CT scan with stable disease to slight progression in the liver,  11. 2/23/17: CT scan and  confirm progressive disease  12. 3/9/17: she began chemotherapy with paclitaxel and carboplatin  13. 4/20/17: CT scan revealed stable disease  14. 8/17/17: CT scan revealed stable disease, all chemotherapy held at that time  15. 10/10/17: CT scan and  level revealed stable disease  16. 4/2/18: CT revealed moderate disease progression  17. 4/13/18: CT guided liver biopsy confirmed metastatic high grade serous carcinoma, ER positive 90%, NH 0%, microsatellite stable, PDL1 <1%.  18. 4/20/18: Began Megace alternating every 3 weeks with tamoxifen  19. 8/6/18: CT scan shows stable disease.  20. 8/31/18: With a rise in the  when on tamoxifen and drop when on Megace, switched to continuous Megace.  21. 11/21/18: CT chest obtained for dyspnea revealed no change disease and no evidence PE  22. 2/5/19: CT revealed mild progression of disease in the liver.  23. 3/19: Megace discontinued  24. 4/23/19 CT shows stable disease.  up to 195  25. 4/25/19: Resumed paclitaxel with carboplatin  26. 7/1/19: Began carboplatin desensitization protocol  27. 8/30/19: CT revealed stable disease  28. 9/2019: Chemotherapy break  29.11/20/19: CT with stable disease  30. 5/11/20: CT chest abdomen pelvis revealed slight increase in liver, peritoneal, and spleen metastases.  31. 6/1/20: Started pembrolizumab every 21 days. Added oral lenvatinib 10 mg daily after her tooth is extracted (June 6).   32.  7/1/20 treatment placed on hold due to diarrhea. Did not improve with steroids. Ultimately found to have c diff colitis.   33. 7/30/20: CT abdomen pelvis showed stable disease when  compared to 5/11/20 imaging  34.  Echo shows EF of 60-65%  35. Resumed pembrolizumab and lenvatinib (10 mg dose) on 9/14/20  36. CT C/A/P 11/12/20 essentially stable  37. CT C/A/P 3/1/21 stable without new sites of metastases or evidence of progression  38. CT C/A/P 6/14/21 stable without new sites of metastases or evidence of progression  39. CT C/A/P 9/3/21 with mild enlargement of hepatic metastases (for example 6.5 x 5.6 vs 6.0 x 5.2, 8.3 x 5.3 vs 7.9 x 5.1 cm), mildly increased adenopathy up to 1.3 from 1.1 cm, other wise with stable disease  40.  Discontinue pembrolizumab and lenvatinib  41.  10/4/21 resumed paclitaxel and carboplatin  42. 10/4/21  up to 345  43.  Mammography 10/14/2021 benign  44.  CT chest abdomen pelvis 12/23/2021 stable with no evidence of progressive disease  45. Cycle 9 held on 3/21/22 due to fatigue   46. CT scans 4/8/22 with stable disease, treatment holiday to begin   47.  CT scan 7/27/2022 with slight increase in size in the aortocaval lymph node and minimal increase in size in the pelvic nodules since comparison study, otherwise stable disease.  48. CT CAP 9/16/22 1. Extensive metastatic disease in the liver is unchanged. 2. Multiple splenic and puhnonaiy lesions are also suspicious for metastatic disease, and are unchanged. 3. Retroperitoneal adenopatlty and soft tissue nodules in the abdomen and pelvis are also not significantly changed, and suspicious for metastatic disease.  49. 10/18/22 treatment holiday end: Restart carboplatin paclitaxel   50. 11/8/22 Carboplatin + Paclitaxel   11/29/22 Carbo + paclitaxel + Sharron + Darbepoietin   12/29/22 Carbo+paclitaxel + sharron   1/16/23 CT CAP [place]ring, hepatic, splenic, peritoneal and lymph node metastasis have not appreciably changed compared to 9/16/2022.  2/9/23 15% dose reduction in carbo and paclitaxel due to fatigue   5/2/23 CT CAP stable disease -> treatment holiday   restart carboplatin paclitaxel and bevacizumab 11/16/23   - CT  "CAP 24 with mild improvement in all disease, tumor marker slight decrease  24 Treatment holiday (would have been due for treatment this day)          Physical Exam:   Vitals were reviewed  Blood pressure 129/75, pulse 89, temperature 97.5  F (36.4  C), temperature source Oral, resp. rate 16, height 1.664 m (5' 5.5\"), weight 73.8 kg (162 lb 11.2 oz), SpO2 99%.  Temperatures:  Current - Temp: 97.5  F (36.4  C); Max - Temp  Av.8  F (36.6  C)  Min: 97.2  F (36.2  C)  Max: 98.6  F (37  C)  Respiration range: Resp  Av.3  Min: 16  Max: 18  Pulse range: Pulse  Av.8  Min: 88  Max: 95  Blood pressure range: Systolic (24hrs), Av , Min:100 , Max:129   ; Diastolic (24hrs), Av, Min:55, Max:84    Pulse oximetry range: SpO2  Av.3 %  Min: 97 %  Max: 100 %  No intake or output data in the 24 hours ending 24 0825    GENERAL: No acute distress.  Remainder of exam deferred for sake of discussion              Past Medical History:   I have reviewed this patient's past medical history  Past Medical History:   Diagnosis Date    Allergic rhinitis due to pollen     Anemia     Arthritis     Arthropathy, unspecified, site unspecified     Follows with rheumatologist, Dr Romo    Cancer ([facility])     endometrial cancer    Esophageal reflux     Fibromyalgia     Gastro-oesophageal reflux disease     Hypertension     Mild persistent asthma 2005    Myalgia and myositis, unspecified     Fibromyalgia     Osteopenia     Pain medication agreement              Past Surgical History:   I have reviewed this patient's past surgical history  Past Surgical History:   Procedure Laterality Date    ARTHROPLASTY REVISION KNEE  2013    Procedure: ARTHROPLASTY REVISION KNEE;  REVISION LEFT TOTAL KNEE (Biomet);  Surgeon: Efrain Lopez MD;  Location: [facility] OR    C  DELIVERY ONLY      C TOTAL KNEE ARTHROPLASTY      Rt. knee    C TOTAL KNEE ARTHROPLASTY      Lt. Knee    [facility] COLONOSCOPY THRU " "STOMA, DIAGNOSTIC  2005    Normal. Had \"twisting\" of colon requiring medical attention after procedure, no surgery.     HYSTERECTOMY      INSERT PORT VASCULAR ACCESS N/A 10/2/2015    Procedure: INSERT PORT VASCULAR ACCESS;  Surgeon: Christopher Gamez MD;  Location:  OR    [facility] NONSPECIFIC PROCEDURE  2005    Left lower leg varicose vein surgery               Social History:   I have reviewed this patient's social history  Social History     Tobacco Use    Smoking status: Never    Smokeless tobacco: Not on file   Substance Use Topics    Alcohol use: No             Family History:   I have reviewed this patient's family history  Family History   Problem Relation Age of Onset    Hypertension Father          age 91.     Arthritis Father     Eye Disorder Father         cataracts    Psychotic Disorder Mother         Alzheimers,  age 83    Cancer Brother         cancer (mesothelioma),  age 64             Allergies:     Allergies   Allergen Reactions    Morphine And Codeine Nausea and Vomiting             Medications:   I have reviewed this patient's current medications  Medications Prior to Admission   Medication Sig Dispense Refill Last Dose    ALBUTEROL 90 MCG/ACT IN AERS Inhale 1-2 puffs into the lungs every 6 hours as needed for shortness of breath.    at PRN    aspirin 81 MG EC tablet Take 81 mg by mouth daily.   2024 at AM    Biotin (BIOTIN FORTE) 5 MG TABS Take 1 tablet by mouth daily.   2024 at AM    bumetanide (BUMEX) 1 MG tablet Take 1 mg by mouth 2 times daily as needed. DURING CHEMO    at PRN    clobetasol propionate (TEMOVATE) 0.05 % external cream Apply topically daily as needed (SKIN IRRITATION ON ARMS).    at PRN    fluticasone (FLONASE) 50 MCG/ACT nasal spray Spray 2 sprays into both nostrils daily as needed for rhinitis or allergies.    at PRN    fluticasone-salmeterol (ADVAIR) 250-50 MCG/ACT inhaler Inhale 1-2 puffs into the lungs daily.   2024 at AM    " ipratropium (ATROVENT) 0.03 % nasal spray Spray 2 sprays into both nostrils every 12 hours.   9/5/2024 at AM    lisinopril (ZESTRIL) 10 MG tablet Take 10 mg by mouth daily.   9/5/2024 at AM    meloxicam (MOBIC) 15 MG tablet Take 15 mg by mouth daily.   9/5/2024 at AM    methylphenidate (RITALIN) 10 MG tablet Take 10 mg by mouth 2 times daily as needed. DURING CHEMO ONLY    at PRN    omeprazole (PRILOSEC) 40 MG DR capsule Take 40 mg by mouth every morning    9/5/2024 at AM    ondansetron (ZOFRAN ODT) 8 MG ODT tab Take 8 mg by mouth every 8 hours as needed for nausea.    at PRN    oxyCODONE (ROXICODONE) 5 MG tablet Take 5 mg by mouth every 8 hours as needed for severe pain. DURING CHEMO ONLY    at PRN    prochlorperazine (COMPAZINE) 10 MG tablet Take 10 mg by mouth every 6 hours as needed for nausea or vomiting.    at PRN    traMADol (ULTRAM) 50 MG tablet Take 100 mg by mouth every 8 hours.   9/5/2024 at AM     Current Facility-Administered Medications   Medication Dose Route Frequency Provider Last Rate Last Admin    albuterol (PROVENTIL HFA/VENTOLIN HFA) inhaler  2 puff Inhalation Q6H PRN Rocky Uribe MD        fluticasone-vilanterol (BREO ELLIPTA) 100-25 MCG/ACT inhaler 1 puff  1 puff Inhalation Daily Rocky Uirbe MD   1 puff at 09/06/24 0810    heparin lock flush 10 unit/mL injection 5-10 mL  5-10 mL Intracatheter Q1H PRN Rocky Uribe MD        heparin lock flush 10 unit/mL injection 5-10 mL  5-10 mL Intracatheter Q24H Rocky Uribe MD        heparin lock flush 100 unit/mL injection 5-10 mL  5-10 mL Intracatheter Q28 Days Rocky Uribe MD        hydrALAZINE (APRESOLINE) tablet 10 mg  10 mg Oral Q4H PRN Rocky Uribe MD        Or    hydrALAZINE (APRESOLINE) injection 10 mg  10 mg Intravenous Q4H PRN Rocky Uribe MD        HYDROmorphone (DILAUDID) injection 0.2 mg  0.2 mg Intravenous Q2H PRN Rocky Uribe MD        HYDROmorphone (DILAUDID)  injection 0.4 mg  0.4 mg Intravenous Q2H PRN Rocky Uribe MD        lactated ringers infusion   Intravenous Continuous Rocky Uribe  mL/hr at 09/06/24 0344 New Bag at 09/06/24 0344    naloxone (NARCAN) injection 0.2 mg  0.2 mg Intravenous Q2 Min PRN Rocky Uribe MD        Or    naloxone (NARCAN) injection 0.4 mg  0.4 mg Intravenous Q2 Min Rocky Stevens MD        Or    naloxone (NARCAN) injection 0.2 mg  0.2 mg Intramuscular Q2 Min PRN Rocky Uribe MD        Or    naloxone (NARCAN) injection 0.4 mg  0.4 mg Intramuscular Q2 Min Rocky Stevens MD        ondansetron (ZOFRAN ODT) ODT tab 4 mg  4 mg Oral Q6H PRN Rocky Uribe MD   4 mg at 09/06/24 0617    Or    ondansetron (ZOFRAN) injection 4 mg  4 mg Intravenous Q6H PRRocky Goldman MD        oxyCODONE (ROXICODONE) tablet 5 mg  5 mg Oral Q4H PRN Rocky Uribe MD   5 mg at 09/06/24 0350    oxyCODONE IR (ROXICODONE) half-tab 2.5 mg  2.5 mg Oral Q4H PRRocky Goldman MD        pantoprazole (PROTONIX) EC tablet 40 mg  40 mg Oral BID AC Rocky Uribe MD   40 mg at 09/06/24 0810    polyethylene glycol (MIRALAX) Packet 17 g  17 g Oral BID PRRocky Goldman MD        prochlorperazine (COMPAZINE) injection 5 mg  5 mg Intravenous Q6H Rocky Stevens MD        Or    prochlorperazine (COMPAZINE) tablet 5 mg  5 mg Oral Q6H PRRocky Goldman MD        Or    prochlorperazine (COMPAZINE) suppository 12.5 mg  12.5 mg Rectal Q12H PRRocky Goldman MD        senna-docusate (SENOKOT-S/PERICOLACE) 8.6-50 MG per tablet 1 tablet  1 tablet Oral BID PRRocky Goldman MD   1 tablet at 09/06/24 0821    Or    senna-docusate (SENOKOT-S/PERICOLACE) 8.6-50 MG per tablet 2 tablet  2 tablet Oral BID Rocky Stevens MD        sodium chloride (PF) 0.9% PF flush 10-20 mL  10-20 mL Intracatheter q1 min Rocky Stevens MD         sodium chloride (PF) 0.9% PF flush 10-20 mL  10-20 mL Intracatheter Q1H PRN Rocky Uribe MD        sodium chloride (PF) 0.9% PF flush 10-20 mL  10-20 mL Intracatheter Q28 Days Rocky Uirbe MD                 Review of Systems:     The 10 point Review of Systems is negative other than noted in the HPI.            Data:   Data   Results for orders placed or performed during the hospital encounter of 09/05/24 (from the past 24 hour(s))   CBC with platelets differential    Narrative    The following orders were created for panel order CBC with platelets differential.  Procedure                               Abnormality         Status                     ---------                               -----------         ------                     CBC with platelets and d...[[id number]]  Abnormal            Final result                 Please view results for these tests on the individual orders.   Comprehensive metabolic panel   Result Value Ref Range    Sodium 134 (L) 135 - 145 mmol/L    Potassium 4.4 3.4 - 5.3 mmol/L    Carbon Dioxide (CO2) 25 22 - 29 mmol/L    Anion Gap 18 (H) 7 - 15 mmol/L    Urea Nitrogen 49.5 (H) 8.0 - 23.0 mg/dL    Creatinine 2.10 (H) 0.51 - 0.95 mg/dL    GFR Estimate 24 (L) >60 mL/min/1.73m2    Calcium 8.7 (L) 8.8 - 10.4 mg/dL    Chloride 91 (L) 98 - 107 mmol/L    Glucose 96 70 - 99 mg/dL    Alkaline Phosphatase 1,633 (H) 40 - 150 U/L    AST 70 (H) 0 - 45 U/L    ALT 18 0 - 50 U/L    Protein Total 6.8 6.4 - 8.3 g/dL    Albumin 3.4 (L) 3.5 - 5.2 g/dL    Bilirubin Total 1.6 (H) <=1.2 mg/dL   CBC with platelets and differential   Result Value Ref Range    WBC Count 9.1 4.0 - 11.0 10e3/uL    RBC Count 3.59 (L) 3.80 - 5.20 10e6/uL    Hemoglobin 10.3 (L) 11.7 - 15.7 g/dL    Hematocrit 31.4 (L) 35.0 - 47.0 %    MCV 88 78 - 100 fL    MCH 28.7 26.5 - 33.0 pg    MCHC 32.8 31.5 - 36.5 g/dL    RDW 14.7 10.0 - 15.0 %    Platelet Count 577 (H) 150 - 450 10e3/uL    % Neutrophils 74 %    %  Lymphocytes 14 %    % Monocytes 10 %    % Eosinophils 0 %    % Basophils 1 %    % Immature Granulocytes 1 %    NRBCs per 100 WBC 0 <1 /100    Absolute Neutrophils 6.7 1.6 - 8.3 10e3/uL    Absolute Lymphocytes 1.3 0.8 - 5.3 10e3/uL    Absolute Monocytes 0.9 0.0 - 1.3 10e3/uL    Absolute Eosinophils 0.0 0.0 - 0.7 10e3/uL    Absolute Basophils 0.1 0.0 - 0.2 10e3/uL    Absolute Immature Granulocytes 0.1 <=0.4 10e3/uL    Absolute NRBCs 0.0 10e3/uL   UA with Microscopic reflex to Culture    Specimen: Urine, Midstream   Result Value Ref Range    Color Urine Yellow Colorless, Straw, Light Yellow, Yellow    Appearance Urine Clear Clear    Glucose Urine Negative Negative mg/dL    Bilirubin Urine Negative Negative    Ketones Urine Negative Negative mg/dL    Specific Gravity Urine 1.015 1.003 - 1.035    Blood Urine Negative Negative    pH Urine 5.5 5.0 - 7.0    Protein Albumin Urine Negative Negative mg/dL    Urobilinogen Urine 2.0 Normal, 2.0 mg/dL    Nitrite Urine Negative Negative    Leukocyte Esterase Urine Small (A) Negative    RBC Urine <1 <=2 /HPF    WBC Urine 6 (H) <=5 /HPF    Squamous Epithelials Urine <1 <=1 /HPF    Hyaline Casts Urine 19 (H) <=2 /LPF    Narrative    Urine Culture not indicated   Comprehensive metabolic panel   Result Value Ref Range    Sodium 133 (L) 135 - 145 mmol/L    Potassium 4.4 3.4 - 5.3 mmol/L    Carbon Dioxide (CO2) 25 22 - 29 mmol/L    Anion Gap 13 7 - 15 mmol/L    Urea Nitrogen 42.4 (H) 8.0 - 23.0 mg/dL    Creatinine 1.68 (H) 0.51 - 0.95 mg/dL    GFR Estimate 31 (L) >60 mL/min/1.73m2    Calcium 8.2 (L) 8.8 - 10.4 mg/dL    Chloride 95 (L) 98 - 107 mmol/L    Glucose 89 70 - 99 mg/dL    Alkaline Phosphatase 1,440 (H) 40 - 150 U/L    AST 59 (H) 0 - 45 U/L    ALT 17 0 - 50 U/L    Protein Total 6.0 (L) 6.4 - 8.3 g/dL    Albumin 3.0 (L) 3.5 - 5.2 g/dL    Bilirubin Total 1.4 (H) <=1.2 mg/dL   CBC with platelets   Result Value Ref Range    WBC Count 8.7 4.0 - 11.0 10e3/uL    RBC Count 3.31 (L) 3.80  - 5.20 10e6/uL    Hemoglobin 9.2 (L) 11.7 - 15.7 g/dL    Hematocrit 28.7 (L) 35.0 - 47.0 %    MCV 87 78 - 100 fL    MCH 27.8 26.5 - 33.0 pg    MCHC 32.1 31.5 - 36.5 g/dL    RDW 14.9 10.0 - 15.0 %    Platelet Count 515 (H) 150 - 450 10e3/uL

## 2024-09-06 NOTE — PROGRESS NOTES
[facility]  Hospitalist Progress Note  Chris Omer MD  09/06/2024    Assessment & Plan   Jany Park is a 79 year old female with past medical history significant for endometrial cancer who presents with abnormal outpatient labs and nausea/vomiting. Admitted on 9/5/2024.      Acute kidney injury with ATN  Contrast induced nephropathy  *Creatinine 2.10, BUN 49.5.   *UA significant for 19 hyaline casts   *Suspect pre-renal from n/v, poor intake (see below). Also had CT on 9/3 and by her report received contrast, possible component of KIRA.   - Continue IVF, change to NS at 75 ml x 24 hours  - Cr improved 2.10 > 1.68   - Hold lisinopril  - strict I/O, please record urine output  - repeat BMP 9/7     Nausea and vomiting  Poor intake  *Reports ~3 weeks of nausea and vomiting, intake of ~25% of baseline.   *Last chemotherapy in 5/2024   *Suspect due to metastatic involvement of liver (by oncology report, CT results not available, but abnormal LFTs and liver palpable and tender on exam)  - Obtain outside CT scan (ordered)  - [facility] consulted as below  - Nutrition consulted  - Ondansetron IV/PO, prochlorperazine IV/PO PRN  She feels better and able to eat lunch but still has some background of nausea     Endometrial cancer, metastatic  Abnormal LFTs  *Alk phos 1633, AST 70, Tbili 1.6  *Followed by Dr. Dreyer of [facility]  *Recent concern for metastatic disease to liver as above. Underwent BM biopsy 9/4/24 (results not available).  - [facility] consulted  She is suppose to undergo chemotherapy on 9/9/24     Chronic normocytic anemia  Hgb 10.3 g/dl. Most recently 11.2 g/dl 3/2024. Likely secondary to underlying malignancy.   - normal WBC  - Hgb 10.3 > 9.2 with hydration     Hypertension  - Hold prior to admission lisinopril in setting of THELMA  BP is dawit despite being off lisinopril     Chronic lymphedema  - Hold prior to admission bumetanide in setting of THELMA     GERD  - Continue prior to  "admission PPI     Asthma  No sign of acute exacerbation.  - Continue prior to admission inhaler regimen (or formulary equivalent)        Diet: Regular diet  DVT Prophylaxis: Pneumatic Compression Devices  Ochoa Catheter: Not present  Code Status: Full code         Disposition Plan  [place] to observation status. Anticipate discharge within 24 hours if clinically improved.     Disposition:     Interval History   -- chart reviewed  -- nausea improved  -- now urinating  -- able to eat lunch    -Data reviewed today: I reviewed all new labs and imaging over the last 24 hours. I personally reviewed no images or EKG's today.    Physical Exam    , Blood pressure 129/75, pulse 89, temperature 97.5  F (36.4  C), temperature source Oral, resp. rate 16, height 1.664 m (5' 5.5\"), weight 73.8 kg (162 lb 11.2 oz), SpO2 99%.  Vitals:    09/05/24 1637 09/05/24 2301 09/06/24 0004   Weight: 72.6 kg (160 lb) 75.1 kg (165 lb 9.1 oz) 73.8 kg (162 lb 11.2 oz)     Vital Signs with Ranges  Temp:  [97.2  F (36.2  C)-98.6  F (37  C)] 97.5  F (36.4  C)  Pulse:  [88-95] 89  Resp:  [16-18] 16  BP: (100-129)/(55-84) 129/75  SpO2:  [97 %-100 %] 99 %  I/O's Last 24 hours  No intake/output data recorded.    Constitutional: Awake, alert, cooperative, no apparent distress  Respiratory: Clear to auscultation bilaterally, no crackles or wheezing  Cardiovascular: Regular rate and rhythm, normal S1 and S2, and no murmur noted  GI: Normal bowel sounds, soft, non-distended, non-tender  Skin/Integumen: No rashes, no cyanosis, no edema  Other:      Medications   All medications were reviewed.  Current Facility-Administered Medications   Medication Dose Route Frequency Provider Last Rate Last Admin    sodium chloride 0.9 % infusion   Intravenous Continuous Chris Omer MD         Current Facility-Administered Medications   Medication Dose Route Frequency Provider Last Rate Last Admin    fluticasone-vilanterol (BREO ELLIPTA) 100-25 MCG/ACT inhaler 1 " puff  1 puff Inhalation Daily Rocky Uribe MD   1 puff at 09/06/24 0810    heparin lock flush 10 unit/mL injection 5-10 mL  5-10 mL Intracatheter Q24H Rocky Uribe MD        heparin lock flush 100 unit/mL injection 5-10 mL  5-10 mL Intracatheter Q28 Days Rocky Uribe MD        pantoprazole (PROTONIX) EC tablet 40 mg  40 mg Oral BID AC Rocky Uribe MD   40 mg at 09/06/24 0810    sodium chloride (PF) 0.9% PF flush 10-20 mL  10-20 mL Intracatheter Q28 Days Rcoky Uribe MD            Data   Recent Labs   Lab 09/06/24  0609 09/05/24  1828   WBC 8.7 9.1   HGB 9.2* 10.3*   MCV 87 88   * 577*   * 134*   POTASSIUM 4.4 4.4   CHLORIDE 95* 91*   CO2 25 25   BUN 42.4* 49.5*   CR 1.68* 2.10*   ANIONGAP 13 18*   LOUISA 8.2* 8.7*   GLC 89 96   ALBUMIN 3.0* 3.4*   PROTTOTAL 6.0* 6.8   BILITOTAL 1.4* 1.6*   ALKPHOS 1,440* 1,633*   ALT 17 18   AST 59* 70*       No results found for this or any previous visit (from the past 24 hour(s)).    Chris Omer MD  Text Page  (7am to 6pm)

## 2024-09-06 NOTE — PHARMACY-ADMISSION MEDICATION HISTORY
"Pharmacy Intern Admission Medication History    Admission medication history is complete. The information provided in this note is only as accurate as the sources available at the time of the update.    Information Source(s): Patient, Prescription bottles, and CareEverywhere/SureScripts via in-person    Pertinent Information:   The patient only takes oxycodone, bumetanide, and methylphenidate when she receives chemotherapy. She has not had them since May and is due to start again this coming Monday.  She reports taking her her Advair every day in the morning and sometimes in the evening \"if she needs it\".  She reports prochlorperazine worked better for her than ondansetron.     Changes made to PTA medication list:  Added:   Aspirin  Oxycodone  Prochlorperazine  Ondansetron  Advair  Lisinopril  Methylphenidate  Clobetasol cream  Flonase  Ipratropium nasal spray  Bumetanide  Deleted:   Symbicort  Iron   Multivitamin  Furosemide  Changed:   Tramadol 1-2 tablets every hours PRN--> 2 tablet three times daily    Allergies reviewed with patient and updates made in EHR: yes    Medication History Completed By: Leanne Samson 9/5/2024 8:46 PM    PTA Med List   Medication Sig Last Dose    ALBUTEROL 90 MCG/ACT IN AERS Inhale 1-2 puffs into the lungs every 6 hours as needed for shortness of breath.  at PRN    aspirin 81 MG EC tablet Take 81 mg by mouth daily. 9/5/2024 at AM    Biotin (BIOTIN FORTE) 5 MG TABS Take 1 tablet by mouth daily. 9/5/2024 at AM    bumetanide (BUMEX) 1 MG tablet Take 1 mg by mouth 2 times daily as needed. DURING CHEMO  at PRN    clobetasol propionate (TEMOVATE) 0.05 % external cream Apply topically daily as needed (SKIN IRRITATION ON ARMS).  at PRN    fluticasone (FLONASE) 50 MCG/ACT nasal spray Spray 2 sprays into both nostrils daily as needed for rhinitis or allergies.  at PRN    fluticasone-salmeterol (ADVAIR) 250-50 MCG/ACT inhaler Inhale 1-2 puffs into the lungs daily. 9/5/2024 at AM    ipratropium " (ATROVENT) 0.03 % nasal spray Spray 2 sprays into both nostrils every 12 hours. 9/5/2024 at AM    lisinopril (ZESTRIL) 10 MG tablet Take 10 mg by mouth daily. 9/5/2024 at AM    meloxicam (MOBIC) 15 MG tablet Take 15 mg by mouth daily. 9/5/2024 at AM    methylphenidate (RITALIN) 10 MG tablet Take 10 mg by mouth 2 times daily as needed. DURING CHEMO ONLY  at PRN    omeprazole (PRILOSEC) 40 MG DR capsule Take 40 mg by mouth every morning  9/5/2024 at AM    ondansetron (ZOFRAN ODT) 8 MG ODT tab Take 8 mg by mouth every 8 hours as needed for nausea.  at PRN    oxyCODONE (ROXICODONE) 5 MG tablet Take 5 mg by mouth every 8 hours as needed for severe pain. DURING CHEMO ONLY  at PRN    prochlorperazine (COMPAZINE) 10 MG tablet Take 10 mg by mouth every 6 hours as needed for nausea or vomiting.  at PRN    traMADol (ULTRAM) 50 MG tablet Take 100 mg by mouth every 8 hours. 9/5/2024 at AM

## 2024-09-06 NOTE — ED NOTES
[facility]  ED Nurse Handoff Report    ED Chief complaint: Abnormal Labs      ED Diagnosis:   Final diagnoses:   None       Code Status: Full Code    Allergies:   Allergies   Allergen Reactions    Morphine And Codeine Nausea and Vomiting       Patient Story: Pt has endometrial CA and had labs and bine marrow bx yesterday. Pt was told to go to ED as her liver and kidney labs were bad   Focused Assessment:      Labs Ordered and Resulted from Time of ED Arrival to Time of ED Departure   COMPREHENSIVE METABOLIC PANEL - Abnormal       Result Value    Sodium 134 (*)     Potassium 4.4      Carbon Dioxide (CO2) 25      Anion Gap 18 (*)     Urea Nitrogen 49.5 (*)     Creatinine 2.10 (*)     GFR Estimate 24 (*)     Calcium 8.7 (*)     Chloride 91 (*)     Glucose 96      Alkaline Phosphatase 1,633 (*)     AST 70 (*)     ALT 18      Protein Total 6.8      Albumin 3.4 (*)     Bilirubin Total 1.6 (*)    ROUTINE UA WITH MICROSCOPIC REFLEX TO CULTURE - Abnormal    Color Urine Yellow      Appearance Urine Clear      Glucose Urine Negative      Bilirubin Urine Negative      Ketones Urine Negative      Specific Gravity Urine 1.015      Blood Urine Negative      pH Urine 5.5      Protein Albumin Urine Negative      Urobilinogen Urine 2.0      Nitrite Urine Negative      Leukocyte Esterase Urine Small (*)     RBC Urine <1      WBC Urine 6 (*)     Squamous Epithelials Urine <1      Hyaline Casts Urine 19 (*)    CBC WITH PLATELETS AND DIFFERENTIAL - Abnormal    WBC Count 9.1      RBC Count 3.59 (*)     Hemoglobin 10.3 (*)     Hematocrit 31.4 (*)     MCV 88      MCH 28.7      MCHC 32.8      RDW 14.7      Platelet Count 577 (*)     % Neutrophils 74      % Lymphocytes 14      % Monocytes 10      % Eosinophils 0      % Basophils 1      % Immature Granulocytes 1      NRBCs per 100 WBC 0      Absolute Neutrophils 6.7      Absolute Lymphocytes 1.3      Absolute Monocytes 0.9      Absolute Eosinophils 0.0      Absolute Basophils  0.1      Absolute Immature Granulocytes 0.1      Absolute NRBCs 0.0       No orders to display         Treatments and/or interventions provided: Labs   Patient's response to treatments and/or interventions: No adverse reactions at this time     To be done/followed up on inpatient unit:  Per admitting     Does this patient have any cognitive concerns?:  Per admitting     Activity level - Baseline/Home:  Independent  Activity Level - Current:   Stand with Assist    Patient's Preferred language: English   Needed?: No    Isolation: None  Infection: Not Applicable  Patient tested for COVID 19 prior to admission: NO  Bariatric?: No    Vital Signs:   Vitals:    09/05/24 1637 09/05/24 1638   BP: 100/55    Pulse: 95    Resp:  18   Temp: 97.2  F (36.2  C)    TempSrc: Temporal    SpO2: 100%    Weight: 72.6 kg (160 lb)        Cardiac Rhythm:     Was the PSS-3 completed:   Yes  What interventions are required if any?               Family Comments: n/a  OBS brochure/video discussed/provided to patient/family: N/A                  For the majority of the shift this patient's behavior was Green.   Behavioral interventions performed were Care and rounding.    ED NURSE PHONE NUMBER: *[phone]

## 2024-09-06 NOTE — PLAN OF CARE
Orientation: A&Ox4; calm, cooperative, pleasant  Aggression Stop Light: green  Activity: SBA  Diet/BS Checks: Regular diet ordered; only had sips of water this shift  Tele:  N/A  IV Access/Drains: PIV SL; Port with good blood return, infusing LR @ 125 mL/hr  Pain Management: Complains of pain to BMB site; oxycodone x 2  Abnormal VS/Results: VSS, RA.  Alk phos 1633, AST 70, Tbili 1.6. Creatinine 2.10  Bowel/Bladder: continent of bowel/bladder; no BM this shift  Skin/Wounds: scattered bruising, isiah BLE, +3 edema  Consults: Hem/onc  D/C Disposition: pending progress/plan  Other Info:   - Zofran given for complaints of nausea

## 2024-09-06 NOTE — PROVIDER NOTIFICATION
MD Notification    Notified Person: MD    Notified Person Name: Luz Marina Roberts    Notification Date/Time: 09/06/2024 1839    Notification Interaction: V[place]aterin    Purpose of Notification:  pt states that she is claustrophobic, can you please order an anti-anxiety to help with MRI procedure      Orders Received: Ativan ordered    Comments:

## 2024-09-06 NOTE — H&P
[facility]    History and Physical - Hospitalist Service       Date of Admission:  9/5/2024    Assessment & Plan   Jany Park is a 79 year old female with past medical history significant for endometrial cancer who presents with abnormal outpatient labs and nausea/vomiting. Admitted on 9/5/2024.     Acute kidney injury  *Creatinine 2.10, BUN 49.5.   *UA significant for 19 hyaline casts   *Suspect pre-renal from n/v, poor intake (see below). Also had CT on 9/3 and by her report received contrast, possible component of KIRA.   - Continue IVF  - BMP in AM   - Hold lisinopril  - Avoid nephrotoxins     Nausea and vomiting  Poor intake  *Reports ~3 weeks of nausea and vomiting, intake of ~25% of baseline.   *Last chemotherapy in 5/2024   *Suspect due to metastatic involvement of liver (by oncology report, CT results not available, but abnormal LFTs and liver palpable and tender on exam)  - Obtain outside CT scan (ordered)  - [facility] consulted as below  - Nutrition consulted  - Ondansetron IV/PO, prochlorperazine IV/PO PRN    Endometrial cancer, metastatic  Abnormal LFTs  *Alk phos 1633, AST 70, Tbili 1.6  *Followed by Dr. Dreyer of [facility]  *Recent concern for metastatic disease to liver as above. Underwent BM biopsy 9/4/24 (results not available).  - [facility] consulted  - Trend LFTs    Chronic normocytic anemia  Hgb 10.3 g/dl. Most recently 11.2 g/dl 3/2024. Likely secondary to underlying malignancy.   - Monitor CBC    Hypertension  - Hold prior to admission lisinopril in setting of THELMA    Chronic lymphedema  - Hold prior to admission bumetanide    GERD  - Continue prior to admission PPI    Asthma  No sign of acute exacerbation.  - Continue prior to admission inhaler regimen (or formulary equivalent)       Diet: Regular diet  DVT Prophylaxis: Pneumatic Compression Devices  Ochoa Catheter: Not present  Code Status: Full code     Disposition Plan   [place] to observation status. Anticipate  discharge within 24 hours if clinically improved.     Entered: Rocky Uribe MD 09/05/2024, 11:14 PM     The patient's care was discussed with the ED provider, patient         Clinically Significant Risk Factors Present on Admission              # Hypoalbuminemia: Lowest albumin = 3.4 g/dL at 9/5/2024  6:28 PM, will monitor as appropriate   # Drug Induced Platelet Defect: home medication list includes an antiplatelet medication   # Hypertension: Home medication list includes antihypertensive(s)      # Anemia: based on hgb <11           # Asthma: noted on problem list                   Rocky Uribe MD  [facility]    ______________________________________________________________________    Chief Complaint   Nausea and vomiting, abnormal labs    History of Present Illness   Jany Park is a 79 year old female who presents with the above chief complaint.    History is obtained from the patient, discussion with ED provider and review of medical record. The patient reports for the past 3 weeks or so she has had increased nausea with episodes of vomiting. She reports overall she is eating and drinking about 25% of normal.  She has associated abdominal discomfort most prominent in the right upper quadrant.  She reports her oncologist was recently concerned by her exam and she underwent a CT as an outpatient on 9/3 as well as a bone marrow biopsy on 9/4.  She had labs drawn as an outpatient as well, which were concerning for abnormal kidney function and liver function tests and so she was directed to be seen in the emergency department for further evaluation.  Reports she has chronic lymphedema for which she takes occasional bumetanide, her edema has recently been improved from baseline and she has not been taking bumetanide.  She denies any diarrhea.  Her last chemotherapy was in May 2024.    In the Emergency Department, the patient was seen by Dr. Willingham, with whom I discussed the  "patient's presenting symptoms and emergency department course.  Initial vital signs were a temperature of 97.2F, HR 95, /55, RR 18, SpO2 100% on room air. Pertinent work-up as noted in A&P. The patient received 1L normal saline and Hospitalists were contacted for admission to the hospital.     Past Medical History    I have reviewed this patient's medical history and updated it with pertinent information if needed.   Past Medical History:   Diagnosis Date    Allergic rhinitis due to pollen     Anemia     Arthritis     Arthropathy, unspecified, site unspecified     Follows with rheumatologist, Dr Romo    Cancer (H)     endometrial cancer    Esophageal reflux     Fibromyalgia     Gastro-oesophageal reflux disease     Hypertension     Mild persistent asthma 2005    Myalgia and myositis, unspecified     Fibromyalgia     Osteopenia     Pain medication agreement        Past Surgical History   I have reviewed this patient's surgical history and updated it with pertinent information if needed.  Past Surgical History:   Procedure Laterality Date    ARTHROPLASTY REVISION KNEE  2013    Procedure: ARTHROPLASTY REVISION KNEE;  REVISION LEFT TOTAL KNEE (Biomet);  Surgeon: Efrain Lopez MD;  Location:  OR      DELIVERY ONLY      C TOTAL KNEE ARTHROPLASTY      Rt. knee    C TOTAL KNEE ARTHROPLASTY      Lt. Knee    HC COLONOSCOPY THRU STOMA, DIAGNOSTIC  2005    Normal. Had \"twisting\" of colon requiring medical attention after procedure, no surgery.     HYSTERECTOMY      INSERT PORT VASCULAR ACCESS N/A 10/2/2015    Procedure: INSERT PORT VASCULAR ACCESS;  Surgeon: Christopher Gamez MD;  Location: [facility] NONSPECIFIC PROCEDURE  2005    Left lower leg varicose vein surgery         Social History   I have reviewed this patient's social history and updated it with pertinent information if needed.  Social History     Tobacco Use    Smoking status: Never   Substance Use " Topics    Alcohol use: No    Drug use: No          Family History   I have reviewed this patient's family history and updated it with pertinent information if needed.   Family History   Problem Relation Age of Onset    Hypertension Father          age 91.     Arthritis Father     Eye Disorder Father         cataracts    Psychotic Disorder Mother         Alzheimers,  age 83    Cancer Brother         cancer (mesothelioma),  age 64        Prior to Admission Medications  -   Prior to Admission Medications   Prescriptions Last Dose Informant Patient Reported? Taking?   ALBUTEROL 90 MCG/ACT IN AERS  at PRN Self No Yes   Sig: Inhale 1-2 puffs into the lungs every 6 hours as needed for shortness of breath.   Biotin (BIOTIN FORTE) 5 MG TABS 2024 at AM Self Yes Yes   Sig: Take 1 tablet by mouth daily.   aspirin 81 MG EC tablet 2024 at AM Self Yes Yes   Sig: Take 81 mg by mouth daily.   bumetanide (BUMEX) 1 MG tablet  at PRN Self Yes Yes   Sig: Take 1 mg by mouth 2 times daily as needed. DURING CHEMO   clobetasol propionate (TEMOVATE) 0.05 % external cream  at PRN Self Yes Yes   Sig: Apply topically daily as needed (SKIN IRRITATION ON ARMS).   fluticasone (FLONASE) 50 MCG/ACT nasal spray  at PRN Self Yes Yes   Sig: Spray 2 sprays into both nostrils daily as needed for rhinitis or allergies.   fluticasone-salmeterol (ADVAIR) 250-50 MCG/ACT inhaler 2024 at AM Self Yes Yes   Sig: Inhale 1-2 puffs into the lungs daily.   ipratropium (ATROVENT) 0.03 % nasal spray 2024 at AM Self Yes Yes   Sig: Spray 2 sprays into both nostrils every 12 hours.   lisinopril (ZESTRIL) 10 MG tablet 2024 at AM Self Yes Yes   Sig: Take 10 mg by mouth daily.   meloxicam (MOBIC) 15 MG tablet 2024 at AM Self Yes Yes   Sig: Take 15 mg by mouth daily.   methylphenidate (RITALIN) 10 MG tablet  at PRN Self Yes Yes   Sig: Take 10 mg by mouth 2 times daily as needed. DURING CHEMO ONLY   omeprazole (PRILOSEC) 40 MG   capsule 9/5/2024 at AM Self Yes Yes   Sig: Take 40 mg by mouth every morning    ondansetron (ZOFRAN ODT) 8 MG ODT tab  at PRN Self Yes Yes   Sig: Take 8 mg by mouth every 8 hours as needed for nausea.   oxyCODONE (ROXICODONE) 5 MG tablet  at PRN Self Yes Yes   Sig: Take 5 mg by mouth every 8 hours as needed for severe pain. DURING CHEMO ONLY   prochlorperazine (COMPAZINE) 10 MG tablet  at PRN Self Yes Yes   Sig: Take 10 mg by mouth every 6 hours as needed for nausea or vomiting.   traMADol (ULTRAM) 50 MG tablet 9/5/2024 at AM Self Yes Yes   Sig: Take 100 mg by mouth every 8 hours.      Facility-Administered Medications: None     Allergies   Allergies   Allergen Reactions    Morphine And Codeine Nausea and Vomiting       Physical Exam   Vital Signs: Temp: 98.6  F (37  C) Temp src: Oral BP: 118/84 Pulse: 88   Resp: 18 SpO2: 97 % O2 Device: None (Room air)    Weight: 165 lbs 9.05 oz    Constitutional: Well-appearing, NAD  Respiratory: Clear to auscultation bilaterally, good air movement, normal effort   Cardiovascular: RRR, no m/r/g.    GI: Soft, palpable hepatomegaly below the costal margin with tenderness to palpation. No rebound tenderness or guarding.    Skin: Warm, dry   Neurologic: Alert. Responding to questions appropriately. Following commands.    Psychiatric: Normal affect, appropriate      Data   Data reviewed today: I reviewed all medications, new labs and imaging results over the last 24 hours. I personally reviewed no images or EKG's today.    Recent Labs   Lab 09/05/24  1828   WBC 9.1   HGB 10.3*   MCV 88   *   *   POTASSIUM 4.4   CHLORIDE 91*   CO2 25   BUN 49.5*   CR 2.10*   ANIONGAP 18*   LOUISA 8.7*   GLC 96   ALBUMIN 3.4*   PROTTOTAL 6.8   BILITOTAL 1.6*   ALKPHOS 1,633*   ALT 18   AST 70*       No results found for this or any previous visit (from the past 24 hour(s)).

## 2024-09-06 NOTE — PROGRESS NOTES
Patient arrived on the floor ten minutes before shift change, transferred patient to bed w/ help of ED tech. Patient is Aox4, calm, cooperative and pleasant. VSS RA, reporting pain in R Hip Bone Marrow biopsy site. Dual Skin check done with Yolanda Mcknight RN who is the oncoming RN

## 2024-09-07 NOTE — PROGRESS NOTES
[facility] Follow Up Note:    Assessment and Plan:  Imelda is a 79 year old admitted with THELMA in the setting of poor intake in the past few weeks. She has been on a chemotherapy holiday since 5/2024.     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  --9/2015 doxil chemotherapy  --10/2016 topotecan chemotherapy  --3/2017 resumed paclitaxel with carboplatin  --4/2018 began megace alternating every 3 weeks with tamoxifen  --switched to continuous megace 8/31/18  --Resumed paclitaxel with carboplatin 4/25/19  --Began carboplatin desensitization protocol 7/1/19  --responded but opted for a chemotherapy hiatus 9/2019  --6/1/20 began pembrolizumab with lenvatinib with the pembrolizumab administered every 3 weeks and the lenvatinib daily. Began lenvatinib at 10 mg daily, and with cycle 2 increased to full dose of 20 mg daily  --Both pembro and lenvatinib were held from late June 2020 to 9/14/20  due to diarrhea   --9/14/20  resumed Lenvatinib (at dose reduction of 10mg) and pembrolizumab.   --Continued pembrolizumab and current dose of lenvatinib at 10 mg daily   -- slowly rising and CT C/A/P 9/3/21 with mild enlargement of hepatic metastases (for example 6.5 x 5.6 vs 6.0 x 5.2, 8.3 x 5.3 vs 7.9 x 5.1 cm), mildly increased adenopathy up to 1.3 from 1.1 cm, other wise with stable disease  --9/8/21 discontinue pembrolizumab and Lenvatinib  --10/4/21 began paclitaxel and carboplatin, cycled every 3 weeks with dose adjustments as she last received in 2019  - 4/11/22 establish care with me and with stable disease on CT scans 4/8/22 we decided to hold her chemotherapy over the summer.  - Restarted treatment 10/18/22, bevacizumab added 11/29/22.   - 2/9/23 Taxol/carbo doses reduced by 15% for escalating fatigue.   - CT CAP 5/2/23 stable disease  -> treatment holiday  - slight progression radiographically and increase in tumor markers -> restart carboplatin paclitaxel and bevacizumab 11/16/23   - CT CAP 2/27/24 with mild improvement in all disease, tumor marker slight decrease   - 7/29/24 increase in size of mets as well as increased tumor marker while off chemotherapy for the summer   - CT 9/4/2024 showed disease progression in the liver and lymph nodes  - She was due to resume treatment on 9/10/2024 with paclitaxel and bevacizumab    PLAN:  - was scheduled for chemo as OP next week. Patient and daughter don't feel she can handle chemo and is too weak. They will d/w Dr. Dreyer further on this. They are aware of the palliative nature of treatment.     THELMA  - Baseline 0.8-0.9 in clinic, but up to 2.8 on 9/4/2024  - Did have CT with contrast on 9/4/2024, no hydronephrosis on that day  - Likely due to nausea and poor intake  - IV hydration has been initiated with improvement in renal function     Anemia / fatigue   - Due to malignancy and its treatment  - Started on EPO 11/29/22, Hg increased to 11.0 and she feels much less fatigued   - 11/14/23 -> IV iron   - Fatigue negatively affecting her QOL, slight dose reduction without noticeable improvement   - EPO for Hg <10  - Hg has not improved as much as we would expect on chemotherapy holiday    - Given her prior chemotherapy history we must look for evidence of MDS, bone marrow biopsy on 9/3 did not show MDS    PLAN:  - PRBC if Hb<7     LFT abnormalities  - Has known liver metastases which likely impact liver function    Nausea   Is controlled well on ani emetics    Weakness:  Being evaluated for weakness/ disposition        Subjective:    Feeling weak, tired, nausea is beter.      Labs:  All labs reviewed    CBC  Recent Labs   Lab 09/06/24  0609 09/05/24  1828   WBC 8.7 9.1   HGB 9.2* 10.3*   MCV 87 88   * 577*       CMP  Recent Labs   Lab 09/07/24  0556 09/06/24  0609 09/05/24  1828   * 133* 134*  "  POTASSIUM 4.1 4.4 4.4   CHLORIDE 97* 95* 91*   CO2 25 25 25   ANIONGAP 11 13 18*   * 89 96   BUN 26.4* 42.4* 49.5*   CR 1.18* 1.68* 2.10*   GFRESTIMATED 47* 31* 24*   LOUISA 7.8* 8.2* 8.7*   PROTTOTAL  --  6.0* 6.8   ALBUMIN  --  3.0* 3.4*   BILITOTAL  --  1.4* 1.6*   ALKPHOS  --  1,440* 1,633*   AST  --  59* 70*   ALT  --  17 18       INRNo lab results found in last 7 days.    Blood CultureNo results for input(s): \"CULT\" in the last 168 hours.      Riya Connor MD  [facility]  9/7/2024 4:18 PM     52 minutes, 25 minutes in room - rest in chart reiew etc.  "

## 2024-09-07 NOTE — PROVIDER NOTIFICATION
MD Notification    Notified Person: MD    Notified Person Name:  Richgilles    Notification Date/Time: 171321 @ 0100    Notification Interaction: [employer] messaging    Purpose of Notification: Patient takes Tramadol 100mg three times per day PTA.  She has not had it since admission and is experiencing withdrawal symptoms.  She was just ordered 50 mg q6 PRN but that is not helping.      Orders Received: Scheduled Tramadol 100mg q8hrs    Comments:

## 2024-09-07 NOTE — PLAN OF CARE
[id number]    Orientation: A&Ox4  Aggression Stop Light: green  Activity: SBA with gait belt and walker.  Diet/BS Checks: regular  Tele:  none  IV Access/Drains: L chest port infusing IVF with good blood return. L PIV SL.   Pain Management: reported moderate pain in R hip, managed with PRN tramadol x1 and scheduled tramadol.   Abnormal VS/Results: VSS on RA.   Bowel/Bladder: continent of B/B. No Bm this shift  Skin/Wounds: scattered bruising. BLE edema.   Consults: hem/onc  D/C Disposition: pending    Other Info:   A&Ox4. VSS on RA. reported moderate pain in R hip, managed with PRN tramadol x1 and scheduled tramadol per pt request, see provider notification notes. Brain MRI completed, see results. SBA with gait belt and walker. Continent B/B. No BM this shift. Regular diet. L chest port infusing IVF with good blood return. L PIV SL. Discharge pending.

## 2024-09-07 NOTE — PROGRESS NOTES
MD Notification    Notified Person: MD hospitalist     Notified Person Name: Dr. Raza     Notification Date/Time: 9/6/2024 at 2321    Notification Interaction: vocera web messaging     Purpose of Notification:  SBAR 833 k.H. Situation: 833 K.H. Pt on scheduled tramadol at home, it has not been scheduled here. pt is adamant and wondering if this can be ordered now.    Orders Received:  PRN tramadol ordered.     Comments:

## 2024-09-07 NOTE — PROGRESS NOTES
MD Notification    Notified Person: MD hospitalist     Notified Person Name: Dr. Raza     Notification Date/Time: 9/6/2024 at 2224    Notification Interaction:  vocera web messaging     Purpose of Notification:  SBAR 833 k.H. Situation: 833 K.H. Pt on scheduled tramadol at home, it has not been scheduled here. pt is adamant and wondering if this can be ordered now.    Orders Received:  PRN Tramadol ordered.     Comments:

## 2024-09-07 NOTE — PROGRESS NOTES
[facility]    Internal Medicine Hospitalist Progress Note  09/07/2024  I evaluated patient on the above date.    Jorge Holliday Jr., MD  [phone] (p)  Text Page  Vocera      [New actions/orders today (09/07/2024) are underlined in the assessment and plan. All lab results in the assessment and plan were reviewed.]  Assessment & Plan   Jany Park is a 79 year old female with past medical history significant for endometrial cancer; HTN; asthma; fibromyalgia with chronic pain; and GERD; who presented 9/5/2024 with abnormal outpatient labs and nausea/vomiting, found with THELMA.    On initial evaluation, pt was afebrile, VSS. Labs notable for CBC with WBC normal, hgb 10.3, platelets 577K; BMP with sodium 134, chloride 91, BUN 49.5, cr 2.1, calcium 9.7, AG 18; LFT's with ALP 1633, AST 70, tbili 1.6; UA with 6 WBC, small LE, 19 hyaline casts.       Acute kidney injury, suspect prerenal (from N/V, poor oral intake), contrast nephropathy +/- NSAID, with ATN.  * Initial presentation as above. Cr 2.1, BUN 49.5, UA significant for 19 hyaline casts. Noted that pt had a contrast CT 9/3 as well. Started on IVF's on admit. PTA lisinopril and meloxicam held on admit.  Recent Labs   Lab 09/07/24  0556 09/06/24  0609 09/05/24  1828   CR 1.18* 1.68* 2.10*   Estimated Creatinine Clearance: 39.9 mL/min (A) (based on SCr of 1.18 mg/dL (H)).  - Continue IVF's - NS at 75 ml/hr.  - Continue to monitor BMP - repeat in am.  - Avoid nephrotoxic medications.    Endometrial cancer, metastatic  Abnormal LFTs, suspect related to metastatic disease.  Nausea and vomiting, suspect multifactorial including related to above.  Poor intake related to above.  * Followed by Dr. Dreyer of [facility]. See Oncology note for oncologic history. Noted diagnosed and had initial surgery 2015 with subsequently chemotherapy. Noted subsequently development of liver metastases. Noted last chemotherapy in 5/2024. Noted that pt underwent CT  imaging and BM biopsy 9/4 (results not available on admit). Noted was suppose to undergo chemotherapy on 9/10/2024.  * Initial presentation as above. On admit, reported ~3 weeks of nausea and vomiting, intake of ~25% of baseline. LFT's elevated.  [facility] consulted on admit.  * On 9/6, per Oncology, noted that CT 9/4 showed disease progression in the liver and lymph nodes. Also noted that BM biopsy showed dysplasia, pending cytogenetics. Noted that her anemia may be due to an exhausted bone marrow from her years of chemotherapy. ?nausea related to IV iron transfusion. Brain MRI ordered that was negative for acute findings.  Recent Labs   Lab 09/07/24  0556 09/06/24  0609 09/05/24  1857 09/05/24  1828   ALBUMIN  --  3.0*  --  3.4*   BILITOTAL  --  1.4*  --  1.6*   ALT  --  17  --  18   AST  --  59*  --  70*   ALKPHOS  --  1,440*  --  1,633*   PROTEIN  --   --  Negative  --    CR 1.18* 1.68*  --  2.10*   - Continue to treat other issues as noted.   - Continue to monitor LFT's.  - Continue PRN ondansetron, PRN prochlorperazine.  - Follow-up with [facility] outpatient - she is suppose to undergo chemotherapy on 9/10/2024, but may need to be rescheduled.  - Appreciate help from [facility].    Hyponatremia, suspect nutritional/hypovolemia.  * Sodium 134 on admit.   Recent Labs   Lab 09/07/24  0556 09/06/24  0609 09/05/24  1828   * 133* 134*   - Continue IVF's.  - Continue to monitor BMP - repeat in am.    Chronic normocytic anemia.  * Hgb 10.3 on admit. Noted most recently 11.2 g/dl 3/2024.   * As above, per Oncology, noted that BM biopsy showed dysplasia, pending cytogenetics. Noted that her anemia may be due to an exhausted bone marrow from her years of chemotherapy.   Recent Labs   Lab 09/06/24  0609 09/05/24  1828   HGB 9.2* 10.3*   - Continue to monitor CBC - repeat in am.     Hypertension (benign essential).  Chronic lymphedema.  [PTA: lisinopril 10 mg daily; PRN bumetanide 1 mg BID.]  * Lisinopril held on admit.  * BP's  "normal off meds.  - Continue to hold lisinopril.  - Continue to hold PRN bumetanide.    Fibromyalgia with chronic pain.  * PTA on regimen including meloxicam, scheduled tramadol 100 mg TID and PRN oxycodone.  * PTA tramadol held on admit given THELMA.  * On 9/6, pt with withdrawal symptoms.   * On 9/7 am, scheduled tramadol restarted.  - Continue tramadol 100 gm TID.  - Continue PRN oxycodone and PRN IV hydromorphone; minimize opioids as able.  - Continue to hold meloxicam given THELMA.     GERD.  - Continue PPI.     Asthma.  - Continue fluticasone-vilanterol and PRN albuterol.       Clinically Significant Risk Factors Present on Admission              # Hypoalbuminemia: Lowest albumin = 3 g/dL at 9/6/2024  6:09 AM, will monitor as appropriate   # Drug Induced Platelet Defect: home medication list includes an antiplatelet medication   # Hypertension: Home medication list includes antihypertensive(s)    # Anemia: based on hgb <11       # Overweight: Estimated body mass index is 26.66 kg/m  as calculated from the following:    Height as of this encounter: 1.664 m (5' 5.5\").    Weight as of this encounter: 73.8 kg (162 lb 11.2 oz).       # Asthma: noted on problem list              Diet: Regular Diet Adult    Prophylaxis: PCD's and ambulation  Ochoa Catheter: Not present  Lines: PRESENT      Port A Cath Single 07/30/20 Left Chest wall-Site Assessment: WDL      Code Status: Full Code    Disposition Plan   Medically Ready for Discharge: Anticipated in 1-2 Days  Expected discharge to prior living arrangement pending above.    Entered: Jorge Holliday MD 09/07/2024, 1:49 PM     COMMUNICATION  - I discussed with patient's daughter 09/07/24          Interval History   Pt noted worsened pain overnight (from recent BM biopsy as well as chronic fibromyalgia.  A bit better today.  Still quite weak.  Decreased appetite but tolerating diet.    * Data reviewed today: I reviewed all new labs and imaging over the last 24 hours. I " "personally reviewed no images or ECG's today.    Physical Exam   Most recent vitals:   , Blood pressure 113/66, pulse 83, temperature 97.7  F (36.5  C), temperature source Oral, resp. rate 16, height 1.664 m (5' 5.5\"), weight 73.8 kg (162 lb 11.2 oz), SpO2 99%. O2 Device: None (Room air)    Vitals:    09/05/24 1637 09/05/24 2301 09/06/24 0004   Weight: 72.6 kg (160 lb) 75.1 kg (165 lb 9.1 oz) 73.8 kg (162 lb 11.2 oz)     Vital signs with ranges:  Temp:  [97.7  F (36.5  C)-99.1  F (37.3  C)] 97.7  F (36.5  C)  Pulse:  [] 83  Resp:  [14-19] 16  BP: (113-124)/(66-74) 113/66  SpO2:  [96 %-99 %] 99 %  Patient Vitals for the past 24 hrs:   BP Temp Temp src Pulse Resp SpO2   09/07/24 0808 113/66 97.7  F (36.5  C) Oral 83 16 99 %   09/07/24 0307 115/67 97.8  F (36.6  C) Oral 85 14 98 %   09/06/24 2351 -- -- -- -- 19 --   09/06/24 2300 116/68 98.4  F (36.9  C) Oral 92 15 96 %   09/06/24 2055 117/74 98.5  F (36.9  C) Oral 100 16 96 %   09/06/24 1554 124/74 99.1  F (37.3  C) Oral 90 16 99 %     I/O's last 24 hours:  I/O last 3 completed shifts:  In: 1495 [P.O.:600; I.V.:895]  Out: 600 [Urine:600]    Constitutional: awake, alert, oriented, conversant   Head:   Eyes:   ENT:   Neck:   Cardiovascular: regular rate and rhythm; no murmurs, rubs or gallops  Lungs: diminished in the bases, no crackles or wheezes  Gastrointestinal/Abdomen: mildly tender   without rebound or guarding, mildly distended, + bowel sounds  :   Musculoskeletal:   Skin/Extremities:   Neurologic:   Psychiatric:   Hematologic/Lymphatic/Immunologic:        Data    Labs reviewed.  Recent Labs   Lab 09/07/24  0556 09/06/24  0609 09/05/24  1828   WBC  --  8.7 9.1   HGB  --  9.2* 10.3*   MCV  --  87 88   PLT  --  515* 577*   * 133* 134*   POTASSIUM 4.1 4.4 4.4   CHLORIDE 97* 95* 91*   CO2 25 25 25   BUN 26.4* 42.4* 49.5*   CR 1.18* 1.68* 2.10*   ANIONGAP 11 13 18*   LOUISA 7.8* 8.2* 8.7*   * 89 96   ALBUMIN  --  3.0* 3.4*   PROTTOTAL  --  6.0* 6.8 " "  BILITOTAL  --  1.4* 1.6*   ALKPHOS  --  1,440* 1,633*   ALT  --  17 18   AST  --  59* 70*     Recent Labs   Lab Test 11/21/18  1714   NT-PROBNP, INPATIENT 196     Recent Labs   Lab 09/07/24  0556 09/06/24  0609 09/05/24  1828   * 89 96     No lab results found.    No results for input(s): \"INR\", \"[id number]\" in the last 168 hours.  Recent Labs   Lab 09/06/24  0609 09/05/24  1828   WBC 8.7 9.1       MICRO:  Cultures (including blood and urine):  No lab results found in last 7 days.    Recent Results (from the past 24 hour(s))   MR Brain w/o & w Contrast    Narrative    EXAM: MR BRAIN W/O and W CONTRAST  LOCATION: [facility]  DATE: 9/6/2024    INDICATION: Recalcitrant nausea, metastatic endometrial cancer, evaluate for brain mets  COMPARISON: None.  CONTRAST: 7 mL Gadavist  TECHNIQUE: Routine multiplanar multisequence head MRI without and with intravenous contrast.    FINDINGS:  INTRACRANIAL CONTENTS: No acute or subacute infarct. No mass, acute hemorrhage, or extra-axial fluid collections. Scattered nonspecific T2/FLAIR hyperintensities within the cerebral white matter most consistent with mild chronic microvascular ischemic   change. Mild generalized cerebral atrophy. No hydrocephalus. Normal position of the cerebellar tonsils. No pathologic contrast enhancement is identified within the limitation of motion degraded postcontrast imaging.    SELLA: No abnormality accounting for technique.    OSSEOUS STRUCTURES/SOFT TISSUES: Normal marrow signal. The major intracranial vascular flow voids are maintained.     ORBITS: No abnormality accounting for technique.     SINUSES/MASTOIDS: No paranasal sinus mucosal disease. No middle ear or mastoid effusion.       Impression    IMPRESSION:  1.  No acute intracranial process. Specifically, no evidence of intracranial metastatic disease.  2.  Generalized brain atrophy and presumed microvascular ischemic changes as detailed above. "       Medications   All medications were reviewed. MAR.    Infusions:  Current Facility-Administered Medications   Medication Dose Route Frequency Provider Last Rate Last Admin    sodium chloride 0.9 % infusion   Intravenous Continuous NegraChris MD 75 mL/hr at 09/07/24 0432 New Bag at 09/07/24 0432     Scheduled Medications:  Current Facility-Administered Medications   Medication Dose Route Frequency Provider Last Rate Last Admin    fluticasone-vilanterol (BREO ELLIPTA) 100-25 MCG/ACT inhaler 1 puff  1 puff Inhalation Daily Rocky Uribe MD   1 puff at 09/07/24 0901    heparin lock flush 10 unit/mL injection 5-10 mL  5-10 mL Intracatheter Q24H Rocky Uribe MD        heparin lock flush 100 unit/mL injection 5-10 mL  5-10 mL Intracatheter Q28 Days Rocky Uribe MD        pantoprazole (PROTONIX) EC tablet 40 mg  40 mg Oral BID AC Rocky Uribe MD   40 mg at 09/07/24 0637    sodium chloride (PF) 0.9% PF flush 10-20 mL  10-20 mL Intracatheter Q28 Days Rocky Uribe MD        traMADol (ULTRAM) tablet 100 mg  100 mg Oral Q8H Jason Guevara MD   100 mg at 09/07/24 0901     PRN Medications:  Current Facility-Administered Medications   Medication Dose Route Frequency Provider Last Rate Last Admin    albuterol (PROVENTIL HFA/VENTOLIN HFA) inhaler  2 puff Inhalation Q6H PRN Rocky Uribe MD        heparin lock flush 10 unit/mL injection 5-10 mL  5-10 mL Intracatheter Q1H PRRocky Goldman MD        hydrALAZINE (APRESOLINE) tablet 10 mg  10 mg Oral Q4H PRN Rocky Uribe MD        Or    hydrALAZINE (APRESOLINE) injection 10 mg  10 mg Intravenous Q4H PRN Rocky Uribe MD        HYDROmorphone (DILAUDID) injection 0.2 mg  0.2 mg Intravenous Q2H PRN Rocky Uribe MD        HYDROmorphone (DILAUDID) injection 0.4 mg  0.4 mg Intravenous Q2H PRN Rocky Uribe MD        naloxone (NARCAN) injection 0.2 mg  0.2 mg  Intravenous Q2 Min PRRocky Goldman MD        Or    naloxone (NARCAN) injection 0.4 mg  0.4 mg Intravenous Q2 Min Rocky Stevens MD        Or    naloxone (NARCAN) injection 0.2 mg  0.2 mg Intramuscular Q2 Min Rocky Stevens MD        Or    naloxone (NARCAN) injection 0.4 mg  0.4 mg Intramuscular Q2 Min Rocky Stevens MD        ondansetron (ZOFRAN ODT) ODT tab 4 mg  4 mg Oral Q6H PRRocky Goldman MD   4 mg at 09/07/24 0900    Or    ondansetron (ZOFRAN) injection 4 mg  4 mg Intravenous Q6H PRRocky Goldman MD        polyethylene glycol (MIRALAX) Packet 17 g  17 g Oral BID PRRocky Goldman MD        prochlorperazine (COMPAZINE) injection 5 mg  5 mg Intravenous Q6H PRRocky Goldman MD        Or    prochlorperazine (COMPAZINE) tablet 5 mg  5 mg Oral Q6H PRRocky Goldman MD   5 mg at 09/06/24 1002    Or    prochlorperazine (COMPAZINE) suppository 12.5 mg  12.5 mg Rectal Q12H PRRocky Goldman MD        senna-docusate (SENOKOT-S/PERICOLACE) 8.6-50 MG per tablet 1 tablet  1 tablet Oral BID Rocky Stevens MD   1 tablet at 09/06/24 0821    Or    senna-docusate (SENOKOT-S/PERICOLACE) 8.6-50 MG per tablet 2 tablet  2 tablet Oral BID Rocky Stevens MD        sodium chloride (PF) 0.9% PF flush 10-20 mL  10-20 mL Intracatheter q1 min Rocky Stevens MD        sodium chloride (PF) 0.9% PF flush 10-20 mL  10-20 mL Intracatheter Q1H Rocky Stevens MD

## 2024-09-07 NOTE — PROVIDER NOTIFICATION
MD Notification    Notified Person: MD hospitalist     Notified Person Name: Dr. Raza     Notification Date/Time: 9/6/2024 at 2321    Notification Interaction: [employer] web messaging     Purpose of Notification:   833 K.H. FYI- brain MRI results are in.    Orders Received:   MD aware    Comments:

## 2024-09-07 NOTE — CONSULTS
"CLINICAL NUTRITION SERVICES  -  ASSESSMENT NOTE    Recommendations Ordered by Registered Dietitian (RD):   Ensure Clear 2x/day (240 kcal, 8 g protein each)      Malnutrition:   Moderate malnutrition        REASON FOR ASSESSMENT  Jany Park is a 79 year old female seen by Registered Dietitian for Provider Order - Malnutrition. PMH includes endometrial cancer.       NUTRITION HISTORY  Obtained nutrition hx from patient. Presents with 3-week hx of nausea/vomiting and poor oral intake. During this time, she was taking in some fluids (juice, tea, water) but little solids (bites of crackers). Subsequently, she has lost some weight -- though presumes her lymphedema is masking the extent of her true weight loss. UBW of 168 lbs --> down to 162 lbs this admission.     CURRENT NUTRITION ORDERS  Diet Order:     Regular     Current Intake/Tolerance:  Overall inadequate -- Eating 1/2 english muffin and half bowl of tomato soup -vs- bites of omelette and 1 popsicle. Reports this is the most solid food she's tolerated for the past 3 weeks.     NUTRITION FOCUSED PHYSICAL ASSESSMENT FOR DIAGNOSING MALNUTRITION)  Observed:    Muscle wasting (refer to documentation in Malnutrition section) and Subcutaneous fat loss (refer to documentation in Malnutrition section)    ANTHROPOMETRICS  Height: 5' 5.5\"  Weight: 162 lbs 11.19 oz (73.8 kg)   Body mass index is 26.66 kg/m .   Weight Status:  Normal BMI  IBW: 59.1 kg  % IBW: 125%  Weight History:   Wt Readings from Last 10 Encounters:   09/06/24 73.8 kg (162 lb 11.2 oz)   08/04/20 82.1 kg (181 lb)   11/21/18 90.7 kg (200 lb)   03/25/16 86.2 kg (190 lb)   10/02/15 89.7 kg (197 lb 12.8 oz)   05/22/13 94.3 kg (207 lb 14.3 oz)   05/04/07 108.4 kg (239 lb)   03/06/07 108.9 kg (240 lb)   08/10/06 102.1 kg (225 lb)   06/20/06 101.9 kg (224 lb 9.6 oz)         LABS  Labs reviewed  Na 133, Cr 1.2, BUN 26    MEDICATIONS  Medications reviewed      ASSESSED NUTRITION NEEDS PER APPROVED PRACTICE " GUIDELINES:    Dosing Weight 63 kg (adjusted, based on IBW of 59 kg and actual wt of 74 kg)   Estimated Energy Needs: 4571-5281 kcals (25-30 Kcal/Kg)  Justification: maintenance  Estimated Protein Needs: 75-95 grams protein (1.2-1.5 g pro/Kg)  Justification: Repletion  Estimated Fluid Needs: 1158-2950  mL (1 mL/Kcal)  Justification: maintenance    MALNUTRITION:  % Weight Loss:  Weight loss does not meet criteria for malnutrition   % Intake:  </= 50% for >/= 1 month (severe malnutrition)  Subcutaneous Fat Loss:  Orbital region mild depletion, Upper arm region mild depletion, and Thoracic region mild depletion  Muscle Loss:  Temporal region mild depletion, Clavicle bone region mild depletion, Scapular bone region mild depletion, and Dorsal hand region mild depletion  Fluid Retention:  Moderate     Malnutrition Diagnosis: Moderate malnutrition  In Context of:  Acute illness or injury    NUTRITION DIAGNOSIS:  Inadequate oral intake related to reduced appetite (in setting of nausea/vomiting) as evidenced by presume consuming <50% estimated nutrition needs for past x3 weeks, weight loss, observed fat/muscle loss     INTERVENTIONS  Recommendations / Nutrition Prescription  See above    Goals  Pt to consume at least 75% of three nutritionally adequate meals per day (or equivalent via snacks/supplements).       MONITORING AND EVALUATION:  Progress towards goals will be monitored and evaluated per protocol and Practice Guidelines    Zayda Baeza RD, LD, CNSC

## 2024-09-08 NOTE — PROGRESS NOTES
[facility] Follow Up Note:    Assessment and Plan:  Imelda is a 79 year old admitted with THELMA in the setting of poor intake in the past few weeks. She has been on a chemotherapy holiday since 5/2024.     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  --9/2015 doxil chemotherapy  --10/2016 topotecan chemotherapy  --3/2017 resumed paclitaxel with carboplatin  --4/2018 began megace alternating every 3 weeks with tamoxifen  --switched to continuous megace 8/31/18  --Resumed paclitaxel with carboplatin 4/25/19  --Began carboplatin desensitization protocol 7/1/19  --responded but opted for a chemotherapy hiatus 9/2019  --6/1/20 began pembrolizumab with lenvatinib with the pembrolizumab administered every 3 weeks and the lenvatinib daily. Began lenvatinib at 10 mg daily, and with cycle 2 increased to full dose of 20 mg daily  --Both pembro and lenvatinib were held from late June 2020 to 9/14/20  due to diarrhea   --9/14/20  resumed Lenvatinib (at dose reduction of 10mg) and pembrolizumab.   --Continued pembrolizumab and current dose of lenvatinib at 10 mg daily   -- slowly rising and CT C/A/P 9/3/21 with mild enlargement of hepatic metastases (for example 6.5 x 5.6 vs 6.0 x 5.2, 8.3 x 5.3 vs 7.9 x 5.1 cm), mildly increased adenopathy up to 1.3 from 1.1 cm, other wise with stable disease  --9/8/21 discontinue pembrolizumab and Lenvatinib  --10/4/21 began paclitaxel and carboplatin, cycled every 3 weeks with dose adjustments as she last received in 2019  - 4/11/22 establish care with me and with stable disease on CT scans 4/8/22 we decided to hold her chemotherapy over the summer.  - Restarted treatment 10/18/22, bevacizumab added 11/29/22.   - 2/9/23 Taxol/carbo doses reduced by 15% for escalating fatigue.   - CT CAP 5/2/23 stable disease  -> treatment holiday  - slight progression radiographically and increase in tumor markers -> restart carboplatin paclitaxel and bevacizumab 11/16/23   - CT CAP 2/27/24 with mild improvement in all disease, tumor marker slight decrease   - 7/29/24 increase in size of mets as well as increased tumor marker while off chemotherapy for the summer   - CT 9/4/2024 showed disease progression in the liver and lymph nodes  - She was due to resume treatment on 9/10/2024 with paclitaxel and bevacizumab     PLAN:  - was scheduled for chemo as OP next week. Patient and daughter don't feel she can handle chemo and is too weak. They will d/w Dr. Dreyer further on this. They are aware of the palliative nature of treatment.     THELMA  - Baseline 0.8-0.9 in clinic, but up to 2.8 on 9/4/2024  - Did have CT with contrast on 9/4/2024, no hydronephrosis on that day  - Likely due to nausea and poor intake  - IV hydration has been initiated with improvement in renal function     Anemia / fatigue   - Due to malignancy and its treatment  - Started on EPO 11/29/22, Hg increased to 11.0 and she feels much less fatigued   - 11/14/23 -> IV iron   - Fatigue negatively affecting her QOL, slight dose reduction without noticeable improvement   - EPO for Hg <10  - Hg has not improved as much as we would expect on chemotherapy holiday    - Given her prior chemotherapy history we must look for evidence of MDS, bone marrow biopsy on 9/3 did not show MDS     PLAN:  - PRBC if Hb<7     LFT abnormalities  - Has known liver metastases which likely impact liver function     Nausea   Is controlled well on ani emetics    Shortness of breath( no tachycardia or hypoxia)  - CXR and EKG unrevealing  - Seems better with inhaler    PLAN: If persistent consider CT chest     Weakness:  Being evaluated for weakness/ disposition        Subjective:    Feeling short of breath today. When I asked and examined no pleuritic pain. No coughing      Labs:  All labs  "reviewed    CBC  Recent Labs   Lab 09/08/24  0556 09/06/24  0609 09/05/24  1828   WBC 9.7 8.7 9.1   HGB 10.1* 9.2* 10.3*   MCV 90 87 88   * 515* 577*       CMP  Recent Labs   Lab 09/08/24  0556 09/07/24  0556 09/06/24  0609 09/05/24  1828    133* 133* 134*   POTASSIUM 4.0 4.1 4.4 4.4   CHLORIDE 100 97* 95* 91*   CO2 24 25 25 25   ANIONGAP 12 11 13 18*   * 100* 89 96   BUN 19.8 26.4* 42.4* 49.5*   CR 1.01* 1.18* 1.68* 2.10*   GFRESTIMATED 57* 47* 31* 24*   LOUISA 7.7* 7.8* 8.2* 8.7*   PROTTOTAL 5.9*  --  6.0* 6.8   ALBUMIN 2.8*  --  3.0* 3.4*   BILITOTAL 1.2  --  1.4* 1.6*   ALKPHOS 1,427*  --  1,440* 1,633*   AST 58*  --  59* 70*   ALT 16  --  17 18       INRNo lab results found in last 7 days.    Blood CultureNo results for input(s): \"CULT\" in the last 168 hours.      Riya Connor MD  [facility]  9/8/2024 1:45 PM     52 minutes-- 25 minutes in room- rest in chart review and documentation  "

## 2024-09-08 NOTE — PLAN OF CARE
Orientation: A&O x4  Aggression Stop Light: Green  Activity: A1 GBW  Diet/BS Checks: Regular diet  Tele:  N/A  IV Access/Drains: Chest Port  HL  Pain Management: On scheduled tramadol  Abnormal VS/Results: VSS on RA ex tachycardia.  Bowel/Bladder: Continent of B/B; Pt had 1 small BM this shift  Skin/Wounds: Scattered bruising and scabs, Moderate edema to BLE  Consults: Hem/onc  D/C Disposition: Pending  Other Info: C/o of intermittent N/V, PRN zofran given x1. Pt also complained of SOB, managed with PRN albuterol

## 2024-09-08 NOTE — PLAN OF CARE
Orientation: A&O x4, Pleasant  Aggression Stop Light: Green  Activity: SBA, ambulated hallway x1 this shift  Diet/BS Checks: Regular diet  Tele:  N/A  IV Access/Drains: Chest Port  HL  Pain Management: C/o of pain at biopsy site, managed with scheduled tramadol  Abnormal VS/Results: VSS on RA. Na= 133, Cr= 1.18  Bowel/Bladder: Continent of B/B; Pt had no BM this shift  Skin/Wounds: Scattered bruising and scabs, Moderate edema to BLE  Consults: Hem/onc  D/C Disposition: Pending  Other Info: C/o of intermittent N/V, no PRNs given this shift

## 2024-09-08 NOTE — PLAN OF CARE
[id number]     Orientation: A&Ox4  Aggression Stop Light: green  Activity: SBA with gait belt and walker.  Diet/BS Checks: regular  Tele:  none  IV Access/Drains: L chest port HL with good blood return. R PIV SL.   Pain Management: denied pain. Scheduled tramadol.  Abnormal VS/Results: VSS on RA.   Bowel/Bladder: continent of B/B. No Bm this shift, PRN senna x1  Skin/Wounds: scattered bruising. BLE edema. Scab on buttocks   Consults: hem/onc  D/C Disposition: pending     Other Info:   A&Ox4. VSS on RA. Denied pain. SBA with gait belt and walker. Continent B/B. No BM this shift, PRN senna given x1. Regular diet. L chest port HL with good blood return. R PIV SL. Discharge pending.

## 2024-09-08 NOTE — PROGRESS NOTES
[facility]    Internal Medicine Hospitalist Progress Note  09/08/2024  I evaluated patient on the above date.    Jorge Holliday Jr., MD  [phone] (p)  Text Page  Vocera      [New actions/orders today (09/08/2024) are underlined in the assessment and plan. All lab results in the assessment and plan were reviewed.]  Assessment & Plan   Jany Park is a 79 year old female with past medical history significant for endometrial cancer; HTN; asthma; fibromyalgia with chronic pain; and GERD; who presented 9/5/2024 with abnormal outpatient labs and nausea/vomiting, found with THELMA.    On initial evaluation, pt was afebrile, VSS. Labs notable for CBC with WBC normal, hgb 10.3, platelets 577K; BMP with sodium 134, chloride 91, BUN 49.5, cr 2.1, calcium 9.7, AG 18; LFT's with ALP 1633, AST 70, tbili 1.6; UA with 6 WBC, small LE, 19 hyaline casts.       Acute kidney injury, suspect prerenal (from N/V, poor oral intake), contrast nephropathy +/- NSAID, with ATN.  * Initial presentation as above. Cr 2.1, BUN 49.5, UA significant for 19 hyaline casts. Noted that pt had a contrast CT 9/3 as well. Started on IVF's on admit. PTA lisinopril and meloxicam held on admit.  * IVF's completed 9/7.  Recent Labs   Lab 09/08/24  0556 09/07/24  0556 09/06/24  0609 09/05/24  1828   CR 1.01* 1.18* 1.68* 2.10*   Estimated Creatinine Clearance: 46.7 mL/min (A) (based on SCr of 1.01 mg/dL (H)).  - Continue to monitor BMP - repeat in am.  - Avoid nephrotoxic medications.    Endometrial cancer, metastatic  Abnormal LFTs, suspect related to metastatic disease.  Nausea and vomiting, suspect multifactorial including related to above.  Poor intake related to above.  * Followed by Dr. Dreyer of [facility]. See Oncology note for oncologic history. Noted diagnosed and had initial surgery 2015 with subsequently chemotherapy. Noted subsequently development of liver metastases. Noted last chemotherapy in 5/2024. Noted that pt  underwent CT imaging and BM biopsy 9/4 (results not available on admit). Noted was suppose to undergo chemotherapy on 9/10/2024.  * Initial presentation as above. On admit, reported ~3 weeks of nausea and vomiting, intake of ~25% of baseline. LFT's elevated.  [facility] consulted on admit.  * On 9/6, per Oncology, noted that CT 9/4 showed disease progression in the liver and lymph nodes. Also noted that BM biopsy showed dysplasia, pending cytogenetics. Noted that her anemia may be due to an exhausted bone marrow from her years of chemotherapy. ?nausea related to IV iron transfusion. Brain MRI ordered that was negative for acute findings.  * On 9/8, tbili normalized, AST and ALP stable/slightly improved.  Recent Labs   Lab 09/08/24  0556 09/07/24  0556 09/06/24  0609 09/05/24  1857 09/05/24  1828   ALBUMIN 2.8*  --  3.0*  --  3.4*   BILITOTAL 1.2  --  1.4*  --  1.6*   ALT 16  --  17  --  18   AST 58*  --  59*  --  70*   ALKPHOS 1,427*  --  1,440*  --  1,633*   PROTEIN  --   --   --  Negative  --    CR 1.01* 1.18* 1.68*  --  2.10*   - Continue to treat other issues as noted.   - Continue to monitor LFT's - repeat in am.  - Continue PRN ondansetron, PRN prochlorperazine.  - Follow-up with [facility] outpatient - she is suppose to undergo chemotherapy on 9/10/2024, but may need to be rescheduled.  - Appreciate help from [facility].    Dyspnea with chest tightness.  * On 9/8, pt noted feeling dyspneic with chest tightness.  - Check ECG and CXR.    Constipation due to inactivity and opioids.  * On 9/8, pt c/o constipation.   - Schedule polyethylene glycol daily and senna-docusate BID and add PRN bisacodyl suppository.  - Continue PRN polyethylene glycol and senna-docusate.  - Increase activity asa able.    Hyponatremia, suspect nutritional/hypovolemia.  * Sodium 134 on admit.   * Sodium normalized 9/8.    Chronic normocytic anemia.  * Hgb 10.3 on admit. Noted most recently 11.2 g/dl 3/2024.   * As above, per Oncology, noted that BM  "biopsy showed dysplasia, pending cytogenetics. Noted that her anemia may be due to an exhausted bone marrow from her years of chemotherapy.   Recent Labs   Lab 09/08/24  0556 09/06/24  0609 09/05/24  1828   HGB 10.1* 9.2* 10.3*   - Continue to monitor CBC - repeat in am.     Hypertension (benign essential).  Chronic lymphedema.  [PTA: lisinopril 10 mg daily; PRN bumetanide 1 mg BID.]  * Lisinopril held on admit.  * BP's normal off meds.  - Continue to hold lisinopril.  - Continue to hold PRN bumetanide.    Fibromyalgia with chronic pain.  * PTA on regimen including meloxicam, scheduled tramadol 100 mg TID and PRN oxycodone.  * PTA tramadol held on admit given THELMA.  * On 9/6, pt with withdrawal symptoms.   * On 9/7 am, scheduled tramadol restarted.  - Continue tramadol 100 gm TID.  - Continue PRN oxycodone and PRN IV hydromorphone; minimize opioids as able.  - Continue to hold meloxicam given THELMA.     GERD.  - Continue PPI.     Asthma.  - Continue fluticasone-vilanterol and PRN albuterol.       Clinically Significant Risk Factors              # Hypoalbuminemia: Lowest albumin = 2.8 g/dL at 9/8/2024  5:56 AM, will monitor as appropriate                 # Overweight: Estimated body mass index is 26.66 kg/m  as calculated from the following:    Height as of this encounter: 1.664 m (5' 5.5\").    Weight as of this encounter: 73.8 kg (162 lb 11.2 oz)., PRESENT ON ADMISSION  # Moderate Malnutrition: based on nutrition assessment, PRESENT ON ADMISSION   # Asthma: noted on problem list              Diet: Regular Diet Adult  Snacks/Supplements Adult: Ensure Clear; With Meals    Prophylaxis: PCD's and ambulation  Ochoa Catheter: Not present  Lines: PRESENT      Port A Cath Single 07/30/20 Left Chest wall-Site Assessment: WDL      Code Status: Full Code    Disposition Plan   Medically Ready for Discharge: Anticipated in 1-2 Days  Expected discharge to prior living arrangement pending above.    Entered: Jorge Holliday MD " "09/08/2024, 10:57 AM     COMMUNICATION  - I discussed with patient's daughter 09/08/24            Interval History   Pt notes dyspnea with feeling of chest tightness this am.  Otherwise feels a bit better today.  Still with decreased appetite and nausea at times.  C/o constipation.    * Data reviewed today: I reviewed all new labs and imaging over the last 24 hours. I personally reviewed no images or ECG's today.    Physical Exam   Most recent vitals:   , Blood pressure 128/70, pulse 89, temperature 98.6  F (37  C), temperature source Oral, resp. rate 16, height 1.664 m (5' 5.5\"), weight 73.8 kg (162 lb 11.2 oz), SpO2 99%. O2 Device: None (Room air)    Vitals:    09/05/24 1637 09/05/24 2301 09/06/24 0004   Weight: 72.6 kg (160 lb) 75.1 kg (165 lb 9.1 oz) 73.8 kg (162 lb 11.2 oz)     Vital signs with ranges:  Temp:  [97.4  F (36.3  C)-98.6  F (37  C)] 98.6  F (37  C)  Pulse:  [85-94] 89  Resp:  [16-20] 16  BP: (116-131)/(66-75) 128/70  SpO2:  [97 %-99 %] 99 %  Patient Vitals for the past 24 hrs:   BP Temp Temp src Pulse Resp SpO2   09/08/24 0725 128/70 98.6  F (37  C) Oral 89 16 99 %   09/08/24 0342 123/74 98  F (36.7  C) Oral 85 16 97 %   09/08/24 0018 129/70 97.9  F (36.6  C) Oral 89 19 98 %   09/07/24 1931 116/66 97.6  F (36.4  C) Oral 87 20 98 %   09/07/24 1618 131/75 97.4  F (36.3  C) Oral 94 18 98 %     I/O's last 24 hours:  I/O last 3 completed shifts:  In: 360 [P.O.:360]  Out: 800 [Urine:800]    Constitutional: awake, alert, oriented, conversant   Head:   Eyes:   ENT:   Neck:   Cardiovascular: regular rate and rhythm; no murmurs, rubs or gallops  Lungs: diminished in the bases, no crackles or wheezes  Gastrointestinal/Abdomen: mildly tender   without rebound or guarding, mildly distended, + bowel sounds  :   Musculoskeletal:   Skin/Extremities:   Neurologic:   Psychiatric:   Hematologic/Lymphatic/Immunologic:        Data    Labs reviewed.  Recent Labs   Lab 09/08/24  0556 09/07/24  0556 09/06/24  0609 " "09/05/24  1828   WBC 9.7  --  8.7 9.1   HGB 10.1*  --  9.2* 10.3*   MCV 90  --  87 88   *  --  515* 577*    133* 133* 134*   POTASSIUM 4.0 4.1 4.4 4.4   CHLORIDE 100 97* 95* 91*   CO2 24 25 25 25   BUN 19.8 26.4* 42.4* 49.5*   CR 1.01* 1.18* 1.68* 2.10*   ANIONGAP 12 11 13 18*   LOUISA 7.7* 7.8* 8.2* 8.7*   * 100* 89 96   ALBUMIN 2.8*  --  3.0* 3.4*   PROTTOTAL 5.9*  --  6.0* 6.8   BILITOTAL 1.2  --  1.4* 1.6*   ALKPHOS 1,427*  --  1,440* 1,633*   ALT 16  --  17 18   AST 58*  --  59* 70*     Recent Labs   Lab Test 11/21/18  1714   NT-PROBNP, INPATIENT 196     Recent Labs   Lab 09/08/24  0556 09/07/24  0556 09/06/24  0609 09/05/24  1828   * 100* 89 96     No lab results found.    No results for input(s): \"INR\", \"[id number]\" in the last 168 hours.  Recent Labs   Lab 09/08/24  0556 09/06/24  0609 09/05/24  1828   WBC 9.7 8.7 9.1       MICRO:  Cultures (including blood and urine):  No lab results found in last 7 days.    No results found for this or any previous visit (from the past 24 hour(s)).      Medications   All medications were reviewed. MAR.    Infusions:  Current Facility-Administered Medications   Medication Dose Route Frequency Provider Last Rate Last Admin     Scheduled Medications:  Current Facility-Administered Medications   Medication Dose Route Frequency Provider Last Rate Last Admin    aspirin EC tablet 81 mg  81 mg Oral Daily Jorge Holliday MD   81 mg at 09/08/24 0800    fluticasone-vilanterol (BREO ELLIPTA) 100-25 MCG/ACT inhaler 1 puff  1 puff Inhalation Daily Rocky Uribe MD   1 puff at 09/08/24 0800    heparin lock flush 10 unit/mL injection 5-10 mL  5-10 mL Intracatheter Q24H Rocky Uribe MD   5 mL at 09/08/24 0600    heparin lock flush 100 unit/mL injection 5-10 mL  5-10 mL Intracatheter Q28 Days Rocky Uribe MD        pantoprazole (PROTONIX) EC tablet 40 mg  40 mg Oral BID AC Rocky Uribe MD   40 mg at 09/08/24 0649    " sodium chloride (PF) 0.9% PF flush 10-20 mL  10-20 mL Intracatheter Q28 Days Rocky Uribe MD        traMADol (ULTRAM) tablet 50 mg  50 mg Oral TID Jorge Holliday MD   50 mg at 09/08/24 0800     PRN Medications:  Current Facility-Administered Medications   Medication Dose Route Frequency Provider Last Rate Last Admin    albuterol (PROVENTIL HFA/VENTOLIN HFA) inhaler  2 puff Inhalation Q6H PRN Rocky Uribe MD        heparin lock flush 10 unit/mL injection 5-10 mL  5-10 mL Intracatheter Q1H PRN Rocky Uribe MD   5 mL at 09/07/24 1747    hydrALAZINE (APRESOLINE) tablet 10 mg  10 mg Oral Q4H PRN Rocky Uribe MD        Or    hydrALAZINE (APRESOLINE) injection 10 mg  10 mg Intravenous Q4H PRN Rocky Uribe MD        HYDROmorphone (PF) (DILAUDID) injection 0.3-0.5 mg  0.3-0.5 mg Intravenous Q4H PRN Jorge Holliday MD        naloxone (NARCAN) injection 0.2 mg  0.2 mg Intravenous Q2 Min PRN Rocky Uribe MD        Or    naloxone (NARCAN) injection 0.4 mg  0.4 mg Intravenous Q2 Min PRN Rocky Uribe MD        Or    naloxone (NARCAN) injection 0.2 mg  0.2 mg Intramuscular Q2 Min PRN Rocky Uirbe MD        Or    naloxone (NARCAN) injection 0.4 mg  0.4 mg Intramuscular Q2 Min PRN Rocky Uribe MD        ondansetron (ZOFRAN ODT) ODT tab 4 mg  4 mg Oral Q6H PRN Rocky Uribe MD   4 mg at 09/08/24 0755    Or    ondansetron (ZOFRAN) injection 4 mg  4 mg Intravenous Q6H PRN Rocky Uribe MD        polyethylene glycol (MIRALAX) Packet 17 g  17 g Oral BID PRN Rocky Uribe MD        prochlorperazine (COMPAZINE) injection 5 mg  5 mg Intravenous Q6H PRN Rocky Uribe MD        Or    prochlorperazine (COMPAZINE) tablet 5 mg  5 mg Oral Q6H PRN Rocky Uribe MD   5 mg at 09/06/24 1002    Or    prochlorperazine (COMPAZINE) suppository 12.5 mg  12.5 mg Rectal Q12H PRN Rocky Urieb MD         senna-docusate (SENOKOT-S/PERICOLACE) 8.6-50 MG per tablet 1 tablet  1 tablet Oral BID Rocky Stevens MD   1 tablet at 09/07/24 2223    Or    senna-docusate (SENOKOT-S/PERICOLACE) 8.6-50 MG per tablet 2 tablet  2 tablet Oral BID Rocky Stevens MD   2 tablet at 09/08/24 0809    sodium chloride (PF) 0.9% PF flush 10-20 mL  10-20 mL Intracatheter q1 min prRocky Kang MD        sodium chloride (PF) 0.9% PF flush 10-20 mL  10-20 mL Intracatheter Q1H Rocky Stevens MD

## 2024-09-09 NOTE — PROGRESS NOTES
[facility]    Internal Medicine Hospitalist Progress Note  09/09/2024  I evaluated patient on the above date.    Jorge Holliday Jr., MD  [phone] (p)  Text Page  Vocera      [New actions/orders today (09/09/2024) are underlined in the assessment and plan. All lab results in the assessment and plan were reviewed.]  Assessment & Plan   Jayn Park is a 79 year old female with past medical history significant for endometrial cancer; HTN; asthma; fibromyalgia with chronic pain; and GERD; who presented 9/5/2024 with abnormal outpatient labs and nausea/vomiting, found with THELMA.    On initial evaluation, pt was afebrile, VSS. Labs notable for CBC with WBC normal, hgb 10.3, platelets 577K; BMP with sodium 134, chloride 91, BUN 49.5, cr 2.1, calcium 9.7, AG 18; LFT's with ALP 1633, AST 70, tbili 1.6; UA with 6 WBC, small LE, 19 hyaline casts.       Acute kidney injury, suspect prerenal (from N/V, poor oral intake), contrast nephropathy +/- NSAID, with ATN.  * Initial presentation as above. Cr 2.1, BUN 49.5, UA significant for 19 hyaline casts. Noted that pt had a contrast CT 9/3 as well. Started on IVF's on admit. PTA lisinopril and meloxicam held on admit.  * IVF's completed 9/7.  * Cr normalized 9/9.  Recent Labs   Lab 09/09/24  0552 09/08/24  0556 09/07/24  0556 09/06/24  0609 09/05/24  1828   CR 0.85 1.01* 1.18* 1.68* 2.10*   Estimated Creatinine Clearance: 56.4 mL/min (based on SCr of 0.85 mg/dL).  - Continue to monitor BMP - repeat in am.  - Avoid nephrotoxic medications.    Endometrial cancer, metastatic  Abnormal LFTs, suspect related to metastatic disease.  Nausea and vomiting, suspect multifactorial including related to above, also possibly constipation.  Poor intake related to above.  * Followed by Dr. Dreyer of [facility]. See Oncology note for oncologic history. Noted diagnosed and had initial surgery 2015 with subsequently chemotherapy. Noted subsequently development of liver  metastases. Noted last chemotherapy in 5/2024. Noted that pt underwent CT imaging and BM biopsy 9/4 (results not available on admit). Noted was suppose to undergo chemotherapy on 9/10/2024.  * Initial presentation as above. On admit, reported ~3 weeks of nausea and vomiting, intake of ~25% of baseline. LFT's elevated.  [facility]  * On 9/6, per Oncology, noted that CT 9/4 showed disease progression in the liver and lymph nodes. Also noted that BM biopsy showed dysplasia, pending cytogenetics. Noted that her anemia may be due to an exhausted bone marrow from her years of chemotherapy. ?nausea related to IV iron transfusion. Brain MRI ordered that was negative for acute findings.  * On 9/8, tbili normalized, AST and ALP stable/slightly improved.  * On 9/9, more nauseated but some improvement after BM.  Recent Labs   Lab 09/09/24  0552 09/08/24  0556 09/07/24  0556 09/06/24  0609 09/05/24  1857 09/05/24  1828   ALBUMIN 2.8* 2.8*  --  3.0*  --  3.4*   BILITOTAL 1.4* 1.2  --  1.4*  --  1.6*   ALT 17 16  --  17  --  18   AST 69* 58*  --  59*  --  70*   ALKPHOS 1,498* 1,427*  --  1,440*  --  1,633*   PROTEIN  --   --   --   --  Negative  --    CR 0.85 1.01* 1.18* 1.68*  --  2.10*   - Continue to treat other issues as noted.   - Continue to monitor LFT's - repeat in am.  - Continue PRN ondansetron, PRN prochlorperazine.  - Follow-up with [facility] outpatient - she is suppose to undergo chemotherapy on 9/10/2024, but may need to be rescheduled.  - Appreciate help from [facility].  - Constipation management as noted.    Constipation due to inactivity and opioids.  * On 9/8, pt c/o constipation. Bowel regimen intensified.  * On 9/9, had BM after suppository.  - Continue scheduled polyethylene glycol daily and senna-docusate BID and add   - Continue PRN polyethylene glycol, senna-docusate and PRN bisacodyl suppository.  - Increase activity as able.    Dyspnea with chest tightness.  * On 9/8, pt noted feeling dyspneic with  "chest tightness. ECG showed SR, no acute ischemic changes. CXR showed small pleural effusions, no focal infiltrates/edema.  * On 9/9, chest tightness better.  - Continue to monitor clinically.    Hyponatremia, suspect nutritional/hypovolemia.  * Sodium 134 on admit.   * Sodium normalized 9/8.    Chronic normocytic anemia.  * Hgb 10.3 on admit. Noted most recently 11.2 g/dl 3/2024.   * As above, per Oncology, noted that BM biopsy showed dysplasia, pending cytogenetics. Noted that her anemia may be due to an exhausted bone marrow from her years of chemotherapy.   Recent Labs   Lab 09/09/24  0552 09/08/24  0556 09/06/24  0609 09/05/24  1828   HGB 9.9* 10.1* 9.2* 10.3*   - Continue to monitor CBC - repeat in am.     Hypertension (benign essential).  Chronic lymphedema.  [PTA: lisinopril 10 mg daily; PRN bumetanide 1 mg BID.]  * Lisinopril held on admit.  * BP's normal off meds.  - Continue to hold lisinopril.  - Continue to hold PRN bumetanide.    Fibromyalgia with chronic pain.  * PTA on regimen including meloxicam, scheduled tramadol 100 mg TID and PRN oxycodone.  * PTA tramadol held on admit given THELMA.  * On 9/6, pt with withdrawal symptoms.   * On 9/7 am, scheduled tramadol restarted.  - Continue tramadol 100 gm TID.  - Continue PRN oxycodone and PRN IV hydromorphone; minimize opioids as able.  - Continue to hold meloxicam given THELMA.    Weakness and physical deconditioning due to multiple acute and chronic medical issues.  * Pt very weak since hospitalization  - Consult PT and OT.     GERD.  - Continue pantoprazole BID.     Asthma.  - Continue fluticasone-vilanterol and PRN albuterol.       Clinically Significant Risk Factors              # Hypoalbuminemia: Lowest albumin = 2.8 g/dL at 9/9/2024  5:52 AM, will monitor as appropriate                 # Overweight: Estimated body mass index is 27.61 kg/m  as calculated from the following:    Height as of this encounter: 1.664 m (5' 5.5\").    Weight as of this encounter: " "76.4 kg (168 lb 8 oz)., PRESENT ON ADMISSION  # Moderate Malnutrition: based on nutrition assessment, PRESENT ON ADMISSION   # Asthma: noted on problem list              Diet: Regular Diet Adult  Snacks/Supplements Adult: Ensure Clear; With Meals    Prophylaxis: PCD's and ambulation  Ochoa Catheter: Not present  Lines: PRESENT      Port A Cath Single 07/30/20 Left Chest wall-Site Assessment: WDL      Code Status: Full Code    Disposition Plan   Medically Ready for Discharge: Anticipated in 1-2 Days  Expected discharge to prior living arrangement pending above.    Entered: Jorge Holliday MD 09/09/2024, 2:07 PM     COMMUNICATION  - I discussed with patient's  09/09/24  - I discussed with patient's daughter 09/09/24      Interval History   More nauseated this am.  Received suppository and had a BM with some effort with some improvement in nausea.  Still very weak.    * Data reviewed today: I reviewed all new labs and imaging over the last 24 hours. I personally reviewed no images or ECG's today.    Physical Exam   Most recent vitals:   , Blood pressure 119/77, pulse 101, temperature 97.3  F (36.3  C), temperature source Oral, resp. rate 20, height 1.664 m (5' 5.5\"), weight 76.4 kg (168 lb 8 oz), SpO2 96%. O2 Device: None (Room air)    Vitals:    09/05/24 2301 09/06/24 0004 09/09/24 0633   Weight: 75.1 kg (165 lb 9.1 oz) 73.8 kg (162 lb 11.2 oz) 76.4 kg (168 lb 8 oz)     Vital signs with ranges:  Temp:  [97.3  F (36.3  C)-98.5  F (36.9  C)] 97.3  F (36.3  C)  Pulse:  [] 101  Resp:  [15-20] 20  BP: (117-132)/(66-77) 119/77  SpO2:  [96 %-99 %] 96 %  Patient Vitals for the past 24 hrs:   BP Temp Temp src Pulse Resp SpO2 Weight   09/09/24 0736 119/77 97.3  F (36.3  C) Oral 101 20 96 % --   09/09/24 0633 -- -- -- -- -- -- 76.4 kg (168 lb 8 oz)   09/09/24 0342 117/74 98.5  F (36.9  C) Oral 91 19 96 % --   09/08/24 2359 120/74 98.3  F (36.8  C) Oral 98 15 96 % --   09/08/24 2217 -- -- -- -- 18 -- -- " "  09/08/24 1918 123/76 98.5  F (36.9  C) Oral 96 16 99 % --   09/08/24 1542 132/66 98  F (36.7  C) Oral 103 16 99 % --     I/O's last 24 hours:  I/O last 3 completed shifts:  In: 480 [P.O.:480]  Out: 500 [Urine:500]    Constitutional: awake, alert, fatigued appearing, uncomfortable appearing, oriented   Head:   Eyes:   ENT:   Neck:   Cardiovascular: regular rate and rhythm; no murmurs, rubs or gallops  Lungs: diminished in the bases, no crackles or wheezes  Gastrointestinal/Abdomen: mildly tender diffusey without rebound or guarding, + bowel sounds, +firmness/mild distension  :   Musculoskeletal:   Skin/Extremities:   Neurologic:   Psychiatric:   Hematologic/Lymphatic/Immunologic:        Data    Labs reviewed.  Recent Labs   Lab 09/09/24  0552 09/08/24  0556 09/07/24  0556 09/06/24  0609   WBC 12.1* 9.7  --  8.7   HGB 9.9* 10.1*  --  9.2*   MCV 89 90  --  87   * 588*  --  515*    136 133* 133*   POTASSIUM 3.9 4.0 4.1 4.4   CHLORIDE 98 100 97* 95*   CO2 24 24 25 25   BUN 15.4 19.8 26.4* 42.4*   CR 0.85 1.01* 1.18* 1.68*   ANIONGAP 13 12 11 13   LOUISA 7.8* 7.7* 7.8* 8.2*   GLC 92 104* 100* 89   ALBUMIN 2.8* 2.8*  --  3.0*   PROTTOTAL 5.8* 5.9*  --  6.0*   BILITOTAL 1.4* 1.2  --  1.4*   ALKPHOS 1,498* 1,427*  --  1,440*   ALT 17 16  --  17   AST 69* 58*  --  59*     Recent Labs   Lab Test 11/21/18  1714   NT-PROBNP, INPATIENT 196     Recent Labs   Lab 09/09/24  0552 09/08/24  0556 09/07/24  0556 09/06/24  0609 09/05/24  1828   GLC 92 104* 100* 89 96     No lab results found.    No results for input(s): \"INR\", \"IYBEYR74QYAL\" in the last 168 hours.  Recent Labs   Lab 09/09/24  0552 09/08/24  0556 09/06/24  0609 09/05/24  1828   WBC 12.1* 9.7 8.7 9.1       MICRO:  Cultures (including blood and urine):  No lab results found in last 7 days.    No results found for this or any previous visit (from the past 24 hour(s)).      Medications   All medications were reviewed. MAR.    Infusions:  Current " Facility-Administered Medications   Medication Dose Route Frequency Provider Last Rate Last Admin     Scheduled Medications:  Current Facility-Administered Medications   Medication Dose Route Frequency Provider Last Rate Last Admin    aspirin EC tablet 81 mg  81 mg Oral Daily Jorge Holliday MD   81 mg at 09/09/24 0927    fluticasone-vilanterol (BREO ELLIPTA) 100-25 MCG/ACT inhaler 1 puff  1 puff Inhalation Daily Rocky Uribe MD   1 puff at 09/09/24 1044    heparin lock flush 10 unit/mL injection 5-10 mL  5-10 mL Intracatheter Q24H Rocky Uribe MD   5 mL at 09/09/24 0555    heparin lock flush 100 unit/mL injection 5-10 mL  5-10 mL Intracatheter Q28 Days Rocky Uribe MD        pantoprazole (PROTONIX) EC tablet 40 mg  40 mg Oral BID AC Rocky Uribe MD   40 mg at 09/09/24 0645    polyethylene glycol (MIRALAX) Packet 17 g  17 g Oral Daily Jorge Holliday MD   17 g at 09/09/24 0928    senna-docusate (SENOKOT-S/PERICOLACE) 8.6-50 MG per tablet 1-2 tablet  1-2 tablet Oral BID Jorge Holliday MD   2 tablet at 09/09/24 0927    sodium chloride (PF) 0.9% PF flush 10-20 mL  10-20 mL Intracatheter Q28 Days Rocky Uribe MD        traMADol (ULTRAM) tablet 50 mg  50 mg Oral TID Jorge Holliday MD   50 mg at 09/09/24 0927     PRN Medications:  Current Facility-Administered Medications   Medication Dose Route Frequency Provider Last Rate Last Admin    albuterol (PROVENTIL HFA/VENTOLIN HFA) inhaler  2 puff Inhalation Q6H PRN Rocky Uribe MD   2 puff at 09/08/24 1226    bisacodyl (DULCOLAX) suppository 10 mg  10 mg Rectal Daily PRN Jorge Holliday MD   10 mg at 09/09/24 0954    heparin lock flush 10 unit/mL injection 5-10 mL  5-10 mL Intracatheter Q1H PRN Rocky Uribe MD   5 mL at 09/07/24 1747    hydrALAZINE (APRESOLINE) tablet 10 mg  10 mg Oral Q4H PRN Rocky Uribe MD        Or    hydrALAZINE (APRESOLINE) injection 10 mg  10  mg Intravenous Q4H PRN Rocky Uribe MD        HYDROmorphone (PF) (DILAUDID) injection 0.3-0.5 mg  0.3-0.5 mg Intravenous Q4H PRN Jorge Holliday MD        naloxone (NARCAN) injection 0.2 mg  0.2 mg Intravenous Q2 Min PRN Rocky Uribe MD        Or    naloxone (NARCAN) injection 0.4 mg  0.4 mg Intravenous Q2 Min PRRocky Goldman MD        Or    naloxone (NARCAN) injection 0.2 mg  0.2 mg Intramuscular Q2 Min PRRocky Goldman MD        Or    naloxone (NARCAN) injection 0.4 mg  0.4 mg Intramuscular Q2 Min PRRocky Goldman MD        ondansetron (ZOFRAN ODT) ODT tab 4 mg  4 mg Oral Q6H PRN Rocky Uribe MD   4 mg at 09/09/24 0925    Or    ondansetron (ZOFRAN) injection 4 mg  4 mg Intravenous Q6H PRRocky Goldman MD        polyethylene glycol (MIRALAX) Packet 17 g  17 g Oral Daily PRN Jorge Holliday MD        prochlorperazine (COMPAZINE) injection 5 mg  5 mg Intravenous Q6H PRRocky Goldman MD        Or    prochlorperazine (COMPAZINE) tablet 5 mg  5 mg Oral Q6H PRN Rocky Uribe MD   5 mg at 09/06/24 1002    Or    prochlorperazine (COMPAZINE) suppository 12.5 mg  12.5 mg Rectal Q12H PRRocky Goldman MD        senna-docusate (SENOKOT-S/PERICOLACE) 8.6-50 MG per tablet 1 tablet  1 tablet Oral BID PRRocky Goldman MD   1 tablet at 09/07/24 2223    Or    senna-docusate (SENOKOT-S/PERICOLACE) 8.6-50 MG per tablet 2 tablet  2 tablet Oral BID PRRocky Goldman MD   2 tablet at 09/08/24 0809    sodium chloride (PF) 0.9% PF flush 10-20 mL  10-20 mL Intracatheter q1 min prRocky Goldman MD        sodium chloride (PF) 0.9% PF flush 10-20 mL  10-20 mL Intracatheter Q1H PRN Rocky Uribe MD

## 2024-09-09 NOTE — PLAN OF CARE
0700 - 1930    Orientation: A&Ox4  Aggression Stop Light: Green  Activity: SBA + gb/w  Diet/BS Checks: Regular, intermittent nausea today with poor appetite  Tele: n/a  IV Access/Drains: Port HL, PIV SL  Pain Management: sched tramadol   Abnormal VS/Results: VSS on RA  Bowel/Bladder: Continent, suppository given in AM for constipation. Large formed BM, pt report much relief.  Skin/Wounds: Scattered bruising/scabs, moderate BLE edema  Consults: Hem/onc  D/C Disposition: Pending   Other Info:    - PRN zofran and compazine given   - Ambulated hallway x1 this anya

## 2024-09-09 NOTE — PLAN OF CARE
[id number]     Orientation: A&Ox4  Aggression Stop Light: green  Activity: SBA with gait belt and walker.  Diet/BS Checks: regular  Tele:  none  IV Access/Drains: L chest port HL with good blood return. R PIV SL.   Pain Management: denied pain. Scheduled tramadol.  Abnormal VS/Results: VSS on RA.   Bowel/Bladder: continent of B/B. 2 small BM this shift.   Skin/Wounds: scattered bruising. BLE edema. Scab on buttocks   Consults: hem/onc  D/C Disposition: pending     Other Info:   A&Ox4. VSS on RA. Denied pain. SBA with gait belt and walker. Continent B/B. 2 small BM this shift. Regular diet. L chest port HL with good blood return. R PIV SL. Discharge pending.

## 2024-09-09 NOTE — PROGRESS NOTES
[facility] Follow Up Note:    Assessment and Plan:  Imelda is a 79 year old admitted with THELMA in the setting of poor intake in the past few weeks. She has been on a chemotherapy holiday since 5/2024.     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  --9/2015 doxil chemotherapy  --10/2016 topotecan chemotherapy  --3/2017 resumed paclitaxel with carboplatin  --4/2018 began megace alternating every 3 weeks with tamoxifen  --switched to continuous megace 8/31/18  --Resumed paclitaxel with carboplatin 4/25/19  --Began carboplatin desensitization protocol 7/1/19  --responded but opted for a chemotherapy hiatus 9/2019  --6/1/20 began pembrolizumab with lenvatinib with the pembrolizumab administered every 3 weeks and the lenvatinib daily. Began lenvatinib at 10 mg daily, and with cycle 2 increased to full dose of 20 mg daily  --Both pembro and lenvatinib were held from late June 2020 to 9/14/20  due to diarrhea   --9/14/20  resumed Lenvatinib (at dose reduction of 10mg) and pembrolizumab.   --Continued pembrolizumab and current dose of lenvatinib at 10 mg daily   -- slowly rising and CT C/A/P 9/3/21 with mild enlargement of hepatic metastases (for example 6.5 x 5.6 vs 6.0 x 5.2, 8.3 x 5.3 vs 7.9 x 5.1 cm), mildly increased adenopathy up to 1.3 from 1.1 cm, other wise with stable disease  --9/8/21 discontinue pembrolizumab and Lenvatinib  --10/4/21 began paclitaxel and carboplatin, cycled every 3 weeks with dose adjustments as she last received in 2019  - 4/11/22 establish care with me and with stable disease on CT scans 4/8/22 we decided to hold her chemotherapy over the summer.  - Restarted treatment 10/18/22, bevacizumab added 11/29/22.   - 2/9/23 Taxol/carbo doses reduced by 15% for escalating fatigue.   - CT CAP 5/2/23 stable disease  -> treatment holiday  - slight progression radiographically and increase in tumor markers -> restart carboplatin paclitaxel and bevacizumab 11/16/23   - CT CAP 2/27/24 with mild improvement in all disease, tumor marker slight decrease   - 7/29/24 increase in size of mets as well as increased tumor marker while off chemotherapy for the summer   - CT 9/4/2024 showed disease progression in the liver and lymph nodes  - She was due to resume treatment on 9/10/2024 with paclitaxel and bevacizumab     PLAN:  - was scheduled for chemo as OP next week.   - Chemotherapy on hold, will need to see how her performance status / overall health improve over the next 1 week.   - Discussed hospice with patient today -> she would like to see how much improvement she can get from her generalized weakness before making a decision about further treatment vs supportive care.      THELMA  - Baseline 0.8-0.9 in clinic, but up to 2.8 on 9/4/2024  - Did have CT with contrast on 9/4/2024, no hydronephrosis on that day  - Likely due to nausea and poor intake  - IV hydration has been initiated with improvement in renal function     Anemia / fatigue   - Due to malignancy and its treatment  - Started on EPO 11/29/22, Hg increased to 11.0 and she feels much less fatigued   - 11/14/23 -> IV iron   - Fatigue negatively affecting her QOL, slight dose reduction without noticeable improvement   - EPO for Hg <10  - Hg has not improved as much as we would expect on chemotherapy holiday    - Given her prior chemotherapy history we must look for evidence of MDS, bone marrow biopsy on 9/3 did not show MDS     PLAN:  - PRBC if Hb<7     LFT abnormalities  - Has known liver metastases which likely impact liver function     Nausea   Is controlled well on antiemetics    Shortness of breath( no tachycardia or hypoxia)  - CXR and EKG unrevealing  - Seems better with inhaler    PLAN: If persistent consider CT chest     Weakness:  Being evaluated for weakness/  "disposition   - PT        Subjective:    Still feels very week for the last 1 month.       Labs:  All labs reviewed    CBC  Recent Labs   Lab 09/09/24  0552 09/08/24  0556 09/06/24  0609   WBC 12.1* 9.7 8.7   HGB 9.9* 10.1* 9.2*   MCV 89 90 87   * 588* 515*       CMP  Recent Labs   Lab 09/09/24  0552 09/08/24  0556 09/07/24  0556 09/06/24  0609 09/05/24  1828    136 133* 133* 134*   POTASSIUM 3.9 4.0 4.1 4.4 4.4   CHLORIDE 98 100 97* 95* 91*   CO2 24 24 25 25 25   ANIONGAP 13 12 11 13 18*   GLC 92 104* 100* 89 96   BUN 15.4 19.8 26.4* 42.4* 49.5*   CR 0.85 1.01* 1.18* 1.68* 2.10*   GFRESTIMATED 70 57* 47* 31* 24*   LOUISA 7.8* 7.7* 7.8* 8.2* 8.7*   PROTTOTAL 5.8* 5.9*  --  6.0* 6.8   ALBUMIN 2.8* 2.8*  --  3.0* 3.4*   BILITOTAL 1.4* 1.2  --  1.4* 1.6*   ALKPHOS 1,498* 1,427*  --  1,440* 1,633*   AST 69* 58*  --  59* 70*   ALT 17 16  --  17 18       INRNo lab results found in last 7 days.    Blood CultureNo results for input(s): \"CULT\" in the last 168 hours.        "

## 2024-09-10 NOTE — PROGRESS NOTES
[facility] Follow Up Note:    Assessment and Plan:  Imelda is a 79 year old admitted with THELMA in the setting of poor intake in the past few weeks. She has been on a chemotherapy holiday since 5/2024.     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  --9/2015 doxil chemotherapy  --10/2016 topotecan chemotherapy  --3/2017 resumed paclitaxel with carboplatin  --4/2018 began megace alternating every 3 weeks with tamoxifen  --switched to continuous megace 8/31/18  --Resumed paclitaxel with carboplatin 4/25/19  --Began carboplatin desensitization protocol 7/1/19  --responded but opted for a chemotherapy hiatus 9/2019  --6/1/20 began pembrolizumab with lenvatinib with the pembrolizumab administered every 3 weeks and the lenvatinib daily. Began lenvatinib at 10 mg daily, and with cycle 2 increased to full dose of 20 mg daily  --Both pembro and lenvatinib were held from late June 2020 to 9/14/20  due to diarrhea   --9/14/20  resumed Lenvatinib (at dose reduction of 10mg) and pembrolizumab.   --Continued pembrolizumab and current dose of lenvatinib at 10 mg daily   -- slowly rising and CT C/A/P 9/3/21 with mild enlargement of hepatic metastases (for example 6.5 x 5.6 vs 6.0 x 5.2, 8.3 x 5.3 vs 7.9 x 5.1 cm), mildly increased adenopathy up to 1.3 from 1.1 cm, other wise with stable disease  --9/8/21 discontinue pembrolizumab and Lenvatinib  --10/4/21 began paclitaxel and carboplatin, cycled every 3 weeks with dose adjustments as she last received in 2019  - 4/11/22 establish care with me and with stable disease on CT scans 4/8/22 we decided to hold her chemotherapy over the summer.  - Restarted treatment 10/18/22, bevacizumab added 11/29/22.   - 2/9/23 Taxol/carbo doses reduced by 15% for escalating fatigue.   - CT CAP 5/2/23 stable disease  -> treatment holiday  - slight progression radiographically and increase in tumor markers -> restart carboplatin paclitaxel and bevacizumab 11/16/23   - CT CAP 2/27/24 with mild improvement in all disease, tumor marker slight decrease   - 7/29/24 increase in size of mets as well as increased tumor marker while off chemotherapy for the summer   - CT 9/4/2024 showed disease progression in the liver and lymph nodes  - She was due to resume treatment on 9/10/2024 with paclitaxel and bevacizumab     PLAN:  - was scheduled for chemo as OP next week.   - Chemotherapy on hold, will need to see how her performance status / overall health improve over the next 1 week.   - Discussed hospice with patient 9/9-> she would like to see how much improvement she can get from her generalized weakness before making a decision about further treatment vs supportive care.      THELMA  - Baseline 0.8-0.9 in clinic, but up to 2.8 on 9/4/2024  - Did have CT with contrast on 9/4/2024, no hydronephrosis on that day  - Likely due to nausea and poor intake  - IV hydration has been initiated with improvement in renal function     Anemia / fatigue   - Due to malignancy and its treatment  - Started on EPO 11/29/22, Hg increased to 11.0 and she feels much less fatigued   - 11/14/23 -> IV iron   - Fatigue negatively affecting her QOL, slight dose reduction without noticeable improvement   - EPO for Hg <10  - Hg has not improved as much as we would expect on chemotherapy holiday    - Given her prior chemotherapy history we must look for evidence of MDS, bone marrow biopsy on 9/3 did not show MDS     PLAN:  - PRBC if Hb<7     LFT abnormalities  - Has known liver metastases which likely impact liver function  - Most likely due to infiltrative mets   - If T bili and alk phos continue to rise -> liver ultrasound      Nausea   Improved with antiemetics    Leukocytosis   - Unclear etiology at this time  - No localizing symptoms   - Afebrile       "  Weakness:  Being evaluated for weakness/ disposition   - PT may recommend in home PT upon discharge        Subjective:    No improvement in weakness. Eating pasta.        Labs:  All labs reviewed    CBC  Recent Labs   Lab 09/10/24  0557 09/09/24  0552 09/08/24  0556   WBC 17.8* 12.1* 9.7   HGB 10.5* 9.9* 10.1*   MCV 89 89 90   * 599* 588*       CMP  Recent Labs   Lab 09/10/24  0557 09/09/24  0552 09/08/24  0556 09/07/24  0556 09/06/24  0609   * 135 136 133* 133*   POTASSIUM 4.1 3.9 4.0 4.1 4.4   CHLORIDE 97* 98 100 97* 95*   CO2 23 24 24 25 25   ANIONGAP 13 13 12 11 13   * 92 104* 100* 89   BUN 14.0 15.4 19.8 26.4* 42.4*   CR 0.81 0.85 1.01* 1.18* 1.68*   GFRESTIMATED 74 70 57* 47* 31*   LOUISA 7.8* 7.8* 7.7* 7.8* 8.2*   PROTTOTAL 5.9* 5.8* 5.9*  --  6.0*   ALBUMIN 3.0* 2.8* 2.8*  --  3.0*   BILITOTAL 1.9* 1.4* 1.2  --  1.4*   ALKPHOS 1,529* 1,498* 1,427*  --  1,440*   AST 70* 69* 58*  --  59*   ALT 18 17 16  --  17       INRNo lab results found in last 7 days.    Blood CultureNo results for input(s): \"CULT\" in the last 168 hours.        "

## 2024-09-10 NOTE — PROGRESS NOTES
09/10/24 7320   Appointment Info   Signing Clinician's Name / Credentials (OT) Saumya Knowles, OTR/L   Living Environment   People in Home spouse   Current Living Arrangements house   Home Accessibility stairs to enter home;stairs within home   Number of Stairs, Main Entrance 8   Stair Railings, Main Entrance railings safe and in good condition;railing on right side (ascending)   Number of Stairs, Within Home, Primary greater than 10 stairs   Stair Railings, Within Home, Primary railings safe and in good condition;railing on right side (ascending)   Transportation Anticipated family or friend will provide;car, drives self   Living Environment Comments Pt lives in 2-story home w/basement but is able to stay on main floor where has pull out bed and/or electric lift/recliner and bath with walk-in shower.   Self-Care   Usual Activity Tolerance good   Current Activity Tolerance moderate   Regular Exercise No   Equipment Currently Used at Home none;grab bar, toilet;grab bar, tub/shower  (see below)   Fall history within last six months no   Activity/Exercise/Self-Care Comment Pt has indep all ADL, IADL and mob without AD/AE up until a week or two PTA when developed worsening weakness. They own adaptive equip from previous health issues including a bath bench, raised toilet seat  and some adaptive dressing tools. At baseline, both pt and spouse indep with med mgmt, driving, shopping and pt does outdoor gardening.   General Information   Onset of Illness/Injury or Date of Surgery 09/05/24   Referring Physician Jorge Holliday MD   Patient/Family Therapy Goal Statement (OT) return home   Additional Occupational Profile Info/Pertinent History of Current Problem 78yo female admitted with weakness, N/V and found to have metastic liver ds. PMH includes endometrial cancer and chemo was to resume 9/9 but she asked to hold until regains some strength.   Existing Precautions/Restrictions fall   Cognitive Status Examination    Orientation Status orientation to person, place and time   Affect/Mental Status (Cognitive) WFL   Follows Commands follows one-step commands;over 90% accuracy   Visual Perception   Visual Impairment/Limitations WNL;corrective lenses full-time   Range of Motion Comprehensive   General Range of Motion no range of motion deficits identified   Strength Comprehensive (MMT)   Comment, General Manual Muscle Testing (MMT) Assessment BUE strength functional, B sami approx 4/5   Coordination   Coordination Comments Pt has arthritic changes in hands but is able to use functionally for self-feeding, grooming tasks   Bed Mobility   Comment (Bed Mobility) supine to sit SBA   Transfers   Transfers sit-stand transfer;toilet transfer   Sit-Stand Transfer   Sit-Stand Dickens (Transfers) supervision   Assistive Device (Sit-Stand Transfers) walker, front-wheeled   Toilet Transfer   Type (Toilet Transfer) sit-stand;stand-sit   Dickens Level (Toilet Transfer) supervision   Assistive Device (Toilet Transfer) grab bars/safety frame;walker, front-wheeled   Balance   Balance Comments Indep static sitting, SBA static standing and CGA provided for amb with walker but no LOB   Activities of Daily Living   BADL Assessment/Intervention lower body dressing;grooming;toileting   Lower Body Dressing Assessment/Training   Dickens Level (Lower Body Dressing) set up;supervision   Grooming Assessment/Training   Position (Grooming) supported standing   Dickens Level (Grooming) set up;supervision   Toileting   Dickens Level (Toileting) supervision   Clinical Impression   Criteria for Skilled Therapeutic Interventions Met (OT) Yes, treatment indicated   OT Diagnosis decline in ADL/IADL performance   OT Problem List-Impairments impacting ADL problems related to;activity tolerance impaired;strength  (progression of illness)   Assessment of Occupational Performance 3-5 Performance Deficits   Identified Performance Deficits HH  "management, bathing, standing ADL tasks, functional mob/stairs   Planned Therapy Interventions (OT) ADL retraining;IADL retraining;strengthening;transfer training   Clinical Decision Making Complexity (OT) problem focused assessment/low complexity   Risk & Benefits of therapy have been explained evaluation/treatment results reviewed;care plan/treatment goals reviewed;risks/benefits reviewed;current/potential barriers reviewed;participants voiced agreement with care plan;patient;spouse/significant other   OT Total Evaluation Time   OT Eval, Low Complexity Minutes (07042) 15   OT Goals   Therapy Frequency (OT) Daily   OT Predicted Duration/Target Date for Goal Attainment 09/12/24   OT Goals OT Goal 1   OT: Goal 1 Patient will verbalize understanding of incorporation of energy conservation principles and adaptive equipment with daily tasks to be able to maintain maximal level of indep and safety during cancer treatments so can continue to manage in home setting.   Interventions   Interventions Quick Adds Self-Care/Home Management;Therapeutic Activity   Self-Care/Home Management   Self-Care/Home Mgmt/ADL, Compensatory, Meal Prep Minutes (59125) 24   Symptoms Noted During/After Treatment (Meal Preparation/Planning Training) fatigue   Treatment Detail/Skilled Intervention OT: pt in bed upon arrival, O x 4, agreeable to out of bed activity. Performed sit-stand, amb in room/bathroom with walker SBA-CGA no LOB. Seated EOB she was able to don socks, clothing over feet by bending over - she stated that would have really tired her out yesterday but did not today. Performed toileting task  (including transfer from low toilet stool) SBA using grab bar on wall. At completion of ADL tasks ambulated on floor using walker approx 180ft without rest break SBA and reported that she felt \"surprisingly okay\". Performed stand to sit in recliner and then indep adjusted footrest to elevate LEs.  OT discussed options for home management with " pt and spouse and helped problem solve environment to maximize pt's safety and ease of ADLs upon return home. She was very much in agreement with OT recommendations which included using the main floor bed and/or recliner for sleeping instead of going up stairs, using walk-in shower and placing their tub bench in there as well as placing raised toilet seat on toilet (have these items stored in a closet). Also recommended she consider asking children to help with meals, cleaning and she was very interested in MOW - will ask SW to provide resource info. Spouse able to provide transport and do errands.   OT Discharge Planning   OT Plan Bring all EC handouts for specific tasks including kitchen, chores, outdoor tasks, etc. Please see if SW has resource number for MOW in pt's residential area.   OT Discharge Recommendation (DC Rec) home with assist;home with home care occupational therapy   OT Rationale for DC Rec Anticipate pt able to return home w/modifications as noted and to which she and spouse agreed. Will benefit from home health OT for home safety assessment and further training in ADL/IADL modification techniques while undergoing treatment to maintain maximal level of strength, indep and safety with daily tasks.   OT Brief overview of current status SBA bed mob, toileting, grooming. Amb x 180ft with FWW SBA.  Encouraged pt to amb with nursing.  Would benefit from resource info on MOW.

## 2024-09-10 NOTE — PLAN OF CARE
9-10-24 Nursing Shift [id number]  Orientation: A&O x4  Aggression Stop Light: Green  Activity: A1 GBW. Is a Fall Risk and unsteady. Pt can reposition self in bed.   Seen by PT this am who reported that pt had poor activity tolerance with BARBER, fatigue, and moderate weakness during ambulation very short distance in room to chair.  Her reportedly dyspnea resolved quickly after rest  Diet/BS Checks: Regular diet  IV Access/Drains: Chest Port  HL  Pain Management: On scheduled tramadol. Denied any pain this shift  Abnormal VS/Results: VSS. Known progression of liver mets probable cause of abnormal hepatic labs. Chemo has been on hold unless otherwise noted by oncologist (Chemo was originally going to be today in oncology clinic)  Bowel/Bladder: Continent of B/B; Yesterdays constipation led to bowel meds last anya/noc resulting in 3 loose  diarrhea episodes previous noc shift and some diarrhea this afteroon. Writer held the scheduled Senokot-S and Miralax - notified Dr Holliday - resulted in those meds were dc'd  Skin/Wounds: Scattered bruising and scabs, Moderate edema to BLE  Consults: Hem/Onc. D/C Disposition: Plan per Sravani Holliday's assessment and talk with pt and family todayis for discharge home tomorrow with HHC/PT/OPT/ RN  Other Info: Continue to monitor    ......Gretta Espinosa RN, BSN, OCN

## 2024-09-10 NOTE — PLAN OF CARE
[id number]  Orientation: a&ox4  Aggression Stop Light: green  Activity: sba w/ walker  Diet/BS Checks: regular  Tele:  n/a  IV Access/Drains: R PIV SL. Port HL  Pain Management: c/o abdominal pain, IV dilaudid 0.3 mg given  Abnormal VS/Results: VSS on RA  Bowel/Bladder: cont B/B to bsc. 4 BM this shift  Skin/Wounds: scattered bruising/scabs. BLE edema +2. Redness on sacrum/buttocks  Consults: heme/onc  D/C Disposition: pending  Other Info:    -PRN zofran given x1  -IV dilaudid given x1   -yohannes tramadol

## 2024-09-10 NOTE — PROGRESS NOTES
09/10/24 1024   Appointment Info   Signing Clinician's Name / Credentials (PT) Maura Fletcher, PT, DPT   Living Environment   People in Home spouse   Current Living Arrangements house   Home Accessibility stairs to enter home;stairs within home   Number of Stairs, Main Entrance 8   Stair Railings, Main Entrance railings safe and in good condition;railing on right side (ascending)   Number of Stairs, Within Home, Primary greater than 10 stairs   Stair Railings, Within Home, Primary railings safe and in good condition;railing on right side (ascending)   Living Environment Comments Pt lives in two level home, bedroom is upstairs, could potentially stay on main level, spouse does not do well with stairs.   Self-Care   Usual Activity Tolerance good   Current Activity Tolerance fair   Regular Exercise No   Equipment Currently Used at Home none   Fall history within last six months no   Activity/Exercise/Self-Care Comment Pt reports she does not currently use assistive device, does have cane and walker at home, independent though has had increasing weakness.   General Information   Onset of Illness/Injury or Date of Surgery 09/05/24   Referring Physician Jorge Holliday MD   Patient/Family Therapy Goals Statement (PT) To go home   Pertinent History of Current Problem (include personal factors and/or comorbidities that impact the POC) Pt is 79 year old female adm on 9/5/24 with abnormal lab results and nausea/vomitting. PMH includes endometrial carcinoma diagnosed 2015, last chemo in May was due to start again 9/9 but on hold currently. Recent CT on 9/4/24 showed metastatic disease to liver.   Existing Precautions/Restrictions fall   Cognition   Affect/Mental Status (Cognition) WFL   Orientation Status (Cognition) oriented x 3   Follows Commands (Cognition) WFL   Pain Assessment   Patient Currently in Pain No   Integumentary/Edema   Integumentary/Edema Comments Pt with B LE swelling noted during eval, reports it  is worse than baseline.   Posture    Posture Forward head position   Range of Motion (ROM)   Range of Motion ROM is WFL   Strength (Manual Muscle Testing)   Strength (Manual Muscle Testing) Deficits observed during functional mobility   Strength Comments Pt generally deconditioned, no focal weakness but difficulty performing functional tasks   Bed Mobility   Comment, (Bed Mobility) Not assessed this visit   Transfers   Comment, (Transfers) SBA   Gait/Stairs (Locomotion)   Comment, (Gait/Stairs) SBA with FWW   Balance   Balance Comments No overt LOB, slightly unsteady   Clinical Impression   Criteria for Skilled Therapeutic Intervention Yes, treatment indicated   PT Diagnosis (PT) Impaired mobility   Influenced by the following impairments Decreased activity tolerance, decreased strength, decreased balance   Functional limitations due to impairments Decreased ability to participate in daily tasks   Clinical Presentation (PT Evaluation Complexity) stable   Clinical Presentation Rationale Current presentation, [facility]   Clinical Decision Making (Complexity) low complexity   Planned Therapy Interventions (PT) balance training;bed mobility training;gait training;home exercise program;patient/family education;stair training;strengthening;transfer training   Risk & Benefits of therapy have been explained evaluation/treatment results reviewed;care plan/treatment goals reviewed;risks/benefits reviewed;current/potential barriers reviewed;participants voiced agreement with care plan;participants included;patient;daughter   PT Total Evaluation Time   PT Eval, Low Complexity Minutes (01803) 10   Physical Therapy Goals   PT Frequency 5x/week   PT Predicted Duration/Target Date for Goal Attainment 09/21/24   PT Goals Bed Mobility;Transfers;Gait;Stairs   PT: Bed Mobility Independent;Supine to/from sit;Rolling   PT: Transfers Modified independent;Sit to/from stand;Bed to/from chair;Assistive device   PT: Gait Modified  independent;Greater than 200 feet;Assistive device   PT: Stairs Supervision/stand-by assist;8 stairs;Rail on right   PT Discharge Planning   PT Plan General strengthening, progress ambulation as yumi, overall activity tolerance   PT Discharge Recommendation (DC Rec) home with assist;home with home care physical therapy;Transitional Care Facility   PT Rationale for DC Rec Pt is below baseline level of function, fatigues very easily, does not need much physical assist with mobility but anticipate need for assistance after discharge due to fatigue levels, will need assist with house cleaning, meals, possibly ADLs depending on fatigue levels, and would benefit from being set up on first floor to decrease amount of stairs needed to be done. If pt does not have assist at home then would recommend TCU for continued inpatient rehab to optimize functional independence and safety prior to return home.   PT Brief overview of current status Goals of therapy will be to address safe mobility and make recs for d/c to next level of care. Pt and RN will continue to follow all falls risk precautions as documented by RN staff while hospitalized   Total Session Time   Total Session Time (sum of timed and untimed services) 10

## 2024-09-10 NOTE — PROGRESS NOTES
[facility]    Internal Medicine Hospitalist Progress Note  09/10/2024  I evaluated patient on the above date.    Jorge Holliday Jr., MD  [phone] (p)  Text Page  Vocera      [New actions/orders today (09/10/2024) are underlined in the assessment and plan. All lab results in the assessment and plan were reviewed.]  Assessment & Plan   Jany Park is a 79 year old female with past medical history significant for endometrial cancer; HTN; asthma; fibromyalgia with chronic pain; and GERD; who presented 9/5/2024 with abnormal outpatient labs and nausea/vomiting, found with THELMA.    On initial evaluation, pt was afebrile, VSS. Labs notable for CBC with WBC normal, hgb 10.3, platelets 577K; BMP with sodium 134, chloride 91, BUN 49.5, cr 2.1, calcium 9.7, AG 18; LFT's with ALP 1633, AST 70, tbili 1.6; UA with 6 WBC, small LE, 19 hyaline casts.       Acute kidney injury, suspect prerenal (from N/V, poor oral intake), contrast nephropathy +/- NSAID, with ATN, resolved.  * Initial presentation as above. Cr 2.1, BUN 49.5, UA significant for 19 hyaline casts. Noted that pt had a contrast CT 9/3 as well. Started on IVF's on admit. PTA lisinopril and meloxicam held on admit.  * IVF's completed 9/7.  * Cr normalized 9/9.    Endometrial cancer, metastatic  Abnormal LFTs, suspect related to metastatic disease.  Nausea and vomiting, suspect multifactorial including related to above, also possibly constipation.  Poor intake related to above.  * Followed by Dr. Dreyer of [facility]. See Oncology note for oncologic history. Noted diagnosed and had initial surgery 2015 with subsequently chemotherapy. Noted subsequently development of liver metastases. Noted last chemotherapy in 5/2024. Noted that pt underwent CT imaging and BM biopsy 9/4 (results not available on admit). Noted was suppose to undergo chemotherapy on 9/10/2024.  * Initial presentation as above. On admit, reported ~3 weeks of nausea and vomiting,  intake of ~25% of baseline. LFT's elevated.  [facility] consulted on admit.  * On 9/6, per Oncology, noted that CT 9/4 showed disease progression in the liver and lymph nodes. Also noted that BM biopsy showed dysplasia, pending cytogenetics. Noted that her anemia may be due to an exhausted bone marrow from her years of chemotherapy. ?nausea related to IV iron transfusion. Brain MRI ordered that was negative for acute findings.  * On 9/8, tbili normalized, AST and ALP stable/slightly improved.  * On 9/9, more nauseated but some improvement after BM. Seen by Dr. Dreyer (primary Oncologist) and recommended holding chemotherapy; discussed about hospice, but pt not ready to consider yet.  Recent Labs   Lab 09/10/24  0557 09/09/24  0552 09/08/24  0556 09/07/24  0556 09/06/24  0609 09/05/24  1857 09/05/24  1828   ALBUMIN 3.0* 2.8* 2.8*  --  3.0*  --  3.4*   BILITOTAL 1.9* 1.4* 1.2  --  1.4*  --  1.6*   ALT 18 17 16  --  17  --  18   AST 70* 69* 58*  --  59*  --  70*   ALKPHOS 1,529* 1,498* 1,427*  --  1,440*  --  1,633*   PROTEIN  --   --   --   --   --  Negative  --    CR 0.81 0.85 1.01* 1.18* 1.68*  --  2.10*   - Continue to treat other issues as noted.   - Continue to monitor LFT's - repeat in am.  - Continue PRN ondansetron, PRN prochlorperazine.  - Follow-up with Dr. Dreyer, [facility], outpatient - holding chemotherapy for now (she was suppose to undergo chemotherapy 9/10/2024).  - Appreciate help from [facility].  - Constipation management as noted.    Constipation due to inactivity and opioids.  * On 9/8, pt c/o constipation. Bowel regimen intensified.  * On 9/9, had BM after suppository.  - Continue scheduled polyethylene glycol daily and senna-docusate BID and add   - Continue PRN polyethylene glycol, senna-docusate and PRN bisacodyl suppository.  - Continue to increase activity as able.    Dyspnea with chest tightness.  * On 9/8, pt noted feeling dyspneic with chest tightness. ECG showed SR, no acute ischemic changes. CXR  showed small pleural effusions, no focal infiltrates/edema.  * On 9/9, chest tightness better.  - Continue to monitor clinically.    Hyponatremia, suspect nutritional/hypovolemia.  * Sodium 134 on admit.   * Sodium normalized 9/8.    Chronic normocytic anemia.  * Hgb 10.3 on admit. Noted most recently 11.2 g/dl 3/2024.   * As above, per Oncology, noted that BM biopsy showed dysplasia, pending cytogenetics. Noted that her anemia may be due to an exhausted bone marrow from her years of chemotherapy.   Recent Labs   Lab 09/10/24  0557 09/09/24  0552 09/08/24  0556 09/06/24  0609 09/05/24  1828   HGB 10.5* 9.9* 10.1* 9.2* 10.3*   - Continue to monitor CBC - repeat in am.     Hypertension (benign essential).  Chronic lymphedema.  [PTA: lisinopril 10 mg daily; PRN bumetanide 1 mg BID.]  * Lisinopril held on admit.  * BP's normal off meds.  - Continue to hold lisinopril.  - Continue to hold PRN bumetanide.    Fibromyalgia with chronic pain.  * PTA on regimen including meloxicam, scheduled tramadol 100 mg TID and PRN oxycodone.  * PTA tramadol held on admit given THELMA.  * On 9/6, pt with withdrawal symptoms.   * On 9/7 am, scheduled tramadol restarted.  - Continue tramadol 100 gm TID.  - Continue PRN oxycodone and PRN IV hydromorphone; minimize opioids as able.  - Continue to hold meloxicam given THELMA.    Weakness and physical deconditioning due to multiple acute and chronic medical issues.  * Pt very weak since hospitalization  * On 9/10, seen by PT and recommended home with assist and home cares vs TCU.  - Discussed with pt,  and daughter, along with Gretta DA SILVA. Overall, pt and family feels she will be capable to go home with assistance from  with home health cares/therapies.     GERD.  - Continue pantoprazole BID.     Asthma.  - Continue fluticasone-vilanterol and PRN albuterol.       Clinically Significant Risk Factors              # Hypoalbuminemia: Lowest albumin = 2.8 g/dL at 9/9/2024  5:52 AM, will monitor  "as appropriate                 # Overweight: Estimated body mass index is 27.61 kg/m  as calculated from the following:    Height as of this encounter: 1.664 m (5' 5.5\").    Weight as of this encounter: 76.4 kg (168 lb 8 oz)., PRESENT ON ADMISSION  # Moderate Malnutrition: based on nutrition assessment, PRESENT ON ADMISSION   # Asthma: noted on problem list              Diet: Regular Diet Adult  Snacks/Supplements Adult: Ensure Clear; With Meals    Prophylaxis: PCD's and ambulation  Ochoa Catheter: Not present  Lines: PRESENT      Port A Cath Single 07/30/20 Left Chest wall-Site Assessment: WDL      Code Status: Full Code    Disposition Plan   Medically Ready for Discharge: Anticipated Tomorrow  Expected discharge to home with home health services pending  continued improvement .    Entered: Jroge Holliday MD 09/10/2024, 12:58 PM           Interval History   Doing a bit better.  Feels a bit stronger today.  Nausea better overall, tolerating diet.  Having BM's.    * Data reviewed today: I reviewed all new labs and imaging over the last 24 hours. I personally reviewed no images or ECG's today.    Physical Exam   Most recent vitals:   , Blood pressure 115/70, pulse 97, temperature 98.1  F (36.7  C), temperature source Oral, resp. rate 18, height 1.664 m (5' 5.5\"), weight 76.4 kg (168 lb 8 oz), SpO2 98%. O2 Device: None (Room air)    Vitals:    09/05/24 2301 09/06/24 0004 09/09/24 0633   Weight: 75.1 kg (165 lb 9.1 oz) 73.8 kg (162 lb 11.2 oz) 76.4 kg (168 lb 8 oz)     Vital signs with ranges:  Temp:  [98.1  F (36.7  C)-98.9  F (37.2  C)] 98.1  F (36.7  C)  Pulse:  [] 97  Resp:  [16-20] 18  BP: (109-135)/(59-78) 115/70  SpO2:  [97 %-99 %] 98 %  Patient Vitals for the past 24 hrs:   BP Temp Temp src Pulse Resp SpO2   09/10/24 1218 115/70 98.1  F (36.7  C) Oral 97 18 98 %   09/10/24 0743 118/59 98.5  F (36.9  C) Oral 87 18 97 %   09/10/24 0320 121/73 98.9  F (37.2  C) Oral 103 16 97 %   09/10/24 0033 109/71 " "98.6  F (37  C) Oral 96 18 99 %   09/09/24 1950 135/72 98.6  F (37  C) Oral 98 18 99 %   09/09/24 1545 134/78 98.4  F (36.9  C) Oral 102 20 99 %     I/O's last 24 hours:  I/O last 3 completed shifts:  In: -   Out: 175 [Urine:175]    Constitutional: awake, alert, more comfortable, oriented, brighter   Head:   Eyes:   ENT:   Neck:   Cardiovascular: regular rate and rhythm; no murmurs, rubs or gallops  Lungs: diminished in the bases, no crackles or wheezes  Gastrointestinal/Abdomen: softer, non-tender to light touch, + bowel sounds, +firmness/mild distension, improved  :   Musculoskeletal:   Skin/Extremities:   Neurologic:   Psychiatric:   Hematologic/Lymphatic/Immunologic:        Data    Labs reviewed.  Recent Labs   Lab 09/10/24  0557 09/09/24  0552 09/08/24  0556   WBC 17.8* 12.1* 9.7   HGB 10.5* 9.9* 10.1*   MCV 89 89 90   * 599* 588*   * 135 136   POTASSIUM 4.1 3.9 4.0   CHLORIDE 97* 98 100   CO2 23 24 24   BUN 14.0 15.4 19.8   CR 0.81 0.85 1.01*   ANIONGAP 13 13 12   LOUISA 7.8* 7.8* 7.7*   * 92 104*   ALBUMIN 3.0* 2.8* 2.8*   PROTTOTAL 5.9* 5.8* 5.9*   BILITOTAL 1.9* 1.4* 1.2   ALKPHOS 1,529* 1,498* 1,427*   ALT 18 17 16   AST 70* 69* 58*     Recent Labs   Lab Test 11/21/18  1714   NT-PROBNP, INPATIENT 196     Recent Labs   Lab 09/10/24  0557 09/09/24  0552 09/08/24  0556 09/07/24  0556 09/06/24  0609 09/05/24  1828   * 92 104* 100* 89 96     No lab results found.    No results for input(s): \"INR\", \"[id number]\" in the last 168 hours.  Recent Labs   Lab 09/10/24  0557 09/09/24  0552 09/08/24  0556 09/06/24  0609 09/05/24  1828   WBC 17.8* 12.1* 9.7 8.7 9.1       MICRO:  Cultures (including blood and urine):  No lab results found in last 7 days.    No results found for this or any previous visit (from the past 24 hour(s)).      Medications   All medications were reviewed. MAR.    Infusions:  Current Facility-Administered Medications   Medication Dose Route Frequency Provider Last Rate " Last Admin     Scheduled Medications:  Current Facility-Administered Medications   Medication Dose Route Frequency Provider Last Rate Last Admin    aspirin EC tablet 81 mg  81 mg Oral Daily Jorge Holliday MD   81 mg at 09/10/24 0948    fluticasone-vilanterol (BREO ELLIPTA) 100-25 MCG/ACT inhaler 1 puff  1 puff Inhalation Daily Rocky Uribe MD   1 puff at 09/10/24 0950    heparin lock flush 10 unit/mL injection 5-10 mL  5-10 mL Intracatheter Q24H Rocky Uribe MD   5 mL at 09/10/24 0556    heparin lock flush 100 unit/mL injection 5-10 mL  5-10 mL Intracatheter Q28 Days Rocky Uribe MD        pantoprazole (PROTONIX) EC tablet 40 mg  40 mg Oral BID AC Rocky Uribe MD   40 mg at 09/10/24 0556    polyethylene glycol (MIRALAX) Packet 17 g  17 g Oral Daily Jorge Holliday MD   17 g at 09/09/24 0928    senna-docusate (SENOKOT-S/PERICOLACE) 8.6-50 MG per tablet 1-2 tablet  1-2 tablet Oral BID Jorge Holliday MD   1 tablet at 09/09/24 2158    sodium chloride (PF) 0.9% PF flush 10-20 mL  10-20 mL Intracatheter Q28 Days Rocky Uribe MD        traMADol (ULTRAM) tablet 50 mg  50 mg Oral TID Jorge Holliday MD   50 mg at 09/10/24 0948     PRN Medications:  Current Facility-Administered Medications   Medication Dose Route Frequency Provider Last Rate Last Admin    albuterol (PROVENTIL HFA/VENTOLIN HFA) inhaler  2 puff Inhalation Q6H PRN Rocky Uribe MD   2 puff at 09/08/24 1226    bisacodyl (DULCOLAX) suppository 10 mg  10 mg Rectal Daily PRN Jorge Holliday MD   10 mg at 09/09/24 0954    heparin lock flush 10 unit/mL injection 5-10 mL  5-10 mL Intracatheter Q1H PRN Rocky Uribe MD   5 mL at 09/07/24 1747    hydrALAZINE (APRESOLINE) tablet 10 mg  10 mg Oral Q4H PRN Rocky Uribe MD        Or    hydrALAZINE (APRESOLINE) injection 10 mg  10 mg Intravenous Q4H PRN Rocky Uribe MD        HYDROmorphone (PF) (DILAUDID)  injection 0.3-0.5 mg  0.3-0.5 mg Intravenous Q4H PRN Jorge Holliday MD   0.3 mg at 09/09/24 2329    naloxone (NARCAN) injection 0.2 mg  0.2 mg Intravenous Q2 Min PRN Rocky Uribe MD        Or    naloxone (NARCAN) injection 0.4 mg  0.4 mg Intravenous Q2 Min PRN Rocky Uribe MD        Or    naloxone (NARCAN) injection 0.2 mg  0.2 mg Intramuscular Q2 Min PRN Rocky Uribe MD        Or    naloxone (NARCAN) injection 0.4 mg  0.4 mg Intramuscular Q2 Min PRN Rocky Uribe MD        ondansetron (ZOFRAN ODT) ODT tab 4 mg  4 mg Oral Q6H PRN Rocky Uribe MD   4 mg at 09/09/24 2306    Or    ondansetron (ZOFRAN) injection 4 mg  4 mg Intravenous Q6H PRN Rocky Uribe MD        polyethylene glycol (MIRALAX) Packet 17 g  17 g Oral Daily PRN Jorge Holliday MD        prochlorperazine (COMPAZINE) injection 5 mg  5 mg Intravenous Q6H PRN Rocky Uribe MD        Or    prochlorperazine (COMPAZINE) tablet 5 mg  5 mg Oral Q6H PRN Rocky Uribe MD   5 mg at 09/09/24 1414    Or    prochlorperazine (COMPAZINE) suppository 12.5 mg  12.5 mg Rectal Q12H PRRocky Goldman MD        senna-docusate (SENOKOT-S/PERICOLACE) 8.6-50 MG per tablet 1 tablet  1 tablet Oral BID PRRocky Goldman MD   1 tablet at 09/07/24 2223    Or    senna-docusate (SENOKOT-S/PERICOLACE) 8.6-50 MG per tablet 2 tablet  2 tablet Oral BID Rocky Stevens MD   2 tablet at 09/08/24 0809    sodium chloride (PF) 0.9% PF flush 10-20 mL  10-20 mL Intracatheter q1 min prRocky Goldman MD        sodium chloride (PF) 0.9% PF flush 10-20 mL  10-20 mL Intracatheter Q1H PRN Rocky Uribe MD

## 2024-09-11 NOTE — PLAN OF CARE
Orientation: A&O x4  Aggression Stop Light: Green  Activity: SBA with walker  Diet/BS Checks: Regular diet  Tele:  N/A  IV Access/Drains: Chest Port  HL  Pain Management: C/o of stomach ache/burning, Managed with scheduled Carafate and tramadol  Abnormal VS/Results: VSS on RA. WBC= 17.8  Bowel/Bladder: Continent of B/B; Pt had no BM this shift  Skin/Wounds: Scattered bruising and scabs,  BLE isiah with moderate edema  Consults: Hem/onc, PT, OT  D/C Disposition: Home with home care, likely tomorrow  Other Info:

## 2024-09-11 NOTE — CONSULTS
Care Management Initial Consult    General Information  Assessment completed with: Patient,    Type of CM/SW Visit: Initial Assessment    Primary Care Provider verified and updated as needed: Yes (Josep)   Readmission within the last 30 days: no previous admission in last 30 days      Reason for Consult: discharge planning  Advance Care Planning: Advance Care Planning Reviewed: present on chart, verified with patient          Communication Assessment  Patient's communication style: spoken language (English or Bilingual)    Hearing Difficulty or Deaf: no   Wear Glasses or Blind: yes    Cognitive  Cognitive/Neuro/Behavioral: WDL  Level of Consciousness: alert  Arousal Level: opens eyes spontaneously  Orientation: oriented x 4  Mood/Behavior: calm, cooperative  Best Language: 0 - No aphasia  Speech: clear, spontaneous    Living Environment:   People in home: spouse  Rudy  Current living Arrangements: house      Able to return to prior arrangements: yes  Living Arrangement Comments: house has 6 steps and railing to enter; bed and bathroom are on the 2nd level up 6 steps; has a hide-a-bed on main level which they will use in the interim until stairs are easier to manage    Family/Social Support:  Care provided by: self, spouse/significant other  Provides care for: no one  Marital Status:   Support system: , Children  Rudy,       Description of Support System: Supportive, Involved    Support Assessment: Adequate family and caregiver support    Current Resources:   Patient receiving home care services: No        Community Resources: None  Equipment currently used at home: grab bar, toilet, grab bar, tub/shower  Supplies currently used at home:      Employment/Financial:  Employment Status: retired        Financial Concerns: none (denies)           Does the patient's insurance plan have a 3 day qualifying hospital stay waiver?  No    Lifestyle & Psychosocial Needs:  Social Determinants of Health      Food Insecurity: Low Risk  (9/5/2024)    Food Insecurity     Within the past 12 months, did you worry that your food would run out before you got money to buy more?: No     Within the past 12 months, did the food you bought just not last and you didn t have money to get more?: No   Depression: Not at risk (3/11/2024)    Received from [facility]    PHQ-2     PHQ-2 TOTAL SCORE: 0   Housing Stability: Low Risk  (9/5/2024)    Housing Stability     Do you have housing? : Yes     Are you worried about losing your housing?: No   Tobacco Use: Low Risk  (3/11/2024)    Received from [facility]    Patient History     Smoking Tobacco Use: Never     Smokeless Tobacco Use: Never     Passive Exposure: Not on file   Financial Resource Strain: Low Risk  (9/5/2024)    Financial Resource Strain     Within the past 12 months, have you or your family members you live with been unable to get utilities (heat, electricity) when it was really needed?: No   Alcohol Use: Not on file   Transportation Needs: Low Risk  (9/5/2024)    Transportation Needs     Within the past 12 months, has lack of transportation kept you from medical appointments, getting your medicines, non-medical meetings or appointments, work, or from getting things that you need?: No   Physical Activity: Not on file   Interpersonal Safety: Low Risk  (9/9/2024)    Interpersonal Safety     Do you feel physically and emotionally safe where you currently live?: Yes     Within the past 12 months, have you been hit, slapped, kicked or otherwise physically hurt by someone?: No     Within the past 12 months, have you been humiliated or emotionally abused in other ways by your partner or ex-partner?: No   Stress: Not on file   Social Connections: Socially Integrated (3/11/2024)    Received from [facility]    Social Connections     Frequency of Communication with Friends and  Family: 0   Health Literacy: Not on file       Functional Status:  Prior to admission patient needed assistance:   Dependent ADLs:: Independent  Dependent IADLs:: Cleaning, Cooking, Laundry, Shopping, Transportation (shared with spouse)       Mental Health Status:          Chemical Dependency Status:                Values/Beliefs:  Spiritual, Cultural Beliefs, Yazidi Practices, Values that affect care: no               Discussed  Partnership in Safe Discharge Planning  document with patient/family: No    Additional Information:  Met with patient at bedside; explained role in discharge planning.  Face sheet information was verified.    Patient was admitted with THELMA, abnormal outpatient labs; she is improved and likely ready for discharge today.    Patient lives with her spouse, Rudy.  She does not need to assist him though he walks with a cane.  He is available to help her if needed.  They have 6 stairs with railing to enter and a flight of stairs to the upper level where the bed and bath rooms are.  She has a shower chair to use in the upper bathroom.  The patient is planning to stay on the main level post hospitalization using a hide-a-bed and bathroom has a walk-in shower.  She is typically independent in her ADL's.  She shares IADL's with her spouse.      PT/OT recommend home care at discharge.  [facility] is the accepting home care agency.  Patient is in agreement with home care and understands the home bound status during.    Patient is inquiring about meals on wheels.  She may qualify for [facility]; left message with [facility] to see if she is in their delivery area.      An appointment was made for her PCP for hospital follow up for Tues Sept 17 at 1:20.    Next Steps: provide information on Meals on Wheels or [facility]/assist with application if able.  Anticipate home today.    Addendum at 3:30 pm:  patient not discharging today.  Will follow up tomorrow.    Addendum at 5:21 pm:  left patient  with [facility] application and information on Meals on Wheels.      Temitope Ruiz RN  Inpatient Float Care Coordinator  [facility]

## 2024-09-11 NOTE — PROGRESS NOTES
[facility]ology Progress Note     Primary Oncologist/Hematologist:            Assessment and Plan:     Endometrial cancer, high-grade serous carcinoma of the endometrium, s/p optimal debulking 2/2015, now with liver and intra-abdominal mets  - s/p extensive prior treatment.   - most recently, elected a chemotherapy holiday over the summer  - 7/29/24 increase in size of mets as well as increased tumor marker while off chemotherapy f  - CT 9/4/2024 showed disease progression in the liver and lymph nodes  - She was due to resume treatment on 9/10/2024 with paclitaxel and bevacizumab  - Will delay chemo for another week or two per Imelda's request. She is not sure she is quite strong enough to resume.   We will see her in clinic in two weeks for probable chemo, dependent on her performance status.  We will repeat her LFTs next week to ensure stability.  Our office will call to arrange.   - Discussed hospice with patient 9/9-> she would like to see how much improvement she can get from her generalized weakness before making a decision about further treatment vs supportive care.       THELMA  - Baseline 0.8-0.9 in clinic, but up to 2.8 on 9/4/2024  - Did have CT with contrast on 9/4/2024, no hydronephrosis on that day  - Likely due to nausea and poor intake  - IV hydration has been initiated with improvement in renal function     Anemia / fatigue   - Due to malignancy and its treatment  - Started on EPO 11/29/22, Hg increased to 11.0 and she feels much less fatigued   - 11/14/23 -> IV iron   - Fatigue negatively affecting her QOL, slight dose reduction without noticeable improvement   - EPO for Hg <10  - Hg has not improved as much as we would expect on chemotherapy holiday    - Given her prior chemotherapy history we must look for evidence of MDS, bone marrow biopsy on 9/3 did not show MDS     PLAN:  - PRBC if Hb<7     LFT abnormalities  - Has known liver metastases which likely impact liver function  - Most  likely due to infiltrative mets   - If T bili and alk phos continue to rise -> liver ultrasound       Nausea   Improved with antiemetics     Leukocytosis   - Unclear etiology at this time  - No localizing symptoms   - Afebrile       Weakness:  Being evaluated for weakness/ disposition   - PT may recommend in home PT upon discharge      Plan home with home care, likely tomorrow. - Will delay chemo for another week or two per Imelda's request. She is not sure she is quite strong enough to resume. We will see her in clinic in two weeks for probable chemo, dependent on her performance status.  We will repeat her LFTs next week to ensure stability.  Our office will call to arrange.    ELMIRA Tomlin, AOCARISSAP  Nurse Practitioner  [facility]  [phone]          Interval History:     Better than upon admission, but still quite weak. No labs today to review.    Denies pain.               Review of Systems:     The 5 point Review of Systems is negative other than noted in the HPI                Medications:   Scheduled Medications  Current Facility-Administered Medications   Medication Dose Route Frequency Provider Last Rate Last Admin    aspirin EC tablet 81 mg  81 mg Oral Daily Jorge Holliday MD   81 mg at 09/11/24 0823    fluticasone-vilanterol (BREO ELLIPTA) 100-25 MCG/ACT inhaler 1 puff  1 puff Inhalation Daily Rocky Uribe MD   1 puff at 09/11/24 0823    heparin lock flush 10 unit/mL injection 5-10 mL  5-10 mL Intracatheter Q24H Rocky Uribe MD   5 mL at 09/11/24 0626    heparin lock flush 100 unit/mL injection 5-10 mL  5-10 mL Intracatheter Q28 Days Rocky Uribe MD        pantoprazole (PROTONIX) EC tablet 40 mg  40 mg Oral BID AC Rocky Uribe MD   40 mg at 09/11/24 0626    sodium chloride (PF) 0.9% PF flush 10-20 mL  10-20 mL Intracatheter Q28 Days Rocky Uribe MD        sucralfate (CARAFATE) suspension 1 g  1 g Oral 4x Daily AC & Chris Zuniga  MD Ana Paula        traMADol (ULTRAM) tablet 50 mg  50 mg Oral TID Jorge Holliday MD   50 mg at 09/11/24 0823     PRN Medications  Current Facility-Administered Medications   Medication Dose Route Frequency Provider Last Rate Last Admin    albuterol (PROVENTIL HFA/VENTOLIN HFA) inhaler  2 puff Inhalation Q6H PRN Rocky Uribe MD   2 puff at 09/10/24 2055    bisacodyl (DULCOLAX) suppository 10 mg  10 mg Rectal Daily PRN Jorge Holliday MD   10 mg at 09/09/24 0954    calcium carbonate (TUMS) chewable tablet 500-1,000 mg  500-1,000 mg Oral TID PRN Jorge Holliday MD        heparin lock flush 10 unit/mL injection 5-10 mL  5-10 mL Intracatheter Q1H PRN Rocky Uribe MD   5 mL at 09/07/24 1747    hydrALAZINE (APRESOLINE) tablet 10 mg  10 mg Oral Q4H PRN Rocky Uribe MD        Or    hydrALAZINE (APRESOLINE) injection 10 mg  10 mg Intravenous Q4H PRN Rocky Uribe MD        HYDROmorphone (PF) (DILAUDID) injection 0.3-0.5 mg  0.3-0.5 mg Intravenous Q4H PRN Jorge Holliday MD   0.3 mg at 09/09/24 2329    naloxone (NARCAN) injection 0.2 mg  0.2 mg Intravenous Q2 Min PRRocky Goldman MD        Or    naloxone (NARCAN) injection 0.4 mg  0.4 mg Intravenous Q2 Min PRRocky Goldman MD        Or    naloxone (NARCAN) injection 0.2 mg  0.2 mg Intramuscular Q2 Min PRRocky Goldman MD        Or    naloxone (NARCAN) injection 0.4 mg  0.4 mg Intramuscular Q2 Min Rocky Stevens MD        ondansetron (ZOFRAN ODT) ODT tab 4 mg  4 mg Oral Q6H PRN Rocky Uribe MD   4 mg at 09/09/24 2306    Or    ondansetron (ZOFRAN) injection 4 mg  4 mg Intravenous Q6H PRN Rocky Uribe MD        polyethylene glycol (MIRALAX) Packet 17 g  17 g Oral Daily PRN Jorge Holliday MD        prochlorperazine (COMPAZINE) injection 5 mg  5 mg Intravenous Q6H PRN Rocky Uribe MD        Or    prochlorperazine (COMPAZINE) tablet 5 mg  5 mg  "Oral Q6H PRN Rocky Uribe MD   5 mg at 09/09/24 1414    Or    prochlorperazine (COMPAZINE) suppository 12.5 mg  12.5 mg Rectal Q12H PRN Rocky Uribe MD        senna-docusate (SENOKOT-S/PERICOLACE) 8.6-50 MG per tablet 1-2 tablet  1-2 tablet Oral BID PRN Jorge Holliday MD        sodium chloride (PF) 0.9% PF flush 10-20 mL  10-20 mL Intracatheter q1 min prn Rocky Uribe MD        sodium chloride (PF) 0.9% PF flush 10-20 mL  10-20 mL Intracatheter Q1H PRN Rocky Uribe MD                      Physical Exam:   Vitals were reviewed  Blood pressure 110/64, pulse 96, temperature 98  F (36.7  C), temperature source Oral, resp. rate 16, height 1.664 m (5' 5.5\"), weight 76.4 kg (168 lb 8 oz), SpO2 96%.  Wt Readings from Last 4 Encounters:   09/09/24 76.4 kg (168 lb 8 oz)   08/04/20 82.1 kg (181 lb)   11/21/18 90.7 kg (200 lb)   03/25/16 86.2 kg (190 lb)       I/O last 3 completed shifts:  In: -   Out: 500 [Urine:500]                 Data:   All laboratory data and imaging studies reviewed.    CMP  Recent Labs   Lab 09/10/24  0557 09/09/24  0552 09/08/24  0556 09/07/24  0556 09/06/24  0609   * 135 136 133* 133*   POTASSIUM 4.1 3.9 4.0 4.1 4.4   CHLORIDE 97* 98 100 97* 95*   CO2 23 24 24 25 25   ANIONGAP 13 13 12 11 13   * 92 104* 100* 89   BUN 14.0 15.4 19.8 26.4* 42.4*   CR 0.81 0.85 1.01* 1.18* 1.68*   GFRESTIMATED 74 70 57* 47* 31*   LOUISA 7.8* 7.8* 7.7* 7.8* 8.2*   PROTTOTAL 5.9* 5.8* 5.9*  --  6.0*   ALBUMIN 3.0* 2.8* 2.8*  --  3.0*   BILITOTAL 1.9* 1.4* 1.2  --  1.4*   ALKPHOS 1,529* 1,498* 1,427*  --  1,440*   AST 70* 69* 58*  --  59*   ALT 18 17 16  --  17     CBC  Recent Labs   Lab 09/10/24  0557 09/09/24  0552 09/08/24  0556 09/06/24  0609   WBC 17.8* 12.1* 9.7 8.7   RBC 3.65* 3.53* 3.45* 3.31*   HGB 10.5* 9.9* 10.1* 9.2*   HCT 32.4* 31.4* 31.1* 28.7*   MCV 89 89 90 87   MCH 28.8 28.0 29.3 27.8   MCHC 32.4 31.5 32.5 32.1   RDW 15.2* 15.3* 15.3* 14.9   * " 599* 588* 515*     INRNo lab results found in last 7 days.        Cecile KU, YIFANP  Nurse Practitioner  [facility]  [phone]

## 2024-09-11 NOTE — PLAN OF CARE
[id number]  Orientation: a&ox4  Aggression Stop Light: green  Activity: SBA walker  Diet/BS Checks: regular  Tele:  n/a  IV Access/Drains: R PIV SL. Port L HL  Pain Management: yohannes tramadol  Abnormal VS/Results: VSS on RA  Bowel/Bladder: cont B/B  Skin/Wounds: scattered bruising and scabs. Moderate edema to BLE  Consults: OT/PT, heme/onc  D/C Disposition: today  Other Info:     -albuterol given x1 for complaints of SOB

## 2024-09-11 NOTE — PROGRESS NOTES
[facility]  Hospitalist Progress Note  Chris Omer MD  09/11/2024    Assessment & Plan   Jany Park is a 79 year old female with past medical history significant for endometrial cancer; HTN; asthma; fibromyalgia with chronic pain; and GERD; who presented 9/5/2024 with abnormal outpatient labs and nausea/vomiting, found with THELMA.     On initial evaluation, pt was afebrile, VSS. Labs notable for CBC with WBC normal, hgb 10.3, platelets 577K; BMP with sodium 134, chloride 91, BUN 49.5, cr 2.1, calcium 9.7, AG 18; LFT's with ALP 1633, AST 70, tbili 1.6; UA with 6 WBC, small LE, 19 hyaline casts.         Acute kidney injury, suspect prerenal (from N/V, poor oral intake), contrast nephropathy +/- NSAID, with ATN, resolved.  * Initial presentation as above. Cr 2.1, BUN 49.5, UA significant for 19 hyaline casts. Noted that pt had a contrast CT 9/3 as well. Started on IVF's on admit. PTA lisinopril and meloxicam held on admit.  * IVF's completed 9/7.  * Cr normalized 9/9.     Endometrial cancer, metastatic  Abnormal LFTs, suspect related to metastatic disease.  Nausea and vomiting, suspect multifactorial including related to above, also possibly constipation.  Poor intake related to above.  * Followed by Dr. Dreyer of [facility]. See Oncology note for oncologic history. Noted diagnosed and had initial surgery 2015 with subsequently chemotherapy. Noted subsequently development of liver metastases. Noted last chemotherapy in 5/2024. Noted that pt underwent CT imaging and BM biopsy 9/4 (results not available on admit). Noted was suppose to undergo chemotherapy on 9/10/2024.  * Initial presentation as above. On admit, reported ~3 weeks of nausea and vomiting, intake of ~25% of baseline. LFT's elevated.  [facility] consulted on admit.  * On 9/6, per Oncology, noted that CT 9/4 showed disease progression in the liver and lymph nodes. Also noted that BM biopsy showed dysplasia, pending cytogenetics.  Noted that her anemia may be due to an exhausted bone marrow from her years of chemotherapy. ?nausea related to IV iron transfusion. Brain MRI ordered that was negative for acute findings.  * On 9/8, tbili normalized, AST and ALP stable/slightly improved.  * On 9/9, more nauseated but some improvement after BM. Seen by Dr. Dreyer (primary Oncologist) and recommended holding chemotherapy; discussed about hospice, but pt not ready to consider yet.             Recent Labs   Lab  09/10/24  0557 09/09/24  0552 09/08/24  0556 09/07/24  0556 09/06/24  0609 09/05/24  1857 09/05/24  1828   ALBUMIN  3.0 2.8* 2.8*  --  3.0*  --  3.4*   BILITOTAL  1.9 1.4* 1.2  --  1.4*  --  1.6*   ALT  18 17 16  --  17  --  18   AST  70 69* 58*  --  59*  --  70*   ALKPHOS  1529 1,498* 1,427*  --  1,440*  --  1,633*   PROTEIN    --   --   --   --  Negative  --    CR  0.81 0.85 1.01* 1.18* 1.68*  --  2.10*   - Continue to treat other issues as noted.   - Continue to monitor LFT's -   - Continue PRN ondansetron, PRN prochlorperazine.  - Follow-up with Dr. Dreyer, [facility], outpatient - holding chemotherapy for now (she was suppose to undergo chemotherapy 9/10/2024).  - Appreciate help from [facility].  - for stomach burning and pain, will add Carafate    Leukocytosis  Past 48 hours noted elevation of wbc, no fever, only complaint is stomach burning today  Monitor WBC with differential  Check procalcitonin, will repeat CXR     Constipation due to inactivity and opioids.  * On 9/8, pt c/o constipation. Bowel regimen intensified.  * On 9/9, had BM after suppository.  - Continue scheduled polyethylene glycol daily and senna-docusate BID and add   - Continue PRN polyethylene glycol, senna-docusate and PRN bisacodyl suppository.  - Continue to increase activity as able.     Dyspnea with chest tightness.  * On 9/8, pt noted feeling dyspneic with chest tightness. ECG showed SR, no acute ischemic changes. CXR showed small pleural effusions, no focal  infiltrates/edema.  * On 9/9, chest tightness better.  - Continue to monitor clinically.     Hyponatremia, suspect nutritional/hypovolemia.  * Sodium 134 on admit.   * Sodium normalized 9/8.     Chronic normocytic anemia.  * Hgb 10.3 on admit. Noted most recently 11.2 g/dl 3/2024.   * As above, per Oncology, noted that BM biopsy showed dysplasia, pending cytogenetics. Noted that her anemia may be due to an exhausted bone marrow from her years of chemotherapy.           Recent Labs   Lab 09/10/24  0557 09/09/24  0552 09/08/24  0556 09/06/24  0609 09/05/24  1828   HGB 10.5* 9.9* 10.1* 9.2* 10.3*   - Continue to monitor CBC - repeat in am.     Hypertension (benign essential).  Chronic lymphedema.  [PTA: lisinopril 10 mg daily; PRN bumetanide 1 mg BID.]  * Lisinopril held on admit.  * BP's normal off meds.  - Continue to hold lisinopril.  - Continue to hold PRN bumetanide.     Fibromyalgia with chronic pain.  * PTA on regimen including meloxicam, scheduled tramadol 100 mg TID and PRN oxycodone.  * PTA tramadol held on admit given THELMA.  * On 9/6, pt with withdrawal symptoms.   * On 9/7 am, scheduled tramadol restarted.  - Continue tramadol 100 gm TID.  - Continue PRN oxycodone and PRN IV hydromorphone; minimize opioids as able.  - Continue to hold meloxicam given THELMA.     Weakness and physical deconditioning due to multiple acute and chronic medical issues.  * Pt very weak since hospitalization  * On 9/10, seen by PT and recommended home with assist and home cares vs TCU.  -  Overall, pt and family feels she will be capable to go home with assistance from  with home health cares/therapies.     GERD.  - Continue pantoprazole BID.     Asthma.  - Continue fluticasone-vilanterol and PRN albuterol.       Clinically Significant Risk Factors              # Hypoalbuminemia: Lowest albumin = 2.8 g/dL at 9/9/2024  5:52 AM, will monitor as appropriate           # Overweight: Estimated body mass index is 27.61 kg/m  as  "calculated from the following:    Height as of this encounter: 1.664 m (5' 5.5\").    Weight as of this encounter: 76.4 kg (168 lb 8 oz)., PRESENT ON ADMISSION  # Moderate Malnutrition: based on nutrition assessment, PRESENT ON ADMISSION   # Asthma: noted on problem list        Diet: Regular Diet Adult  Snacks/Supplements Adult: Ensure Clear; With Meals    Prophylaxis: PCD's and ambulation  Ochoa Catheter: Not present  Lines: PRESENT      Port A Cath Single 07/30/20 Left Chest wall-Site Assessment: WDL      Code Status: Full Code        Disposition Plan  Medically Ready for Discharge: Anticipated Tomorrow with home care    Disposition:     Interval History   -- chart reviewed  -- today ate very little has burning in her stomach  -- noted WBC elevated    -Data reviewed today: I reviewed all new labs and imaging over the last 24 hours. I personally reviewed no images or EKG's today.    Physical Exam    , Blood pressure 110/64, pulse 96, temperature 98  F (36.7  C), temperature source Oral, resp. rate 16, height 1.664 m (5' 5.5\"), weight 76.4 kg (168 lb 8 oz), SpO2 96%.  Vitals:    09/05/24 2301 09/06/24 0004 09/09/24 0633   Weight: 75.1 kg (165 lb 9.1 oz) 73.8 kg (162 lb 11.2 oz) 76.4 kg (168 lb 8 oz)     Vital Signs with Ranges  Temp:  [98  F (36.7  C)-98.4  F (36.9  C)] 98  F (36.7  C)  Pulse:  [94-99] 96  Resp:  [16-18] 16  BP: (110-119)/(62-83) 110/64  SpO2:  [96 %-98 %] 96 %  I/O's Last 24 hours  I/O last 3 completed shifts:  In: -   Out: 500 [Urine:500]    Constitutional: Awake, alert, cooperative, chronically ill appearing  Respiratory: Clear to auscultation bilaterally, no crackles or wheezing  Cardiovascular: Regular rate and rhythm, normal S1 and S2, and no murmur noted  GI: Normal bowel sounds, soft, non-distended, non-tender  Skin/Integumen: No rashes, no cyanosis, no edema  Other:      Medications   All medications were reviewed.  Current Facility-Administered Medications   Medication Dose Route Frequency " Provider Last Rate Last Admin     Current Facility-Administered Medications   Medication Dose Route Frequency Provider Last Rate Last Admin    aspirin EC tablet 81 mg  81 mg Oral Daily Jorge Holliday MD   81 mg at 09/11/24 0823    fluticasone-vilanterol (BREO ELLIPTA) 100-25 MCG/ACT inhaler 1 puff  1 puff Inhalation Daily Rocky Uribe MD   1 puff at 09/11/24 0823    heparin lock flush 10 unit/mL injection 5-10 mL  5-10 mL Intracatheter Q24H Rocky Uribe MD   5 mL at 09/11/24 0626    heparin lock flush 100 unit/mL injection 5-10 mL  5-10 mL Intracatheter Q28 Days Rocky Uribe MD        pantoprazole (PROTONIX) EC tablet 40 mg  40 mg Oral BID AC Rocky Uribe MD   40 mg at 09/11/24 0626    sodium chloride (PF) 0.9% PF flush 10-20 mL  10-20 mL Intracatheter Q28 Days Rocky Uribe MD        traMADol (ULTRAM) tablet 50 mg  50 mg Oral TID Jorge Holliday MD   50 mg at 09/11/24 0823        Data   Recent Labs   Lab 09/10/24  0557 09/09/24  0552 09/08/24  0556   WBC 17.8* 12.1* 9.7   HGB 10.5* 9.9* 10.1*   MCV 89 89 90   * 599* 588*   * 135 136   POTASSIUM 4.1 3.9 4.0   CHLORIDE 97* 98 100   CO2 23 24 24   BUN 14.0 15.4 19.8   CR 0.81 0.85 1.01*   ANIONGAP 13 13 12   LOUISA 7.8* 7.8* 7.7*   * 92 104*   ALBUMIN 3.0* 2.8* 2.8*   PROTTOTAL 5.9* 5.8* 5.9*   BILITOTAL 1.9* 1.4* 1.2   ALKPHOS 1,529* 1,498* 1,427*   ALT 18 17 16   AST 70* 69* 58*       No results found for this or any previous visit (from the past 24 hour(s)).    Chris Omer MD  Text Page  (7am to 6pm)

## 2024-09-12 NOTE — PROGRESS NOTES
[facility] Progress Note     Primary Oncologist/Hematologist:  Dr. 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   - most recently, elected a chemotherapy holiday over the summer  - 7/29/24 increase in size of mets as well as increased tumor marker while off chemotherapy f  - CT 9/4/2024 showed disease progression in the liver and lymph nodes  - She was due to resume treatment on 9/10/2024 with paclitaxel and bevacizumab  - Will delay chemo for another week or two per Imelda's request. She is not sure she is quite strong enough to resume.   We will see her in clinic in two weeks for probable chemo, dependent on her performance status.  We will repeat her LFTs next week to ensure stability.  Our office will call to arrange.   - Discussed hospice with patient 9/9-> she would like to see how much improvement she can get from her generalized weakness before making a decision about further treatment vs supportive care.  She acknowledges that she is not getting stronger.   - Discussed goals of care with pts daughter in the hallway.  She shares my worry that Imelda may not get strong enough to resume chemo.  We discussed that without treatment, Imelda's life may be measured in a few months or even less. We will continue this discussion over the next few days.        THELMA  - Baseline 0.8-0.9 in clinic, but up to 2.8 on 9/4/2024  - Did have CT with contrast on 9/4/2024, no hydronephrosis on that day  - Likely due to nausea and poor intake  - IV hydration has been initiated with improvement in renal function     Anemia / fatigue   - Due to malignancy and its treatment  - Started on EPO 11/29/22, Hg increased to 11.0 and she feels much less fatigued   - 11/14/23 -> IV iron   - Fatigue negatively affecting her QOL, slight dose reduction without  noticeable improvement   - EPO for Hg <10  - Hg has not improved as much as we would expect on chemotherapy holiday    - Given her prior chemotherapy history we must look for evidence of MDS, bone marrow biopsy on 9/3 did not show MDS     PLAN:  - PRBC if Hb<7     LFT abnormalities  - Has known liver metastases which likely impact liver function  - Most likely due to infiltrative mets   - If T bili and alk phos continue to rise -> consider liver ultrasound.  If elevations are due to tumor burden and if LFTs continue to rise, this may prevent us from giving chemo.      Nausea   Improved with antiemetics     Leukocytosis   - Unclear etiology at this time  - No localizing symptoms   - Afebrile       Weakness:  Being evaluated for weakness/ disposition   - PT may recommend in home PT upon discharge      Plan home with home care, likely tomorrow. - Will delay chemo for another week or two per Imelda's request. She is not sure she is quite strong enough to resume. We will see her in clinic in two weeks for probable chemo, dependent on her performance status.  We will repeat her LFTs next week to ensure stability.  Our office will call to arrange.      ELMIRA Tomlin, AOCARISSAP  Nurse Practitioner  [facility]  [phone]          Interval History:     Not stronger. Minimal activity results in fatigue. Poor appetite.               Review of Systems:     The 5 point Review of Systems is negative other than noted in the HPI                Medications:   Scheduled Medications  Current Facility-Administered Medications   Medication Dose Route Frequency Provider Last Rate Last Admin    aspirin EC tablet 81 mg  81 mg Oral Daily Jorge Holliday MD   81 mg at 09/12/24 0848    fluticasone-vilanterol (BREO ELLIPTA) 100-25 MCG/ACT inhaler 1 puff  1 puff Inhalation Daily Rocky Uribe MD   1 puff at 09/12/24 0857    heparin lock flush 10 unit/mL injection 5-10 mL  5-10 mL Intracatheter Q24H Rocky Uribe  MD Abran   5 mL at 09/12/24 0556    heparin lock flush 100 unit/mL injection 5-10 mL  5-10 mL Intracatheter Q28 Days Rocky Uribe MD        pantoprazole (PROTONIX) EC tablet 40 mg  40 mg Oral BID AC Rocky Uribe MD   40 mg at 09/12/24 0555    sodium chloride (PF) 0.9% PF flush 10-20 mL  10-20 mL Intracatheter Q28 Days Rocky Uribe MD        sucralfate (CARAFATE) suspension 1 g  1 g Oral 4x Daily AC & HS Chris Omer MD   1 g at 09/12/24 1207    traMADol (ULTRAM) tablet 50 mg  50 mg Oral TID Jorge Holliday MD   50 mg at 09/12/24 0848     PRN Medications  Current Facility-Administered Medications   Medication Dose Route Frequency Provider Last Rate Last Admin    albuterol (PROVENTIL HFA/VENTOLIN HFA) inhaler  2 puff Inhalation Q6H PRN Rocky Uribe MD   2 puff at 09/10/24 2055    bisacodyl (DULCOLAX) suppository 10 mg  10 mg Rectal Daily PRN Jorge Holliday MD   10 mg at 09/09/24 0954    calcium carbonate (TUMS) chewable tablet 500-1,000 mg  500-1,000 mg Oral TID PRN Jorge Holliday MD   500 mg at 09/12/24 0848    heparin lock flush 10 unit/mL injection 5-10 mL  5-10 mL Intracatheter Q1H PRN Rocky Uribe MD   5 mL at 09/12/24 0857    hydrALAZINE (APRESOLINE) tablet 10 mg  10 mg Oral Q4H PRN Rocky Uribe MD        Or    hydrALAZINE (APRESOLINE) injection 10 mg  10 mg Intravenous Q4H PRN Rocky Uribe MD        HYDROmorphone (PF) (DILAUDID) injection 0.3-0.5 mg  0.3-0.5 mg Intravenous Q4H PRN Jorge Holliday MD   0.5 mg at 09/12/24 0003    naloxone (NARCAN) injection 0.2 mg  0.2 mg Intravenous Q2 Min PRN Rocky Uribe MD        Or    naloxone (NARCAN) injection 0.4 mg  0.4 mg Intravenous Q2 Min PRN Rocky Uribe MD        Or    naloxone (NARCAN) injection 0.2 mg  0.2 mg Intramuscular Q2 Min PRN Rocky Uribe MD        Or    naloxone (NARCAN) injection 0.4 mg  0.4 mg Intramuscular Q2 Min PRN  "Rocky Uribe MD        ondansetron (ZOFRAN ODT) ODT tab 4 mg  4 mg Oral Q6H PRN Rocky Uribe MD   4 mg at 09/09/24 2306    Or    ondansetron (ZOFRAN) injection 4 mg  4 mg Intravenous Q6H PRN Rocky Uribe MD   4 mg at 09/12/24 0307    polyethylene glycol (MIRALAX) Packet 17 g  17 g Oral Daily PRN Jorge Holliday MD        prochlorperazine (COMPAZINE) injection 5 mg  5 mg Intravenous Q6H PRN Rocky Uribe MD        Or    prochlorperazine (COMPAZINE) tablet 5 mg  5 mg Oral Q6H PRN Rocky Uribe MD   5 mg at 09/09/24 1414    Or    prochlorperazine (COMPAZINE) suppository 12.5 mg  12.5 mg Rectal Q12H PRN Rocky Uribe MD        senna-docusate (SENOKOT-S/PERICOLACE) 8.6-50 MG per tablet 1-2 tablet  1-2 tablet Oral BID PRN Jorge Holliday MD        sodium chloride (PF) 0.9% PF flush 10-20 mL  10-20 mL Intracatheter q1 min prn Rocky Uribe MD        sodium chloride (PF) 0.9% PF flush 10-20 mL  10-20 mL Intracatheter Q1H PRN Rocky Uribe MD                      Physical Exam:   Vitals were reviewed  Blood pressure 120/68, pulse 97, temperature 97.5  F (36.4  C), temperature source Oral, resp. rate 18, height 1.664 m (5' 5.5\"), weight 76.4 kg (168 lb 8 oz), SpO2 99%.  Wt Readings from Last 4 Encounters:   09/09/24 76.4 kg (168 lb 8 oz)   08/04/20 82.1 kg (181 lb)   11/21/18 90.7 kg (200 lb)   03/25/16 86.2 kg (190 lb)       I/O last 3 completed shifts:  In: -   Out: 700 [Urine:700]                 Data:   All laboratory data and imaging studies reviewed.    CMP  Recent Labs   Lab 09/12/24  0556 09/10/24  0557 09/09/24  0552 09/08/24  0556   * 133* 135 136   POTASSIUM 4.0 4.1 3.9 4.0   CHLORIDE 95* 97* 98 100   CO2 24 23 24 24   ANIONGAP 12 13 13 12   * 115* 92 104*   BUN 13.2 14.0 15.4 19.8   CR 0.76 0.81 0.85 1.01*   GFRESTIMATED 80 74 70 57*   LOUISA 7.8* 7.8* 7.8* 7.7*   PROTTOTAL 6.1* 5.9* 5.8* 5.9*   ALBUMIN 2.8* 3.0* 2.8* " 2.8*   BILITOTAL 2.1* 1.9* 1.4* 1.2   ALKPHOS 1,542* 1,529* 1,498* 1,427*   AST 77* 70* 69* 58*   ALT 17 18 17 16     CBC  Recent Labs   Lab 09/12/24  0556 09/10/24  0557 09/09/24  0552 09/08/24  0556   WBC 18.5* 17.8* 12.1* 9.7   RBC 3.45* 3.65* 3.53* 3.45*   HGB 9.6* 10.5* 9.9* 10.1*   HCT 30.4* 32.4* 31.4* 31.1*   MCV 88 89 89 90   MCH 27.8 28.8 28.0 29.3   MCHC 31.6 32.4 31.5 32.5   RDW 15.4* 15.2* 15.3* 15.3*   * 603* 599* 588*     INRNo lab results found in last 7 days.        Cecile KU, YIFANP  Nurse Practitioner  [facility]  [phone]

## 2024-09-12 NOTE — PLAN OF CARE
Goal Outcome Evaluation:  9/11/24  [id number]    Orientation: A/Ox4, pleasant  Aggression Stop Light: green  Activity: SBA-A1 GBW  Diet/BS Checks: reg  Tele:  none  IV Access/Drains: R PIV SL, L Port HL  Pain Management: Abdominal int burning pain/discomfort-scheduled tramadol, Dil IV PRN 1x, Tums 1x  Abnormal VS/Results: VSS, exc tachycardia, on RA  Bowel/Bladder: cont both, no bm  Skin/Wounds: scattered scabs, BLE edema  Consults: Hem/Onc/PT/OT  D/C Disposition: pending  Other Info: int nausea-zofran IV given 1x

## 2024-09-12 NOTE — PROGRESS NOTES
[facility]  Hospitalist Progress Note  Chris Omer MD  09/12/2024    Assessment & Plan   Jany Park is a 79 year old female with past medical history significant for endometrial cancer; HTN; asthma; fibromyalgia with chronic pain; and GERD; who presented 9/5/2024 with abnormal outpatient labs and nausea/vomiting, found with THELMA.     On initial evaluation, pt was afebrile, VSS. Labs notable for CBC with WBC normal, hgb 10.3, platelets 577K; BMP with sodium 134, chloride 91, BUN 49.5, cr 2.1, calcium 9.7, AG 18; LFT's with ALP 1633, AST 70, tbili 1.6; UA with 6 WBC, small LE, 19 hyaline casts.         Acute kidney injury, suspect prerenal (from N/V, poor oral intake), contrast nephropathy +/- NSAID, with ATN, resolved.  * Initial presentation as above. Cr 2.1, BUN 49.5, UA significant for 19 hyaline casts. Noted that pt had a contrast CT 9/3 as well. Started on IVF's on admit. PTA lisinopril and meloxicam held on admit.  * IVF's completed 9/7.  * Cr normalized 9/9.     Endometrial cancer, metastatic  Abnormal LFTs, suspect related to metastatic disease.  Nausea and vomiting, suspect multifactorial including related to above, also possibly constipation.  Poor intake related to above.  * Followed by Dr. Dreyer of [facility]. See Oncology note for oncologic history. Noted diagnosed and had initial surgery 2015 with subsequently chemotherapy. Noted subsequently development of liver metastases. Noted last chemotherapy in 5/2024. Noted that pt underwent CT imaging and BM biopsy 9/4 (results not available on admit). Noted was suppose to undergo chemotherapy on 9/10/2024.  * Initial presentation as above. On admit, reported ~3 weeks of nausea and vomiting, intake of ~25% of baseline. LFT's elevated.  [facility] consulted on admit.  * On 9/6, per Oncology, noted that CT 9/4 showed disease progression in the liver and lymph nodes. Also noted that BM biopsy showed dysplasia, pending cytogenetics.  Noted that her anemia may be due to an exhausted bone marrow from her years of chemotherapy. ?nausea related to IV iron transfusion. Brain MRI ordered that was negative for acute findings.  * On 9/8, tbili normalized, AST and ALP stable/slightly improved.  * On 9/9, more nauseated but some improvement after BM. Seen by Dr. Dreyer (primary Oncologist) and recommended holding chemotherapy; discussed about hospice, but pt not ready to consider yet.             Recent Labs   Lab  09/10/24  0557 09/09/24  0552 09/08/24  0556 09/07/24  0556 09/06/24  0609 09/05/24  1857 09/05/24  1828   ALBUMIN  3.0 2.8* 2.8*  --  3.0*  --  3.4*   BILITOTAL  1.9 1.4* 1.2  --  1.4*  --  1.6*   ALT  18 17 16  --  17  --  18   AST  70 69* 58*  --  59*  --  70*   ALKPHOS  1529 1,498* 1,427*  --  1,440*  --  1,633*   PROTEIN    --   --   --   --  Negative  --    CR  0.81 0.85 1.01* 1.18* 1.68*  --  2.10*   - Continue to treat other issues as noted.   - Continue to monitor LFT's -   - Continue PRN ondansetron, PRN prochlorperazine.  - Follow-up with Dr. Dreyer, [facility], outpatient - holding chemotherapy for now (she was suppose to undergo chemotherapy 9/10/2024).  - Appreciate help from [facility].  - for stomach burning and pain,  Carafate added but helps only a little    Leukocytosis  Past 48 hours noted elevation of wbc, no fever, only complaint is stomach burning today  Monitor WBC with differential  Procalcitonin up 1, repeat CXR shows no infiltrate, no fevers  Will check CT of chest/abd/pelvis with contrast     Constipation due to inactivity and opioids.  * On 9/8, pt c/o constipation. Bowel regimen intensified.  * On 9/9, had BM after suppository.  - Continue scheduled polyethylene glycol daily and senna-docusate BID and add   - Continue PRN polyethylene glycol, senna-docusate and PRN bisacodyl suppository.  - Continue to increase activity as able.     Dyspnea with chest tightness.  * On 9/8, pt noted feeling dyspneic with chest tightness. ECG  showed SR, no acute ischemic changes. CXR showed small pleural effusions, no focal infiltrates/edema.  * On 9/9, chest tightness better.  - Continue to monitor clinically.     Hyponatremia, suspect nutritional/hypovolemia.  * Sodium 134 on admit.   * Sodium normalized 9/8.     Chronic normocytic anemia.  * Hgb 10.3 on admit. Noted most recently 11.2 g/dl 3/2024.   * As above, per Oncology, noted that BM biopsy showed dysplasia, pending cytogenetics. Noted that her anemia may be due to an exhausted bone marrow from her years of chemotherapy.           Recent Labs   Lab 09/10/24  0557 09/09/24  0552 09/08/24  0556 09/06/24  0609 09/05/24  1828   HGB 10.5* 9.9* 10.1* 9.2* 10.3*   - Continue to monitor CBC - repeat in am.     Hypertension (benign essential).  Chronic lymphedema.  [PTA: lisinopril 10 mg daily; PRN bumetanide 1 mg BID.]  * Lisinopril held on admit.  * BP's normal off meds.  - Continue to hold lisinopril.  - Continue to hold PRN bumetanide.     Fibromyalgia with chronic pain.  * PTA on regimen including meloxicam, scheduled tramadol 100 mg TID and PRN oxycodone.  * PTA tramadol held on admit given THELMA.  * On 9/6, pt with withdrawal symptoms.   * On 9/7 am, scheduled tramadol restarted.  - Continue tramadol 100 gm TID.  - Continue PRN oxycodone and PRN IV hydromorphone; minimize opioids as able.  - Continue to hold meloxicam given THELMA.     Weakness and physical deconditioning due to multiple acute and chronic medical issues.  * Pt very weak since hospitalization  * On 9/10, seen by PT and recommended home with assist and home cares vs TCU.  -  Overall, pt and family feels she will be capable to go home with assistance from  with home health cares/therapies.     GERD.  - Continue pantoprazole BID.     Asthma.  - Continue fluticasone-vilanterol and PRN albuterol.       Clinically Significant Risk Factors              # Hypoalbuminemia: Lowest albumin = 2.8 g/dL at 9/9/2024  5:52 AM, will monitor as  "appropriate           # Overweight: Estimated body mass index is 27.61 kg/m  as calculated from the following:    Height as of this encounter: 1.664 m (5' 5.5\").    Weight as of this encounter: 76.4 kg (168 lb 8 oz)., PRESENT ON ADMISSION  # Moderate Malnutrition: based on nutrition assessment, PRESENT ON ADMISSION   # Asthma: noted on problem list        Diet: Regular Diet Adult  Snacks/Supplements Adult: Ensure Clear; With Meals    Prophylaxis: PCD's and ambulation  Ochoa Catheter: Not present  Lines: PRESENT      Port A Cath Single 07/30/20 Left Chest wall-Site Assessment: WDL      Code Status: Full Code        Disposition Plan  Medically Ready for Discharge: Anticipated Tomorrow with home care    Disposition:     Interval History   -- chart reviewed  -- burning upper abdomen and chest continues but improved slightly with medications  -- noted WBC elevated and rise in procalcitonin    -Data reviewed today: I reviewed all new labs and imaging over the last 24 hours. I personally reviewed no images or EKG's today.    Physical Exam    , Blood pressure 120/68, pulse 97, temperature 97.5  F (36.4  C), temperature source Oral, resp. rate 18, height 1.664 m (5' 5.5\"), weight 76.4 kg (168 lb 8 oz), SpO2 99%.  Vitals:    09/05/24 2301 09/06/24 0004 09/09/24 0633   Weight: 75.1 kg (165 lb 9.1 oz) 73.8 kg (162 lb 11.2 oz) 76.4 kg (168 lb 8 oz)     Vital Signs with Ranges  Temp:  [97.5  F (36.4  C)-97.8  F (36.6  C)] 97.5  F (36.4  C)  Pulse:  [] 97  Resp:  [18-20] 18  BP: (113-127)/(68-75) 120/68  SpO2:  [97 %-100 %] 99 %  I/O's Last 24 hours  I/O last 3 completed shifts:  In: -   Out: 700 [Urine:700]    Constitutional: Awake, alert, cooperative,    Respiratory: Clear to auscultation bilaterally, no crackles or wheezing  Cardiovascular: Regular rate and rhythm, normal S1 and S2, and no murmur noted  GI: Normal bowel sounds, soft, non-distended, non-tender  Skin/Integumen: No rashes, no cyanosis, no edema  Other:  "     Medications   All medications were reviewed.  Current Facility-Administered Medications   Medication Dose Route Frequency Provider Last Rate Last Admin     Current Facility-Administered Medications   Medication Dose Route Frequency Provider Last Rate Last Admin    aspirin EC tablet 81 mg  81 mg Oral Daily Jorge Holliday MD   81 mg at 09/12/24 0848    fluticasone-vilanterol (BREO ELLIPTA) 100-25 MCG/ACT inhaler 1 puff  1 puff Inhalation Daily Rocky Uribe MD   1 puff at 09/12/24 0857    heparin lock flush 10 unit/mL injection 5-10 mL  5-10 mL Intracatheter Q24H Rocky Uribe MD   5 mL at 09/12/24 0556    heparin lock flush 100 unit/mL injection 5-10 mL  5-10 mL Intracatheter Q28 Days Rocky Uribe MD        pantoprazole (PROTONIX) EC tablet 40 mg  40 mg Oral BID AC Rocky Uribe MD   40 mg at 09/12/24 1700    sodium chloride (PF) 0.9% PF flush 10-20 mL  10-20 mL Intracatheter Q28 Days Rocky Uribe MD        sucralfate (CARAFATE) suspension 1 g  1 g Oral 4x Daily AC & HS Chris Omer MD   1 g at 09/12/24 1700    traMADol (ULTRAM) tablet 50 mg  50 mg Oral TID Jorge Holliday MD   50 mg at 09/12/24 1700        Data   Recent Labs   Lab 09/12/24  0556 09/10/24  0557 09/09/24  0552   WBC 18.5* 17.8* 12.1*   HGB 9.6* 10.5* 9.9*   MCV 88 89 89   * 603* 599*   * 133* 135   POTASSIUM 4.0 4.1 3.9   CHLORIDE 95* 97* 98   CO2 24 23 24   BUN 13.2 14.0 15.4   CR 0.76 0.81 0.85   ANIONGAP 12 13 13   LOUISA 7.8* 7.8* 7.8*   * 115* 92   ALBUMIN 2.8* 3.0* 2.8*   PROTTOTAL 6.1* 5.9* 5.8*   BILITOTAL 2.1* 1.9* 1.4*   ALKPHOS 1,542* 1,529* 1,498*   ALT 17 18 17   AST 77* 70* 69*       No results found for this or any previous visit (from the past 24 hour(s)).    Chris Omer MD  Text Page  (7am to 6pm)

## 2024-09-13 NOTE — PROGRESS NOTES
"CLINICAL NUTRITION SERVICES - REASSESSMENT NOTE      Recommendations:     Will modify supplement order:  Will send apple CLEAR at breakfast and dinner, Boost SOOTHE at lunch  Send pineapple GelateinPLUS for pt to sample at lunch today       Malnutrition: (9/7)  % Weight Loss:  Weight loss does not meet criteria for malnutrition   % Intake:  </= 50% for >/= 1 month (severe malnutrition)  Subcutaneous Fat Loss:  Orbital region mild depletion, Upper arm region mild depletion, and Thoracic region mild depletion  Muscle Loss:  Temporal region mild depletion, Clavicle bone region mild depletion, Scapular bone region mild depletion, and Dorsal hand region mild depletion  Fluid Retention:  Moderate      Malnutrition Diagnosis: Moderate malnutrition  In Context of:  Acute illness or injury       EVALUATION OF PROGRESS TOWARD GOALS   Diet:    9/12: Regular ---> Clear Liquids  Parra CLEAR at breakfast and dinner    Intake/Tolerance:    Chart reviewed  Visited with pt and dtr this morning  Pt reports decreased appetite  Doesn't like the berry CLEAR - \"too sweet\"  Had some apple juice and tea for breakfast  Discussed alternate supplements for pt to try      ASSESSED NUTRITION NEEDS:  Dosing Weight 63 kg (adjusted, based on IBW of 59 kg and actual wt of 74 kg)   Estimated Energy Needs: 5732-3936 kcals (25-30 Kcal/Kg)  Justification: maintenance  Estimated Protein Needs: 75-95 grams protein (1.2-1.5 g pro/Kg)  Justification: Repletion      NEW FINDINGS:     09/09/24 0633 76.4 kg (168 lb 8 oz) Standing scale   09/06/24 0004 73.8 kg (162 lb 11.2 oz) Standing scale   09/05/24 2301 75.1 kg (165 lb 9.1 oz) Bed scale   09/05/24 1637 72.6 kg (160 lb) --     9/12: CT ---> Modest ascites now present slightly increased. Peritoneal implants consistent with carcinomatosis.  Increasing anasarca.       Previous Goals (9/7):   Pt to consume at least 75% of three nutritionally adequate meals per day (or equivalent via snacks/supplements). "   Evaluation: Not met    Previous Nutrition Diagnosis (9/7):   Inadequate oral intake related to reduced appetite (in setting of nausea/vomiting) as evidenced by presume consuming <50% estimated nutrition needs for past x3 weeks, weight loss, observed fat/muscle loss   Evaluation: No change, modified below        CURRENT NUTRITION DIAGNOSIS  Inadequate oral intake related to decreased appetite as evidenced by pt taking very small amount of liquids    INTERVENTIONS  Recommendations / Nutrition Prescription  Clear liquids    Will modify supplement order:  Will send apple CLEAR at breakfast and dinner, Boost SOOTHE at lunch  Send pineapple GelateinPLUS for pt to sample at lunch today      Goals  Diet to be advanced to solids in the next 1-2 days      MONITORING AND EVALUATION:  Progress towards goals will be monitored and evaluated per protocol and Practice Guidelines

## 2024-09-13 NOTE — PROGRESS NOTES
Progress Note     Primary Oncologist/Hematologist:  Dr. Dreyer          Assessment and Plan:New actions/orders today (09/13/2024) are underlined.     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   - most recently, elected a chemotherapy holiday over the summer  - 7/29/24 increase in size of mets as well as increased tumor marker while off chemotherapy f  - CT 9/4/2024 showed disease progression in the liver and lymph nodes  - She was due to resume treatment on 9/10/2024 with paclitaxel and bevacizumab  - We discussed her overall functional status today. I am afraid that further treatment may be challenging given her overall decline.  Some of that may be related to underlying infection.  We can certainly see if she gets stronger to continue chemotherapy, but today we did talk about what might be next if she is unable to get therapy.  We reviewed best supportive care and hospice as an option to feel good through the time that she has left.  - She can follow-up in the clinic a few weeks after her discharge to discuss next steps    THELMA  - Baseline 0.8-0.9 in clinic, but up to 2.8 on 9/4/2024  - Did have CT with contrast on 9/4/2024, no hydronephrosis on that day  - Likely due to nausea and poor intake  - IV hydration has been initiated with improvement in renal function     Anemia / fatigue   - Due to malignancy and its treatment  - Started on EPO 11/29/22, Hg increased to 11.0 and she feels much less fatigued   - 11/14/23 -> IV iron   - Fatigue negatively affecting her QOL, slight dose reduction without noticeable improvement   - EPO for Hg <10  - Hg has not improved as much as we would expect on chemotherapy holiday    - Given her prior chemotherapy history we must look for evidence of MDS, bone marrow biopsy on 9/3 did not show MDS  - PRBC if Hb<7     LFT abnormalities  - Has known liver  metastases which likely impact liver function  - Most likely due to infiltrative mets   - CT 9/12 showed stable liver mets, no biliary issues     Nausea   Improved with antiemetics     Leukocytosis   - New abdominal pain symptoms with ascites on CT 9/12.  - Procalcitonin is elevated. WBC is rising.  - Plan paracentesis and antibiotics for SBP. If negative, this might be due to underlying malignancy.      Weakness:  Being evaluated for weakness/ disposition   - PT may recommend in home PT upon discharge      Time spent: 36 minutes spent reviewing records, face-to-face with Imelda along with her  and daughter, and in consultation with her care team    Anastacio KU, CNP  [facility]  [phone] (office)         Interval History:     Feeling tired.  Weak.  Worried about getting more treatment.              Review of Systems:     The 5 point Review of Systems is negative other than noted in the HPI             Medications:   Scheduled Medications  Current Facility-Administered Medications   Medication Dose Route Frequency Provider Last Rate Last Admin    aspirin EC tablet 81 mg  81 mg Oral Daily Jorge Holliday MD   81 mg at 09/13/24 0943    cefTRIAXone (ROCEPHIN) 1 g vial to attach to  mL bag for ADULTS or NS 50 mL bag for PEDS  1 g Intravenous Q24H Chris Omer MD   1 g at 09/13/24 1141    fluticasone-vilanterol (BREO ELLIPTA) 100-25 MCG/ACT inhaler 1 puff  1 puff Inhalation Daily Rocky Uribe MD   1 puff at 09/13/24 0943    heparin lock flush 10 unit/mL injection 5-10 mL  5-10 mL Intracatheter Q24H Rocky Uribe MD   5 mL at 09/13/24 0635    heparin lock flush 100 unit/mL injection 5-10 mL  5-10 mL Intracatheter Q28 Days Rocky Uribe MD        lidocaine (PF) (XYLOCAINE) 1 % injection 10 mL  10 mL Other Once Long Toribio MD        pantoprazole (PROTONIX) EC tablet 40 mg  40 mg Oral BID AC Rocky Uribe MD   40 mg at 09/13/24 1701     sodium chloride (PF) 0.9% PF flush 10-20 mL  10-20 mL Intracatheter Q28 Days Rocky Uribe MD        sucralfate (CARAFATE) suspension 1 g  1 g Oral 4x Daily AC & HS NegraChris brandt MD   1 g at 09/13/24 1701    traMADol (ULTRAM) tablet 50 mg  50 mg Oral TID Jorge Holliday MD   50 mg at 09/13/24 1701     PRN Medications  Current Facility-Administered Medications   Medication Dose Route Frequency Provider Last Rate Last Admin    albuterol (PROVENTIL HFA/VENTOLIN HFA) inhaler  2 puff Inhalation Q6H PRN Rocky Uribe MD   2 puff at 09/10/24 2055    bisacodyl (DULCOLAX) suppository 10 mg  10 mg Rectal Daily PRN Jorge Holliday MD   10 mg at 09/09/24 0954    calcium carbonate (TUMS) chewable tablet 500-1,000 mg  500-1,000 mg Oral TID PRN Jorge Holliday MD   1,000 mg at 09/12/24 1944    heparin lock flush 10 unit/mL injection 5-10 mL  5-10 mL Intracatheter Q1H PRN Rocky Uribe MD   5 mL at 09/13/24 1243    hydrALAZINE (APRESOLINE) tablet 10 mg  10 mg Oral Q4H PRN Rocky Uribe MD        Or    hydrALAZINE (APRESOLINE) injection 10 mg  10 mg Intravenous Q4H PRN Rocky Uribe MD        HYDROmorphone (PF) (DILAUDID) injection 0.3-0.5 mg  0.3-0.5 mg Intravenous Q4H PRN Jorge Holliday MD   0.5 mg at 09/12/24 1944    naloxone (NARCAN) injection 0.2 mg  0.2 mg Intravenous Q2 Min PRRocky Goldman MD        Or    naloxone (NARCAN) injection 0.4 mg  0.4 mg Intravenous Q2 Min PRN Rocky Uribe MD        Or    naloxone (NARCAN) injection 0.2 mg  0.2 mg Intramuscular Q2 Min PRRocky Goldman MD        Or    naloxone (NARCAN) injection 0.4 mg  0.4 mg Intramuscular Q2 Min PRN Rocky Uribe MD        ondansetron (ZOFRAN ODT) ODT tab 4 mg  4 mg Oral Q6H PRN Rocky Uribe MD   4 mg at 09/09/24 2306    Or    ondansetron (ZOFRAN) injection 4 mg  4 mg Intravenous Q6H PRN Rocky Uribe MD   4 mg at 09/12/24 2309     "polyethylene glycol (MIRALAX) Packet 17 g  17 g Oral Daily PRN Jorge Holliady MD        prochlorperazine (COMPAZINE) injection 5 mg  5 mg Intravenous Q6H PRN Rocky Uribe MD        Or    prochlorperazine (COMPAZINE) tablet 5 mg  5 mg Oral Q6H PRN Rocky Uribe MD   5 mg at 09/09/24 1414    Or    prochlorperazine (COMPAZINE) suppository 12.5 mg  12.5 mg Rectal Q12H PRN Rocky Uribe MD        senna-docusate (SENOKOT-S/PERICOLACE) 8.6-50 MG per tablet 1-2 tablet  1-2 tablet Oral BID PRN Jorge Holliday MD   1 tablet at 09/12/24 2214    sodium chloride (PF) 0.9% PF flush 10-20 mL  10-20 mL Intracatheter q1 min prn Rocky Uribe MD        sodium chloride (PF) 0.9% PF flush 10-20 mL  10-20 mL Intracatheter Q1H PRN Rocky Uribe MD                      Physical Exam:   Vitals were reviewed  Blood pressure 117/69, pulse 100, temperature 98.9  F (37.2  C), temperature source Oral, resp. rate 18, height 1.664 m (5' 5.5\"), weight 76.4 kg (168 lb 8 oz), SpO2 98%.  Wt Readings from Last 4 Encounters:   09/09/24 76.4 kg (168 lb 8 oz)   08/04/20 82.1 kg (181 lb)   11/21/18 90.7 kg (200 lb)   03/25/16 86.2 kg (190 lb)       I/O last 3 completed shifts:  In: -   Out: 775 [Urine:775]    Constitutional: Awake, alert, cooperative, no apparent distress            Data:   All laboratory data and imaging studies reviewed.    CMP  Recent Labs   Lab 09/13/24  0635 09/12/24  0556 09/10/24  0557 09/09/24  0552 09/08/24  0556   * 131* 133* 135 136   POTASSIUM 4.1 4.0 4.1 3.9 4.0   CHLORIDE 93* 95* 97* 98 100   CO2 24 24 23 24 24   ANIONGAP 11 12 13 13 12   * 127* 115* 92 104*   BUN 13.5 13.2 14.0 15.4 19.8   CR 0.73 0.76 0.81 0.85 1.01*   GFRESTIMATED 84 80 74 70 57*   LOUISA 7.8* 7.8* 7.8* 7.8* 7.7*   PROTTOTAL  --  6.1* 5.9* 5.8* 5.9*   ALBUMIN  --  2.8* 3.0* 2.8* 2.8*   BILITOTAL  --  2.1* 1.9* 1.4* 1.2   ALKPHOS  --  1,542* 1,529* 1,498* 1,427*   AST  --  77* 70* 69* 58* "   ALT  --  17 18 17 16     CBC  Recent Labs   Lab 09/13/24  0635 09/12/24  0556 09/10/24  0557 09/09/24  0552   WBC 18.8* 18.5* 17.8* 12.1*   RBC 3.52* 3.45* 3.65* 3.53*   HGB 10.0* 9.6* 10.5* 9.9*   HCT 30.8* 30.4* 32.4* 31.4*   MCV 88 88 89 89   MCH 28.4 27.8 28.8 28.0   MCHC 32.5 31.6 32.4 31.5   RDW 15.3* 15.4* 15.2* 15.3*   * 625* 603* 599*     INRNo lab results found in last 7 days.  Data   Results for orders placed or performed during the hospital encounter of 09/05/24 (from the past 24 hour(s))   CT Chest/Abdomen/Pelvis w Contrast    Narrative    EXAM: CT CHEST/ABDOMEN/PELVIS W CONTRAST  LOCATION: [facility]  DATE: 9/12/2024    INDICATION: burning in chest and upper abdomen, endometrial cancer, also has rising white count  COMPARISON: 9/4/2024  TECHNIQUE: CT scan of the chest, abdomen, and pelvis was performed following injection of IV contrast. Multiplanar reformats were obtained. Dose reduction techniques were used.   CONTRAST: 84ml isovue 370    FINDINGS:   LUNGS AND PLEURA: Numerous bilateral pulmonary nodules unchanged consistent metastases. Trace volume right pleural effusion slightly more prominent. Minimal atelectasis at right lung base.    MEDIASTINUM/AXILLAE: Low-density subcarinal and right hilar adenopathy unchanged. Subcarinal node measures approximately 2.2 x 1.3 cm. Largest right hilar node measures 1.8 x 1.4 cm left-sided Port-A-Cath.    CORONARY ARTERY CALCIFICATION: Moderate.    HEPATOBILIARY: Innumerable hepatic metastases unchanged gallbladder and bile ducts remain within normal limits.    PANCREAS: Normal.    SPLEEN: Stable low-attenuation within splenic hilum unchanged favored to represent metastatic disease.    ADRENAL GLANDS: Normal.    KIDNEYS/BLADDER: Normal.    BOWEL: No obstruction or inflammatory change. Mild but increased volume ascites. Enhancing soft tissue density along right gutter consistent with metastatic implants. Implants also seen  within the omentum.    LYMPH NODES: Bulky maria esther hepatis lymph nodes measuring up to 3.9 x 3.7 cm unchanged. Mesenteric adenopathy versus tumor implants throughout small bowel mesentery unchanged. Mild periaortic adenopathy stable.    VASCULATURE: Unremarkable.    PELVIC ORGANS: Ascites. Hysterectomy.    MUSCULOSKELETAL: Increasing subcutaneous edema throughout the abdomen and pelvis consistent with anasarca.      Impression    IMPRESSION:  1.  Innumerable pulmonary and hepatic metastases unchanged.  2.  Modest ascites now present slightly increased. Peritoneal implants consistent with carcinomatosis.  3.  Maria Esther hepatis retroperitoneal and mesenteric adenopathy unchanged.  4.  Increasing anasarca.  5.  Tiny volume right pleural effusion minimally larger.   CBC with platelets   Result Value Ref Range    WBC Count 18.8 (H) 4.0 - 11.0 10e3/uL    RBC Count 3.52 (L) 3.80 - 5.20 10e6/uL    Hemoglobin 10.0 (L) 11.7 - 15.7 g/dL    Hematocrit 30.8 (L) 35.0 - 47.0 %    MCV 88 78 - 100 fL    MCH 28.4 26.5 - 33.0 pg    MCHC 32.5 31.5 - 36.5 g/dL    RDW 15.3 (H) 10.0 - 15.0 %    Platelet Count 618 (H) 150 - 450 10e3/uL   Basic metabolic panel   Result Value Ref Range    Sodium 128 (L) 135 - 145 mmol/L    Potassium 4.1 3.4 - 5.3 mmol/L    Chloride 93 (L) 98 - 107 mmol/L    Carbon Dioxide (CO2) 24 22 - 29 mmol/L    Anion Gap 11 7 - 15 mmol/L    Urea Nitrogen 13.5 8.0 - 23.0 mg/dL    Creatinine 0.73 0.51 - 0.95 mg/dL    GFR Estimate 84 >60 mL/min/1.73m2    Calcium 7.8 (L) 8.8 - 10.4 mg/dL    Glucose 124 (H) 70 - 99 mg/dL   Cell count with differential fluid    Narrative    The following orders were created for panel order Cell count with differential fluid.  Procedure                               Abnormality         Status                     ---------                               -----------         ------                     Cell Count Body Fluid[[id number]]                            In process                 Differential Body  Fluid[[id number]]                          In process                   Please view results for these tests on the individual orders.   Glucose fluid   Result Value Ref Range    Glucose Fluid Source Pleural Cavity, Right     Glucose fluid 105 mg/dL    Narrative    No reference ranges have been established. This result should be interpreted in the context of the patient's clinical condition and compared to simultaneous measurement in the patient's blood. This is a lab developed test. It has not been cleared or approved by the FDA. FDA clearance is not required for clinical use.   Lactate dehydrogenase fluid   Result Value Ref Range    LD Fluid Source Pleural Cavity, Right     Lactate dehydrogenase fluid 257 U/L    Narrative    No reference ranges have been established. This result should be interpreted in the context of the patient's clinical condition and compared to simultaneous measurement in the patient's blood. This is a lab developed test. It has not been cleared or approved by the FDA. FDA clearance is not required for clinical use.   Protein fluid   Result Value Ref Range    Protein Fluid Source Abdomen     Protein Total Fluid 2.0 g/dL    Narrative    No reference ranges have been established. This result should be interpreted in the context of the patient's clinical condition and compared to simultaneous measurement in the patient's blood. This is a lab developed test. It has not been cleared or approved by the FDA. FDA clearance is not required for clinical use.   US Paracentesis with Albumin    Narrative    US PARACENTESIS WITH ALBUMIN 9/13/2024 4:15 PM    CLINICAL HISTORY: History of endometrial carcinoma. Metastatic disease  within the chest and abdomen.    PROCEDURE: Informed consent obtained. Time out performed. A limited  ultrasound of the abdomen was performed demonstrating small to  moderate volume of ascites. Largest collection of fluid is localized  to the right lower quadrant and the overlying  skin was prepped and  draped in a sterile fashion. 10 mL of 1% lidocaine was infused into  local soft tissues. An 8 [place] catheter system was introduced into  the abdominal ascites under ultrasound guidance.    0.3 liters of clear fluid were removed and sent for specified labs.    Patient tolerated procedure well.    Ultrasound imaging was obtained and placed in the patient's permanent  medical record.      Impression    IMPRESSION:  1.  Ultrasound-guided diagnostic/therapeutic paracentesis performed  without complication.    DANA ATKINSON MD         SYSTEM ID:  [id number]   US Thoracentesis    Narrative    EXAM:  1. RIGHT THORACENTESIS  2. ULTRASOUND GUIDANCE  LOCATION: [facility]  DATE/TIME: 9/13/2024 4:15 PM    INDICATION: History of endometrial carcinoma. Metastatic disease  within the chest and abdomen.    PROCEDURE: Informed consent obtained. Time out performed. A limited  ultrasound of the right chest was performed demonstrating a small  right pleural effusion. The largest collection of fluid was localized  and the overlying skin was prepped and draped in a sterile fashion. 10  mL of 1% lidocaine was infused into local soft tissues. A 5 [place]  catheter system was introduced into the pleural effusion under  ultrasound guidance.    0.1 liters of clear fluid were removed and sent for specified labs.    Patient tolerated procedure well.    Ultrasound images have been permanently captured for documentation.      Impression    IMPRESSION:  Ultrasound-guided diagnostic/therapeutic right thoracentesis performed  without complication.    DANA ATKINSON MD         SYSTEM ID:  [id number]

## 2024-09-13 NOTE — PLAN OF CARE
Orientation: A&O  Aggression Stop Light: Green  Activity: SBA with walker  Diet/BS Checks: Clear liquid diet, tolerating better today per pt report  Tele:  n/a  IV Access/Drains: Port hep locked, R PIV SL  Pain Management: Pt reports less burning and discomfort today, no PRN meds needed.   Abnormal VS/Results: VSS on room air  Bowel/Bladder: Continent   Skin/Wounds: Scattered scabs. Blanchable redness on coccyx and buttocks, pt repositions independently in the bed or chair  Consults: Heme/onc, PT/OT  D/C Disposition: Pending  Other Info: Baseline LE edema R>L.  ABX started for possible SBP.  Para removed 300ml today and right thora removed 100ml.

## 2024-09-13 NOTE — PLAN OF CARE
Goal Outcome Evaluation:  9/12/24  [id number]    Orientation: A/Ox4, pleasant  Aggression Stop Light: green  Activity: SBA Walker  Diet/BS Checks: Regular  Tele:  none  IV Access/Drains: R PIV SL, L Port HL with  good blood return  Pain Management: Abdominal discomfort with int burning-Dilaudid 1x, Tums 1x  Abnormal VS/Results: VSS, exc tachycardia, on RA  Bowel/Bladder: cont both  Skin/Wounds: scattered scabs  Consults: Hem/Onc/PT/OT  D/C Disposition: pending  Other Info: Senna 1x for constipation  CT done-see results

## 2024-09-13 NOTE — PLAN OF CARE
Orientation: A&O x4, Pleasant  Aggression Stop Light: Green  Activity: SBA with walker  Diet/BS Checks: Regular diet  Tele:  N/A  IV Access/Drains: Chest Port  HL  Pain Management: C/o of stomach burning, managed with scheduled Carafate and tramadol  Abnormal VS/Results: VSS on RA. Na = 131, WBC= 18.5   Bowel/Bladder: Continent of B/B; Pt had no BM this shift  Skin/Wounds: Scattered bruising and scabs, redness blanchable on sacrum/coccyx, BLE isiah with moderate edema  Consults: Hem/onc, PT, OT  D/C Disposition: Plan to discharge to Home with home care pending   Other Info:

## 2024-09-14 NOTE — PROGRESS NOTES
[facility]  Hospitalist Progress Note  Chris Omer MD  09/13/2024    Assessment & Plan   Jany Park is a 79 year old female with past medical history significant for endometrial cancer; HTN; asthma; fibromyalgia with chronic pain; and GERD; who presented 9/5/2024 with abnormal outpatient labs and nausea/vomiting, found with THELMA.     On initial evaluation, pt was afebrile, VSS. Labs notable for CBC with WBC normal, hgb 10.3, platelets 577K; BMP with sodium 134, chloride 91, BUN 49.5, cr 2.1, calcium 9.7, AG 18; LFT's with ALP 1633, AST 70, tbili 1.6; UA with 6 WBC, small LE, 19 hyaline casts.         Acute kidney injury, suspect prerenal (from N/V, poor oral intake), contrast nephropathy +/- NSAID, with ATN, resolved.  * Initial presentation as above. Cr 2.1, BUN 49.5, UA significant for 19 hyaline casts. Noted that pt had a contrast CT 9/3 as well. Started on IVF's on admit. PTA lisinopril and meloxicam held on admit.  * IVF's completed 9/7.  * Cr normalized 9/9.     Endometrial cancer, metastatic  Abnormal LFTs, suspect related to metastatic disease.  Nausea and vomiting, suspect multifactorial including related to above, also possibly constipation.  Poor intake related to above.  * Followed by Dr. Dreyer of [facility]. See Oncology note for oncologic history. Noted diagnosed and had initial surgery 2015 with subsequently chemotherapy. Noted subsequently development of liver metastases. Noted last chemotherapy in 5/2024. Noted that pt underwent CT imaging and BM biopsy 9/4 (results not available on admit). Noted was suppose to undergo chemotherapy on 9/10/2024.  * Initial presentation as above. On admit, reported ~3 weeks of nausea and vomiting, intake of ~25% of baseline. LFT's elevated.  [facility] consulted on admit.  * On 9/6, per Oncology, noted that CT 9/4 showed disease progression in the liver and lymph nodes. Also noted that BM biopsy showed dysplasia, pending cytogenetics.  Noted that her anemia may be due to an exhausted bone marrow from her years of chemotherapy. ?nausea related to IV iron transfusion. Brain MRI ordered that was negative for acute findings.  * On 9/8, tbili normalized, AST and ALP stable/slightly improved.  * On 9/9, more nauseated but some improvement after BM. Seen by Dr. Dreyer (primary Oncologist) and recommended holding chemotherapy; discussed about hospice, but pt not ready to consider yet.             Recent Labs   Lab  09/10/24  0557 09/09/24  0552 09/08/24  0556 09/07/24  0556 09/06/24  0609 09/05/24  1857 09/05/24  1828   ALBUMIN  3.0 2.8* 2.8*  --  3.0*  --  3.4*   BILITOTAL  1.9 1.4* 1.2  --  1.4*  --  1.6*   ALT  18 17 16  --  17  --  18   AST  70 69* 58*  --  59*  --  70*   ALKPHOS  1529 1,498* 1,427*  --  1,440*  --  1,633*   PROTEIN    --   --   --   --  Negative  --    CR  0.81 0.85 1.01* 1.18* 1.68*  --  2.10*   - Continue to treat other issues as noted.   - Continue to monitor LFT's -   - Continue PRN ondansetron, PRN prochlorperazine.  - Follow-up with Dr. Dreyer, [facility], outpatient - holding chemotherapy for now (she was suppose to undergo chemotherapy 9/10/2024).  - Appreciate help from [facility].  - for stomach burning and pain,  Carafate added but helps only a little    Leukocytosis  Past 48 hours noted elevation of wbc, no fever, only complaint is stomach burning today  Monitor WBC with differential  Procalcitonin up 1, repeat CXR shows no infiltrate, no fevers  CT of chest/abd/pelvis with contrast showed increasing anasarca and stable metastatic disease     Constipation due to inactivity and opioids.  * On 9/8, pt c/o constipation. Bowel regimen intensified.  * On 9/9, had BM after suppository.  - Continue scheduled polyethylene glycol daily and senna-docusate BID and add   - Continue PRN polyethylene glycol, senna-docusate and PRN bisacodyl suppository.  - Continue to increase activity as able.     Dyspnea with chest tightness.  * On 9/8, pt  noted feeling dyspneic with chest tightness. ECG showed SR, no acute ischemic changes. CXR showed small pleural effusions, no focal infiltrates/edema.  * On 9/9, chest tightness better.  - Continue to monitor clinically.     Hyponatremia, suspect nutritional/hypovolemia.  * Sodium 134 on admit.   * Sodium normalized 9/8.     Chronic normocytic anemia.  * Hgb 10.3 on admit. Noted most recently 11.2 g/dl 3/2024.   * As above, per Oncology, noted that BM biopsy showed dysplasia, pending cytogenetics. Noted that her anemia may be due to an exhausted bone marrow from her years of chemotherapy.            Recent Labs   Lab 09/13/2024 0635 09/10/24  0557 09/09/24  0552 09/08/24  0556 09/06/24  0609 09/05/24  1828   HGB 10 10.5* 9.9* 10.1* 9.2* 10.3*   - Continue to monitor CBC - repeat in am.     Hypertension (benign essential).  Chronic lymphedema.  [PTA: lisinopril 10 mg daily; PRN bumetanide 1 mg BID.]  * Lisinopril held on admit.  * BP's normal off meds.  - Continue to hold lisinopril.  - Continue to hold PRN bumetanide.     Fibromyalgia with chronic pain.  * PTA on regimen including meloxicam, scheduled tramadol 100 mg TID and PRN oxycodone.  * PTA tramadol held on admit given THELMA.  * On 9/6, pt with withdrawal symptoms.   * On 9/7 am, scheduled tramadol restarted.  - Continue tramadol 100 gm TID.  - Continue PRN oxycodone and PRN IV hydromorphone; minimize opioids as able.  - Continue to hold meloxicam given THELMA.     Weakness and physical deconditioning due to multiple acute and chronic medical issues.  * Pt very weak since hospitalization  * On 9/10, seen by PT and recommended home with assist and home cares vs TCU.  -  Overall, pt and family feels she will be capable to go home with assistance from  with home health cares/therapies.     GERD.  - Continue pantoprazole BID.     Asthma.  - Continue fluticasone-vilanterol and PRN albuterol.       Clinically Significant Risk Factors              #  "Hypoalbuminemia: Lowest albumin = 2.8 g/dL at 9/9/2024  5:52 AM, will monitor as appropriate           # Overweight: Estimated body mass index is 27.61 kg/m  as calculated from the following:    Height as of this encounter: 1.664 m (5' 5.5\").    Weight as of this encounter: 76.4 kg (168 lb 8 oz)., PRESENT ON ADMISSION  # Moderate Malnutrition: based on nutrition assessment, PRESENT ON ADMISSION   # Asthma: noted on problem list        Diet: Regular Diet Adult  Snacks/Supplements Adult: Ensure Clear; With Meals    Prophylaxis: PCD's and ambulation  Ochoa Catheter: Not present  Lines: PRESENT      Port A Cath Single 07/30/20 Left Chest wall-Site Assessment: WDL      Code Status: Full Code        Disposition Plan  Medically Ready for Discharge: Anticipated Tomorrow with home care    Disposition:     Interval History   -- discussed with onc PA  -- burning upper abdomen and chest continues    -- underwent thoracentesis 100 cc    -Data reviewed today: I reviewed all new labs and imaging over the last 24 hours. I personally reviewed no images or EKG's today.    Physical Exam    , Blood pressure 121/70, pulse 105, temperature 97.2  F (36.2  C), temperature source Oral, resp. rate 16, height 1.664 m (5' 5.5\"), weight 76.4 kg (168 lb 8 oz), SpO2 97%.  Vitals:    09/05/24 2301 09/06/24 0004 09/09/24 0633   Weight: 75.1 kg (165 lb 9.1 oz) 73.8 kg (162 lb 11.2 oz) 76.4 kg (168 lb 8 oz)     Vital Signs with Ranges  Temp:  [97.2  F (36.2  C)-99  F (37.2  C)] 97.2  F (36.2  C)  Pulse:  [] 105  Resp:  [16-20] 16  BP: (108-124)/(68-73) 121/70  SpO2:  [96 %-99 %] 97 %  I/O's Last 24 hours  I/O last 3 completed shifts:  In: 120 [P.O.:120]  Out: 875 [Urine:875]    Constitutional: Awake, alert, cooperative,    Respiratory: Clear to auscultation bilaterally, no crackles or wheezing  Cardiovascular: Regular rate and rhythm, normal S1 and S2, and no murmur noted  GI: Normal bowel sounds, soft, non-distended, " non-tender  Skin/Integumen: No rashes, no cyanosis   Other:      Medications   All medications were reviewed.  Current Facility-Administered Medications   Medication Dose Route Frequency Provider Last Rate Last Admin     Current Facility-Administered Medications   Medication Dose Route Frequency Provider Last Rate Last Admin    aspirin EC tablet 81 mg  81 mg Oral Daily Jorge Holliday MD   81 mg at 09/13/24 0943    cefTRIAXone (ROCEPHIN) 1 g vial to attach to  mL bag for ADULTS or NS 50 mL bag for PEDS  1 g Intravenous Q24H Chris Omer MD   1 g at 09/13/24 1141    fluticasone-vilanterol (BREO ELLIPTA) 100-25 MCG/ACT inhaler 1 puff  1 puff Inhalation Daily Rocky Uribe MD   1 puff at 09/13/24 0943    heparin lock flush 10 unit/mL injection 5-10 mL  5-10 mL Intracatheter Q24H Rocky Uribe MD   5 mL at 09/13/24 0635    heparin lock flush 100 unit/mL injection 5-10 mL  5-10 mL Intracatheter Q28 Days Rocky Uribe MD        lidocaine (PF) (XYLOCAINE) 1 % injection 10 mL  10 mL Other Once Long Toribio MD        pantoprazole (PROTONIX) EC tablet 40 mg  40 mg Oral BID AC Rocky Uribe MD   40 mg at 09/13/24 1701    sodium chloride (PF) 0.9% PF flush 10-20 mL  10-20 mL Intracatheter Q28 Days Rocky Uribe MD        sucralfate (CARAFATE) suspension 1 g  1 g Oral 4x Daily AC & HS Chris Omer MD   1 g at 09/13/24 2226    traMADol (ULTRAM) tablet 50 mg  50 mg Oral TID Jorge Holliday MD   50 mg at 09/13/24 2226        Data   Recent Labs   Lab 09/13/24  0635 09/12/24  0556 09/10/24  0557   WBC 18.8* 18.5* 17.8*   HGB 10.0* 9.6* 10.5*   MCV 88 88 89   * 625* 603*   * 131* 133*   POTASSIUM 4.1 4.0 4.1   CHLORIDE 93* 95* 97*   CO2 24 24 23   BUN 13.5 13.2 14.0   CR 0.73 0.76 0.81   ANIONGAP 11 12 13   LOUISA 7.8* 7.8* 7.8*   * 127* 115*   ALBUMIN  --  2.8* 3.0*   PROTTOTAL  --  6.1* 5.9*   BILITOTAL  --  2.1* 1.9*   ALKPHOS  --   1,542* 1,529*   ALT  --  17 18   AST  --  77* 70*       Recent Results (from the past 24 hour(s))   US Paracentesis with Albumin    Narrative    US PARACENTESIS WITH ALBUMIN 9/13/2024 4:15 PM    CLINICAL HISTORY: History of endometrial carcinoma. Metastatic disease  within the chest and abdomen.    PROCEDURE: Informed consent obtained. Time out performed. A limited  ultrasound of the abdomen was performed demonstrating small to  moderate volume of ascites. Largest collection of fluid is localized  to the right lower quadrant and the overlying skin was prepped and  draped in a sterile fashion. 10 mL of 1% lidocaine was infused into  local soft tissues. An 8 Slovak catheter system was introduced into  the abdominal ascites under ultrasound guidance.    0.3 liters of clear fluid were removed and sent for specified labs.    Patient tolerated procedure well.    Ultrasound imaging was obtained and placed in the patient's permanent  medical record.      Impression    IMPRESSION:  1.  Ultrasound-guided diagnostic/therapeutic paracentesis performed  without complication.    DANA ATKINSON MD         SYSTEM ID:  [id number]   US Thoracentesis    Narrative    EXAM:  1. RIGHT THORACENTESIS  2. ULTRASOUND GUIDANCE  LOCATION: [facility]  DATE/TIME: 9/13/2024 4:15 PM    INDICATION: History of endometrial carcinoma. Metastatic disease  within the chest and abdomen.    PROCEDURE: Informed consent obtained. Time out performed. A limited  ultrasound of the right chest was performed demonstrating a small  right pleural effusion. The largest collection of fluid was localized  and the overlying skin was prepped and draped in a sterile fashion. 10  mL of 1% lidocaine was infused into local soft tissues. A 5 Slovak  catheter system was introduced into the pleural effusion under  ultrasound guidance.    0.1 liters of clear fluid were removed and sent for specified labs.    Patient tolerated procedure well.    Ultrasound images  have been permanently captured for documentation.      Impression    IMPRESSION:  Ultrasound-guided diagnostic/therapeutic right thoracentesis performed  without complication.    DANA ATKINSON MD         SYSTEM ID:  [id number]       Chris Omer MD  Text Page  (7am to 6pm)

## 2024-09-14 NOTE — PLAN OF CARE
Orientation: A&Ox4  Aggression Stop Light: Green  Activity: SBA/W  Diet/BS Checks: clears  Tele:  N/A  IV Access/Drains: L port HL, good blood return noted. Offered to do needle change this shift but pt declined.   Pain Management: IV dilaudid given x2 for abdominal pain  Abnormal VS/Results: VSS on RA ex tachy  - WBC 18.8  Bowel/Bladder: continent of B/B  Skin/Wounds: blanchable redness to coccyx, LE edema, scattered scabbing. Thora and para sites with bandaids in place, WDL.  Consults: PT, OT, hem/onc  D/C Disposition: pending  Other Info:   - zofran and compazine given for nausea

## 2024-09-14 NOTE — PROGRESS NOTES
Progress Note     Primary Oncologist/Hematologist:  Dr. Dreyer          Assessment and Plan:New actions/orders today (09/14/2024) are underlined.     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   - most recently, elected a chemotherapy holiday over the summer  - 7/29/24 increase in size of mets as well as increased tumor marker while off chemotherapy f  - CT 9/4/2024 showed disease progression in the liver and lymph nodes  - She was due to resume treatment on 9/10/2024 with paclitaxel and bevacizumab  -Again  discussed her overall functional status today. Afraid that further treatment may be challenging given her overall decline.  Some of that may be related to underlying infection.  We can certainly see if she gets stronger to continue chemotherapy, but today we again talkedabout what might be next if she is unable to get therapy.  We reviewed best supportive care and hospice as an option to feel good through the time that she has left.  - She can follow-up in the clinic a few weeks after her discharge to discuss next steps    THELMA  - Baseline 0.8-0.9 in clinic, but up to 2.8 on 9/4/2024, completely normal now 0.73 yesterday.  - Did have CT with contrast on 9/4/2024, no hydronephrosis on that day  - Likely due to nausea and poor intake  - IV hydration has been initiated with improvement in renal function     Anemia / fatigue   - Due to malignancy and its treatment  - Started on EPO 11/29/22, Hg increased to 11.0 and she feels much less fatigued   - 11/14/23 -> IV iron   - Fatigue negatively affecting her QOL, slight dose reduction without noticeable improvement   - EPO for Hg <10  - Hg has not improved as much as we would expect on chemotherapy holiday    - Given her prior chemotherapy history we must look for evidence of MDS, bone marrow biopsy on 9/3 did not show MDS  - PRBC if Hb<7      LFT abnormalities  - Has known liver metastases which likely impact liver function  Last checked 9/12-were stable  - Most likely due to infiltrative mets   - CT 9/12 showed stable liver mets, no biliary issues     Nausea   Improved with antiemetics     Leukocytosis   - New abdominal pain symptoms with ascites on CT 9/12.  - Procalcitonin  elevated. WBC elevated up to 18.8 yesterday.  No CBC done today.  - Plan paracentesis and antibiotics for SBP. If negative, this might be due to underlying malignancy.  On IV ceftriaxone.      Weakness:  Being evaluated for weakness/ disposition   - PT may recommend in home PT upon discharge   -Continue OT PT in the hospital    Patient given multiple opportunities to ask questions, answered to the best of her satisfaction.  Will continue to follow the patient while in the hospital.     Lynne Mcfadden MD  [facility]  [phone] (office)         Interval History:     Overall feeling better, especially when she is doing much.  Tolerated thoracentesis okay yesterday, but she did have some pain last night at the site of the procedure.  Resolved now.  She is working with OT/PT.  Unclear liquid diet, tolerating well.  Lymphedema has improved significantly in the lower extremities.  Denies any fever or chills.              Review of Systems:     The 10 point Review of Systems is negative other than noted in the HPI             Medications:   Scheduled Medications  Current Facility-Administered Medications   Medication Dose Route Frequency Provider Last Rate Last Admin    aspirin EC tablet 81 mg  81 mg Oral Daily Jorge Holliday MD   81 mg at 09/14/24 0829    bumetanide (BUMEX) tablet 1 mg  1 mg Oral BID Chris Omer MD   1 mg at 09/14/24 0829    cefTRIAXone (ROCEPHIN) 1 g vial to attach to  mL bag for ADULTS or NS 50 mL bag for PEDS  1 g Intravenous Q24H Chris Omer MD   1 g at 09/13/24 1141    fluticasone-vilanterol (BREO ELLIPTA) 100-25 MCG/ACT inhaler  1 puff  1 puff Inhalation Daily Rocky Uribe MD   1 puff at 09/14/24 0833    heparin lock flush 10 unit/mL injection 5-10 mL  5-10 mL Intracatheter Q24H Rocky Uribe MD   5 mL at 09/14/24 0649    heparin lock flush 100 unit/mL injection 5-10 mL  5-10 mL Intracatheter Q28 Days Rocky Uribe MD        lidocaine (PF) (XYLOCAINE) 1 % injection 10 mL  10 mL Other Once Long Toribio MD        pantoprazole (PROTONIX) EC tablet 40 mg  40 mg Oral BID AC Rocky Uribe MD   40 mg at 09/14/24 0832    sodium chloride (PF) 0.9% PF flush 10-20 mL  10-20 mL Intracatheter Q28 Days Rocky Uribe MD        sucralfate (CARAFATE) suspension 1 g  1 g Oral 4x Daily AC & HS Chris Omer MD   1 g at 09/14/24 0830    traMADol (ULTRAM) tablet 50 mg  50 mg Oral TID Jorge Holliday MD   50 mg at 09/14/24 0829     PRN Medications  Current Facility-Administered Medications   Medication Dose Route Frequency Provider Last Rate Last Admin    albuterol (PROVENTIL HFA/VENTOLIN HFA) inhaler  2 puff Inhalation Q6H PRN Rocky Uribe MD   2 puff at 09/10/24 2055    bisacodyl (DULCOLAX) suppository 10 mg  10 mg Rectal Daily PRN Jorge Holliday MD   10 mg at 09/09/24 0954    calcium carbonate (TUMS) chewable tablet 500-1,000 mg  500-1,000 mg Oral TID PRN Jorge Holliday MD   1,000 mg at 09/12/24 1944    heparin lock flush 10 unit/mL injection 5-10 mL  5-10 mL Intracatheter Q1H PRN Rocky Uribe MD   5 mL at 09/13/24 1926    hydrALAZINE (APRESOLINE) tablet 10 mg  10 mg Oral Q4H PRN Rocky Uribe MD        Or    hydrALAZINE (APRESOLINE) injection 10 mg  10 mg Intravenous Q4H PRN Rocky Uribe MD        HYDROmorphone (PF) (DILAUDID) injection 0.3-0.5 mg  0.3-0.5 mg Intravenous Q4H PRN Jorge Holliday MD   0.5 mg at 09/14/24 0106    naloxone (NARCAN) injection 0.2 mg  0.2 mg Intravenous Q2 Min PRN Rocky Uribe MD        Or     "naloxone (NARCAN) injection 0.4 mg  0.4 mg Intravenous Q2 Min PRN Rocky Uribe MD        Or    naloxone (NARCAN) injection 0.2 mg  0.2 mg Intramuscular Q2 Min PRN Rocky Uribe MD        Or    naloxone (NARCAN) injection 0.4 mg  0.4 mg Intramuscular Q2 Min PRN Rocky Uribe MD        ondansetron (ZOFRAN ODT) ODT tab 4 mg  4 mg Oral Q6H PRN Rocky Uribe MD   4 mg at 09/13/24 1925    Or    ondansetron (ZOFRAN) injection 4 mg  4 mg Intravenous Q6H PRN Rocky Uribe MD   4 mg at 09/14/24 0646    polyethylene glycol (MIRALAX) Packet 17 g  17 g Oral Daily PRN Jorge Holliday MD        prochlorperazine (COMPAZINE) injection 5 mg  5 mg Intravenous Q6H PRN Rocky Uribe MD   5 mg at 09/13/24 2117    Or    prochlorperazine (COMPAZINE) tablet 5 mg  5 mg Oral Q6H PRN Rocky Uribe MD   5 mg at 09/09/24 1414    Or    prochlorperazine (COMPAZINE) suppository 12.5 mg  12.5 mg Rectal Q12H PRN Rocky Uribe MD        senna-docusate (SENOKOT-S/PERICOLACE) 8.6-50 MG per tablet 1-2 tablet  1-2 tablet Oral BID PRN Jorge Holliday MD   2 tablet at 09/13/24 2230    sodium chloride (PF) 0.9% PF flush 10-20 mL  10-20 mL Intracatheter q1 min prn Rocky Uribe MD        sodium chloride (PF) 0.9% PF flush 10-20 mL  10-20 mL Intracatheter Q1H PRN Rocky Uribe MD                      Physical Exam:   Vitals were reviewed  Blood pressure 109/72, pulse 99, temperature 98  F (36.7  C), temperature source Oral, resp. rate 16, height 1.664 m (5' 5.5\"), weight 76.4 kg (168 lb 8 oz), SpO2 99%.  Wt Readings from Last 4 Encounters:   09/09/24 76.4 kg (168 lb 8 oz)   08/04/20 82.1 kg (181 lb)   11/21/18 90.7 kg (200 lb)   03/25/16 86.2 kg (190 lb)       I/O last 3 completed shifts:  In: 120 [P.O.:120]  Out: 650 [Urine:650]    Constitutional: Awake, alert, cooperative, no apparent distress, appears chronically ill       RESP: No shortness of breath with " conversation  ABDO: Firm, distended, nontender, no guarding or rigidity  EXT: Bilateral lower extremity edema 3+        Data:   All laboratory data and imaging studies reviewed.    CMP  Recent Labs   Lab 09/13/24  0635 09/12/24  0556 09/10/24  0557 09/09/24  0552 09/08/24  0556   * 131* 133* 135 136   POTASSIUM 4.1 4.0 4.1 3.9 4.0   CHLORIDE 93* 95* 97* 98 100   CO2 24 24 23 24 24   ANIONGAP 11 12 13 13 12   * 127* 115* 92 104*   BUN 13.5 13.2 14.0 15.4 19.8   CR 0.73 0.76 0.81 0.85 1.01*   GFRESTIMATED 84 80 74 70 57*   LOUISA 7.8* 7.8* 7.8* 7.8* 7.7*   PROTTOTAL  --  6.1* 5.9* 5.8* 5.9*   ALBUMIN  --  2.8* 3.0* 2.8* 2.8*   BILITOTAL  --  2.1* 1.9* 1.4* 1.2   ALKPHOS  --  1,542* 1,529* 1,498* 1,427*   AST  --  77* 70* 69* 58*   ALT  --  17 18 17 16     CBC  Recent Labs   Lab 09/13/24  0635 09/12/24  0556 09/10/24  0557 09/09/24  0552   WBC 18.8* 18.5* 17.8* 12.1*   RBC 3.52* 3.45* 3.65* 3.53*   HGB 10.0* 9.6* 10.5* 9.9*   HCT 30.8* 30.4* 32.4* 31.4*   MCV 88 88 89 89   MCH 28.4 27.8 28.8 28.0   MCHC 32.5 31.6 32.4 31.5   RDW 15.3* 15.4* 15.2* 15.3*   * 625* 603* 599*     INRNo lab results found in last 7 days.  Data   Results for orders placed or performed during the hospital encounter of 09/05/24 (from the past 24 hour(s))   Cell count with differential fluid    Narrative    The following orders were created for panel order Cell count with differential fluid.  Procedure                               Abnormality         Status                     ---------                               -----------         ------                     Cell Count Body Fluid[[id number]]                            Final result               Differential Body Fluid[[id number]]                          Final result                 Please view results for these tests on the individual orders.   Pleural fluid Aerobic Bacterial Culture Routine With Gram Stain    Specimen: Pleural Cavity, Right; Pleural fluid   Result Value Ref  Range    Gram Stain Result No organisms seen     Gram Stain Result 3+ WBC seen     Narrative    Gram Stain quantification of host cells and microbiological organisms was done on a cytocentrifuged preparation.    Glucose fluid   Result Value Ref Range    Glucose Fluid Source Pleural Cavity, Right     Glucose fluid 105 mg/dL    Narrative    No reference ranges have been established. This result should be interpreted in the context of the patient's clinical condition and compared to simultaneous measurement in the patient's blood. This is a lab developed test. It has not been cleared or approved by the FDA. FDA clearance is not required for clinical use.   Lactate dehydrogenase fluid   Result Value Ref Range    LD Fluid Source Pleural Cavity, Right     Lactate dehydrogenase fluid 257 U/L    Narrative    No reference ranges have been established. This result should be interpreted in the context of the patient's clinical condition and compared to simultaneous measurement in the patient's blood. This is a lab developed test. It has not been cleared or approved by the FDA. FDA clearance is not required for clinical use.   Protein fluid   Result Value Ref Range    Protein Fluid Source Abdomen     Protein Total Fluid 2.0 g/dL    Narrative    No reference ranges have been established. This result should be interpreted in the context of the patient's clinical condition and compared to simultaneous measurement in the patient's blood. This is a lab developed test. It has not been cleared or approved by the FDA. FDA clearance is not required for clinical use.   Cell Count Body Fluid   Result Value Ref Range    Color Yellow Colorless, Yellow    Clarity Clear Clear    Cell Count Fluid Source Pleural Cavity, Right     Total Nucleated Cells      Narrative    No reference ranges have been established.  This result  should be interpreted in the context of the patient's clinical condition and   compared to simultaneous measurement in  the patient's blood.         Differential Body Fluid    Narrative    Specimen clotted. Slide reviewed, no abnormal cells seen.  No reference ranges have been established. This result should be interpreted in the context of the patient's clinical condition and compared to simultaneous measurement in the patient's blood.   US Paracentesis with Albumin    Narrative    US PARACENTESIS WITH ALBUMIN 9/13/2024 4:15 PM    CLINICAL HISTORY: History of endometrial carcinoma. Metastatic disease  within the chest and abdomen.    PROCEDURE: Informed consent obtained. Time out performed. A limited  ultrasound of the abdomen was performed demonstrating small to  moderate volume of ascites. Largest collection of fluid is localized  to the right lower quadrant and the overlying skin was prepped and  draped in a sterile fashion. 10 mL of 1% lidocaine was infused into  local soft tissues. An 8 [place]bourgish catheter system was introduced into  the abdominal ascites under ultrasound guidance.    0.3 liters of clear fluid were removed and sent for specified labs.    Patient tolerated procedure well.    Ultrasound imaging was obtained and placed in the patient's permanent  medical record.      Impression    IMPRESSION:  1.  Ultrasound-guided diagnostic/therapeutic paracentesis performed  without complication.    DANA ATKINSON MD         SYSTEM ID:  [id number]   US Thoracentesis    Narrative    EXAM:  1. RIGHT THORACENTESIS  2. ULTRASOUND GUIDANCE  LOCATION: [facility]  DATE/TIME: 9/13/2024 4:15 PM    INDICATION: History of endometrial carcinoma. Metastatic disease  within the chest and abdomen.    PROCEDURE: Informed consent obtained. Time out performed. A limited  ultrasound of the right chest was performed demonstrating a small  right pleural effusion. The largest collection of fluid was localized  and the overlying skin was prepped and draped in a sterile fashion. 10  mL of 1% lidocaine was infused into local soft tissues. A 5  Amharic  catheter system was introduced into the pleural effusion under  ultrasound guidance.    0.1 liters of clear fluid were removed and sent for specified labs.    Patient tolerated procedure well.    Ultrasound images have been permanently captured for documentation.      Impression    IMPRESSION:  Ultrasound-guided diagnostic/therapeutic right thoracentesis performed  without complication.    DANA ATKINSON MD         SYSTEM ID:  [id number]           Time spent: 40 minutes > 50 % face-to-face with the patient.  Additional time chart review, documentation

## 2024-09-15 NOTE — PLAN OF CARE
Orientation: A&Ox4  Aggression Stop Light: Green  Activity: SBA/W. Pt feeling very weak this shift.   Diet/BS Checks: advanced to low fiber  Tele:  N/A  IV Access/Drains: L port HL, good blood return noted. R PIV SL  Pain Management: IV dilaudid given x2 for R sided abdominal pain. Scheduled trazodone given.   Abnormal VS/Results: VSS on RA ex tachy  - Na 128  Bowel/Bladder: continent of B/B  Skin/Wounds: blanchable redness to coccyx - noted small open area, LE edema, scattered scabbing. Thora and para sites with bandaids in place, WDL.  Consults: PT, OT, hem/onc  D/C Disposition: pending  Other Info:   - sudden episode episode of nausea/dry heaving without emesis this AM. PRN IV zofran given x1

## 2024-09-15 NOTE — PROGRESS NOTES
[facility]  Hospitalist Progress Note  Chris Omer MD  09/14/2024    Assessment & Plan   Jany Park is a 79 year old female with past medical history significant for endometrial cancer; HTN; asthma; fibromyalgia with chronic pain; and GERD; who presented 9/5/2024 with abnormal outpatient labs and nausea/vomiting, found with THELMA.     On initial evaluation, pt was afebrile, VSS. Labs notable for CBC with WBC normal, hgb 10.3, platelets 577K; BMP with sodium 134, chloride 91, BUN 49.5, cr 2.1, calcium 9.7, AG 18; LFT's with ALP 1633, AST 70, tbili 1.6; UA with 6 WBC, small LE, 19 hyaline casts.         Acute kidney injury, suspect prerenal (from N/V, poor oral intake), contrast nephropathy +/- NSAID, with ATN, resolved.  * Initial presentation as above. Cr 2.1, BUN 49.5, UA significant for 19 hyaline casts. Noted that pt had a contrast CT 9/3 as well. Started on IVF's on admit. PTA lisinopril and meloxicam held on admit.  * IVF's completed 9/7.  * Cr normalized 9/9.     Endometrial cancer, metastatic  Abnormal LFTs, suspect related to metastatic disease.  Nausea and vomiting, suspect multifactorial including related to above, also possibly constipation.  Poor intake related to above.  * Followed by Dr. Dreyer of [facility]. See Oncology note for oncologic history. Noted diagnosed and had initial surgery 2015 with subsequently chemotherapy. Noted subsequently development of liver metastases. Noted last chemotherapy in 5/2024. Noted that pt underwent CT imaging and BM biopsy 9/4 (results not available on admit). Noted was suppose to undergo chemotherapy on 9/10/2024.  * Initial presentation as above. On admit, reported ~3 weeks of nausea and vomiting, intake of ~25% of baseline. LFT's elevated.  [facility] consulted on admit.  * On 9/6, per Oncology, noted that CT 9/4 showed disease progression in the liver and lymph nodes. Also noted that BM biopsy showed dysplasia, pending cytogenetics.  Noted that her anemia may be due to an exhausted bone marrow from her years of chemotherapy. ?nausea related to IV iron transfusion. Brain MRI ordered that was negative for acute findings.  * On 9/8, tbili normalized, AST and ALP stable/slightly improved.  * On 9/9, more nauseated but some improvement after BM. Seen by Dr. Dreyer (primary Oncologist) and recommended holding chemotherapy; discussed about hospice, but pt not ready to consider yet.             Recent Labs   Lab  09/10/24  0557 09/09/24  0552 09/08/24  0556 09/07/24  0556 09/06/24  0609 09/05/24  1857 09/05/24  1828   ALBUMIN  3.0 2.8* 2.8*  --  3.0*  --  3.4*   BILITOTAL  1.9 1.4* 1.2  --  1.4*  --  1.6*   ALT  18 17 16  --  17  --  18   AST  70 69* 58*  --  59*  --  70*   ALKPHOS  1529 1,498* 1,427*  --  1,440*  --  1,633*   PROTEIN    --   --   --   --  Negative  --    CR  0.81 0.85 1.01* 1.18* 1.68*  --  2.10*   - Continue to treat other issues as noted.   - Continue to monitor LFT's -   - Continue PRN ondansetron, PRN prochlorperazine.  - Follow-up with Dr. Dreyer, [facility], outpatient - holding chemotherapy for now (she was suppose to undergo chemotherapy 9/10/2024).  - Appreciate help from [facility].  - for stomach burning and pain,  Carafate added but helps only a little    Leukocytosis  Past few days noted elevation of wbc, no fever, only complaint is stomach burning today  Monitor WBC with differential  Procalcitonin up 1, repeat CXR shows no infiltrate, no fevers  CT of chest/abd/pelvis with contrast showed increasing anasarca and stable metastatic disease     Constipation due to inactivity and opioids.  * On 9/8, pt c/o constipation. Bowel regimen intensified.  * On 9/9, had BM after suppository.  - Continue scheduled polyethylene glycol daily and senna-docusate BID and add   - Continue PRN polyethylene glycol, senna-docusate and PRN bisacodyl suppository.  - Continue to increase activity as able.     Dyspnea with chest tightness.  * On 9/8, pt  noted feeling dyspneic with chest tightness. ECG showed SR, no acute ischemic changes. CXR showed small pleural effusions, no focal infiltrates/edema.  * On 9/9, chest tightness better.  - Continue to monitor clinically.     Hyponatremia, suspect nutritional/hypovolemia.  * Sodium 134 on admit.   * Sodium normalized 9/8  *noted down trending to 128 due to initiation of diuretic     Chronic normocytic anemia.  * Hgb 10.3 on admit. Noted most recently 11.2 g/dl 3/2024.   * As above, per Oncology, noted that BM biopsy showed dysplasia, pending cytogenetics. Noted that her anemia may be due to an exhausted bone marrow from her years of chemotherapy.            Recent Labs   Lab 09/13/2024 0635 09/10/24  0557 09/09/24  0552 09/08/24  0556 09/06/24  0609 09/05/24  1828   HGB 10 10.5* 9.9* 10.1* 9.2* 10.3*   - Continue to monitor CBC - repeat in am.     Hypertension (benign essential).  Chronic lymphedema.  [PTA: lisinopril 10 mg daily; PRN bumetanide 1 mg BID.]  * Lisinopril held on admit.  * BP's normal off meds.  - Continue to hold lisinopril.  - resume bumetanide.     Fibromyalgia with chronic pain.  * PTA on regimen including meloxicam, scheduled tramadol 100 mg TID and PRN oxycodone.  * PTA tramadol held on admit given THELMA.  * On 9/6, pt with withdrawal symptoms.   * On 9/7 am, scheduled tramadol restarted.  - Continue tramadol 100 gm TID.  - Continue PRN oxycodone and PRN IV hydromorphone; minimize opioids as able.  - Continue to hold meloxicam       Weakness and physical deconditioning due to multiple acute and chronic medical issues.  * Pt very weak since hospitalization  * On 9/10, seen by PT and recommended home with assist and home cares vs TCU.  -  Overall, pt and family feels she will be capable to go home with assistance from  with home health cares/therapies.     GERD.  - Continue pantoprazole BID.     Asthma.  - Continue fluticasone-vilanterol and PRN albuterol.       Clinically Significant Risk  "Factors              # Hypoalbuminemia: Lowest albumin = 2.8 g/dL at 9/9/2024  5:52 AM, will monitor as appropriate           # Overweight: Estimated body mass index is 27.61 kg/m  as calculated from the following:    Height as of this encounter: 1.664 m (5' 5.5\").    Weight as of this encounter: 76.4 kg (168 lb 8 oz)., PRESENT ON ADMISSION  # Moderate Malnutrition: based on nutrition assessment, PRESENT ON ADMISSION   # Asthma: noted on problem list        Diet: Regular Diet Adult  Snacks/Supplements Adult: Ensure Clear; With Meals    Prophylaxis: PCD's and ambulation  Ochoa Catheter: Not present  Lines: PRESENT      Port A Cath Single 07/30/20 Left Chest wall-Site Assessment: WDL      Code Status: Full Code        Disposition Plan  Medically Ready for Discharge: Anticipated home with home care on 9/16    Disposition:     Interval History   -- seen at bedside with some friends  -- feels a bit constipated  -- tolerating clear liquds    -Data reviewed today: I reviewed all new labs and imaging over the last 24 hours. I personally reviewed no images or EKG's today.    Physical Exam    , Blood pressure 113/65, pulse 100, temperature 98.1  F (36.7  C), temperature source Oral, resp. rate 18, height 1.664 m (5' 5.5\"), weight 76.4 kg (168 lb 8 oz), SpO2 100%.  Vitals:    09/05/24 2301 09/06/24 0004 09/09/24 0633   Weight: 75.1 kg (165 lb 9.1 oz) 73.8 kg (162 lb 11.2 oz) 76.4 kg (168 lb 8 oz)     Vital Signs with Ranges  Temp:  [97.2  F (36.2  C)-98.1  F (36.7  C)] 98.1  F (36.7  C)  Pulse:  [] 100  Resp:  [16-18] 18  BP: (109-121)/(65-72) 113/65  SpO2:  [97 %-100 %] 100 %  I/O's Last 24 hours  I/O last 3 completed shifts:  In: 120 [P.O.:120]  Out: 400 [Urine:400]    Constitutional: Awake, alert, cooperative,    Respiratory: Clear to auscultation bilaterally, no crackles or wheezing  Cardiovascular: Regular rate and rhythm, normal S1 and S2, and no murmur noted  GI: Normal bowel sounds, soft, non-distended, " non-tender  Skin/Integumen: No rashes, no cyanosis   Other:      Medications   All medications were reviewed.  Current Facility-Administered Medications   Medication Dose Route Frequency Provider Last Rate Last Admin     Current Facility-Administered Medications   Medication Dose Route Frequency Provider Last Rate Last Admin    aspirin EC tablet 81 mg  81 mg Oral Daily Jorge Holliday MD   81 mg at 09/14/24 0829    bumetanide (BUMEX) tablet 1 mg  1 mg Oral BID Chris Omer MD   1 mg at 09/14/24 1622    cefTRIAXone (ROCEPHIN) 1 g vial to attach to  mL bag for ADULTS or NS 50 mL bag for PEDS  1 g Intravenous Q24H Chris Omer MD   1 g at 09/14/24 1230    fluticasone-vilanterol (BREO ELLIPTA) 100-25 MCG/ACT inhaler 1 puff  1 puff Inhalation Daily Rocky Uribe MD   1 puff at 09/14/24 0833    heparin lock flush 10 unit/mL injection 5-10 mL  5-10 mL Intracatheter Q24H Rocky Uribe MD   5 mL at 09/14/24 0649    heparin lock flush 100 unit/mL injection 5-10 mL  5-10 mL Intracatheter Q28 Days Rocky Uribe MD        lidocaine (PF) (XYLOCAINE) 1 % injection 10 mL  10 mL Other Once Long Toribio MD        pantoprazole (PROTONIX) EC tablet 40 mg  40 mg Oral BID AC Rocky Uribe MD   40 mg at 09/14/24 1622    sodium chloride (PF) 0.9% PF flush 10-20 mL  10-20 mL Intracatheter Q28 Days Rocky Uribe MD        sucralfate (CARAFATE) suspension 1 g  1 g Oral 4x Daily AC & HS Chris Omer MD   1 g at 09/14/24 1622    traMADol (ULTRAM) tablet 50 mg  50 mg Oral TID Jorge Holliday MD   50 mg at 09/14/24 1622        Data   Recent Labs   Lab 09/13/24  0635 09/12/24  0556 09/10/24  0557   WBC 18.8* 18.5* 17.8*   HGB 10.0* 9.6* 10.5*   MCV 88 88 89   * 625* 603*   * 131* 133*   POTASSIUM 4.1 4.0 4.1   CHLORIDE 93* 95* 97*   CO2 24 24 23   BUN 13.5 13.2 14.0   CR 0.73 0.76 0.81   ANIONGAP 11 12 13   LOUISA 7.8* 7.8* 7.8*   * 127*  115*   ALBUMIN  --  2.8* 3.0*   PROTTOTAL  --  6.1* 5.9*   BILITOTAL  --  2.1* 1.9*   ALKPHOS  --  1,542* 1,529*   ALT  --  17 18   AST  --  77* 70*       No results found for this or any previous visit (from the past 24 hour(s)).      Chris Omer MD  Text Page  (7am to 6pm)

## 2024-09-15 NOTE — PLAN OF CARE
Orientation: A&O  Aggression Stop Light: Green  Activity: SBA with walker and gait belt  Diet/BS Checks: Low fiber diet, poor appetite  Tele:  n/a  IV Access/Drains: Port hep locked  Pain Management: Scheduled tramadol given, no PRN pain meds needed  Abnormal VS/Results: VSS on RA, tachy at times  Bowel/Bladder: Continent of bowel and bladder. Last BM 9/10, declined suppository  Skin/Wounds: Small open area on coccyx, mepilex in place, blanchable redness on coccyx and buttocks. LE edema, scattered bruising and scabbing.  Pressure injury on nose from glasses, mepilex lite in place.  WOC consult requested.    Consults: PT/OT, heme/onc  D/C Disposition: Possible discharge home tomorrow   Other Info: Appetite slightly improved today.

## 2024-09-15 NOTE — PLAN OF CARE
Orientation: A&O  Aggression Stop Light: Green  Activity: SBA with walker  Diet/BS Checks: Clear liquid, poor appeite  Tele:  n/a  IV Access/Drains: Port needle exchanged today.  KOFFI ZAMORA.   Pain Management: Scheduled tramadol  Abnormal VS/Results: VSS except tachycardia  Bowel/Bladder: Continent  Skin/Wounds: Blanchable redness to coccyx, LE edema, scattered scabbing. Pressure injury on nose from glasses, area cleansed and mepilex lite applied, ask MD for a WOC consult  Consults: Pt/OT, heme/onc  D/C Disposition: Pending  Other Info: Pt complained of abdominal pain and discomfort this afternoon, managed with scheduled tramadol and Carafate.

## 2024-09-15 NOTE — PROGRESS NOTES
Progress Note     Primary Oncologist/Hematologist:  Dr. Dreyer          Assessment and Plan:New actions/orders today (09/15/2024) are underlined.     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   - most recently, elected a chemotherapy holiday over the summer  - 7/29/24 increase in size of mets as well as increased tumor marker while off chemotherapy f  - CT 9/4/2024 showed disease progression in the liver and lymph nodes  - She was due to resume treatment on 9/10/2024 with paclitaxel and bevacizumab   discussed her overall functional status fraid that further treatment may be challenging given her overall decline.  Some of that may be related to underlying infection.  We can certainly see if she gets stronger to continue chemotherapy, but today we again talkedabout what might be next if she is unable to get therapy.  We reviewed best supportive care and hospice as an option to feel good through the time that she has left.  - She can follow-up in the clinic a few weeks after her discharge to discuss next steps    THELMA  - Baseline 0.8-0.9 in clinic, but up to 2.8 on 9/4/2024, completely normal now 0.73 on 9/13  - Did have CT with contrast on 9/4/2024, no hydronephrosis on that day  - Likely due to nausea and poor intake  - IV hydration has been initiated with improvement in renal function     Anemia / fatigue   - Due to malignancy and its treatment  - Started on EPO 11/29/22, Hg increased to 11.0 and she feels much less fatigued   - 11/14/23 -> IV iron   - Fatigue negatively affecting her QOL, slight dose reduction without noticeable improvement   - EPO for Hg <10  - Hg has not improved as much as we would expect on chemotherapy holiday    - Given her prior chemotherapy history we must look for evidence of MDS, bone marrow biopsy on 9/3 did not show MDS  - PRBC if Hb<7     LFT  abnormalities  - Has known liver metastases which likely impact liver function  Last checked 9/12-were stable  - Most likely due to infiltrative mets   - CT 9/12 showed stable liver mets, no biliary issues     Nausea   Improved with antiemetics     Leukocytosis   - New abdominal pain symptoms with ascites on CT 9/12.  - Procalcitonin  elevated. WBC elevated up to 18.8 yesterday.  No CBC done today.  - Plan paracentesis and antibiotics for SBP. If negative, this might be due to underlying malignancy.  On IV ceftriaxone.    Please obtain CBC  in am    Weakness:  Being evaluated for weakness/ disposition   - PT may recommend in home PT upon discharge   -Continue OT PT in the hospital    Patient given multiple opportunities to ask questions, answered to the best of her satisfaction.  Will continue to follow the patient while in the hospital.     Lynne Mcfadden MD  [facility]  [phone] (office)         Interval History:     Overall feeling OK. Tolerated low fiber diet well so far. Dry heaving last evening and am. Strength slowly improving,  She is working with OT/PT. Intermittent right sided abd pain.  Lymphedema has improved significantly in the lower extremities.  Denies any fever or chills.              Review of Systems:     The 10 point Review of Systems is negative other than noted in the HPI             Medications:   Scheduled Medications  Current Facility-Administered Medications   Medication Dose Route Frequency Provider Last Rate Last Admin    aspirin EC tablet 81 mg  81 mg Oral Daily Jorge Holliday MD   81 mg at 09/15/24 0905    bumetanide (BUMEX) tablet 1 mg  1 mg Oral BID Chris Omer MD   1 mg at 09/15/24 0906    cefTRIAXone (ROCEPHIN) 1 g vial to attach to  mL bag for ADULTS or NS 50 mL bag for PEDS  1 g Intravenous Q24H Chris Omer MD   1 g at 09/14/24 1230    fluticasone-vilanterol (BREO ELLIPTA) 100-25 MCG/ACT inhaler 1 puff  1 puff Inhalation Daily Rocky Uribe  MD Abran   1 puff at 09/15/24 0906    heparin lock flush 10 unit/mL injection 5-10 mL  5-10 mL Intracatheter Q24H Rocky Uribe MD   5 mL at 09/15/24 0540    heparin lock flush 100 unit/mL injection 5-10 mL  5-10 mL Intracatheter Q28 Days Rocky Uribe MD        lidocaine (PF) (XYLOCAINE) 1 % injection 10 mL  10 mL Other Once Long Toribio MD        pantoprazole (PROTONIX) EC tablet 40 mg  40 mg Oral BID AC Rocky Uribe MD   40 mg at 09/15/24 0910    sodium chloride (PF) 0.9% PF flush 10-20 mL  10-20 mL Intracatheter Q28 Days Rocky Uribe MD        sucralfate (CARAFATE) suspension 1 g  1 g Oral 4x Daily AC & HS Chris Omer MD   1 g at 09/15/24 0906    traMADol (ULTRAM) tablet 50 mg  50 mg Oral TID Jorge Holliday MD   50 mg at 09/15/24 0906     PRN Medications  Current Facility-Administered Medications   Medication Dose Route Frequency Provider Last Rate Last Admin    albuterol (PROVENTIL HFA/VENTOLIN HFA) inhaler  2 puff Inhalation Q6H PRN Rocky Uribe MD   2 puff at 09/10/24 2055    bisacodyl (DULCOLAX) suppository 10 mg  10 mg Rectal Daily PRN Jorge Holliday MD   10 mg at 09/09/24 0954    calcium carbonate (TUMS) chewable tablet 500-1,000 mg  500-1,000 mg Oral TID PRN Jorge Holliday MD   1,000 mg at 09/12/24 1944    heparin lock flush 10 unit/mL injection 5-10 mL  5-10 mL Intracatheter Q1H PRN Rocky Uribe MD   5 mL at 09/15/24 0346    hydrALAZINE (APRESOLINE) tablet 10 mg  10 mg Oral Q4H PRN Rocky Uribe MD        Or    hydrALAZINE (APRESOLINE) injection 10 mg  10 mg Intravenous Q4H PRN Rocky Uribe MD        HYDROmorphone (PF) (DILAUDID) injection 0.3-0.5 mg  0.3-0.5 mg Intravenous Q4H PRN Jorge Holliday MD   0.5 mg at 09/15/24 0346    naloxone (NARCAN) injection 0.2 mg  0.2 mg Intravenous Q2 Min PRN Rocky Uribe MD        Or    naloxone (NARCAN) injection 0.4 mg  0.4 mg  "Intravenous Q2 Min PRN Rocky Uribe MD        Or    naloxone (NARCAN) injection 0.2 mg  0.2 mg Intramuscular Q2 Min PRRocky Goldman MD        Or    naloxone (NARCAN) injection 0.4 mg  0.4 mg Intramuscular Q2 Min PRN Rocky Uribe MD        ondansetron (ZOFRAN ODT) ODT tab 4 mg  4 mg Oral Q6H PRN Rocky Uribe MD   4 mg at 09/13/24 1925    Or    ondansetron (ZOFRAN) injection 4 mg  4 mg Intravenous Q6H PRN Rocky Uribe MD   4 mg at 09/15/24 0540    polyethylene glycol (MIRALAX) Packet 17 g  17 g Oral Daily PRN Jorge Holliday MD        prochlorperazine (COMPAZINE) injection 5 mg  5 mg Intravenous Q6H PRN Rocky Uribe MD   5 mg at 09/13/24 2117    Or    prochlorperazine (COMPAZINE) tablet 5 mg  5 mg Oral Q6H PRN Rocky Uribe MD   5 mg at 09/09/24 1414    Or    prochlorperazine (COMPAZINE) suppository 12.5 mg  12.5 mg Rectal Q12H PRN Rocky Uribe MD        senna-docusate (SENOKOT-S/PERICOLACE) 8.6-50 MG per tablet 1-2 tablet  1-2 tablet Oral BID PRN Jorge Holliday MD   2 tablet at 09/15/24 0351    sodium chloride (PF) 0.9% PF flush 10-20 mL  10-20 mL Intracatheter q1 min prRocky Goldman MD        sodium chloride (PF) 0.9% PF flush 10-20 mL  10-20 mL Intracatheter Q1H PRN Rocky Uribe MD                      Physical Exam:   Vitals were reviewed  Blood pressure 112/73, pulse 103, temperature 97.4  F (36.3  C), temperature source Oral, resp. rate 20, height 1.664 m (5' 5.5\"), weight 76.4 kg (168 lb 8 oz), SpO2 97%.  Wt Readings from Last 4 Encounters:   09/09/24 76.4 kg (168 lb 8 oz)   08/04/20 82.1 kg (181 lb)   11/21/18 90.7 kg (200 lb)   03/25/16 86.2 kg (190 lb)       I/O last 3 completed shifts:  In: -   Out: 500 [Urine:500]    Constitutional: Awake, alert, cooperative, no apparent distress, appears chronically ill       RESP: No shortness of breath with conversation  ABDO: Firm, distended, nontender, no " guarding or rigidity  EXT: Bilateral lower extremity edema 3+        Data:   All laboratory data and imaging studies reviewed.    CMP  Recent Labs   Lab 09/13/24  0635 09/12/24  0556 09/10/24  0557 09/09/24  0552   * 131* 133* 135   POTASSIUM 4.1 4.0 4.1 3.9   CHLORIDE 93* 95* 97* 98   CO2 24 24 23 24   ANIONGAP 11 12 13 13   * 127* 115* 92   BUN 13.5 13.2 14.0 15.4   CR 0.73 0.76 0.81 0.85   GFRESTIMATED 84 80 74 70   LOUISA 7.8* 7.8* 7.8* 7.8*   PROTTOTAL  --  6.1* 5.9* 5.8*   ALBUMIN  --  2.8* 3.0* 2.8*   BILITOTAL  --  2.1* 1.9* 1.4*   ALKPHOS  --  1,542* 1,529* 1,498*   AST  --  77* 70* 69*   ALT  --  17 18 17     CBC  Recent Labs   Lab 09/13/24  0635 09/12/24  0556 09/10/24  0557 09/09/24  0552   WBC 18.8* 18.5* 17.8* 12.1*   RBC 3.52* 3.45* 3.65* 3.53*   HGB 10.0* 9.6* 10.5* 9.9*   HCT 30.8* 30.4* 32.4* 31.4*   MCV 88 88 89 89   MCH 28.4 27.8 28.8 28.0   MCHC 32.5 31.6 32.4 31.5   RDW 15.3* 15.4* 15.2* 15.3*   * 625* 603* 599*     INRNo lab results found in last 7 days.  Data   No results found for this or any previous visit (from the past 24 hour(s)).          Time spent: 30 minutes > 50 % face-to-face with the patient.  Additional time chart review, documentation

## 2024-09-16 NOTE — PLAN OF CARE
Orientation: A&Ox4  Aggression Stop Light: Green  Activity: SBA/W. Pt feeling very weak this shift.   Diet/BS Checks: low fiber - poor appetite.   Tele:  N/A  IV Access/Drains: L port HL, good blood return noted. R PIV SL  Pain Management: IV dilaudid given x1 for R sided abdominal pain - improved pain control from previous night. Scheduled trazodone given.   Abnormal VS/Results: VSS on RA ex tachy  Bowel/Bladder: continent of B/B, voiding well. No BM. PRN senna given. Declines suppository. Bowel sounds faint but active.   Skin/Wounds: blanchable redness to coccyx with small open area - mepilex in place. LE edema/isiah. Scattered scabbing. Thora and para sites with bandaids in place, WDL.  Consults: PT, OT, hem/onc  D/C Disposition: pending

## 2024-09-16 NOTE — PROGRESS NOTES
[facility]  Hospitalist Progress Note  Chris Omer MD  09/15/2024    Assessment & Plan   Jany Park is a 79 year old female with past medical history significant for endometrial cancer; HTN; asthma; fibromyalgia with chronic pain; and GERD; who presented 9/5/2024 with abnormal outpatient labs and nausea/vomiting, found with THELMA.     On initial evaluation, pt was afebrile, VSS. Labs notable for CBC with WBC normal, hgb 10.3, platelets 577K; BMP with sodium 134, chloride 91, BUN 49.5, cr 2.1, calcium 9.7, AG 18; LFT's with ALP 1633, AST 70, tbili 1.6; UA with 6 WBC, small LE, 19 hyaline casts.         Acute kidney injury, suspect prerenal (from N/V, poor oral intake), contrast nephropathy +/- NSAID, with ATN, resolved.  * Initial presentation as above. Cr 2.1, BUN 49.5, UA significant for 19 hyaline casts. Noted that pt had a contrast CT 9/3 as well. Started on IVF's on admit. PTA lisinopril and meloxicam held on admit.  * IVF's completed 9/7.  * Cr normalized 9/9.     Endometrial cancer, metastatic  Abnormal LFTs, suspect related to metastatic disease.  Nausea and vomiting, suspect multifactorial including related to above, also possibly constipation.  Poor intake related to above.  * Followed by Dr. Dreyer of [facility]. See Oncology note for oncologic history. Noted diagnosed and had initial surgery 2015 with subsequently chemotherapy. Noted subsequently development of liver metastases. Noted last chemotherapy in 5/2024. Noted that pt underwent CT imaging and BM biopsy 9/4 (results not available on admit). Noted was suppose to undergo chemotherapy on 9/10/2024.  * Initial presentation as above. On admit, reported ~3 weeks of nausea and vomiting, intake of ~25% of baseline. LFT's elevated.  [facility] consulted on admit.  * On 9/6, per Oncology, noted that CT 9/4 showed disease progression in the liver and lymph nodes. Also noted that BM biopsy showed dysplasia, pending cytogenetics.  Noted that her anemia may be due to an exhausted bone marrow from her years of chemotherapy. ?nausea related to IV iron transfusion. Brain MRI ordered that was negative for acute findings.  * On 9/8, tbili normalized, AST and ALP stable/slightly improved.  * On 9/9, more nauseated but some improvement after BM. Seen by Dr. Dreyer (primary Oncologist) and recommended holding chemotherapy; discussed about hospice, but pt not ready to consider yet.  - Continue to treat other issues as noted.   - Continue to monitor LFT's -   - Continue PRN ondansetron, PRN prochlorperazine.  - Follow-up with Dr. Dreyer, [facility], outpatient - holding chemotherapy for now (she was suppose to undergo chemotherapy 9/10/2024).  - Has been having burning discomfort in her upper abdomen and lower chest    Leukocytosis - suspect more malignancy related   Past few days noted elevation of wbc, no fever, only complaint is stomach burning today  Monitor WBC with differential  Procalcitonin up 1, repeat CXR shows no infiltrate, no fevers  CT of chest/abd/pelvis with contrast showed increasing anasarca and stable metastatic disease, no inflammation  She has been getting empiric ceftriaxone day 3/5, consider changing to ciprofloxacin if patient discharges prior to completion.     Constipation due to inactivity and opioids.  * On 9/8, pt c/o constipation. Bowel regimen intensified.  * On 9/9, had BM after suppository.  - Continue scheduled polyethylene glycol daily and senna-docusate BID and add   - Continue PRN polyethylene glycol, senna-docusate and PRN bisacodyl suppository.  - Continue to increase activity as able.     Dyspnea with chest tightness.  * On 9/8, pt noted feeling dyspneic with chest tightness. ECG showed SR, no acute ischemic changes. CXR showed small pleural effusions, no focal infiltrates/edema.  * On 9/9, chest tightness better.  - Continue to monitor clinically.     Hyponatremia, suspect nutritional/hypovolemia.  * Sodium 134 on  "admit.   * Sodium normalized 9/8  *noted down trending to 128 due to initiation of diuretic     Chronic normocytic anemia.  * Hgb 10.3 on admit. Noted most recently 11.2 g/dl 3/2024.   * As above, per Oncology, noted that BM biopsy showed dysplasia, pending cytogenetics. Noted that her anemia may be due to an exhausted bone marrow from her years of chemotherapy.            Recent Labs   Lab 09/13/2024 0635 09/10/24  0557 09/09/24  0552 09/08/24  0556 09/06/24  0609 09/05/24  1828   HGB 10 10.5* 9.9* 10.1* 9.2* 10.3*   - Continue to monitor CBC - repeat in am.     Hypertension (benign essential).  Chronic lymphedema.  [PTA: lisinopril 10 mg daily; PRN bumetanide 1 mg BID.]  * Lisinopril held on admit.  * BP's normal off meds.  - Continue to hold lisinopril.  - resume bumetanide.     Fibromyalgia with chronic pain.  * PTA on regimen including meloxicam, scheduled tramadol 100 mg TID and PRN oxycodone.  * PTA tramadol held on admit given THELMA.  * On 9/6, pt with withdrawal symptoms.   * On 9/7 am, scheduled tramadol restarted.  - Continue tramadol 100 gm TID.  - Continue PRN oxycodone and PRN IV hydromorphone; minimize opioids as able.  - Continue to hold meloxicam       Weakness and physical deconditioning due to multiple acute and chronic medical issues.  * Pt very weak since hospitalization  * On 9/10, seen by PT and recommended home with assist and home cares vs TCU.  -  Overall, pt and family feels she will be capable to go home with assistance from  with home health cares/therapies.     GERD.  - Continue pantoprazole BID.     Asthma.  - Continue fluticasone-vilanterol and PRN albuterol.       Clinically Significant Risk Factors              # Hypoalbuminemia: Lowest albumin = 2.8 g/dL at 9/9/2024  5:52 AM, will monitor as appropriate           # Overweight: Estimated body mass index is 27.61 kg/m  as calculated from the following:    Height as of this encounter: 1.664 m (5' 5.5\").    Weight as of this " "encounter: 76.4 kg (168 lb 8 oz)., PRESENT ON ADMISSION  # Moderate Malnutrition: based on nutrition assessment, PRESENT ON ADMISSION   # Asthma: noted on problem list        Diet: Regular Diet Adult  Snacks/Supplements Adult: Ensure Clear; With Meals    Prophylaxis: PCD's and ambulation  Ochoa Catheter: Not present  Lines: PRESENT      Port A Cath Single 07/30/20 Left Chest wall-Site Assessment: WDL      Code Status: Full Code        Disposition Plan  Medically Ready for Discharge: Anticipated home with home care on 9/16 vs 9/17    Disposition:     Interval History   -- her abdominal symptoms have improved slightly  -- feels a bit constipated    -Data reviewed today: I reviewed all new labs and imaging over the last 24 hours. I personally reviewed no images or EKG's today.    Physical Exam    , Blood pressure 127/79, pulse 104, temperature 97.3  F (36.3  C), temperature source Oral, resp. rate 16, height 1.664 m (5' 5.5\"), weight 76.4 kg (168 lb 8 oz), SpO2 95%.  Vitals:    09/05/24 2301 09/06/24 0004 09/09/24 0633   Weight: 75.1 kg (165 lb 9.1 oz) 73.8 kg (162 lb 11.2 oz) 76.4 kg (168 lb 8 oz)     Vital Signs with Ranges  Temp:  [97.3  F (36.3  C)-98.3  F (36.8  C)] 97.3  F (36.3  C)  Pulse:  [] 104  Resp:  [16-20] 16  BP: (100-127)/(69-79) 127/79  SpO2:  [95 %-98 %] 95 %  I/O's Last 24 hours  I/O last 3 completed shifts:  In: 240 [P.O.:240]  Out: 1050 [Urine:1050]    Constitutional: Awake, alert, cooperative,    Respiratory: Clear to auscultation bilaterally, no crackles or wheezing  Cardiovascular: Regular rate and rhythm, normal S1 and S2, and no murmur noted  GI: Normal bowel sounds, soft, non-distended, non-tender  Skin/Integumen: No rashes, no cyanosis, bilateral lymphedema  Other:      Medications   All medications were reviewed.  Current Facility-Administered Medications   Medication Dose Route Frequency Provider Last Rate Last Admin     Current Facility-Administered Medications   Medication Dose " Route Frequency Provider Last Rate Last Admin    aspirin EC tablet 81 mg  81 mg Oral Daily Jorge Holliday MD   81 mg at 09/15/24 0905    bumetanide (BUMEX) tablet 1 mg  1 mg Oral BID Chris Omer MD   1 mg at 09/15/24 1616    cefTRIAXone (ROCEPHIN) 1 g vial to attach to  mL bag for ADULTS or NS 50 mL bag for PEDS  1 g Intravenous Q24H Chris Omer MD   1 g at 09/15/24 1240    fluticasone-vilanterol (BREO ELLIPTA) 100-25 MCG/ACT inhaler 1 puff  1 puff Inhalation Daily Rocky Uribe MD   1 puff at 09/15/24 0906    heparin lock flush 10 unit/mL injection 5-10 mL  5-10 mL Intracatheter Q24H Rocky Uribe MD   5 mL at 09/15/24 0540    heparin lock flush 100 unit/mL injection 5-10 mL  5-10 mL Intracatheter Q28 Days Rocky Uribe MD        lidocaine (PF) (XYLOCAINE) 1 % injection 10 mL  10 mL Other Once Long Toribio MD        pantoprazole (PROTONIX) EC tablet 40 mg  40 mg Oral BID AC Rocky Uribe MD   40 mg at 09/15/24 1616    sodium chloride (PF) 0.9% PF flush 10-20 mL  10-20 mL Intracatheter Q28 Days Rocky Uribe MD        sucralfate (CARAFATE) suspension 1 g  1 g Oral 4x Daily AC & HS Chris Omer MD   1 g at 09/15/24 2117    traMADol (ULTRAM) tablet 50 mg  50 mg Oral TID Jorge Holliday MD   50 mg at 09/15/24 2117        Data   Recent Labs   Lab 09/13/24  0635 09/12/24  0556 09/10/24  0557   WBC 18.8* 18.5* 17.8*   HGB 10.0* 9.6* 10.5*   MCV 88 88 89   * 625* 603*   * 131* 133*   POTASSIUM 4.1 4.0 4.1   CHLORIDE 93* 95* 97*   CO2 24 24 23   BUN 13.5 13.2 14.0   CR 0.73 0.76 0.81   ANIONGAP 11 12 13   LOUISA 7.8* 7.8* 7.8*   * 127* 115*   ALBUMIN  --  2.8* 3.0*   PROTTOTAL  --  6.1* 5.9*   BILITOTAL  --  2.1* 1.9*   ALKPHOS  --  1,542* 1,529*   ALT  --  17 18   AST  --  77* 70*       No results found for this or any previous visit (from the past 24 hour(s)).      Chris Omer MD  Text Page  (7am to 6pm)

## 2024-09-16 NOTE — PROGRESS NOTES
Progress Note     Primary Oncologist/Hematologist:  Dr. Dreyer          Assessment and Plan     Endometrial cancer, high-grade serous carcinoma of the endometrium, s/p optimal debulking 2/2015, now with liver and intra-abdominal mets  - s/p extensive prior treatment.   - most recently, elected a chemotherapy holiday over the summer  - 7/29/24 increase in size of mets as well as increased tumor marker while off chemotherapy f  - CT 9/4/2024 showed disease progression in the liver and lymph nodes  - She was due to resume treatment on 9/10/2024 with paclitaxel and bevacizumab  - Will restart her on her home medication methylphenidate, withdrawal from this medication can be contributing to her fatigue.     THELMA  - Resolved      Anemia / fatigue   - Due to malignancy and its treatment  - Started on EPO 11/29/22, Hg increased to 11.0 and she feels much less fatigued   - 11/14/23 -> IV iron   - Fatigue negatively affecting her QOL, slight dose reduction without noticeable improvement   - EPO for Hg <10  - Hg has not improved as much as we would expect on chemotherapy holiday    - Given her prior chemotherapy history we must look for evidence of MDS, bone marrow biopsy on 9/3 did not show MDS  - PRBC if Hb<7     LFT abnormalities  - Has known liver metastases which likely impact liver function  Last checked 9/12-were stable  - Most likely due to infiltrative mets   - CT 9/12 showed stable liver mets, no biliary issues     Nausea   Improved with antiemetics     Leukocytosis   - New abdominal pain symptoms with ascites on CT 9/12.  - Procalcitonin  elevated. WBC elevated up to 18.8 yesterday.  No CBC done today.  - Plan paracentesis and antibiotics for SBP. If negative, this might be due to underlying malignancy.  On IV ceftriaxone.    Weakness:  Being evaluated for weakness/ disposition   - PT may recommend in home PT upon discharge   -Continue OT PT in the hospital    Patient given multiple opportunities to ask  questions, answered to the best of her satisfaction.  Will continue to follow the patient while in the hospital.     Patrick Dreyer,   [facility]  [phone] (office)         Interval History:     Had a good night sleep last night, then felt very fatigued this morning.               Review of Systems:     The 10 point Review of Systems is negative other than noted in the HPI             Medications:   Scheduled Medications  Current Facility-Administered Medications   Medication Dose Route Frequency Provider Last Rate Last Admin    aspirin EC tablet 81 mg  81 mg Oral Daily Jorge Holliday MD   81 mg at 09/16/24 0907    [Held by provider] bumetanide (BUMEX) tablet 1 mg  1 mg Oral BID Chris Omer MD   1 mg at 09/16/24 0907    cefTRIAXone (ROCEPHIN) 1 g vial to attach to  mL bag for ADULTS or NS 50 mL bag for PEDS  1 g Intravenous Q24H Chris Omer MD   1 g at 09/16/24 1237    fluticasone-vilanterol (BREO ELLIPTA) 100-25 MCG/ACT inhaler 1 puff  1 puff Inhalation Daily Rocky Uribe MD   1 puff at 09/16/24 0920    heparin lock flush 10 unit/mL injection 5-10 mL  5-10 mL Intracatheter Q24H Rocky Uribe MD   5 mL at 09/16/24 0635    heparin lock flush 100 unit/mL injection 5-10 mL  5-10 mL Intracatheter Q28 Days Rocky Uribe MD        lidocaine (PF) (XYLOCAINE) 1 % injection 10 mL  10 mL Other Once Long Toribio MD        pantoprazole (PROTONIX) EC tablet 40 mg  40 mg Oral BID AC Rocky Uribe MD   40 mg at 09/16/24 0640    polyethylene glycol (MIRALAX) Packet 17 g  17 g Oral Daily Jorge Holliday MD        senna-docusate (SENOKOT-S/PERICOLACE) 8.6-50 MG per tablet 1-2 tablet  1-2 tablet Oral BID Jorge Holliday MD   1 tablet at 09/16/24 0907    sodium chloride (PF) 0.9% PF flush 10-20 mL  10-20 mL Intracatheter Q28 Days Rocky Uribe MD        sucralfate (CARAFATE) suspension 1 g  1 g Oral 4x Daily AC & HS Chris Omer  MD Ana Paula   1 g at 09/16/24 1237    traMADol (ULTRAM) tablet 50 mg  50 mg Oral TID Jorge Holliday MD   50 mg at 09/16/24 0907     PRN Medications  Current Facility-Administered Medications   Medication Dose Route Frequency Provider Last Rate Last Admin    albuterol (PROVENTIL HFA/VENTOLIN HFA) inhaler  2 puff Inhalation Q6H PRN Rocky Uribe MD   2 puff at 09/10/24 2055    bisacodyl (DULCOLAX) suppository 10 mg  10 mg Rectal Daily PRN Jorge Holliday MD   10 mg at 09/09/24 0954    calcium carbonate (TUMS) chewable tablet 500-1,000 mg  500-1,000 mg Oral TID PRN Jorge Holliday MD   1,000 mg at 09/12/24 1944    heparin lock flush 10 unit/mL injection 5-10 mL  5-10 mL Intracatheter Q1H PRN Rocky Uribe MD   5 mL at 09/16/24 1454    hydrALAZINE (APRESOLINE) tablet 10 mg  10 mg Oral Q4H PRN Rocky Uribe MD        Or    hydrALAZINE (APRESOLINE) injection 10 mg  10 mg Intravenous Q4H PRN Rocky Uribe MD        HYDROmorphone (PF) (DILAUDID) injection 0.3-0.5 mg  0.3-0.5 mg Intravenous Q4H PRN Jorge Holliday MD   0.5 mg at 09/15/24 2242    methylphenidate (RITALIN) tablet 10 mg  10 mg Oral TID PRN Dreyer, Patrick,         naloxone (NARCAN) injection 0.2 mg  0.2 mg Intravenous Q2 Min PRN Rocky Uribe MD        Or    naloxone (NARCAN) injection 0.4 mg  0.4 mg Intravenous Q2 Min PRN Rocky Uribe MD        Or    naloxone (NARCAN) injection 0.2 mg  0.2 mg Intramuscular Q2 Min PRN Rocky Uribe MD        Or    naloxone (NARCAN) injection 0.4 mg  0.4 mg Intramuscular Q2 Min PRN Rocky Uribe MD        ondansetron (ZOFRAN ODT) ODT tab 4 mg  4 mg Oral Q6H PRN Rocky Uribe MD   4 mg at 09/16/24 0631    Or    ondansetron (ZOFRAN) injection 4 mg  4 mg Intravenous Q6H PRN Rocky Uribe MD   4 mg at 09/15/24 0540    oxyCODONE (ROXICODONE) tablet 5 mg  5 mg Oral Q4H PRN Jorge Holliday MD        polyethylene  "glycol (MIRALAX) Packet 17 g  17 g Oral Daily PRN Jorge Holliday MD        prochlorperazine (COMPAZINE) injection 5 mg  5 mg Intravenous Q6H PRN Rocky Uribe MD   5 mg at 09/13/24 2117    Or    prochlorperazine (COMPAZINE) tablet 5 mg  5 mg Oral Q6H PRN Rocky Uribe MD   5 mg at 09/09/24 1414    Or    prochlorperazine (COMPAZINE) suppository 12.5 mg  12.5 mg Rectal Q12H PRN Rocky Uribe MD        senna-docusate (SENOKOT-S/PERICOLACE) 8.6-50 MG per tablet 1-2 tablet  1-2 tablet Oral BID PRN Jorge Holliday MD   2 tablet at 09/15/24 2117    sodium chloride (PF) 0.9% PF flush 10-20 mL  10-20 mL Intracatheter q1 min prn Rocky Uribe MD        sodium chloride (PF) 0.9% PF flush 10-20 mL  10-20 mL Intracatheter Q1H PRN Rocky Uribe MD                      Physical Exam:   Vitals were reviewed  Blood pressure 128/72, pulse 100, temperature 97.9  F (36.6  C), temperature source Oral, resp. rate 16, height 1.664 m (5' 5.5\"), weight 76.4 kg (168 lb 8 oz), SpO2 99%.  Wt Readings from Last 4 Encounters:   09/09/24 76.4 kg (168 lb 8 oz)   08/04/20 82.1 kg (181 lb)   11/21/18 90.7 kg (200 lb)   03/25/16 86.2 kg (190 lb)       I/O last 3 completed shifts:  In: 480 [P.O.:480]  Out: 1725 [Urine:1725]    Constitutional: Awake, alert, cooperative, no apparent distress, appears chronically ill       RESP: No shortness of breath with conversation  ABDO: Firm, distended, nontender, no guarding or rigidity  EXT: Bilateral lower extremity edema 3+        Data:   All laboratory data and imaging studies reviewed.    CMP  Recent Labs   Lab 09/16/24  1450 09/16/24  0635 09/13/24  0635 09/12/24  0556 09/10/24  0557   * 126* 128* 131* 133*   POTASSIUM  --  3.5 4.1 4.0 4.1   CHLORIDE  --  88* 93* 95* 97*   CO2  --  24 24 24 23   ANIONGAP  --  14 11 12 13   GLC  --  101* 124* 127* 115*   BUN  --  20.3 13.5 13.2 14.0   CR  --  1.07* 0.73 0.76 0.81   GFRESTIMATED  --  53* 84 80 74 "   LOUISA  --  7.9* 7.8* 7.8* 7.8*   PROTTOTAL  --   --   --  6.1* 5.9*   ALBUMIN  --   --   --  2.8* 3.0*   BILITOTAL  --   --   --  2.1* 1.9*   ALKPHOS  --   --   --  1,542* 1,529*   AST  --   --   --  77* 70*   ALT  --   --   --  17 18     CBC  Recent Labs   Lab 09/16/24  0635 09/13/24  0635 09/12/24  0556 09/10/24  0557   WBC 15.9* 18.8* 18.5* 17.8*   RBC 3.54* 3.52* 3.45* 3.65*   HGB 10.3* 10.0* 9.6* 10.5*   HCT 30.7* 30.8* 30.4* 32.4*   MCV 87 88 88 89   MCH 29.1 28.4 27.8 28.8   MCHC 33.6 32.5 31.6 32.4   RDW 15.3* 15.3* 15.4* 15.2*   * 618* 625* 603*     INRNo lab results found in last 7 days.  Data   Results for orders placed or performed during the hospital encounter of 09/05/24 (from the past 24 hour(s))   CBC with platelets   Result Value Ref Range    WBC Count 15.9 (H) 4.0 - 11.0 10e3/uL    RBC Count 3.54 (L) 3.80 - 5.20 10e6/uL    Hemoglobin 10.3 (L) 11.7 - 15.7 g/dL    Hematocrit 30.7 (L) 35.0 - 47.0 %    MCV 87 78 - 100 fL    MCH 29.1 26.5 - 33.0 pg    MCHC 33.6 31.5 - 36.5 g/dL    RDW 15.3 (H) 10.0 - 15.0 %    Platelet Count 610 (H) 150 - 450 10e3/uL   Basic metabolic panel   Result Value Ref Range    Sodium 126 (L) 135 - 145 mmol/L    Potassium 3.5 3.4 - 5.3 mmol/L    Chloride 88 (L) 98 - 107 mmol/L    Carbon Dioxide (CO2) 24 22 - 29 mmol/L    Anion Gap 14 7 - 15 mmol/L    Urea Nitrogen 20.3 8.0 - 23.0 mg/dL    Creatinine 1.07 (H) 0.51 - 0.95 mg/dL    GFR Estimate 53 (L) >60 mL/min/1.73m2    Calcium 7.9 (L) 8.8 - 10.4 mg/dL    Glucose 101 (H) 70 - 99 mg/dL   Sodium (Every 6 Hrs)   Result Value Ref Range    Sodium 125 (L) 135 - 145 mmol/L             Time spent: 30 minutes > 50 % face-to-face with the patient.  Additional time chart review, documentation

## 2024-09-16 NOTE — PROGRESS NOTES
[facility]    Internal Medicine Hospitalist Progress Note  09/16/2024  I evaluated patient on the above date.    Jorge Holliday Jr., MD  [phone] (p)  Text Page  Vocera      [New actions/orders today (09/16/2024) are underlined in the assessment and plan. All lab results in the assessment and plan were reviewed.]  Assessment & Plan   Jany Park is a 79 year old female with past medical history significant for endometrial cancer; HTN; asthma; fibromyalgia with chronic pain; and GERD; who presented 9/5/2024 with abnormal outpatient labs and nausea/vomiting, found with THELMA.     On initial evaluation, pt was afebrile, VSS. Labs notable for CBC with WBC normal, hgb 10.3, platelets 577K; BMP with sodium 134, chloride 91, BUN 49.5, cr 2.1, calcium 9.7, AG 18; LFT's with ALP 1633, AST 70, tbili 1.6; UA with 6 WBC, small LE, 19 hyaline casts.        Endometrial cancer, metastatic  Abnormal LFTs, suspect related to metastatic disease.  Nausea and vomiting, suspect multifactorial including related to above, also possibly constipation.  Abdominal pain, suspect related to above.  Poor intake related to above.  * Followed by Dr. Dreyer of [facility]. See Oncology note for oncologic history. Noted diagnosed and had initial surgery 2015 with subsequently chemotherapy. Noted subsequently development of liver metastases. Noted last chemotherapy in 5/2024. Noted that pt underwent CT imaging and BM biopsy 9/4 (results not available on admit). Noted was suppose to undergo chemotherapy on 9/10/2024.  * Initial presentation as above. On admit, reported ~3 weeks of nausea and vomiting, intake of ~25% of baseline. LFT's elevated.  [facility] consulted on admit.  * On 9/6, per Oncology, noted that CT 9/4 showed disease progression in the liver and lymph nodes. Also noted that BM biopsy showed dysplasia, pending cytogenetics. Noted that her anemia may be due to an exhausted bone marrow from her years of chemotherapy.  ?nausea related to IV iron transfusion. Brain MRI ordered that was negative for acute findings.  * On 9/8, tbili normalized, AST and ALP stable/slightly improved.  * On 9/9, more nauseated but some improvement after BM. Seen by Dr. Dreyer (primary Oncologist) and recommended holding chemotherapy; discussed about hospice, but pt not ready to consider yet.   * On 9/11, noted abdominal burning pain, started on sucralfate.  * On 9/12, increased leukocytosis and burning abdominal pain. CT CAP showed innumerable pulmonary and hepatic metastases unchanged; modest ascites now present, slightly increased, peritoneal implants consistent with carcinomatosis; mckenzie hepatis retroperitoneal and mesenteric adenopathy unchanged; increasing anasarca; tiny volume right pleural effusion minimally larger.  * On 9/13-9/14, had more discussions with Oncology about worsened functional status and goals of care.  Underwent paracentesis 9/13, 300 ml removed, specimen noted to be clotted so no cell count noted.  * On 9/16, abdominal pain better.  - Continue to monitor LFT's.  - Continue PPI and sucralfate.  - Add PRN oxycodone 5 mg q4h (on PRN oxycodone PTA).  - Continue PRN IV hydromorphone; minimize opioids as able.  - Continue PRN ondansetron, PRN prochlorperazine.  - Follow-up with Dr. Dreyer, [facility], outpatient, can discuss further goals of cares.  - Appreciate help from [facility].     Acute kidney injury, suspect prerenal (from N/V, poor oral intake), contrast nephropathy +/- NSAID, with ATN, resolved.  * Initial presentation as above. Cr 2.1, BUN 49.5, UA significant for 19 hyaline casts. Noted that pt had a contrast CT 9/3 as well. Started on IVF's on admit. PTA lisinopril and meloxicam held on admit.  * IVF's completed 9/7.  * Cr normalized 9/9.  * Started on diuretics for anasarca 9/14.  Recent Labs   Lab 09/13/24  0635 09/12/24  0556 09/10/24  0557   CR 0.73 0.76 0.81   Estimated Creatinine Clearance: 65.7 mL/min (based on SCr of 0.73  mg/dL).  - Continue to monitor BMP periodically.    Leukocytosis, suspect more malignancy related   * Pt with gradually increasing WBC this hospitalization.   * On 9/12, procalcitonin 1.06. CT of chest/abd/pelvis as above showed increasing anasarca and stable metastatic disease, no inflammation.  * On 9/13, empirically on ceftriaxone. Thoracentesis cultures NGTD.  Recent Labs   Lab 09/13/24  0635 09/12/24  0902 09/12/24  0556 09/10/24  0557   WBC 18.8*  --  18.5* 17.8*   PCAL  --  1.06*  --   --    - Continue to monitor CBC.  - Continue ceftriaxone (started 9/13) - stop antibiotics after 9/17.     Constipation due to inactivity and opioids.  * On 9/8, pt c/o constipation. Bowel regimen intensified.  * On 9/9, had BM after suppository.  * On 9/16, pt notes no BM since last suppository (9/9).  - Schedule polyethylene glycol and senna-docusate.  - Continue PRN polyethylene glycol, senna-docusate and PRN bisacodyl suppository.  - Continue to increase activity as able.     Dyspnea with chest tightness.  * On 9/8, pt noted feeling dyspneic with chest tightness. ECG showed SR, no acute ischemic changes. CXR showed small pleural effusions, no focal infiltrates/edema.  * On 9/9, chest tightness better.  - Continue to monitor clinically.     Hyponatremia, suspect nutritional/hypovolemia.  * Sodium 134 on admit.   * Sodium normalized 9/8.  * Started diuretics 9/14.  Recent Labs   Lab 09/13/24  0635 09/12/24  0556 09/10/24  0557   * 131* 133*   - Continue to monitor BMP.    ADDENDUM 13:32:  * Repeat sodium at 126, question SIADH component. Has received fluids and loop diuretic this hospitalization.  - Monitor sodium q8h.  - Empirically start 1500 ml fluid restriction.  - Check urine sodium, urine osmol and serum osmolality.  - Hold scheduled bumetanide for now (though would tend to cause increased sodium levels from hypotonic urine produced).     Chronic normocytic anemia.  * Hgb 10.3 on admit. Noted most recently 11.2  g/dl 3/2024.   * As above, per Oncology, noted that BM biopsy showed dysplasia, pending cytogenetics. Noted that her anemia may be due to an exhausted bone marrow from her years of chemotherapy.   Recent Labs   Lab 09/13/24  0635 09/12/24  0556 09/10/24  0557   HGB 10.0* 9.6* 10.5*   - Continue to monitor CBC - repeat in am.     Hypertension (benign essential).  Third spacing/anasarca with ascites and pleural effusion, suspect from malnutrition with low albumin.  Chronic lymphedema.  [PTA: lisinopril 10 mg daily; PRN bumetanide 1 mg BID.]  * Lisinopril held on admit.  * BP's normal off meds.  * Initial issues with THELMA as above and received IVF's this hospitalization (cr subsequently normalized).  * CT 9/12 showed increasing anasarca, ascites and right pleural effusion.   * Underwent right thoracentesis 9/13, 100 ml removed and cultures NGTD. Also underwent paracentesis  9/13 as above.  * Started bumetanide 9/14.  - Continue bumetanide 1 mg PO BID.  - Continue to hold lisinopril.  - Continue to monitor i/o's, daily wts.     Fibromyalgia with chronic pain.  * PTA on regimen including meloxicam, scheduled tramadol 100 mg TID and PRN oxycodone.  * PTA tramadol held on admit given THELMA.  * On 9/6, pt with withdrawal symptoms.   * On 9/7 am, scheduled tramadol restarted.  - Continue tramadol 100 gm TID.  - Resume PRN oxycodone as above.  - Continue PRN IV hydromorphone; minimize opioids as able.  - Continue to hold meloxicam.    Moderate malnutrition related to acute and chronic medical issues.  * Noted by Nutrition; see their documentation.  - Continue diet as tolerated.  - Continue supplements.    Weakness and physical deconditioning due to multiple acute and chronic medical issues.  * Pt very weak since hospitalization  * On 9/10, seen by PT and recommended home with assist and home cares vs TCU.  -  Overall, pt and family feels she will be capable to go home with assistance from  with home health  "cares/therapies.     GERD.  * Pt with burning pain this hospitalization.  * Started on sucralfate 9/11, noted helped only a little.  - Continue pantoprazole BID and sucralfate QID.     Asthma.  - Continue fluticasone-vilanterol and PRN albuterol.        Clinically Significant Risk Factors              # Hypoalbuminemia: Lowest albumin = 2.8 g/dL at 9/12/2024  5:56 AM, will monitor as appropriate               # Overweight: Estimated body mass index is 27.61 kg/m  as calculated from the following:    Height as of this encounter: 1.664 m (5' 5.5\").    Weight as of this encounter: 76.4 kg (168 lb 8 oz).   # Moderate Malnutrition: based on nutrition assessment    # Financial/Environmental Concerns: none (denies)  # Asthma: noted on problem list              Diet: Diet  Snacks/Supplements Adult: Other; apple CLEAR at brkfst and dinner, Boost Soothe at lunch (RD); With Meals  Low Fiber Diet    Prophylaxis: PCD's and ambulation  Ochoa Catheter: Not present  Lines: PRESENT      Port A Cath Single 07/30/20 Left Chest wall-Site Assessment: WDL      Code Status: Full Code    Disposition Plan   Medically Ready for Discharge: Anticipated Today  Expected discharge to home with family.    Entered: Jorge Holliday MD 09/16/2024, 7:18 AM               Interval History   Doing better today.  Slept well, best she's slept this hospitalization.  Abdominal pain improved today.  C/o constipation.    * Data reviewed today: I reviewed all new labs and imaging over the last 24 hours. I personally reviewed no images or ECG's today.    Physical Exam   Most recent vitals:   , Blood pressure 120/70, pulse 94, temperature 97.2  F (36.2  C), temperature source Oral, resp. rate 16, height 1.664 m (5' 5.5\"), weight 76.4 kg (168 lb 8 oz), SpO2 99%. O2 Device: None (Room air)    Vitals:    09/05/24 2301 09/06/24 0004 09/09/24 0633   Weight: 75.1 kg (165 lb 9.1 oz) 73.8 kg (162 lb 11.2 oz) 76.4 kg (168 lb 8 oz)     Vital signs with ranges:  Temp:  " "[97.2  F (36.2  C)-98.3  F (36.8  C)] 97.2  F (36.2  C)  Pulse:  [] 94  Resp:  [16-20] 16  BP: (112-127)/(69-79) 120/70  SpO2:  [95 %-99 %] 99 %  Patient Vitals for the past 24 hrs:   BP Temp Temp src Pulse Resp SpO2   09/16/24 0630 120/70 97.2  F (36.2  C) Oral 94 16 99 %   09/15/24 2243 127/79 97.3  F (36.3  C) Oral 104 16 95 %   09/15/24 2013 118/70 98.3  F (36.8  C) Oral 112 18 97 %   09/15/24 1549 125/69 98.3  F (36.8  C) Oral 95 16 98 %   09/15/24 1244 115/70 97.5  F (36.4  C) Oral 100 18 98 %   09/15/24 0853 112/73 97.4  F (36.3  C) Oral 103 20 97 %     I/O's last 24 hours:  I/O last 3 completed shifts:  In: 240 [P.O.:240]  Out: 1350 [Urine:1350]    Constitutional: awake, alert, oriented, pleasant, conversant   Head:   Eyes:   ENT:   Neck:   Cardiovascular: regular rate and rhythm; no murmurs, rubs or gallops  Lungs: diminished in the bases, no crackles or wheezes  Gastrointestinal/Abdomen: mildly tender with some firmness without rebound or guarding, mildly distended, + bowel sounds  :   Musculoskeletal:   Skin/Extremities:  bilateral lower extremity lymphedema, trace to 1+ pitting in legs  Neurologic:   Psychiatric:   Hematologic/Lymphatic/Immunologic:        Data    Labs reviewed.  Recent Labs   Lab 09/13/24  0635 09/12/24  0556 09/10/24  0557   WBC 18.8* 18.5* 17.8*   HGB 10.0* 9.6* 10.5*   MCV 88 88 89   * 625* 603*   * 131* 133*   POTASSIUM 4.1 4.0 4.1   CHLORIDE 93* 95* 97*   CO2 24 24 23   BUN 13.5 13.2 14.0   CR 0.73 0.76 0.81   ANIONGAP 11 12 13   LOUISA 7.8* 7.8* 7.8*   * 127* 115*   ALBUMIN  --  2.8* 3.0*   PROTTOTAL  --  6.1* 5.9*   BILITOTAL  --  2.1* 1.9*   ALKPHOS  --  1,542* 1,529*   ALT  --  17 18   AST  --  77* 70*     Recent Labs   Lab Test 11/21/18  1714   NT-PROBNP, INPATIENT 196     Recent Labs   Lab 09/13/24  0635 09/12/24  0556 09/10/24  0557   * 127* 115*     No lab results found.    No results for input(s): \"INR\", \"[id number]\" in the last 168 " hours.  Recent Labs   Lab 09/13/24  0635 09/12/24  0902 09/12/24  0556 09/10/24  0557   WBC 18.8*  --  18.5* 17.8*   PCAL  --  1.06*  --   --        MICRO:  Cultures (including blood and urine):  Recent Labs   Lab 09/13/24  1609   CULTURE No Growth       No results found for this or any previous visit (from the past 24 hour(s)).    Medications   All medications were reviewed. MAR.    Infusions:  Current Facility-Administered Medications   Medication Dose Route Frequency Provider Last Rate Last Admin     Scheduled Medications:  Current Facility-Administered Medications   Medication Dose Route Frequency Provider Last Rate Last Admin    aspirin EC tablet 81 mg  81 mg Oral Daily Jorge Holliday MD   81 mg at 09/15/24 0905    bumetanide (BUMEX) tablet 1 mg  1 mg Oral BID Chris Omer MD   1 mg at 09/15/24 1616    cefTRIAXone (ROCEPHIN) 1 g vial to attach to  mL bag for ADULTS or NS 50 mL bag for PEDS  1 g Intravenous Q24H Chris Omer MD   1 g at 09/15/24 1240    fluticasone-vilanterol (BREO ELLIPTA) 100-25 MCG/ACT inhaler 1 puff  1 puff Inhalation Daily Rocky Uribe MD   1 puff at 09/15/24 0906    heparin lock flush 10 unit/mL injection 5-10 mL  5-10 mL Intracatheter Q24H Rocky Uribe MD   5 mL at 09/16/24 0635    heparin lock flush 100 unit/mL injection 5-10 mL  5-10 mL Intracatheter Q28 Days Rocky Uribe MD        lidocaine (PF) (XYLOCAINE) 1 % injection 10 mL  10 mL Other Once Long Toribio MD        pantoprazole (PROTONIX) EC tablet 40 mg  40 mg Oral BID AC Rocky Uribe MD   40 mg at 09/16/24 0640    sodium chloride (PF) 0.9% PF flush 10-20 mL  10-20 mL Intracatheter Q28 Days Rocky Uribe MD        sucralfate (CARAFATE) suspension 1 g  1 g Oral 4x Daily AC & HS Chris Omer MD   1 g at 09/16/24 0640    traMADol (ULTRAM) tablet 50 mg  50 mg Oral TID Jorge Holliday MD   50 mg at 09/15/24 2117     PRN Medications:  Current  Facility-Administered Medications   Medication Dose Route Frequency Provider Last Rate Last Admin    albuterol (PROVENTIL HFA/VENTOLIN HFA) inhaler  2 puff Inhalation Q6H PRN Rocky Uribe MD   2 puff at 09/10/24 2055    bisacodyl (DULCOLAX) suppository 10 mg  10 mg Rectal Daily PRN Jorge Holliday MD   10 mg at 09/09/24 0954    calcium carbonate (TUMS) chewable tablet 500-1,000 mg  500-1,000 mg Oral TID PRN Jorge Holliday MD   1,000 mg at 09/12/24 1944    heparin lock flush 10 unit/mL injection 5-10 mL  5-10 mL Intracatheter Q1H PRN Rocky Uribe MD   5 mL at 09/15/24 2242    hydrALAZINE (APRESOLINE) tablet 10 mg  10 mg Oral Q4H PRN Rocky Uribe MD        Or    hydrALAZINE (APRESOLINE) injection 10 mg  10 mg Intravenous Q4H PRN Rocky Uribe MD        HYDROmorphone (PF) (DILAUDID) injection 0.3-0.5 mg  0.3-0.5 mg Intravenous Q4H PRN Jorge Holliday MD   0.5 mg at 09/15/24 2242    naloxone (NARCAN) injection 0.2 mg  0.2 mg Intravenous Q2 Min PRRocky Goldman MD        Or    naloxone (NARCAN) injection 0.4 mg  0.4 mg Intravenous Q2 Min PRRocky Goldman MD        Or    naloxone (NARCAN) injection 0.2 mg  0.2 mg Intramuscular Q2 Min PRRocky Goldman MD        Or    naloxone (NARCAN) injection 0.4 mg  0.4 mg Intramuscular Q2 Min PRRocky Goldman MD        ondansetron (ZOFRAN ODT) ODT tab 4 mg  4 mg Oral Q6H PRN Rocky Uribe MD   4 mg at 09/16/24 0631    Or    ondansetron (ZOFRAN) injection 4 mg  4 mg Intravenous Q6H PRN Rocky Uribe MD   4 mg at 09/15/24 0540    polyethylene glycol (MIRALAX) Packet 17 g  17 g Oral Daily PRN Jorge Holliday MD        prochlorperazine (COMPAZINE) injection 5 mg  5 mg Intravenous Q6H PRN Rocky Uribe MD   5 mg at 09/13/24 2117    Or    prochlorperazine (COMPAZINE) tablet 5 mg  5 mg Oral Q6H PRN Rocky Uribe MD   5 mg at 09/09/24 1414    Or     prochlorperazine (COMPAZINE) suppository 12.5 mg  12.5 mg Rectal Q12H PRN Rocky Uribe MD        senna-docusate (SENOKOT-S/PERICOLACE) 8.6-50 MG per tablet 1-2 tablet  1-2 tablet Oral BID PRN Jorge Holliday MD   2 tablet at 09/15/24 2117    sodium chloride (PF) 0.9% PF flush 10-20 mL  10-20 mL Intracatheter q1 min prn Rocky Uribe MD        sodium chloride (PF) 0.9% PF flush 10-20 mL  10-20 mL Intracatheter Q1H PRN Rocky Uribe MD

## 2024-09-17 NOTE — CONSULTS
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  -change odd days and prn   Clean area with soap/water. Pat dry  Mepilex sacral: Push to base of fold.   PIP measures:  Repositioning (turns with assist x 1) HOB below 30 degrees if mot medically contraindicated; mobilize as medially indicated;          RD folowing; Incontinence protocol;     Orders In Epic  Recommended provider order: NA  WOC Nurse follow-up plan: Bi-weekly and prn  Nursing to notify the Provider(s) and re-consult the WOC Nurse if wound(s) deteriorates or new skin concern.    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   - most recently, elected a chemotherapy holiday over the summer  - 7/29/24 increase in size of mets as well as increased tumor marker while off chemotherapy f  - CT 9/4/2024 showed disease progression in the liver and lymph nodes  - She was due to resume treatment on 9/10/2024 with paclitaxel and bevacizumab  - Will restart her on her home medication methylphenidate, withdrawal from this medication can be contributing to her fatigue.     THELMA  - Resolved      Anemia / fatigue   - Due to malignancy and its treatment  - Started on EPO 11/29/22, Hg increased to 11.0 and she feels much less fatigued   - 11/14/23 -> IV iron   - Fatigue negatively affecting her QOL, slight dose reduction without noticeable improvement   - EPO for Hg <10  - Hg has not improved as much as we would expect on chemotherapy holiday    - Given her prior chemotherapy history we must look for evidence  of MDS, bone marrow biopsy on 9/3 did not show MDS  - PRBC if Hb<7     LFT abnormalities  - Has known liver metastases which likely impact liver function  Last checked 9/12-were stable  - Most likely due to infiltrative mets   - CT 9/12 showed stable liver mets, no biliary issues     Nausea   Improved with antiemetics     Leukocytosis   - New abdominal pain symptoms with ascites on CT 9/12.  - Procalcitonin  elevated. WBC elevated up to 18.8 yesterday.  No CBC done today.  - Plan paracentesis and antibiotics for SBP. If negative, this might be due to underlying malignancy.  On IV ceftriaxone.     Weakness:    Objective Data  Containment of urine/stool: Brief /BSC for [facility], BSC for [facility]    Active Diet Order:  Orders Placed This Encounter      Diet      Low Fiber Diet    Output:   I/O last 3 completed shifts:  In: 600 [P.O.:600]  Out: 425 [Urine:425]    Risk Assessment:   Sensory Perception: 3-->slightly limited  Moisture: 4-->rarely moist  Activity: 3-->walks occasionally  Mobility: 3-->slightly limited  Nutrition: 2-->probably inadequate  Friction and Shear: 3-->no apparent problem  Cain Score: 18                          Labs:   Recent Labs   Lab 09/17/24  0630   ALBUMIN 2.8*   HGB 10.1*   WBC 15.7*       Physical Exam  Skin inspection: Perineal area    Wound Location: Distal base of coccyx crease and gluteal cleft   Date of last WOC photo:   9-17-24       Measurements (length x width x depth, in cm):  .4cm x .4cm x .2cm   Wound Base: 100% red with small dot of whitish tissue  Tunneling: NA  Undermining: NA  Palpation of the wound bed: firm  Periwound skin: blanchable erythema along bilateral inner wall of gluteal cleft   Color: pink to deeper erythema  Temperature: warm  Drainage: scant sweat  Odor: none  Pain: denies     Interventions  Current support surface: atmos air  Current off-loading measures: pillows  Visual inspection of wound(s) completed  Wound Care: completed   Supplies: floor supply room   Education  provided: Discussed with patient. Provided tips for PIP measures   Discussed plan of care with Nursing     Riri Fraser RN, CWOCN

## 2024-09-17 NOTE — PROGRESS NOTES
Brief Hospitalist Cross cover note:    Temp: 97.9  F (36.6  C) Temp src: Oral BP: 122/70 Pulse: 108   Resp: 16 SpO2: 98 % O2 Device: None (Room air)       Paged for sodium 124. Minimal change from 125 8 hrs ago. Reviewed serum/urine osmol and urine sodium and it appears consistent with SIADH. Fluid restriction started and bumex d/c'd this afternoon. Will defer further intervention to morning and next check rather than starting hypertonic saline or vasopressin or something similar in the middle of the night. Change over even past 3 days has been slow and minimal.         Charlene Hawkins MD  11:06 PM

## 2024-09-17 NOTE — PROGRESS NOTES
[facility] Progress Note     Primary Oncologist/Hematologist:  Dr. 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   - most recently, elected a chemotherapy holiday over the summer  - 7/29/24 increase in size of mets as well as increased tumor marker while off chemotherapy f  - CT 9/4/2024 showed disease progression in the liver and lymph nodes  - She was due to resume treatment on 9/10/2024 with paclitaxel and bevacizumab  - 9/17:  resumed home medication methylphenidate as withdrawal from this medication can be contributing to her fatigue. She feels quite a bit more energetic this afternoon.    THELMA  - Resolved      Anemia / fatigue   - Due to malignancy and its treatment  - Started on EPO 11/29/22, Hg increased to 11.0 and she feels much less fatigued   - 11/14/23 -> IV iron   - Fatigue negatively affecting her QOL, slight dose reduction without noticeable improvement   - EPO for Hg <10  - Hg has not improved as much as we would expect on chemotherapy holiday    - Given her prior chemotherapy history we must look for evidence of MDS, bone marrow biopsy on 9/3 did not show MDS  - PRBC if Hb<7     LFT abnormalities  - Has known extensive liver metastases which likely impact liver function  - bilirubin increased to 2.6  - Most likely due to infiltrative mets   - CT 9/12 showed stable liver mets, no biliary obstructive findings.     Nausea   Improved with antiemetics     Leukocytosis   - New abdominal pain symptoms with ascites on CT 9/12.  - paracentesis on 9/13/24. Specimen clotted so no cell count obtained.   - on ceftriaxone  - elevated WBC may be due to malignancy.      Weakness:  Being evaluated for weakness/ disposition   - PT may recommend in home PT upon discharge   -Continue OT PT in the hospital    Goals of care.    I spent  time this morning with Imelda and her daughter. We discussed the possibility that Imelda may not get stronger.  She has significant malignancy in her lungs and liver which are most certainly impacting her performance status.  We discussed continuing on a restorative path, hoping that she can get stronger in order to receive additional cancer directed therapy versus moving to a comfort based approach. We all acknowledged that Imelda is not currently strong enough for chemo unless significant recovery occurs.  I shared my worry that this may not occur.   Imelda asked me to come back later in the day when her  Rudy and son Jason were present.  I did stop back this afternoon and had a very similar conversation.  We also discussed the role of hospice if she elects a comfort based approach.   In the interim, Imelda had a dose of methylphenidate and feels much more perky/alert.  She would like to give it another day to see if the methylphenidate impacts her physical strength as well.   I will follow up tomorrow.       ELMIRA Tomlin, AOCNP  Nurse Practitioner  [facility]  [phone]    Total time 55 minutes.  40 minutes in face to face. 15 minutes in chart completion and updating nursing staff and primary oncologist.       Interval History:     Continues to be weak.  Methylphenidate resulted in more alertness and less fatigue.               Review of Systems:     The 5 point Review of Systems is negative other than noted in the HPI                Medications:   Scheduled Medications  Current Facility-Administered Medications   Medication Dose Route Frequency Provider Last Rate Last Admin    aspirin EC tablet 81 mg  81 mg Oral Daily Jorge Holliday MD   81 mg at 09/17/24 1127    [Held by provider] bumetanide (BUMEX) tablet 1 mg  1 mg Oral BID Chris Omer MD   1 mg at 09/16/24 0907    fluticasone-vilanterol (BREO ELLIPTA) 100-25 MCG/ACT inhaler 1 puff  1 puff Inhalation Daily Rocky Uribe  MD Abran   1 puff at 09/17/24 0926    heparin lock flush 10 unit/mL injection 5-10 mL  5-10 mL Intracatheter Q24H Rocky Uribe MD   5 mL at 09/17/24 0628    heparin lock flush 100 unit/mL injection 5-10 mL  5-10 mL Intracatheter Q28 Days Rocky Uribe MD        lidocaine (PF) (XYLOCAINE) 1 % injection 10 mL  10 mL Other Once Long Toribio MD        methylphenidate (RITALIN) tablet 10 mg  10 mg Oral BID Dreyer, Patrick, DO   10 mg at 09/17/24 1442    pantoprazole (PROTONIX) EC tablet 40 mg  40 mg Oral BID AC Rocky Uribe MD   40 mg at 09/17/24 1543    polyethylene glycol (MIRALAX) Packet 17 g  17 g Oral BID Daniela Philip MD        potassium chloride 20 mEq in 50 mL intermittent infusion  20 mEq Intravenous Q1H Daniela Philip MD 50 mL/hr at 09/17/24 1638 20 mEq at 09/17/24 1638    senna-docusate (SENOKOT-S/PERICOLACE) 8.6-50 MG per tablet 1-2 tablet  1-2 tablet Oral BID Jorge Holliday MD   2 tablet at 09/17/24 0927    sodium chloride (PF) 0.9% PF flush 10-20 mL  10-20 mL Intracatheter Q28 Days Rocky Uribe MD        sucralfate (CARAFATE) suspension 1 g  1 g Oral 4x Daily AC & HS Chris Omer MD   1 g at 09/17/24 0628    traMADol (ULTRAM) tablet 100 mg  100 mg Oral TID Daniela Philip MD   100 mg at 09/17/24 1543    Urea (URE-NA) packet 15 g  15 g Oral BID Daniela Philip MD   15 g at 09/17/24 0926     PRN Medications  Current Facility-Administered Medications   Medication Dose Route Frequency Provider Last Rate Last Admin    albuterol (PROVENTIL HFA/VENTOLIN HFA) inhaler  2 puff Inhalation Q6H PRN Rocky Uribe MD   2 puff at 09/10/24 2055    bisacodyl (DULCOLAX) suppository 10 mg  10 mg Rectal Daily PRN Jorge Holliday MD   10 mg at 09/09/24 0954    calcium carbonate (TUMS) chewable tablet 500-1,000 mg  500-1,000 mg Oral TID PRN Jorge Holliday MD   1,000 mg at 09/12/24 1944    heparin lock flush 10 unit/mL injection 5-10 mL  5-10 mL  Intracatheter Q1H PRN Rocky Uribe MD   5 mL at 09/17/24 0946    hydrALAZINE (APRESOLINE) tablet 10 mg  10 mg Oral Q4H PRN Rocky Uribe MD        Or    hydrALAZINE (APRESOLINE) injection 10 mg  10 mg Intravenous Q4H PRN Rocky Uribe MD        HYDROmorphone (PF) (DILAUDID) injection 0.3-0.5 mg  0.3-0.5 mg Intravenous Q4H PRN Jorge Holliday MD   0.5 mg at 09/16/24 2233    methylphenidate (RITALIN) tablet 10 mg  10 mg Oral Daily PRN Dreyer, Patrick,         naloxone (NARCAN) injection 0.2 mg  0.2 mg Intravenous Q2 Min PRN Rocky Uribe MD        Or    naloxone (NARCAN) injection 0.4 mg  0.4 mg Intravenous Q2 Min PRN Rocky Uribe MD        Or    naloxone (NARCAN) injection 0.2 mg  0.2 mg Intramuscular Q2 Min PRN Rocky Uribe MD        Or    naloxone (NARCAN) injection 0.4 mg  0.4 mg Intramuscular Q2 Min PRN Rocky Uribe MD        ondansetron (ZOFRAN ODT) ODT tab 4 mg  4 mg Oral Q6H PRN Rocky Uribe MD   4 mg at 09/16/24 2006    Or    ondansetron (ZOFRAN) injection 4 mg  4 mg Intravenous Q6H PRN Rocky Uribe MD   4 mg at 09/17/24 0946    oxyCODONE (ROXICODONE) tablet 5 mg  5 mg Oral Q4H PRN Jorge Holliday MD        polyethylene glycol (MIRALAX) Packet 17 g  17 g Oral Daily PRN Jorge Holliday MD        prochlorperazine (COMPAZINE) injection 5 mg  5 mg Intravenous Q6H PRN Rocky Uribe MD   5 mg at 09/17/24 1137    Or    prochlorperazine (COMPAZINE) tablet 5 mg  5 mg Oral Q6H PRN Rocky Uribe MD   5 mg at 09/09/24 1414    Or    prochlorperazine (COMPAZINE) suppository 12.5 mg  12.5 mg Rectal Q12H PRN Rocky Uribe MD        senna-docusate (SENOKOT-S/PERICOLACE) 8.6-50 MG per tablet 1-2 tablet  1-2 tablet Oral BID PRN Jorge Holliday MD   2 tablet at 09/15/24 2117    sodium chloride (PF) 0.9% PF flush 10-20 mL  10-20 mL Intracatheter q1 min prn Rocky Uribe MD         "sodium chloride (PF) 0.9% PF flush 10-20 mL  10-20 mL Intracatheter Q1H PRRocky Goldman MD   10 mL at 09/16/24 2233                  Physical Exam:   Vitals were reviewed  Blood pressure 110/67, pulse 114, temperature 98.5  F (36.9  C), temperature source Oral, resp. rate 18, height 1.664 m (5' 5.51\"), weight 76.8 kg (169 lb 6.4 oz), SpO2 97%.  Wt Readings from Last 4 Encounters:   09/17/24 76.8 kg (169 lb 6.4 oz)   08/04/20 82.1 kg (181 lb)   11/21/18 90.7 kg (200 lb)   03/25/16 86.2 kg (190 lb)       I/O last 3 completed shifts:  In: 600 [P.O.:600]  Out: 425 [Urine:425]               Data:   All laboratory data and imaging studies reviewed.    CMP  Recent Labs   Lab 09/17/24  0630 09/16/24  2153 09/16/24  1450 09/16/24  0635 09/13/24  0635 09/12/24  0556   *  126* 124* 125* 126* 128* 131*   POTASSIUM 3.0*  --   --  3.5 4.1 4.0   CHLORIDE 86*  --   --  88* 93* 95*   CO2 24  --   --  24 24 24   ANIONGAP 16*  --   --  14 11 12   *  --   --  101* 124* 127*   BUN 20.8  --   --  20.3 13.5 13.2   CR 1.03*  --   --  1.07* 0.73 0.76   GFRESTIMATED 55*  --   --  53* 84 80   LOUISA 7.9*  --   --  7.9* 7.8* 7.8*   PROTTOTAL 6.1*  --   --   --   --  6.1*   ALBUMIN 2.8*  --   --   --   --  2.8*   BILITOTAL 2.6*  --   --   --   --  2.1*   ALKPHOS 1,859*  --   --   --   --  1,542*   *  --   --   --   --  77*   ALT 23  --   --   --   --  17     CBC  Recent Labs   Lab 09/17/24  0630 09/16/24  0635 09/13/24  0635 09/12/24  0556   WBC 15.7* 15.9* 18.8* 18.5*   RBC 3.61* 3.54* 3.52* 3.45*   HGB 10.1* 10.3* 10.0* 9.6*   HCT 30.7* 30.7* 30.8* 30.4*   MCV 85 87 88 88   MCH 28.0 29.1 28.4 27.8   MCHC 32.9 33.6 32.5 31.6   RDW 15.2* 15.3* 15.3* 15.4*   * 610* 618* 625*     INRNo lab results found in last 7 days.        Cecile KU, YIFANP  Nurse Practitioner  [facility]  [phone]      "

## 2024-09-17 NOTE — PROGRESS NOTES
[facility]    Internal Medicine Hospitalist Progress Note  09/17/2024  I evaluated patient on the above date.    Assessment & Plan   Jany Park is a 79 year old female with past medical history significant for endometrial cancer; HTN; asthma; fibromyalgia with chronic pain; and GERD; who presented 9/5/2024 with abnormal outpatient labs and nausea/vomiting, found with THELMA.     On initial evaluation, pt was afebrile, VSS. Labs notable for CBC with WBC normal, hgb 10.3, platelets 577K; BMP with sodium 134, chloride 91, BUN 49.5, cr 2.1, calcium 9.7, AG 18; LFT's with ALP 1633, AST 70, tbili 1.6; UA with 6 WBC, small LE, 19 hyaline casts.        Endometrial cancer, metastatic  Abnormal LFTs, suspect related to metastatic disease.  Nausea and vomiting, suspect multifactorial including related to above, also possibly constipation.  Abdominal pain, suspect related to above.  Poor intake related to above.  * Followed by Dr. Dreyer of [facility]. See Oncology note for oncologic history. Noted diagnosed and had initial surgery 2015 with subsequently chemotherapy. Noted subsequently development of liver metastases. Noted last chemotherapy in 5/2024. Noted that pt underwent CT imaging and BM biopsy 9/4 (results not available on admit). Noted was suppose to undergo chemotherapy on 9/10/2024.  * Initial presentation as above. On admit, reported ~3 weeks of nausea and vomiting, intake of ~25% of baseline. LFT's elevated.  [facility]  * On 9/6, per Oncology, noted that CT 9/4 showed disease progression in the liver and lymph nodes. Also noted that BM biopsy showed dysplasia, pending cytogenetics. Noted that her anemia may be due to an exhausted bone marrow from her years of chemotherapy. ?nausea related to IV iron transfusion. Brain MRI ordered that was negative for acute findings.  * On 9/8, tbili normalized, AST and ALP stable/slightly improved.  * On 9/9, more nauseated but some  improvement after BM. Seen by Dr. Dreyer (primary Oncologist) and recommended holding chemotherapy; discussed about hospice, but pt not ready to consider yet.   * On 9/11, noted abdominal burning pain, started on sucralfate.  * On 9/12, increased leukocytosis and burning abdominal pain. CT CAP showed innumerable pulmonary and hepatic metastases unchanged; modest ascites now present, slightly increased, peritoneal implants consistent with carcinomatosis; mckenzie hepatis retroperitoneal and mesenteric adenopathy unchanged; increasing anasarca; tiny volume right pleural effusion minimally larger.  * On 9/13-9/14, had more discussions with Oncology about worsened functional status and goals of care.  Underwent paracentesis 9/13, 300 ml removed, specimen noted to be clotted so no cell count noted.  * On 9/16, abdominal pain better.  - Continue to monitor LFT's.  - Continue PPI and sucralfate.  Schedule tramadol 50 mg 3 times daily patient and family wanting it to increase the dosage to 100 mg 3 times daily.  Order changed on 9/17/2024  - Continue PRN IV hydromorphone; minimize opioids as able.  - Continue PRN ondansetron, PRN prochlorperazine.  - Follow-up with Dr. Dreyer, [facility], outpatient, can discuss further goals of cares.  - Appreciate help from [facility].         Acute kidney injury, suspect prerenal (from N/V, poor oral intake), contrast nephropathy +/- NSAID, with ATN, resolved.  * Initial presentation as above. Cr 2.1, BUN 49.5, UA significant for 19 hyaline casts. Noted that pt had a contrast CT 9/3 as well. Started on IVF's on admit. PTA lisinopril and meloxicam held on admit.  * IVF's completed 9/7.  * Cr normalized 9/9.  * Started on diuretics for anasarca 9/14.  Recent Labs   Lab 09/17/24  0630 09/16/24  0635 09/13/24  0635 09/12/24  0556   CR 1.03* 1.07* 0.73 0.76   Estimated Creatinine Clearance: 46.5 mL/min (A) (based on SCr of 1.03 mg/dL (H)).  - Continue to monitor BMP periodically.    Leukocytosis, suspect  more malignancy related   * Pt with gradually increasing WBC this hospitalization.   * On 9/12, procalcitonin 1.06. CT of chest/abd/pelvis as above showed increasing anasarca and stable metastatic disease, no inflammation.  * On 9/13, empirically on ceftriaxone. Thoracentesis cultures NGTD.  Recent Labs   Lab 09/17/24  0630 09/16/24  0635 09/13/24  0635 09/12/24  0902 09/12/24  0556   WBC 15.7* 15.9* 18.8*  --  18.5*   PCAL  --   --   --  1.06*  --    - Continue to monitor CBC.  - Continue ceftriaxone (started 9/13) - stopped  antibiotics after 9/17.     Constipation due to inactivity and opioids.  * On 9/8, pt c/o constipation. Bowel regimen intensified.  * On 9/9, had BM after suppository.  * On 9/16, pt notes no BM since last suppository (9/9).  - Schedule polyethylene glycol twice daily and senna-docusate.  - Continue PRN polyethylene glycol, senna-docusate and PRN bisacodyl suppository.  - Continue to increase activity as able.     Dyspnea with chest tightness.  * On 9/8, pt noted feeling dyspneic with chest tightness. ECG showed SR, no acute ischemic changes. CXR showed small pleural effusions, no focal infiltrates/edema.  * On 9/9, chest tightness better.  - Continue to monitor clinically.     Hyponatremia, suspect secondary to SIADH  * Sodium 134 on admit.   * Sodium normalized 9/8.  * Started diuretics 9/14.  Recent Labs   Lab 09/17/24  0630 09/16/24  2153 09/16/24  1450 09/16/24  0635 09/13/24  0635 09/12/24  0556   *  126* 124* 125* 126* 128* 131*   - Continue to monitor BMP.  Sodium stable at 126.  Started on Urena supplementation twice daily  On fluid restriction 1500 mL daily  Monitor BMP  Bumex on hold currently     Chronic normocytic anemia.  * Hgb 10.3 on admit. Noted most recently 11.2 g/dl 3/2024.   * As above, per Oncology, noted that BM biopsy showed dysplasia, pending cytogenetics. Noted that her anemia may be due to an exhausted bone marrow from her years of chemotherapy.   Recent Labs    Lab 09/17/24  0630 09/16/24  0635 09/13/24  0635 09/12/24  0556   HGB 10.1* 10.3* 10.0* 9.6*   - Continue to monitor CBC - repeat in am.     Hypertension (benign essential).  Third spacing/anasarca with ascites and pleural effusion, suspect from malnutrition with low albumin.  Chronic lymphedema.  [PTA: lisinopril 10 mg daily; PRN bumetanide 1 mg BID.]  * Lisinopril held on admit.  * BP's normal off meds.  * Initial issues with THELMA as above and received IVF's this hospitalization (cr subsequently normalized).  * CT 9/12 showed increasing anasarca, ascites and right pleural effusion.   * Underwent right thoracentesis 9/13, 100 ml removed and cultures NGTD. Also underwent paracentesis  9/13 as above.  * Started bumetanide 9/14.  - Continue bumetanide 1 mg PO BID.  - Continue to hold lisinopril.  - Continue to monitor i/o's, daily wts.     Fibromyalgia with chronic pain.  * PTA on regimen including meloxicam, scheduled tramadol 100 mg TID and PRN oxycodone.  * PTA tramadol held on admit given THELMA.  * On 9/6, pt with withdrawal symptoms.   * On 9/7 am, scheduled tramadol restarted.  - Continue tramadol 100 gm TID.  - Resume PRN oxycodone as above.  - Continue PRN IV hydromorphone; minimize opioids as able.  - Continue to hold meloxicam.    Moderate malnutrition related to acute and chronic medical issues.  * Noted by Nutrition; see their documentation.  - Continue diet as tolerated.  - Continue supplements.    Weakness and physical deconditioning due to multiple acute and chronic medical issues.  * Pt very weak since hospitalization  * On 9/10, seen by PT and recommended home with assist and home cares vs TCU.  -  Overall, pt and family feels she will be capable to go home with assistance from  with home health cares/therapies.    Hypokalemia;  Potassium low at 3  Started on replacement protocol     GERD.  * Pt with burning pain this hospitalization.  * Started on sucralfate 9/11, noted helped only a little.  -  "Continue pantoprazole BID and sucralfate QID.     Asthma.  - Continue fluticasone-vilanterol and PRN albuterol.        Clinically Significant Risk Factors        # Hypokalemia: Lowest K = 3 mmol/L in last 2 days, will replace as needed  # Hyponatremia: Lowest Na = 124 mmol/L in last 2 days, will monitor as appropriate      # Hypoalbuminemia: Lowest albumin = 2.8 g/dL at 9/17/2024  6:30 AM, will monitor as appropriate               # Overweight: Estimated body mass index is 27.61 kg/m  as calculated from the following:    Height as of this encounter: 1.664 m (5' 5.5\").    Weight as of this encounter: 76.4 kg (168 lb 8 oz).   # Moderate Malnutrition: based on nutrition assessment      # Financial/Environmental Concerns: none (denies)  # Asthma: noted on problem list              Diet: Diet  Snacks/Supplements Adult: Other; apple CLEAR at brkfst and dinner, Boost Soothe at lunch (RD); With Meals  Low Fiber Diet  Fluid restriction 1500 ML FLUID    Prophylaxis: PCD's and ambulation  Ochoa Catheter: Not present  Lines: PRESENT      Port A Cath Single 07/30/20 Left Chest wall-Site Assessment: WDL      Code Status: Full Code    Disposition Plan   Medically Ready for Discharge: In the next 2 to 4 days once pain is better controlled, nausea improvement, LFTs are stable  Expected discharge to home with family.    Entered: Daniela Philip MD 09/17/2024, 11:22 AM       Discussed with bedside RN, patient and her daughter at bedside on 9/17/2024        Interval History   Chart reviewed.  Discussed with bedside RN  Patient resting comfortably in bed.  Complains of intermittent abdominal pain.  Also complains of intermittent nausea.  Complains of constipation.  MiraLAX dosage increased to twice a daily.  Patient family wanted Rx schedule tramadol dosage to be increased to 100 mg 3 times daily.  Order placed.  No other acute issues    * Data reviewed today: I reviewed all new labs and imaging over the last 24 hours. I personally " "reviewed no images or ECG's today.    Physical Exam   Most recent vitals:   , Blood pressure 122/73, pulse 108, temperature 97.9  F (36.6  C), temperature source Oral, resp. rate 18, height 1.664 m (5' 5.5\"), weight 76.4 kg (168 lb 8 oz), SpO2 97%. O2 Device: None (Room air)    Vitals:    09/05/24 2301 09/06/24 0004 09/09/24 0633   Weight: 75.1 kg (165 lb 9.1 oz) 73.8 kg (162 lb 11.2 oz) 76.4 kg (168 lb 8 oz)     Vital signs with ranges:  Temp:  [97.9  F (36.6  C)-98.2  F (36.8  C)] 97.9  F (36.6  C)  Pulse:  [105-110] 108  Resp:  [16-18] 18  BP: (108-122)/(68-73) 122/73  SpO2:  [97 %-98 %] 97 %  Patient Vitals for the past 24 hrs:   BP Temp Temp src Pulse Resp SpO2   09/17/24 0710 122/73 97.9  F (36.6  C) Oral 108 18 97 %   09/17/24 0311 118/73 98  F (36.7  C) Oral 110 18 97 %   09/16/24 2159 122/70 97.9  F (36.6  C) Oral 108 16 98 %   09/16/24 1957 108/68 98.2  F (36.8  C) Oral 105 16 98 %     I/O's last 24 hours:  I/O last 3 completed shifts:  In: 600 [P.O.:600]  Out: 1075 [Urine:1075]    Constitutional: awake, alert, oriented, pleasant, conversant   Head:   Eyes:   ENT:   Neck:   Cardiovascular: regular rate and rhythm; no murmurs, rubs or gallops  Lungs: diminished in the bases, no crackles or wheezes  Gastrointestinal/Abdomen: mildly tender with some firmness without rebound or guarding, mildly distended, + bowel sounds  :   Musculoskeletal:   Skin/Extremities:  bilateral lower extremity lymphedema, trace to 1+ pitting in legs  Neurologic:   Psychiatric:   Hematologic/Lymphatic/Immunologic:        Data    Labs reviewed.  Recent Labs   Lab 09/17/24  0630 09/16/24  2153 09/16/24  1450 09/16/24  0635 09/13/24  0635 09/12/24  0556   WBC 15.7*  --   --  15.9* 18.8* 18.5*   HGB 10.1*  --   --  10.3* 10.0* 9.6*   MCV 85  --   --  87 88 88   *  --   --  610* 618* 625*   *  126* 124* 125* 126* 128* 131*   POTASSIUM 3.0*  --   --  3.5 4.1 4.0   CHLORIDE 86*  --   --  88* 93* 95*   CO2 24  --   --  24 24 " "24   BUN 20.8  --   --  20.3 13.5 13.2   CR 1.03*  --   --  1.07* 0.73 0.76   ANIONGAP 16*  --   --  14 11 12   LOUISA 7.9*  --   --  7.9* 7.8* 7.8*   *  --   --  101* 124* 127*   ALBUMIN 2.8*  --   --   --   --  2.8*   PROTTOTAL 6.1*  --   --   --   --  6.1*   BILITOTAL 2.6*  --   --   --   --  2.1*   ALKPHOS 1,859*  --   --   --   --  1,542*   ALT 23  --   --   --   --  17   *  --   --   --   --  77*     Recent Labs   Lab Test 11/21/18  1714   NT-PROBNP, INPATIENT 196     Recent Labs   Lab 09/17/24  0630 09/16/24  0635 09/13/24  0635 09/12/24  0556   * 101* 124* 127*     No lab results found.    No results for input(s): \"INR\", \"[id number]\" in the last 168 hours.  Recent Labs   Lab 09/17/24  0630 09/16/24  0635 09/13/24  0635 09/12/24  0902 09/12/24  0556   WBC 15.7* 15.9* 18.8*  --  18.5*   PCAL  --   --   --  1.06*  --        MICRO:  Cultures (including blood and urine):  Recent Labs   Lab 09/13/24  1609   CULTURE No growth after 3 days       No results found for this or any previous visit (from the past 24 hour(s)).    Medications   All medications were reviewed. MAR.    Infusions:  Current Facility-Administered Medications   Medication Dose Route Frequency Provider Last Rate Last Admin     Scheduled Medications:  Current Facility-Administered Medications   Medication Dose Route Frequency Provider Last Rate Last Admin    aspirin EC tablet 81 mg  81 mg Oral Daily Jorge Holliday MD   81 mg at 09/17/24 0927    [Held by provider] bumetanide (BUMEX) tablet 1 mg  1 mg Oral BID Chris Omer MD   1 mg at 09/16/24 0907    cefTRIAXone (ROCEPHIN) 1 g vial to attach to  mL bag for ADULTS or NS 50 mL bag for PEDS  1 g Intravenous Q24H Chris Omer MD   1 g at 09/16/24 1237    fluticasone-vilanterol (BREO ELLIPTA) 100-25 MCG/ACT inhaler 1 puff  1 puff Inhalation Daily Rocky Uribe MD   1 puff at 09/17/24 0993    heparin lock flush 10 unit/mL injection 5-10 mL  5-10 mL " Intracatheter Q24H Rocky Uribe MD   5 mL at 09/17/24 0628    heparin lock flush 100 unit/mL injection 5-10 mL  5-10 mL Intracatheter Q28 Days Rocky Uribe MD        lidocaine (PF) (XYLOCAINE) 1 % injection 10 mL  10 mL Other Once Long Toribio MD        methylphenidate (RITALIN) tablet 10 mg  10 mg Oral BID Dreyer, Patrick, DO   10 mg at 09/17/24 0926    pantoprazole (PROTONIX) EC tablet 40 mg  40 mg Oral BID AC Rocky Uribe MD   40 mg at 09/17/24 0628    polyethylene glycol (MIRALAX) Packet 17 g  17 g Oral BID Daniela Philip MD        senna-docusate (SENOKOT-S/PERICOLACE) 8.6-50 MG per tablet 1-2 tablet  1-2 tablet Oral BID Jorge Holliday MD   2 tablet at 09/17/24 0927    sodium chloride (PF) 0.9% PF flush 10-20 mL  10-20 mL Intracatheter Q28 Days Rocky Uribe MD        sucralfate (CARAFATE) suspension 1 g  1 g Oral 4x Daily AC & HS Chris Omer MD   1 g at 09/17/24 0628    traMADol (ULTRAM) tablet 100 mg  100 mg Oral TID Daniela Philip MD        Urea (URE-NA) packet 15 g  15 g Oral BID Daniela Philip MD   15 g at 09/17/24 0926     PRN Medications:  Current Facility-Administered Medications   Medication Dose Route Frequency Provider Last Rate Last Admin    albuterol (PROVENTIL HFA/VENTOLIN HFA) inhaler  2 puff Inhalation Q6H PRN Rocky Uribe MD   2 puff at 09/10/24 2055    bisacodyl (DULCOLAX) suppository 10 mg  10 mg Rectal Daily PRN Jorge Holliday MD   10 mg at 09/09/24 0954    calcium carbonate (TUMS) chewable tablet 500-1,000 mg  500-1,000 mg Oral TID PRN Jorge Holliday MD   1,000 mg at 09/12/24 1944    heparin lock flush 10 unit/mL injection 5-10 mL  5-10 mL Intracatheter Q1H PRN Rocky Uribe MD   5 mL at 09/17/24 0946    hydrALAZINE (APRESOLINE) tablet 10 mg  10 mg Oral Q4H PRN Rocky Uribe MD        Or    hydrALAZINE (APRESOLINE) injection 10 mg  10 mg Intravenous Q4H PRN Rocky Uribe MD         HYDROmorphone (PF) (DILAUDID) injection 0.3-0.5 mg  0.3-0.5 mg Intravenous Q4H PRN Jorge Holliday MD   0.5 mg at 09/16/24 2233    methylphenidate (RITALIN) tablet 10 mg  10 mg Oral Daily PRN Dreyer, Patrick,         naloxone (NARCAN) injection 0.2 mg  0.2 mg Intravenous Q2 Min PRN Rocky Uribe MD        Or    naloxone (NARCAN) injection 0.4 mg  0.4 mg Intravenous Q2 Min PRN Rocky Uribe MD        Or    naloxone (NARCAN) injection 0.2 mg  0.2 mg Intramuscular Q2 Min PRN Rocky Uribe MD        Or    naloxone (NARCAN) injection 0.4 mg  0.4 mg Intramuscular Q2 Min PRN Rocky Uribe MD        ondansetron (ZOFRAN ODT) ODT tab 4 mg  4 mg Oral Q6H PRN Rocky Uribe MD   4 mg at 09/16/24 2006    Or    ondansetron (ZOFRAN) injection 4 mg  4 mg Intravenous Q6H PRN Rocky Uribe MD   4 mg at 09/17/24 0946    oxyCODONE (ROXICODONE) tablet 5 mg  5 mg Oral Q4H PRN Jorge Holliday MD        polyethylene glycol (MIRALAX) Packet 17 g  17 g Oral Daily PRN Jorge Holliday MD        prochlorperazine (COMPAZINE) injection 5 mg  5 mg Intravenous Q6H PRN Rocky Uribe MD   5 mg at 09/13/24 2117    Or    prochlorperazine (COMPAZINE) tablet 5 mg  5 mg Oral Q6H PRN Rocky Uribe MD   5 mg at 09/09/24 1414    Or    prochlorperazine (COMPAZINE) suppository 12.5 mg  12.5 mg Rectal Q12H PRN Rocky Uribe MD        senna-docusate (SENOKOT-S/PERICOLACE) 8.6-50 MG per tablet 1-2 tablet  1-2 tablet Oral BID PRN Jorge Holliday MD   2 tablet at 09/15/24 2117    sodium chloride (PF) 0.9% PF flush 10-20 mL  10-20 mL Intracatheter q1 min prn Rocky Uribe MD        sodium chloride (PF) 0.9% PF flush 10-20 mL  10-20 mL Intracatheter Q1H PRN Rocky Uribe MD   10 mL at 09/16/24 0036

## 2024-09-17 NOTE — PROVIDER NOTIFICATION
MD Notification    Notified Person: MD    Notified Person Name: Dr. Jorge Holliday     Notification Date/Time: 9/16 10:06 am, 11:38 am, 12:59 pm    Notification Interaction: [employer] messaging     Purpose of Notification: Pt's sodium level this morning was 126, and interventions needed? Will pt be discharging today?     Orders Received: Yes     Comments: Hold discharge for today, serial sodium check ordered.

## 2024-09-17 NOTE — CONSULTS
GASTROENTEROLOGY CONSULTATION     Jany Park  [address] [facility] [id number]  79 year old female    Admission Date/Time: 9/5/2024  Primary Care Provider: Selvin Car  Referring / Attending Physician:  Tamera    We were asked to see the patient in consultation by Dr. Philip for evaluation of elevated liver tests. Pt with metastatic endometrial cancer admitted for poor appetite, nausea, emesis, THELMA. Found to have increasing elevated liver tests which has been a trend since at least 9/5/24. Last CT on 9/12/24 showed extensive hepatic metastatic disease without biliary obstruction. Dr. Dreyer feels that liver test elevation most likely due to liver metastases. Total bilirubin today 2.6.  Pt has had nausea and emesis x 1 month. Denies abdominal pain. Feels stiff due to lying in bed.         PAST MEDICAL HISTORY:  Patient Active Problem List    Diagnosis Date Noted    Hepatomegaly 09/06/2024     Priority: Medium    Acute kidney injury (H24) 09/05/2024     Priority: Medium    Colitis, indeterminate 07/30/2020     Priority: Medium    Endometrial cancer (H) 05/22/2015     Priority: Medium    Other specified prophylactic or treatment measure 05/22/2015     Priority: Medium    Knee pain, left 05/22/2013     Priority: Medium    Female stress incontinence 08/15/2006     Priority: Medium    Mild persistent asthma 06/20/2006     Priority: Medium    Esophageal reflux 12/01/2004     Priority: Medium    Allergic rhinitis due to pollen 07/07/2004     Priority: Medium    Myalgia and myositis 07/07/2004     Priority: Medium     Problem list name updated by automated process. Provider to review                 ROS: A comprehensive ten point review of systems was negative aside from those in mentioned in the HPI.      MEDICATIONS:   Current Outpatient Medications   Medication Sig Dispense Refill    calcium carbonate (TUMS) 500 MG chewable tablet Take 2 tablets (1,000 mg) by mouth 3 times daily as needed for  "heartburn. 60 tablet 1    ciprofloxacin (CIPRO) 500 MG tablet Take 1 tablet (500 mg) by mouth 2 times daily. 4 tablet 0    oxyCODONE (ROXICODONE) 5 MG tablet Take 1 tablet (5 mg) by mouth every 8 hours as needed for severe pain. DURING CHEMO ONLY 15 tablet 0    polyethylene glycol (MIRALAX) 17 GM/Dose powder Take 17 g by mouth daily. 510 g 1    senna-docusate (SENOKOT-S/PERICOLACE) 8.6-50 MG tablet Take 1-2 tablets by mouth 2 times daily as needed for constipation. 60 tablet 1    sucralfate (CARAFATE) 1 GM/10ML suspension Take 10 mLs (1 g) by mouth 4 times daily (before meals and nightly). 1200 mL 1       ALLERGIES:   Allergies   Allergen Reactions    Morphine And Codeine Nausea and Vomiting       SOCIAL HISTORY:  Social History     Tobacco Use    Smoking status: Never   Substance Use Topics    Alcohol use: No    Drug use: No       FAMILY HISTORY:  Family History   Problem Relation Age of Onset    Hypertension Father          age 91.     Arthritis Father     Eye Disorder Father         cataracts    Psychotic Disorder Mother         Alzheimers,  age 83    Cancer Brother         cancer (mesothelioma),  age 64       PHYSICAL EXAM:   General  awake alert  /73 (BP Location: Left arm)   Pulse 108   Temp 97.9  F (36.6  C) (Oral)   Resp 18   Ht 1.664 m (5' 5.5\")   Wt 76.4 kg (168 lb 8 oz)   SpO2 97%   BMI 27.61 kg/m      PHYSICAL EXAM:  Constitutional: alert and no distress   Cardiovascular: negative  Respiratory: negative  Psychiatric: mentation appears normal  Head: Normocephalic. No masses, lesions, tenderness or abnormalities  Neck: Neck supple. No adenopathy. Thyroid symmetric, normal size,  Abdomen: Abdomen soft, non-tender. BS normal. No masses, liver enlarged and firm  NEURO: negative  SKIN: no suspicious lesions or rashes  JOINT/EXTREMITIES: 2+ pitting LE edema          ADDITIONAL COMMENTS:   I reviewed the patient's new clinical lab test results.   Recent Labs   Lab Test 24  0630 " 09/16/24  0635 09/13/24  0635 07/30/20  1343 11/21/18  1714 04/13/18  0735   WBC 15.7* 15.9* 18.8*   < > 8.3  --    HGB 10.1* 10.3* 10.0*   < > 11.5*  --    MCV 85 87 88   < > 96  --    * 610* 618*   < > 312 317   INR  --   --   --   --  1.05 0.92    < > = values in this interval not displayed.     Recent Labs   Lab Test 09/17/24  0630 09/16/24  0635 09/13/24  0635   POTASSIUM 3.0* 3.5 4.1   CHLORIDE 86* 88* 93*   CO2 24 24 24   BUN 20.8 20.3 13.5   ANIONGAP 16* 14 11     Recent Labs   Lab Test 09/17/24  0630 09/12/24  0556 09/10/24  0557 09/06/24  0609 09/05/24  1857   ALBUMIN 2.8* 2.8* 3.0*   < >  --    BILITOTAL 2.6* 2.1* 1.9*   < >  --    ALT 23 17 18   < >  --    * 77* 70*   < >  --    PROTEIN  --   --   --   --  Negative    < > = values in this interval not displayed.       I reviewed the patient's new imaging results.        CONSULTATION ASSESSMENT AND PLAN:    Active Problems:  Elevated liver tests: Likely due to extensive hepatic metastatic disease. No sign of biliary obstruction on recent CT. No role for ERCP. Hospice care would be an appropriate consideration in this setting.     Will sign off. Call with questions.       Total time: 25 minutes    Charlene Lugo MD  [facility]  Pager: [phone]  Office: [phone]

## 2024-09-17 NOTE — PLAN OF CARE
9/16/24 -- [id number]    Orientation: A&O x4  Aggression Stop Light: Green  Activity: SBA w/ walker.   Diet/BS Checks: low fiber - poor appetite.   Tele: N/A  IV Access/Drains: L port HL, good blood return noted.   Pain Management: IV dilaudid given x1 for discomfort  Abnormal VS/Results: VSS on RA except tachycardia  Bowel/Bladder: continent of B/B. No BM..   Skin/Wounds: blanchable redness to coccyx - mepi in place.  Consults: PT, OT, hem/onc  D/C Disposition: pending    Other:   - Last Na draw was 124, MD aware. Recheck this AM   - Zofran given x2 for nausea

## 2024-09-17 NOTE — PLAN OF CARE
Goal Outcome Evaluation:    Shift Note: 0700-1930    Orientation: A/Ox4   Aggression Stop Light: Green   Activity: SBA GB/walker   Diet/BS Checks: Low fiber diet- poor intake   Tele: N/a   IV Access/Drains: Port on L side chest   Pain Management: Denied pain/nausea this shift   Abnormal VS/Results: VSS except tachy at times, on RA  Bowel/Bladder: Continent of B/B  Skin/Wounds: BLE edema, scattered bruising    Consults: Hem/Onc, nutrition, SW, PT/OT following   D/C Disposition: Pending pt progress     Other Info: Sodium levels today were low, order placed for Q8 NA checks. Fluid restriction initiated and provider held Bumex. Pt very lethargic throughout the day, however denied pain. Has poor appetite. No BM since 9/10, has scheduled senna and Miralax now- no BM this shift.

## 2024-09-18 NOTE — CONSULTS
SPIRITUAL HEALTH SERVICES Consult Note  [facility]  Oncology    Reason for visit or referral: [facility] consult request for Tenriism/ritual support.    Provided Alevism communion for pt and spouse per their request,    Plan: Unit  to follow.    Chris Rodriguez  Associate   [facility] Spiritual Health Phone Line [phone]  Spiritual Health Pager [phone]    SHS available 24/7 for emergent requests/referrals, either by paging the on-call  or by entering an ASAP/STAT consult in [facility], which will also page the on-call .

## 2024-09-18 NOTE — PROGRESS NOTES
[facility] Hematology Progress Note     Primary Oncologist/Hematologist:  Dr. 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   - most recently, elected a chemotherapy holiday over the summer  - 7/29/24 increase in size of mets as well as increased tumor marker while off chemotherapy f  - CT 9/4/2024 showed disease progression in the liver and lymph nodes  - She was due to resume treatment on 9/10/2024 with paclitaxel and bevacizumab  - 9/17:  resumed home medication methylphenidate as withdrawal from this medication can be contributing to her fatigue. She feels quite a bit more energetic after resuming on 9/17/24.      THELMA  - Resolved      Anemia / fatigue   - Due to malignancy and its treatment  - Started on EPO 11/29/22, Hg increased to 11.0 and she feels much less fatigued   - 11/14/23 -> IV iron   - Fatigue negatively affecting her QOL, slight dose reduction without noticeable improvement   - EPO for Hg <10  - Hg has not improved as much as we would expect on chemotherapy holiday    - Given her prior chemotherapy history we must look for evidence of MDS, bone marrow biopsy on 9/3 did not show MDS  - PRBC if Hb<7     LFT abnormalities  - Has known extensive liver metastases which likely impact liver function  - bilirubin increased to 2.6  - Most likely due to infiltrative mets   - CT 9/12 showed stable liver mets, no biliary obstructive findings.      Nausea   Improved with antiemetics     Leukocytosis   - New abdominal pain symptoms with ascites on CT 9/12.  - paracentesis on 9/13/24. Specimen clotted so no cell count obtained.   - on ceftriaxone  - elevated WBC may be due to malignancy.      Weakness:  Being evaluated for weakness/ disposition   - PT may recommend in home PT upon discharge   -Continue OT PT in the hospital     Goals of care.     We again discussed goals of care today. Present were Imelda's , son, daughter, daughter-in-law and brother in law.    Imelda feels much more energetic on the ritalin.  We discussed the difference between this energy and her body actually being stronger.      We again discussed the options, including continuing on a restorative path, hoping that she can get stronger in order to receive additional cancer directed therapy versus moving to a comfort based approach. We all acknowledged that Imelda is not currently strong enough for chemo unless significant recovery occurs.  I shared my worry that this may not occur.     Today, because she is feeling stronger and more alert, Imelda wants to give it a few more days to see if the improved feeling she has is sustained.      We discussed her malignancy and the likelihood that it is growing in the background> There is a chance, that as we wait for her to get stronger, her liver becomes more impaired and the window of opportunity to treat her will close.      Her family agrees to support whichever decision she makes.    We will continue to discuss.       ELMIRA Tomlin, AOCARISSAP  Nurse Practitioner  [facility]  [phone]    Total time 40 min including 30 min face to face. 10 min chart review/completion.       Interval History:     Much more alert on ritalin.  Pain currently well controlled.               Review of Systems:     The 5 point Review of Systems is negative other than noted in the HPI                Medications:   Scheduled Medications  Current Facility-Administered Medications   Medication Dose Route Frequency Provider Last Rate Last Admin    aspirin EC tablet 81 mg  81 mg Oral Daily Jorge Holliday MD   81 mg at 09/18/24 0926    [Held by provider] bumetanide (BUMEX) tablet 1 mg  1 mg Oral BID Chris Omer MD   1 mg at 09/16/24 0907    fluticasone-vilanterol (BREO ELLIPTA) 100-25 MCG/ACT inhaler 1 puff  1 puff Inhalation Daily  Rocky Uribe MD   1 puff at 09/18/24 0926    heparin lock flush 10 unit/mL injection 5-10 mL  5-10 mL Intracatheter Q24H Rocky Uribe MD   5 mL at 09/18/24 0552    heparin lock flush 100 unit/mL injection 5-10 mL  5-10 mL Intracatheter Q28 Days Rocky Uribe MD        lidocaine (PF) (XYLOCAINE) 1 % injection 10 mL  10 mL Other Once Long Toribio MD        methylphenidate (RITALIN) tablet 10 mg  10 mg Oral BID Dreyer, Patrick, DO   10 mg at 09/18/24 1150    pantoprazole (PROTONIX) EC tablet 40 mg  40 mg Oral BID AC Rocky Uribe MD   40 mg at 09/18/24 0552    polyethylene glycol (MIRALAX) Packet 17 g  17 g Oral BID Daniela Philip MD   17 g at 09/17/24 2037    senna-docusate (SENOKOT-S/PERICOLACE) 8.6-50 MG per tablet 1-2 tablet  1-2 tablet Oral BID Jorge Holliday MD   1 tablet at 09/18/24 0926    sodium chloride (PF) 0.9% PF flush 10-20 mL  10-20 mL Intracatheter Q28 Days Rocky Uribe MD        sucralfate (CARAFATE) suspension 1 g  1 g Oral 4x Daily AC & HS Chris Omer MD   1 g at 09/18/24 1141    traMADol (ULTRAM) tablet 100 mg  100 mg Oral TID Daniela Philip MD   100 mg at 09/18/24 0926    Urea (URE-NA) packet 15 g  15 g Oral BID Daniela Philip MD   15 g at 09/17/24 0926     PRN Medications  Current Facility-Administered Medications   Medication Dose Route Frequency Provider Last Rate Last Admin    albuterol (PROVENTIL HFA/VENTOLIN HFA) inhaler  2 puff Inhalation Q6H PRN Rocky Uribe MD   2 puff at 09/10/24 2055    bisacodyl (DULCOLAX) suppository 10 mg  10 mg Rectal Daily PRN Jorge Holliday MD   10 mg at 09/09/24 0954    calcium carbonate (TUMS) chewable tablet 500-1,000 mg  500-1,000 mg Oral TID PRN Jorge Holliday MD   1,000 mg at 09/17/24 2213    heparin lock flush 10 unit/mL injection 5-10 mL  5-10 mL Intracatheter Q1H PRN Rocky Uribe MD   5 mL at 09/17/24 1943    hydrALAZINE (APRESOLINE) tablet 10 mg  10 mg  Oral Q4H PRN Rocky Uribe MD        Or    hydrALAZINE (APRESOLINE) injection 10 mg  10 mg Intravenous Q4H PRN Rocky Uribe MD        HYDROmorphone (PF) (DILAUDID) injection 0.3-0.5 mg  0.3-0.5 mg Intravenous Q4H PRN Jorge Holliday MD   0.5 mg at 09/16/24 2233    methylphenidate (RITALIN) tablet 10 mg  10 mg Oral Daily PRN Dreyer, Patrick,         naloxone (NARCAN) injection 0.2 mg  0.2 mg Intravenous Q2 Min PRN Rocky Uribe MD        Or    naloxone (NARCAN) injection 0.4 mg  0.4 mg Intravenous Q2 Min PRRocky Goldman MD        Or    naloxone (NARCAN) injection 0.2 mg  0.2 mg Intramuscular Q2 Min PRN Rocky Uribe MD        Or    naloxone (NARCAN) injection 0.4 mg  0.4 mg Intramuscular Q2 Min PRN Rocky Uribe MD        ondansetron (ZOFRAN ODT) ODT tab 4 mg  4 mg Oral Q6H PRN Rocky Uribe MD   4 mg at 09/16/24 2006    Or    ondansetron (ZOFRAN) injection 4 mg  4 mg Intravenous Q6H PRN Rocky Uribe MD   4 mg at 09/17/24 0946    oxyCODONE (ROXICODONE) tablet 5 mg  5 mg Oral Q4H PRN Jorge Holliday MD        polyethylene glycol (MIRALAX) Packet 17 g  17 g Oral Daily PRN Jorge Holliday MD        prochlorperazine (COMPAZINE) injection 5 mg  5 mg Intravenous Q6H PRN Rocky Uribe MD   5 mg at 09/17/24 1137    Or    prochlorperazine (COMPAZINE) tablet 5 mg  5 mg Oral Q6H PRN Rocky Uribe MD   5 mg at 09/09/24 1414    Or    prochlorperazine (COMPAZINE) suppository 12.5 mg  12.5 mg Rectal Q12H PRN Rocky Uribe MD        senna-docusate (SENOKOT-S/PERICOLACE) 8.6-50 MG per tablet 1-2 tablet  1-2 tablet Oral BID PRN Jorge Holliday MD   2 tablet at 09/15/24 2117    sodium chloride (PF) 0.9% PF flush 10-20 mL  10-20 mL Intracatheter q1 min prn Rocky Uribe MD        sodium chloride (PF) 0.9% PF flush 10-20 mL  10-20 mL Intracatheter Q1H PRN Rocky Uribe MD   20 mL at 09/18/24  "0582                  Physical Exam:   Vitals were reviewed  Blood pressure 118/75, pulse 103, temperature 98  F (36.7  C), temperature source Oral, resp. rate 16, height 1.664 m (5' 5.51\"), weight 76.8 kg (169 lb 6.4 oz), SpO2 97%.  Wt Readings from Last 4 Encounters:   09/17/24 76.8 kg (169 lb 6.4 oz)   08/04/20 82.1 kg (181 lb)   11/21/18 90.7 kg (200 lb)   03/25/16 86.2 kg (190 lb)       I/O last 3 completed shifts:  In: 450 [P.O.:450]  Out: 270 [Urine:270]               Data:   All laboratory data and imaging studies reviewed.    CMP  Recent Labs   Lab 09/18/24  0551 09/17/24  1945 09/17/24  0630 09/16/24  2153 09/16/24  1450 09/16/24  0635 09/13/24  0635 09/12/24  0556   *  --  126*  126* 124* 125* 126* 128* 131*   POTASSIUM 3.6 3.6 3.0*  --   --  3.5 4.1 4.0   CHLORIDE 89*  --  86*  --   --  88* 93* 95*   CO2 25  --  24  --   --  24 24 24   ANIONGAP 13  --  16*  --   --  14 11 12   *  --  115*  --   --  101* 124* 127*   BUN 27.4*  --  20.8  --   --  20.3 13.5 13.2   CR 1.05*  --  1.03*  --   --  1.07* 0.73 0.76   GFRESTIMATED 54*  --  55*  --   --  53* 84 80   LOUISA 7.9*  --  7.9*  --   --  7.9* 7.8* 7.8*   PROTTOTAL 5.8*  --  6.1*  --   --   --   --  6.1*   ALBUMIN 2.6*  --  2.8*  --   --   --   --  2.8*   BILITOTAL 2.7*  --  2.6*  --   --   --   --  2.1*   ALKPHOS 1,875*  --  1,859*  --   --   --   --  1,542*   AST 98*  --  103*  --   --   --   --  77*   ALT 22  --  23  --   --   --   --  17     CBC  Recent Labs   Lab 09/18/24  0551 09/17/24  0630 09/16/24  0635 09/13/24  0635   WBC 13.3* 15.7* 15.9* 18.8*   RBC 3.47* 3.61* 3.54* 3.52*   HGB 9.9* 10.1* 10.3* 10.0*   HCT 29.2* 30.7* 30.7* 30.8*   MCV 84 85 87 88   MCH 28.5 28.0 29.1 28.4   MCHC 33.9 32.9 33.6 32.5   RDW 15.3* 15.2* 15.3* 15.3*   * 590* 610* 618*     INRNo lab results found in last 7 days.        YIFAN ArizmendiP  Nurse Practitioner  [facility]  [phone]      "

## 2024-09-18 NOTE — PLAN OF CARE
Goal Outcome Evaluation:    Shift Note: 8937-9340    Orientation: A/Ox4   Aggression Stop Light: Green   Activity: SBA GB/walker   Diet/BS Checks: Low fiber diet, 1500 mL FR- poor appetite   Tele: N/a   IV Access/Drains: Port on L side chest   Pain Management: Denied pain/  Abnormal VS/Results: VSS except tachy at times, on RA  Bowel/Bladder: Continent of B/B, no BM this shift   Skin/Wounds: BLE edema, scattered bruising    Consults: Hem/Onc, nutrition, SW, PT/OT, WOC following   D/C Disposition: Pending pt progress     Other Info: Started on K+ replacement protocol, K+ was 3.0 today- replaced IV and levels rechecked this evening. K+ was 3.6, next draw tomorrow morning. NA levels this morning 126, serial checks were discontinued. Pt started on Urea for low NA levels, pt became very nauseated this morning and was unable to drink medication. Pt had episode of emesis- PRN zofran and compazine given. Pt declined dulcolax suppository today. Tramadol dose increased to 100 mg TID.

## 2024-09-18 NOTE — PLAN OF CARE
Orientation: A&O  Aggression Stop Light: Green  Activity: SBA with walker and gait belt  Diet/BS Checks: Low fiber, 1500ml fluid restriction.  Poor appetite.  Tele:  n/a  IV Access/Drains: Left port hep locked  Pain Management: Denies pain, managed with scheduled toradol  Abnormal VS/Results: Tachycardic at times, other vitals stable  Bowel/Bladder: Continent of bowel and bladder  Skin/Wounds: Wound on inner gluteal cleft and coccyx crease-mepi in place, wound on left nare-mepilex changed today. Scattered bruising and LE edema  Consults: PT/OT, heme/onc  D/C Disposition: Pending-pt states she is leaning towards going home with home healthcare  Other Info: Only one dose of ritalin given today due to timing.  Pt states she was awake more than usual last night so she was agreeable to only trying one dose of ritalin today.

## 2024-09-18 NOTE — PLAN OF CARE
Shift: 1900-0730    Orientation: A/Ox4   Aggression Stop Light: Green   Activity: SBA GB/walker   Diet/BS Checks: Low fiber diet, 1500 mL FR- poor appetite   Tele: N/a   IV Access/Drains: Port on L side chest  HL  Pain Management: Scheduled tramadol, no PRN meds given  Abnormal VS/Results: VSS, on RA  Bowel/Bladder: Continent of B/B, had 2 BMs this shift   Skin/Wounds: BLE edema, scattered bruising    Consults: Hem/Onc, nutrition, SW, PT/OT, WOC following   D/C Disposition: Pending pt progress      Other Info: On K+ protocol, lab results pending, denied pain and nausea this shift

## 2024-09-18 NOTE — PLAN OF CARE
Orientation: A/Ox4   Aggression Stop Light: Green   Activity: SBA GB/walker   Diet/BS Checks: Low fiber diet, 1500 mL FR- poor appetite   Tele: N/a   IV Access/Drains: Port on L side chest  HL  Pain Management: Scheduled tramadol   Abnormal VS/Results: VSS except tachy at times, on RA  Bowel/Bladder: Continent of B/B, no BM this shift   Skin/Wounds: BLE edema, scattered bruising    Consults: Hem/Onc, nutrition, SW, PT/OT, WOC following   D/C Disposition: Pending pt progress      Other Info: On K+ replacement recheck in AM.   NA levels this morning 126, serial checks were discontinued. Pt started on Urea for low NA levels,pt became very nauseated this morning and was unable to drink medication, declined evening dose.  Pt has intermittent nausea w/ drinking and eating PRN zofran and compazine available.   PRN Tums given for acid reflux

## 2024-09-18 NOTE — CONSULTS
SPIRITUAL HEALTH SERVICES - Consult Note  [facility] MedSurg/Onc 88    Referral Source/Reason for Visit: staff request for emotional support    On this visit with pt Jany, her daughter-in-law was present at bedside.    Jany reflected on a family care conference that occurred today, naming appreciation for family support and clarity from staff.    She shares she is well supported by her loved ones.    Jany identifies as Baptism and requested Communion. Facilitated connection with [employer]  who will provide Communion this afternoon.    Plan: Spiritual Health remains available. Please contact as needs arise.    Maren Lisa  Associate     SHS available 24/7 for emergent requests/referrals, either by paging the on-call  or by entering an ASAP/STAT consult in Epic (this will also page the on-call ).

## 2024-09-19 NOTE — PROGRESS NOTES
[facility]    Internal Medicine Hospitalist Progress Note  09/18/2024  I evaluated patient on the above date.    Assessment & Plan   Jany Park is a 79 year old female with past medical history significant for endometrial cancer; HTN; asthma; fibromyalgia with chronic pain; and GERD; who presented 9/5/2024 with abnormal outpatient labs and nausea/vomiting, found with THELMA.     On initial evaluation, pt was afebrile, VSS. Labs notable for CBC with WBC normal, hgb 10.3, platelets 577K; BMP with sodium 134, chloride 91, BUN 49.5, cr 2.1, calcium 9.7, AG 18; LFT's with ALP 1633, AST 70, tbili 1.6; UA with 6 WBC, small LE, 19 hyaline casts.        Endometrial cancer, metastatic  Abnormal LFTs, suspect related to metastatic disease.  Nausea and vomiting, suspect multifactorial including related to above, also possibly constipation.  Abdominal pain, suspect related to above.  Poor intake related to above.  * Followed by Dr. Dreyer of [facility]. See Oncology note for oncologic history. Noted diagnosed and had initial surgery 2015 with subsequently chemotherapy. Noted subsequently development of liver metastases. Noted last chemotherapy in 5/2024. Noted that pt underwent CT imaging and BM biopsy 9/4 (results not available on admit). Noted was suppose to undergo chemotherapy on 9/10/2024.  * Initial presentation as above. On admit, reported ~3 weeks of nausea and vomiting, intake of ~25% of baseline. LFT's elevated.  [facility]  * On 9/6, per Oncology, noted that CT 9/4 showed disease progression in the liver and lymph nodes. Also noted that BM biopsy showed dysplasia, pending cytogenetics. Noted that her anemia may be due to an exhausted bone marrow from her years of chemotherapy. ?nausea related to IV iron transfusion. Brain MRI ordered that was negative for acute findings.  * On 9/8, tbili normalized, AST and ALP stable/slightly improved.  * On 9/9, more nauseated but some  improvement after BM. Seen by Dr. Dreyer (primary Oncologist) and recommended holding chemotherapy; discussed about hospice, but pt not ready to consider yet.   * On 9/11, noted abdominal burning pain, started on sucralfate.  * On 9/12, increased leukocytosis and burning abdominal pain. CT CAP showed innumerable pulmonary and hepatic metastases unchanged; modest ascites now present, slightly increased, peritoneal implants consistent with carcinomatosis; mckenzie hepatis retroperitoneal and mesenteric adenopathy unchanged; increasing anasarca; tiny volume right pleural effusion minimally larger.  * On 9/13-9/14, had more discussions with Oncology about worsened functional status and goals of care.  Underwent paracentesis 9/13, 300 ml removed, specimen noted to be clotted so no cell count noted.  * On 9/16, abdominal pain better.  - Continue to monitor LFT's.  - Continue PPI and sucralfate.  Schedule tramadol 50 mg 3 times daily patient and family wanting it to increase the dosage to 100 mg 3 times daily.  Order changed on 9/17/2024  - Continue PRN IV hydromorphone; minimize opioids as able.  - Continue PRN ondansetron, PRN prochlorperazine.  - Follow-up with Dr. Dreyer, [facility], outpatient, can discuss further goals of cares.  - Appreciate help from [facility].  [place] [facility] team consulted on 9/17/2024.  Signed off on 9/17/2024   elevated LFTs were felt to be secondary to metastatic disease to the liver as per GI team.  No concern for  biliary obstruction.  They recommended monitoring LFTs    LFTs are stable currently    [place] oncology continues to have goals of care discussions with patient and family.  Patient wants to give it a few days before making decisions regarding her medical cares     Acute kidney injury, suspect prerenal (from N/V, poor oral intake), contrast nephropathy +/- NSAID, with ATN, resolved.  * Initial presentation as above. Cr 2.1, BUN 49.5, UA significant for 19 hyaline casts. Noted that pt had a  contrast CT 9/3 as well. Started on IVF's on admit. PTA lisinopril and meloxicam held on admit.  * IVF's completed 9/7.  * Cr normalized 9/9.  * Started on diuretics for anasarca 9/14.  Recent Labs   Lab 09/18/24  0551 09/17/24  0630 09/16/24  0635 09/13/24  0635 09/12/24  0556   CR 1.05* 1.03* 1.07* 0.73 0.76   Estimated Creatinine Clearance: 45.7 mL/min (A) (based on SCr of 1.05 mg/dL (H)).  - Continue to monitor BMP periodically.    Leukocytosis, suspect more malignancy related   * Pt with gradually increasing WBC this hospitalization.   * On 9/12, procalcitonin 1.06. CT of chest/abd/pelvis as above showed increasing anasarca and stable metastatic disease, no inflammation.  * On 9/13, empirically on ceftriaxone. Thoracentesis cultures NGTD.  Recent Labs   Lab 09/18/24  0551 09/17/24  0630 09/16/24  0635 09/13/24  0635 09/12/24  0902 09/12/24  0556   WBC 13.3* 15.7* 15.9* 18.8*  --  18.5*   PCAL  --   --   --   --  1.06*  --    - Continue to monitor CBC.  - Continue ceftriaxone (started 9/13) - stopped  antibiotics on 9/17/2024     Constipation due to inactivity and opioids.  * On 9/8, pt c/o constipation. Bowel regimen intensified.  * On 9/9, had BM after suppository.  * On 9/16, pt notes no BM since last suppository (9/9).  - Schedule polyethylene glycol twice daily and senna-docusate.  - Continue PRN polyethylene glycol, senna-docusate and PRN bisacodyl suppository.  - Continue to increase activity as able.  Patient had 2 bowel movements on 9/18/2024     Dyspnea with chest tightness.  * On 9/8, pt noted feeling dyspneic with chest tightness. ECG showed SR, no acute ischemic changes. CXR showed small pleural effusions, no focal infiltrates/edema.  * On 9/9, chest tightness better.  - Continue to monitor clinically.     Hyponatremia, suspect secondary to SIADH  * Sodium 134 on admit.   * Sodium normalized 9/8.  * Started diuretics 9/14.  Recent Labs   Lab 09/18/24  0551 09/17/24  0630 09/16/24  7025  09/16/24  1450 09/16/24  0635 09/13/24  0635   * 126*  126* 124* 125* 126* 128*   - Continue to monitor BMP.   Started on Urena supplementation twice daily  On fluid restriction 1500 mL daily  Monitor BMP  Bumex on hold currently.  Restart on 9:19 AM  Sodium stable at 127 on 9/18/2024     Chronic normocytic anemia.  * Hgb 10.3 on admit. Noted most recently 11.2 g/dl 3/2024.   * As above, per Oncology, noted that BM biopsy showed dysplasia, pending cytogenetics. Noted that her anemia may be due to an exhausted bone marrow from her years of chemotherapy.   Recent Labs   Lab 09/18/24  0551 09/17/24  0630 09/16/24  0635 09/13/24  0635 09/12/24  0556   HGB 9.9* 10.1* 10.3* 10.0* 9.6*   - Continue to monitor CBC      Hypertension (benign essential).  Third spacing/anasarca with ascites and pleural effusion, suspect from malnutrition with low albumin.  Chronic lymphedema.  [PTA: lisinopril 10 mg daily; PRN bumetanide 1 mg BID.]  * Lisinopril held on admit.  * BP's normal off meds.  * Initial issues with THELMA as above and received IVF's this hospitalization (cr subsequently normalized).  * CT 9/12 showed increasing anasarca, ascites and right pleural effusion.   * Underwent right thoracentesis 9/13, 100 ml removed and cultures NGTD. Also underwent paracentesis  9/13 as above.  * Started bumetanide 9/14.  - Continue bumetanide 1 mg PO BID.  - Continue to hold lisinopril.  - Continue to monitor i/o's, daily wts.     Fibromyalgia with chronic pain.  * PTA on regimen including meloxicam, scheduled tramadol 100 mg TID and PRN oxycodone.  * PTA tramadol held on admit given THELMA.  * On 9/6, pt with withdrawal symptoms.   * On 9/7 am, scheduled tramadol restarted.  - Continue tramadol 100 gm TID.  - Resume PRN oxycodone as above.  - Continue PRN IV hydromorphone; minimize opioids as able.  - Continue to hold meloxicam.    Moderate malnutrition related to acute and chronic medical issues.  * Noted by Nutrition; see their  "documentation.  - Continue diet as tolerated.  - Continue supplements.    Weakness and physical deconditioning due to multiple acute and chronic medical issues.  * Pt very weak since hospitalization  * On 9/10, seen by PT and recommended home with assist and home cares vs TCU.  -  Overall, pt and family feels she will be capable to go home with assistance from  with home health cares/therapies.    Hypokalemia;  Potassium low at 3  Started on replacement protocol     GERD.  * Pt with burning pain this hospitalization.  * Started on sucralfate 9/11, noted helped only a little.  - Continue pantoprazole BID and sucralfate QID.     Asthma.  - Continue fluticasone-vilanterol and PRN albuterol.        Clinically Significant Risk Factors        # Hypokalemia: Lowest K = 3 mmol/L in last 2 days, will replace as needed  # Hyponatremia: Lowest Na = 124 mmol/L in last 2 days, will monitor as appropriate      # Hypoalbuminemia: Lowest albumin = 2.6 g/dL at 9/18/2024  5:51 AM, will monitor as appropriate               # Overweight: Estimated body mass index is 27.75 kg/m  as calculated from the following:    Height as of this encounter: 1.664 m (5' 5.51\").    Weight as of this encounter: 76.8 kg (169 lb 6.4 oz).   # Moderate Malnutrition: based on nutrition assessment      # Financial/Environmental Concerns: none (denies)  # Asthma: noted on problem list              Diet: Diet  Snacks/Supplements Adult: Other; apple CLEAR at brkfst and dinner, Boost Soothe at lunch (RD); With Meals  Low Fiber Diet  Fluid restriction 1500 ML FLUID    Prophylaxis: PCD's and ambulation  Ochoa Catheter: Not present  Lines: PRESENT      Port A Cath Single 07/30/20 Left Chest wall-Site Assessment: WDL      Code Status: Full Code    Disposition Plan   Medically Ready for Discharge: In the next 2 to 4 days once pain is better controlled, nausea improvement, LFTs are stable  Expected discharge to home with family.    Entered: Daniela Philip MD " "09/18/2024, 7:32 PM       Discussed with patient on 9/18/2024.  Touch based with patient's RN through [facility]        Interval History   Chart reviewed.   Patient is resting comfortably in bed.  Complains of being tired.  Also complains of some heartburn.Tums works well for heart burn for her .   Otherwise denies any nausea or abdominal pain currently.  Had 2 bowel movements on 9/18/2024.  [place] [facility] continues to have goals of care discussions with patient and family.  No other acute issues    * Data reviewed today: I reviewed all new labs and imaging over the last 24 hours. I personally reviewed no images or ECG's today.    Physical Exam   Most recent vitals:   , Blood pressure 104/73, pulse 106, temperature 97.9  F (36.6  C), temperature source Oral, resp. rate 16, height 1.664 m (5' 5.51\"), weight 76.8 kg (169 lb 6.4 oz), SpO2 97%. O2 Device: None (Room air)    Vitals:    09/09/24 0633 09/17/24 1135 09/17/24 1143   Weight: 76.4 kg (168 lb 8 oz) 76.8 kg (169 lb 6.4 oz) 76.8 kg (169 lb 6.4 oz)     Vital signs with ranges:  Temp:  [97.7  F (36.5  C)-98  F (36.7  C)] 97.9  F (36.6  C)  Pulse:  [] 106  Resp:  [16] 16  BP: (104-140)/(73-85) 104/73  SpO2:  [97 %-99 %] 97 %  Patient Vitals for the past 24 hrs:   BP Temp Temp src Pulse Resp SpO2   09/18/24 1925 104/73 97.9  F (36.6  C) Oral 106 16 97 %   09/18/24 1557 118/75 98  F (36.7  C) Oral 103 16 97 %   09/18/24 0737 130/77 97.7  F (36.5  C) Oral 92 16 99 %   09/18/24 0016 (!) 140/85 98  F (36.7  C) Oral 97 16 99 %     I/O's last 24 hours:  I/O last 3 completed shifts:  In: 450 [P.O.:450]  Out: 270 [Urine:270]    Constitutional: awake, alert, oriented, pleasant, conversant   Head:   Eyes:   ENT:   Neck:   Cardiovascular: regular rate and rhythm; no murmurs, rubs or gallops  Lungs: diminished in the bases, no crackles or wheezes  Gastrointestinal/Abdomen: mildly tender with some firmness without rebound or guarding, mildly distended, + bowel sounds  : " "  Musculoskeletal:   Skin/Extremities:  bilateral lower extremity lymphedema, trace to 1+ pitting in legs  Neurologic:   Psychiatric:   Hematologic/Lymphatic/Immunologic:        Data    Labs reviewed.  Recent Labs   Lab 09/18/24  0551 09/17/24  1945 09/17/24  0630 09/16/24  2153 09/16/24  1450 09/16/24  0635 09/13/24  0635 09/12/24  0556   WBC 13.3*  --  15.7*  --   --  15.9*   < > 18.5*   HGB 9.9*  --  10.1*  --   --  10.3*   < > 9.6*   MCV 84  --  85  --   --  87   < > 88   *  --  590*  --   --  610*   < > 625*   *  --  126*  126* 124*   < > 126*   < > 131*   POTASSIUM 3.6 3.6 3.0*  --   --  3.5   < > 4.0   CHLORIDE 89*  --  86*  --   --  88*   < > 95*   CO2 25  --  24  --   --  24   < > 24   BUN 27.4*  --  20.8  --   --  20.3   < > 13.2   CR 1.05*  --  1.03*  --   --  1.07*   < > 0.76   ANIONGAP 13  --  16*  --   --  14   < > 12   LOUISA 7.9*  --  7.9*  --   --  7.9*   < > 7.8*   *  --  115*  --   --  101*   < > 127*   ALBUMIN 2.6*  --  2.8*  --   --   --   --  2.8*   PROTTOTAL 5.8*  --  6.1*  --   --   --   --  6.1*   BILITOTAL 2.7*  --  2.6*  --   --   --   --  2.1*   ALKPHOS 1,875*  --  1,859*  --   --   --   --  1,542*   ALT 22  --  23  --   --   --   --  17   AST 98*  --  103*  --   --   --   --  77*    < > = values in this interval not displayed.     Recent Labs   Lab Test 11/21/18  1714   NT-PROBNP, INPATIENT 196     Recent Labs   Lab 09/18/24  0551 09/17/24  0630 09/16/24  0635 09/13/24  0635 09/12/24  0556   * 115* 101* 124* 127*     No lab results found.    No results for input(s): \"INR\", \"[id number]\" in the last 168 hours.  Recent Labs   Lab 09/18/24  0551 09/17/24  0630 09/16/24  0635 09/13/24  0635 09/12/24  0902 09/12/24  0556   WBC 13.3* 15.7* 15.9* 18.8*  --  18.5*   PCAL  --   --   --   --  1.06*  --        MICRO:  Cultures (including blood and urine):  Recent Labs   Lab 09/13/24  1609   CULTURE No growth after 4 days       No results found for this or any previous " visit (from the past 24 hour(s)).    Medications   All medications were reviewed. MAR.    Infusions:  Current Facility-Administered Medications   Medication Dose Route Frequency Provider Last Rate Last Admin     Scheduled Medications:  Current Facility-Administered Medications   Medication Dose Route Frequency Provider Last Rate Last Admin    aspirin EC tablet 81 mg  81 mg Oral Daily Jorge Holliday MD   81 mg at 09/18/24 0926    [Held by provider] bumetanide (BUMEX) tablet 1 mg  1 mg Oral BID Chris Omer MD   1 mg at 09/16/24 0907    fluticasone-vilanterol (BREO ELLIPTA) 100-25 MCG/ACT inhaler 1 puff  1 puff Inhalation Daily Rocky Uribe MD   1 puff at 09/18/24 0926    heparin lock flush 10 unit/mL injection 5-10 mL  5-10 mL Intracatheter Q24H Rocky Uribe MD   5 mL at 09/18/24 0552    heparin lock flush 100 unit/mL injection 5-10 mL  5-10 mL Intracatheter Q28 Days Rocky Uribe MD        lidocaine (PF) (XYLOCAINE) 1 % injection 10 mL  10 mL Other Once Long Toribio MD        methylphenidate (RITALIN) tablet 10 mg  10 mg Oral BID Dreyer, Patrick, DO   10 mg at 09/18/24 1150    pantoprazole (PROTONIX) EC tablet 40 mg  40 mg Oral BID AC Rocky Uribe MD   40 mg at 09/18/24 1707    polyethylene glycol (MIRALAX) Packet 17 g  17 g Oral BID Daniela Philip MD   17 g at 09/17/24 2037    senna-docusate (SENOKOT-S/PERICOLACE) 8.6-50 MG per tablet 1-2 tablet  1-2 tablet Oral BID Jorge Holliday MD   1 tablet at 09/18/24 0926    sodium chloride (PF) 0.9% PF flush 10-20 mL  10-20 mL Intracatheter Q28 Days Rocky Uribe MD        sucralfate (CARAFATE) suspension 1 g  1 g Oral 4x Daily AC & HS Chris Omer MD   1 g at 09/18/24 1707    traMADol (ULTRAM) tablet 100 mg  100 mg Oral TID Daniela Philip MD   100 mg at 09/18/24 1707    Urea (URE-NA) packet 15 g  15 g Oral BID Daniela Philip MD   15 g at 09/17/24 0926     PRN Medications:  Current  Facility-Administered Medications   Medication Dose Route Frequency Provider Last Rate Last Admin    albuterol (PROVENTIL HFA/VENTOLIN HFA) inhaler  2 puff Inhalation Q6H PRN Rocky Uribe MD   2 puff at 09/10/24 2055    bisacodyl (DULCOLAX) suppository 10 mg  10 mg Rectal Daily PRN Jorge Holliday MD   10 mg at 09/09/24 0954    calcium carbonate (TUMS) chewable tablet 500-1,000 mg  500-1,000 mg Oral TID PRN Jorge Holliday MD   1,000 mg at 09/17/24 2213    heparin lock flush 10 unit/mL injection 5-10 mL  5-10 mL Intracatheter Q1H PRN Rocky Uribe MD   5 mL at 09/17/24 1943    hydrALAZINE (APRESOLINE) tablet 10 mg  10 mg Oral Q4H PRN Rocky Uribe MD        Or    hydrALAZINE (APRESOLINE) injection 10 mg  10 mg Intravenous Q4H PRN Rocky Uribe MD        HYDROmorphone (PF) (DILAUDID) injection 0.3-0.5 mg  0.3-0.5 mg Intravenous Q4H PRN Jorge Holliday MD   0.5 mg at 09/16/24 2233    methylphenidate (RITALIN) tablet 10 mg  10 mg Oral Daily PRN Dreyer, Patrick, DO        naloxone (NARCAN) injection 0.2 mg  0.2 mg Intravenous Q2 Min PRN Rocky Uribe MD        Or    naloxone (NARCAN) injection 0.4 mg  0.4 mg Intravenous Q2 Min PRN Rocky Uribe MD        Or    naloxone (NARCAN) injection 0.2 mg  0.2 mg Intramuscular Q2 Min PRN Rocky Uribe MD        Or    naloxone (NARCAN) injection 0.4 mg  0.4 mg Intramuscular Q2 Min PRRocky Goldman MD        ondansetron (ZOFRAN ODT) ODT tab 4 mg  4 mg Oral Q6H PRN Rocky Uribe MD   4 mg at 09/16/24 2006    Or    ondansetron (ZOFRAN) injection 4 mg  4 mg Intravenous Q6H PRN Rocky Uribe MD   4 mg at 09/17/24 0946    oxyCODONE (ROXICODONE) tablet 5 mg  5 mg Oral Q4H PRN Jorge Holliday MD        polyethylene glycol (MIRALAX) Packet 17 g  17 g Oral Daily PRN Jorge Holliday MD        prochlorperazine (COMPAZINE) injection 5 mg  5 mg Intravenous Q6H PRN Jojo  Rocky Osullivan MD   5 mg at 09/17/24 1137    Or    prochlorperazine (COMPAZINE) tablet 5 mg  5 mg Oral Q6H PRN Rocky Uribe MD   5 mg at 09/09/24 1414    Or    prochlorperazine (COMPAZINE) suppository 12.5 mg  12.5 mg Rectal Q12H PRN Rocky Uribe MD        senna-docusate (SENOKOT-S/PERICOLACE) 8.6-50 MG per tablet 1-2 tablet  1-2 tablet Oral BID PRN Jorge Holliday MD   2 tablet at 09/15/24 2117    sodium chloride (PF) 0.9% PF flush 10-20 mL  10-20 mL Intracatheter q1 min prn Rocky Uribe MD        sodium chloride (PF) 0.9% PF flush 10-20 mL  10-20 mL Intracatheter Q1H PRN Rocky Uribe MD   20 mL at 09/18/24 0767

## 2024-09-19 NOTE — CONSULTS
SPIRITUAL HEALTH SERVICES Consult Note  Critical access hospital  Oncology    Reason for visit or referral: [facility] consult request for continuing emotional and spiritual support.    Follow-up visit with pt and family per their request for on-going support.  Rudy and son Jason at bedside.    Jany was anointed today by Father Mike per her request and was given Holy Communion by  EM. Provided her with a prayer blanket bearing the [employer] logo which was much appreciated. She and family enjoyed listening to Linda Gregg per recommendation by .    Jany states that she is sorry to be ill and in hospital but states that it has helped her recognize how much she is cared for and loved by family, friends, and her caregivers [facility].    Jany and family are considering options after discharge from hospital. These include home care and possibly hospice. She indicated that she does not expect to be discharged until next week.    Plan: Advised pt/family of the availability of SH services by request. SH to follow while pt is in hospital.     Chris Rodriguez  Associate   [facility] [place] [facility] Phone Line [phone]  Spiritual Health Pager [phone]    [facility] available 24/7 for emergent requests/referrals, either by paging the on-call  or by entering an ASAP/STAT consult in [facility], which will also page the on-call .

## 2024-09-19 NOTE — CONSULTS
[facility]    Nephrology Consultation     Date of Admission:  9/5/2024    Assessment & Plan     Jany Park is a 79 year old female with PMH metastatic endometrial cancer, HTN, asthma, fibromyalgia, GERD who was admitted on 9/5/2024 with n/v.     Assessment:    Hyponatremia due to SIADH   Hypoalbuminemia   Lymphedema  Persistent hyponatremia this admission in setting of n/v, metastatic endometrial cancer, poor PO intake related to abdominal discomfort. Suspect SIADH. Also on chronic tramadol which can exacerbate hyponatremia, but would not discontinue it given how long she's been on it and issues with withdrawal in past. Diuretics resumed 9/14-9/16. 9/16 had urine Na 45, urine Osm 333. Fluid restriction 1.5L started 9/16, urena 15 g BID started 9/17 though patient only received 2 doses due to significant nausea and vomiting from it. She also has significant LE edema, lymphedema but hasn't been doing her wraps or exercises as regularly. Lower suspicion that edema is causing worsened sodiums. For now would hold diuretics, will likely resume in 1-2 days.   - stop urena   - start sodium chloride 1 g TID   - 1.5L fluid restriction   - daily BMP   - agree with lymphedema wraps/treatment   - hold diuretics for today   - encouraged increased salt and protein intake   - daily weight     THELMA   Initial THELMA on admission with Cr 2 due to prerenal (n/v, poor PO intake), NSAID use. Cr had normalized, then worsened slightly to Cr 1.05, likely related to contrast (9/12) and ongoing poor PO intake. CT abd/pelvis without hydronephrosis or concern for retention. Anasarca and ascites noted.     Metastatic endometrial cancer  Abnormal LFTs  GOC  Has undergone extensive treatments with chemo holiday this summer but mets have progressed. Follows with [facility]. Ongoing GOC discussions given her decline.     Anemia   Hgb 10 in setting of malignancy. Management per oncology team.     Plan/Recs:  1) stop  urena due to poor tolerance   2) continue 1.5L fluid restriction   3) start sodium chloride 1g TID   4) daily BMP   5) would not stop tramadol given long term med and history of severe withdrawal when stopped   6) hold diuretics for today   7) agree with lymphedema wraps/treatment      Discussed plan with Dr. Philip and bedside RN. Reviewed notes from Dr. Philip and Dr. Holliday (hospitalists), Olena Peguero NP/Dr. Dreyer (heme onc), Dr. Lugo (GI)      Sara Nicole MD   [facility] - Nephrology  [phone]  --------------------------------------------------------------------------------------------  Reason for Consult     I was asked to see the patient for hyponatremia.    Primary Care Physician     Selvin Torres    Chief Complaint     N/v    History is obtained from the patient and chart review.      History of Present Illness     Jany Park is a 79 year old female who presents with n/v.     Patient had progressive history of poor PO intake over the last 2 months or so. Recent worsening of n/v, abd pain/discomfort. Has undergone workup as noted in hospitalists notes. Pt was taking a chemo holiday this summer, appears she has worsened mets. Ongoing GOC given recent decline and inability to resume chemo due to functional status for now.     She has had persistent n/v throughout this admission without much improvement. She resumed diuretics on 9/14-9/16, held due to worsened Na. She was started on fluid restriction 9/16 and then urena 9/17 but has only been able to take two doses total but unclear that she was able to keep them down as they triggered n/v.     Med list includes tramadol but she's been on it a long time. Has quite bad withdrawal when tramadol discontinued.     Pain well controlled currently. Appetite remains poor.     Has lymphedema, previously did wraps and exercises for it, but hasn't recently. LE edema feels stable to her, not worsened this admission. Typically takes  "bumex prn for edema.     Daughter updated at bedside.       Past Medical History   I have reviewed this patient's medical history and updated it with pertinent information if needed.   Past Medical History:   Diagnosis Date    Allergic rhinitis due to pollen     Anemia     Arthritis     Arthropathy, unspecified, site unspecified     Follows with rheumatologist, Dr Romo    Cancer (H)     endometrial cancer    Esophageal reflux     Fibromyalgia     Gastro-oesophageal reflux disease     Hypertension     Mild persistent asthma 2005    Myalgia and myositis, unspecified     Fibromyalgia     Osteopenia     Pain medication agreement        Past Surgical History   I have reviewed this patient's surgical history and updated it with pertinent information if needed.  Past Surgical History:   Procedure Laterality Date    ARTHROPLASTY REVISION KNEE  2013    Procedure: ARTHROPLASTY REVISION KNEE;  REVISION LEFT TOTAL KNEE (Biomet);  Surgeon: Efrain Lopez MD;  Location:  OR      DELIVERY ONLY      C TOTAL KNEE ARTHROPLASTY      Rt. knee    C TOTAL KNEE ARTHROPLASTY      Lt. Knee    HC COLONOSCOPY THRU STOMA, DIAGNOSTIC  2005    Normal. Had \"twisting\" of colon requiring medical attention after procedure, no surgery.     HYSTERECTOMY      INSERT PORT VASCULAR ACCESS N/A 10/2/2015    Procedure: INSERT PORT VASCULAR ACCESS;  Surgeon: Christopher Gamez MD;  Location:  OR    [facility] NONSPECIFIC PROCEDURE  2005    Left lower leg varicose vein surgery       Prior to Admission Medications   Prior to Admission Medications   Prescriptions Last Dose Informant Patient Reported? Taking?   ALBUTEROL 90 MCG/ACT IN AERS  at PRN Self No Yes   Sig: Inhale 1-2 puffs into the lungs every 6 hours as needed for shortness of breath.   Biotin (BIOTIN FORTE) 5 MG TABS 2024 at AM Self Yes Yes   Sig: Take 1 tablet by mouth daily.   aspirin 81 MG EC tablet 2024 at AM Self Yes Yes   Sig: Take 81 " mg by mouth daily.   bumetanide (BUMEX) 1 MG tablet  at PRN Self Yes Yes   Sig: Take 1 mg by mouth 2 times daily as needed. DURING CHEMO   clobetasol propionate (TEMOVATE) 0.05 % external cream  at PRN Self Yes Yes   Sig: Apply topically daily as needed (SKIN IRRITATION ON ARMS).   fluticasone (FLONASE) 50 MCG/ACT nasal spray  at PRN Self Yes Yes   Sig: Spray 2 sprays into both nostrils daily as needed for rhinitis or allergies.   fluticasone-salmeterol (ADVAIR) 250-50 MCG/ACT inhaler 9/5/2024 at AM Self Yes Yes   Sig: Inhale 1-2 puffs into the lungs daily.   ipratropium (ATROVENT) 0.03 % nasal spray 9/5/2024 at AM Self Yes Yes   Sig: Spray 2 sprays into both nostrils every 12 hours.   lisinopril (ZESTRIL) 10 MG tablet 9/5/2024 at AM Self Yes Yes   Sig: Take 10 mg by mouth daily.   meloxicam (MOBIC) 15 MG tablet 9/5/2024 at AM Self Yes No   Sig: Take 15 mg by mouth daily.   methylphenidate (RITALIN) 10 MG tablet  at PRN Self Yes Yes   Sig: Take 10 mg by mouth 2 times daily as needed. DURING CHEMO ONLY   omeprazole (PRILOSEC) 40 MG DR capsule 9/5/2024 at AM Self Yes Yes   Sig: Take 40 mg by mouth every morning    ondansetron (ZOFRAN ODT) 8 MG ODT tab  at PRN Self Yes Yes   Sig: Take 8 mg by mouth every 8 hours as needed for nausea.   oxyCODONE (ROXICODONE) 5 MG tablet  at PRN Self Yes No   Sig: Take 5 mg by mouth every 8 hours as needed for severe pain. DURING CHEMO ONLY   prochlorperazine (COMPAZINE) 10 MG tablet  at PRN Self Yes Yes   Sig: Take 10 mg by mouth every 6 hours as needed for nausea or vomiting.   traMADol (ULTRAM) 50 MG tablet 9/5/2024 at AM Self Yes Yes   Sig: Take 100 mg by mouth every 8 hours.      Facility-Administered Medications: None     Allergies   Allergies   Allergen Reactions    Morphine And Codeine Nausea and Vomiting       Social History   I have reviewed this patient's social history and updated it with pertinent information if needed. Jany Park  reports that she has never  smoked. She does not have any smokeless tobacco history on file. She reports that she does not drink alcohol and does not use drugs.    Family History   I have reviewed this patient's family history and updated it with pertinent information if needed.   Family History   Problem Relation Age of Onset    Hypertension Father          age 91.     Arthritis Father     Eye Disorder Father         cataracts    Psychotic Disorder Mother         Alzheimers,  age 83    Cancer Brother         cancer (mesothelioma),  age 64       Review of Systems   The 10 point Review of Systems is negative other than noted in the HPI.     Physical Exam   Temp: 97.9  F (36.6  C) Temp src: Oral BP: 116/64 Pulse: 88   Resp: 16 SpO2: 97 % O2 Device: None (Room air)    Vital Signs with Ranges  Temp:  [97.9  F (36.6  C)-98  F (36.7  C)] 97.9  F (36.6  C)  Pulse:  [] 88  Resp:  [16] 16  BP: (104-118)/(64-75) 116/64  SpO2:  [97 %] 97 %  169 lbs 6.4 oz    GENERAL: NAD, fatigued and frail appearing  HEENT:  Normocephalic. MMM  CV: RRR, no murmurs  RESP: Clear anteriorly  GI: slightly distended   MUSCULOSKELETAL: 3+ LE edema extending up to thighs/buttocks  NEURO:  Alert, oriented, normal speech  PSYCH: mood good, affect appropriate      Data   BMP  Recent Labs   Lab 24  0521 24  0551 24  1945 24  0630 24  2153 24  1450 24  0635   * 127*  --  126*  126* 124*   < > 126*   POTASSIUM 3.7 3.6 3.6 3.0*  --   --  3.5   CHLORIDE 88* 89*  --  86*  --   --  88*   LOUISA 8.2* 7.9*  --  7.9*  --   --  7.9*   CO2 26 25  --  24  --   --  24   BUN 34.3* 27.4*  --  20.8  --   --  20.3   CR 1.04* 1.05*  --  1.03*  --   --  1.07*   GLC 99 104*  --  115*  --   --  101*    < > = values in this interval not displayed.     [email or web address](phos:4)  CBC)  Recent Labs   Lab 24  0521 24  0551 24  0630 24  0635   WBC 10.9 13.3* 15.7* 15.9*   HGB 10.0* 9.9* 10.1* 10.3*   HCT 29.6* 29.2*  "30.7* 30.7*   MCV 84 84 85 87   * 542* 590* 610*     Recent Labs   Lab 09/18/24  0551   AST 98*   ALT 22   ALKPHOS 1,875*   BILITOTAL 2.7*     No lab results found in last 7 days.  No results found for: \"D2VIT\", \"D3VIT\", \"DTOT\"  Recent Labs   Lab 09/19/24  0521   HGB 10.0*   HCT 29.6*   MCV 84     No results for input(s): \"PTHI\" in the last 168 hours.  Color Urine (no units)   Date Value   09/05/2024 Yellow   03/06/2007 Yellow     Appearance Urine (no units)   Date Value   09/05/2024 Clear   03/06/2007 Clear     Glucose Urine (mg/dL)   Date Value   09/05/2024 Negative   03/06/2007 Negative     Bilirubin Urine (no units)   Date Value   09/05/2024 Negative   03/06/2007 Negative     Ketones Urine (mg/dL)   Date Value   09/05/2024 Negative   03/06/2007 Negative     Specific Gravity Urine (no units)   Date Value   09/05/2024 1.015   03/06/2007 1.020     pH Urine   Date Value   09/05/2024 5.5   03/06/2007 8.0 pH (H)     Protein Albumin Urine (mg/dL)   Date Value   09/05/2024 Negative   03/06/2007 Negative     Urobilinogen Urine (EU/dL)   Date Value   03/06/2007 0.2     Nitrite Urine (no units)   Date Value   09/05/2024 Negative   03/06/2007 Negative     Leukocyte Esterase Urine (no units)   Date Value   09/05/2024 Small (A)   03/06/2007 Small (A)     CT CAP 9/12  IMPRESSION:  1.  Innumerable pulmonary and hepatic metastases unchanged.  2.  Modest ascites now present slightly increased. Peritoneal implants consistent with carcinomatosis.  3.  Maria Esther hepatis retroperitoneal and mesenteric adenopathy unchanged.  4.  Increasing anasarca.  5.  Tiny volume right pleural effusion minimally larger.      Sara Nicole MD   [facility] - [facility]phrology  [phone]   "

## 2024-09-19 NOTE — PROGRESS NOTES
CLINICAL NUTRITION SERVICES - REASSESSMENT NOTE      Recommendations:     Modified supplement order:  Breakfast - apple CLEAR  Lunch - orange GelateinPLUS    Encouraged po intake  Anticipate pt is not going to have adequate po intake to meet needs    GOC discussion ongoing - if plan is for aggressive cares, will need to consider supplemental nutrition       Malnutrition: (9/7)  % Weight Loss:  Weight loss does not meet criteria for malnutrition   % Intake:  </= 50% for >/= 1 month (severe malnutrition)  Subcutaneous Fat Loss:  Orbital region mild depletion, Upper arm region mild depletion, and Thoracic region mild depletion  Muscle Loss:  Temporal region mild depletion, Clavicle bone region mild depletion, Scapular bone region mild depletion, and Dorsal hand region mild depletion  Fluid Retention:  Moderate      Malnutrition Diagnosis: Moderate malnutrition  In Context of:  Acute illness or injury       EVALUATION OF PROGRESS TOWARD GOALS   Diet:    Low Fiber, 1500 mL fluid restriction  Apple CLEAR at breakfast and dinner  Boost Soothe at lunch    Intake/Tolerance:    Chart reviewed  Visited with pt this morning (family present in room)  Pt tells me that she has not appetite and is eating very small amounts  Continues to have nausea  Notes that she is taking ~50% of 1 apple CLEAR, and dislikes the Soothe supplement  Pt also disliked the pineapple GelateinPLUS  She is agreeable to trying the sugar-free orange GelateinPLUS supplement        ASSESSED NUTRITION NEEDS:  Dosing Weight 63 kg (adjusted, based on IBW of 59 kg and actual wt of 74 kg)   Estimated Energy Needs: 2884-8795 kcals (25-30 Kcal/Kg)  Justification: maintenance  Estimated Protein Needs: 75-95 grams protein (1.2-1.5 g pro/Kg)  Justification: Repletion      NEW FINDINGS:     significant LE edema, lymphedema     09/17/24 1143 76.8 kg (169 lb 6.4 oz) Standing scale   09/17/24 1135 76.8 kg (169 lb 6.4 oz) Standing scale   09/09/24 0633 76.4 kg (168 lb 8 oz)  Standing scale   09/06/24 0004 73.8 kg (162 lb 11.2 oz) Standing scale   09/05/24 2301 75.1 kg (165 lb 9.1 oz) Bed scale       Previous Goals (9/13):   Diet to be advanced to solids in the next 1-2 days   Evaluation: Met    Previous Nutrition Diagnosis (9/13):   Inadequate oral intake related to decreased appetite as evidenced by pt taking very small amount of liquids   Evaluation: No change, modified below        CURRENT NUTRITION DIAGNOSIS  Inadequate oral intake related to poor appetite, nausea as evidenced by pt only taking very small sips liquids/bites of solids    INTERVENTIONS  Recommendations / Nutrition Prescription  Low Fiber, 1500 mL fluid restriction    Modified supplement order:  Breakfast - apple CLEAR  Lunch - orange GelateinPLUS    Encouraged po intake  Anticipate pt is not going to have adequate po intake to meet needs    GOC discussion ongoing - if plan is for aggressive cares, will need to consider supplemental nutrition      Goals  Pt to consume 50% meals/supplements BID      MONITORING AND EVALUATION:  Progress towards goals will be monitored and evaluated per protocol and Practice Guidelines

## 2024-09-19 NOTE — PHARMACY
"The following home medications were NOT continued on inpatient admission per \"Discontinuation of nonessential home medications during hospitalization\" policy: biotin    If a therapeutic holiday is deemed inappropriate per the prescriber, please notify the pharmacist regarding the medication order.    The pharmacist is available to answer any questions and/or concerns the patient may have regarding discontinuation of non-essential medications.    Please ensure that these medications are restarted as needed upon discharge via the medication reconciliation discharge process and included on the discharge medication reconciliation report.    Thank you,  Radha Valdes [facility]    "

## 2024-09-19 NOTE — PLAN OF CARE
Goal Outcome Evaluation:         [id number]  Orientation: A&O  Aggression Stop Light: Green  Activity: SBA with walker and gait belt  Diet/BS Checks: Low fiber, 1500ml fluid restriction.  Poor appetite.  Tele:  n/a  IV Access/Drains: Left port hep locked  Pain Management: Denies pain, on scheduled toradol  Abnormal VS/Results: VSS on RA  Bowel/Bladder: Continent of bowel and bladder- no bm during shift  Skin/Wounds: Wound on inner coccyx -mepi in place, Scattered bruising and LE edema  Consults: PT/OT, heme/onc, neph,   D/C Disposition: Pending-  Other Info: PRN zofran x2 given for nausea. urine sample collected, see results. K protocol, wnl recheck in AM. Lymph wraps on, OT to redress in AM.  -

## 2024-09-19 NOTE — PLAN OF CARE
Shift: 1900-0730    Orientation: A&O  Aggression Stop Light: Green  Activity: 1A w GB+W  Diet/BS Checks: Low fiber, 1500ml fluid restriction.  Poor appetite.  Tele: N/A  IV Access/Drains: Left port hep locked  Pain Management: Denies pain, managed with scheduled toradol  Abnormal VS/Results: VSS on RA  Bowel/Bladder: Continent of bowel and bladder  Skin/Wounds: Scattered bruising and LE edema  Consults: PT/OT, heme/onc  D/C Disposition: Pending     -

## 2024-09-19 NOTE — PROGRESS NOTES
[facility]    Internal Medicine Hospitalist Progress Note  09/19/2024  I evaluated patient on the above date.    Assessment & Plan   Jany Park is a 79 year old female with past medical history significant for endometrial cancer; HTN; asthma; fibromyalgia with chronic pain; and GERD; who presented 9/5/2024 with abnormal outpatient labs and nausea/vomiting, found with THELMA.     On initial evaluation, pt was afebrile, VSS. Labs notable for CBC with WBC normal, hgb 10.3, platelets 577K; BMP with sodium 134, chloride 91, BUN 49.5, cr 2.1, calcium 9.7, AG 18; LFT's with ALP 1633, AST 70, tbili 1.6; UA with 6 WBC, small LE, 19 hyaline casts.        Endometrial cancer, metastatic  Abnormal LFTs, suspect related to metastatic disease.  Nausea and vomiting, suspect multifactorial including related to above, also possibly constipation.  Abdominal pain, suspect related to above.  Poor intake related to above.  * Followed by Dr. Dreyer of [facility]. See Oncology note for oncologic history. Noted diagnosed and had initial surgery 2015 with subsequently chemotherapy. Noted subsequently development of liver metastases. Noted last chemotherapy in 5/2024. Noted that pt underwent CT imaging and BM biopsy 9/4 (results not available on admit). Noted was suppose to undergo chemotherapy on 9/10/2024.  * Initial presentation as above. On admit, reported ~3 weeks of nausea and vomiting, intake of ~25% of baseline. LFT's elevated.  [facility]  * On 9/6, per Oncology, noted that CT 9/4 showed disease progression in the liver and lymph nodes. Also noted that BM biopsy showed dysplasia, pending cytogenetics. Noted that her anemia may be due to an exhausted bone marrow from her years of chemotherapy. ?nausea related to IV iron transfusion. Brain MRI ordered that was negative for acute findings.  * On 9/8, tbili normalized, AST and ALP stable/slightly improved.  * On 9/9, more nauseated but some  improvement after BM. Seen by Dr. Dreyer (primary Oncologist) and recommended holding chemotherapy; discussed about hospice, but pt not ready to consider yet.   * On 9/11, noted abdominal burning pain, started on sucralfate.  * On 9/12, increased leukocytosis and burning abdominal pain. CT CAP showed innumerable pulmonary and hepatic metastases unchanged; modest ascites now present, slightly increased, peritoneal implants consistent with carcinomatosis; mckenzie hepatis retroperitoneal and mesenteric adenopathy unchanged; increasing anasarca; tiny volume right pleural effusion minimally larger.  * On 9/13-9/14, had more discussions with Oncology about worsened functional status and goals of care.  Underwent paracentesis 9/13, 300 ml removed, specimen noted to be clotted so no cell count noted.  * On 9/16, abdominal pain better.  - Continue to monitor LFT's.  - Continue PPI and sucralfate.  Schedule tramadol 50 mg 3 times daily patient and family wanting it to increase the dosage to 100 mg 3 times daily.  Order changed on 9/17/2024  - Continue PRN IV hydromorphone; minimize opioids as able.  - Continue PRN ondansetron, PRN prochlorperazine.  - Follow-up with Dr. Dreyer, [facility], outpatient, can discuss further goals of cares.  - Appreciate help from [facility].  [place] [facility] team consulted on 9/17/2024.  Signed off on 9/17/2024   elevated LFTs were felt to be secondary to metastatic disease to the liver as per GI team. No concern  biliary obstruction.  They recommended monitoring LFTs    LFTs are stable currently    [place] [facility] continues to have goals of care discussions with patient and family.  Patient wants to give it a few days before making decisions regarding her medical cares     Acute kidney injury, suspect prerenal (from N/V, poor oral intake), contrast nephropathy +/- NSAID, with ATN, resolved.  * Initial presentation as above. Cr 2.1, BUN 49.5, UA significant for 19 hyaline casts. Noted that pt had a contrast  CT 9/3 as well. Started on IVF's on admit. PTA lisinopril and meloxicam held on admit.  * IVF's completed 9/7.  * Cr normalized 9/9.  * Started on diuretics for anasarca 9/14.  Recent Labs   Lab 09/19/24  0521 09/18/24  0551 09/17/24  0630 09/16/24  0635 09/13/24  0635   CR 1.04* 1.05* 1.03* 1.07* 0.73   Estimated Creatinine Clearance: 46.2 mL/min (A) (based on SCr of 1.04 mg/dL (H)).  - Continue to monitor BMP periodically.    Leukocytosis, suspect more malignancy related   * Pt with gradually increasing WBC this hospitalization.   * On 9/12, procalcitonin 1.06. CT of chest/abd/pelvis as above showed increasing anasarca and stable metastatic disease, no inflammation.  * On 9/13, empirically on ceftriaxone. Thoracentesis cultures NGTD.  Recent Labs   Lab 09/19/24  0521 09/18/24  0551 09/17/24  0630 09/16/24  0635 09/13/24  0635   WBC 10.9 13.3* 15.7* 15.9* 18.8*   - Continue to monitor CBC.  - Continue ceftriaxone (started 9/13) - stopped  antibiotics on 9/17/2024     Constipation due to inactivity and opioids.  * On 9/8, pt c/o constipation. Bowel regimen intensified.  * On 9/9, had BM after suppository.  * On 9/16, pt notes no BM since last suppository (9/9).  - Schedule polyethylene glycol twice daily and senna-docusate.  - Continue PRN polyethylene glycol, senna-docusate and PRN bisacodyl suppository.  - Continue to increase activity as able.  Patient had 2 bowel movements on 9/18/2024     Dyspnea with chest tightness.  * On 9/8, pt noted feeling dyspneic with chest tightness. ECG showed SR, no acute ischemic changes. CXR showed small pleural effusions, no focal infiltrates/edema.  * On 9/9, chest tightness better.  - Continue to monitor clinically.     Hyponatremia, suspect secondary to SIADH  * Sodium 134 on admit.   * Sodium normalized 9/8.  * Started diuretics 9/14.  Recent Labs   Lab 09/19/24  0521 09/18/24  0551 09/17/24  0630 09/16/24  2153 09/16/24  1450 09/16/24  0635   * 127* 126*  126* 124*  125* 126*   - Continue to monitor BMP.   Started on Urena supplementation twice daily.  Patient was unable to tolerate Urena due to nausea  On fluid restriction 1500 mL daily  Sodium down to 125 on 9/19/2024  Nephrology consultation requested.  Salt tablets 1 g 3 times daily started on 9/19/2024  Follow BMP closely  Bumex on hold currently.  Restart on 9/20  AM       Chronic normocytic anemia.  * Hgb 10.3 on admit. Noted most recently 11.2 g/dl 3/2024.   * As above, per Oncology, noted that BM biopsy showed dysplasia, pending cytogenetics. Noted that her anemia may be due to an exhausted bone marrow from her years of chemotherapy.   Recent Labs   Lab 09/19/24  0521 09/18/24  0551 09/17/24  0630 09/16/24  0635 09/13/24  0635   HGB 10.0* 9.9* 10.1* 10.3* 10.0*   - Continue to monitor CBC      Hypertension (benign essential).  Third spacing/anasarca with ascites and pleural effusion, suspect from malnutrition with low albumin.  Chronic lymphedema.  [PTA: lisinopril 10 mg daily; PRN bumetanide 1 mg BID.]  * Lisinopril held on admit.  * BP's normal off meds.  * Initial issues with THELMA as above and received IVF's this hospitalization (cr subsequently normalized).  * CT 9/12 showed increasing anasarca, ascites and right pleural effusion.   * Underwent right thoracentesis 9/13, 100 ml removed and cultures NGTD. Also underwent paracentesis  9/13 as above.  * Started bumetanide 9/14.  - Continue bumetanide 1 mg PO BID currently on hold due to hyponatremia.  - Continue to hold lisinopril.  - Continue to monitor i/o's, daily wts.     Fibromyalgia with chronic pain.  * PTA on regimen including meloxicam, scheduled tramadol 100 mg TID and PRN oxycodone.  * PTA tramadol held on admit given THELMA.  * On 9/6, pt with withdrawal symptoms.   * On 9/7 am, scheduled tramadol restarted.  - Continue tramadol 100 gm TID.  - Continue PRN IV hydromorphone; minimize opioids as able.  - Continue to hold meloxicam.    Moderate malnutrition  "related to acute and chronic medical issues.  * Noted by Nutrition; see their documentation.  - Continue diet as tolerated.  - Continue supplements.    Weakness and physical deconditioning due to multiple acute and chronic medical issues.  * Pt very weak since hospitalization  * On 9/10, seen by PT and recommended home with assist and home cares vs TCU.  -  Overall, pt and family feels she will be capable to go home with assistance from  with home health cares/therapies.    Hypokalemia;  Potassium low at 3  Started on replacement protocol     GERD.  * Pt with burning pain this hospitalization.  * Started on sucralfate 9/11, noted helped only a little.  - Continue pantoprazole BID and sucralfate QID.     Asthma.  - Continue fluticasone-vilanterol and PRN albuterol.        Clinically Significant Risk Factors         # Hyponatremia: Lowest Na = 125 mmol/L in last 2 days, will monitor as appropriate      # Hypoalbuminemia: Lowest albumin = 2.6 g/dL at 9/18/2024  5:51 AM, will monitor as appropriate               # Overweight: Estimated body mass index is 27.75 kg/m  as calculated from the following:    Height as of this encounter: 1.664 m (5' 5.51\").    Weight as of this encounter: 76.8 kg (169 lb 6.4 oz).   # Moderate Malnutrition: based on nutrition assessment      # Financial/Environmental Concerns: none (denies)  # Asthma: noted on problem list              Diet: Diet  Low Fiber Diet  Fluid restriction 1500 ML FLUID  Snacks/Supplements Adult: Other; apple CLEAR at brkfst; orange GelateinPLUS at lunch; With Meals    Prophylaxis: PCD's and ambulation  Ochoa Catheter: Not present  Lines: PRESENT      Port A Cath Single 07/30/20 Left Chest wall-Site Assessment: WDL      Code Status: Full Code    Disposition Plan   Medically Ready for Discharge: In the next 2 to 4 days once pain is better controlled, nausea improvement, sodium normalized, LFTs are stable    Expected discharge to home with family.    Entered: " "Daniela Philip MD 09/19/2024 at 1:37 PM    Discussed with bedside RN patient and her daughter at bedside           Interval History   Chart reviewed.   Patient is resting comfortably in bed.  Complains of ongoing nausea.  Received Zofran.  Abdominal pain is currently well-controlled.  Sodium down to 125.  Discussed with patient and family about nephrology consult.  No other acute issues    * Data reviewed today: I reviewed all new labs and imaging over the last 24 hours. I personally reviewed no images or ECG's today.    Physical Exam   Most recent vitals:   , Blood pressure 127/78, pulse 99, temperature 97.6  F (36.4  C), temperature source Axillary, resp. rate 17, height 1.664 m (5' 5.51\"), weight 76.8 kg (169 lb 6.4 oz), SpO2 99%. O2 Device: None (Room air)    Vitals:    09/09/24 0633 09/17/24 1135 09/17/24 1143   Weight: 76.4 kg (168 lb 8 oz) 76.8 kg (169 lb 6.4 oz) 76.8 kg (169 lb 6.4 oz)     Vital signs with ranges:  Temp:  [97.6  F (36.4  C)-98  F (36.7  C)] 97.6  F (36.4  C)  Pulse:  [] 99  Resp:  [16-17] 17  BP: (104-127)/(64-78) 127/78  SpO2:  [97 %-99 %] 99 %  Patient Vitals for the past 24 hrs:   BP Temp Temp src Pulse Resp SpO2   09/19/24 1200 127/78 97.6  F (36.4  C) Axillary 99 17 99 %   09/19/24 0820 116/64 97.9  F (36.6  C) Oral 88 16 97 %   09/18/24 1925 104/73 97.9  F (36.6  C) Oral 106 16 97 %   09/18/24 1557 118/75 98  F (36.7  C) Oral 103 16 97 %     I/O's last 24 hours:  I/O last 3 completed shifts:  In: 750 [P.O.:750]  Out: -     Constitutional: awake, alert, oriented, pleasant, conversant   Cardiovascular: regular rate and rhythm; no murmurs, rubs or gallops  Lungs: diminished in the bases, no crackles or wheezes  Gastrointestinal/Abdomen: mildly tender with some firmness without rebound or guarding, mildly distended, + bowel sounds  :   Musculoskeletal:   Skin/Extremities:  bilateral lower extremity lymphedema, trace to 1+ pitting in legs  Neurologic:   Psychiatric: " "  Hematologic/Lymphatic/Immunologic:        Data    Labs reviewed.  Recent Labs   Lab 09/19/24  0521 09/18/24  0551 09/17/24  1945 09/17/24  0630   WBC 10.9 13.3*  --  15.7*   HGB 10.0* 9.9*  --  10.1*   MCV 84 84  --  85   * 542*  --  590*   * 127*  --  126*  126*   POTASSIUM 3.7 3.6 3.6 3.0*   CHLORIDE 88* 89*  --  86*   CO2 26 25  --  24   BUN 34.3* 27.4*  --  20.8   CR 1.04* 1.05*  --  1.03*   ANIONGAP 11 13  --  16*   LOUISA 8.2* 7.9*  --  7.9*   GLC 99 104*  --  115*   ALBUMIN  --  2.6*  --  2.8*   PROTTOTAL  --  5.8*  --  6.1*   BILITOTAL  --  2.7*  --  2.6*   ALKPHOS  --  1,875*  --  1,859*   ALT  --  22  --  23   AST  --  98*  --  103*     Recent Labs   Lab Test 11/21/18  1714   NT-PROBNP, INPATIENT 196     Recent Labs   Lab 09/19/24  0521 09/18/24  0551 09/17/24  0630 09/16/24  0635 09/13/24  0635   GLC 99 104* 115* 101* 124*     No lab results found.    No results for input(s): \"INR\", \"[id number]\" in the last 168 hours.  Recent Labs   Lab 09/19/24  0521 09/18/24  0551 09/17/24  0630 09/16/24  0635 09/13/24  0635   WBC 10.9 13.3* 15.7* 15.9* 18.8*       MICRO:  Cultures (including blood and urine):  Recent Labs   Lab 09/13/24  1609   CULTURE No Growth       No results found for this or any previous visit (from the past 24 hour(s)).    Medications   All medications were reviewed. MAR.    Infusions:  Current Facility-Administered Medications   Medication Dose Route Frequency Provider Last Rate Last Admin     Scheduled Medications:  Current Facility-Administered Medications   Medication Dose Route Frequency Provider Last Rate Last Admin    aspirin EC tablet 81 mg  81 mg Oral Daily Jorge Holliday MD   81 mg at 09/19/24 0926    [Held by provider] bumetanide (BUMEX) tablet 1 mg  1 mg Oral BID Daniela Philip MD   1 mg at 09/16/24 0907    fluticasone-vilanterol (BREO ELLIPTA) 100-25 MCG/ACT inhaler 1 puff  1 puff Inhalation Daily Rocky Uribe MD   1 puff at 09/19/24 1021    heparin " lock flush 10 unit/mL injection 5-10 mL  5-10 mL Intracatheter Q24H Rocky Uribe MD   5 mL at 09/19/24 0523    heparin lock flush 100 unit/mL injection 5-10 mL  5-10 mL Intracatheter Q28 Days Rocky Uribe MD        lidocaine (PF) (XYLOCAINE) 1 % injection 10 mL  10 mL Other Once Long Toribio MD        methylphenidate (RITALIN) tablet 10 mg  10 mg Oral BID Dreyer, Patrick, DO   10 mg at 09/19/24 1021    pantoprazole (PROTONIX) EC tablet 40 mg  40 mg Oral BID AC Rocky Uribe MD   40 mg at 09/19/24 0523    polyethylene glycol (MIRALAX) Packet 17 g  17 g Oral Daily Daniela Philip MD        senna-docusate (SENOKOT-S/PERICOLACE) 8.6-50 MG per tablet 1-2 tablet  1-2 tablet Oral BID Jorge Holliday MD   1 tablet at 09/19/24 0926    sodium chloride (PF) 0.9% PF flush 10-20 mL  10-20 mL Intracatheter Q28 Days Rocky Uribe MD        sodium chloride tablet 1 g  1 g Oral TID w/meals Sara Nicole MD   1 g at 09/19/24 1205    sucralfate (CARAFATE) suspension 1 g  1 g Oral 4x Daily AC & HS Chris Omer MD   1 g at 09/19/24 1123    traMADol (ULTRAM) tablet 100 mg  100 mg Oral TID Daniela Philip MD   100 mg at 09/19/24 0926     PRN Medications:  Current Facility-Administered Medications   Medication Dose Route Frequency Provider Last Rate Last Admin    albuterol (PROVENTIL HFA/VENTOLIN HFA) inhaler  2 puff Inhalation Q6H PRN Rocky Uribe MD   2 puff at 09/10/24 2055    artificial saliva (BIOTENE DRY MOUTHWASH) liquid 10 mL  10 mL Swish & Spit 4x Daily PRN Daniela Philip MD        bisacodyl (DULCOLAX) suppository 10 mg  10 mg Rectal Daily PRN Jorge Holliday MD   10 mg at 09/09/24 0954    calcium carbonate (TUMS) chewable tablet 500-1,000 mg  500-1,000 mg Oral TID PRN Jorge Holliday MD   1,000 mg at 09/17/24 2215    carboxymethylcellulose PF (REFRESH PLUS) 0.5 % ophthalmic solution 2 drop  2 drop Both Eyes 4x Daily PRN Daniela Philip MD   2  drop at 09/19/24 1205    heparin lock flush 10 unit/mL injection 5-10 mL  5-10 mL Intracatheter Q1H PRN Rocky Uribe MD   5 mL at 09/19/24 0832    hydrALAZINE (APRESOLINE) tablet 10 mg  10 mg Oral Q4H PRN Rocky Uribe MD        Or    hydrALAZINE (APRESOLINE) injection 10 mg  10 mg Intravenous Q4H PRN Rocky Uribe MD        HYDROmorphone (PF) (DILAUDID) injection 0.3-0.5 mg  0.3-0.5 mg Intravenous Q4H PRN Jorge Holliday MD   0.5 mg at 09/16/24 2233    methylphenidate (RITALIN) tablet 10 mg  10 mg Oral Daily PRN Dreyer, Patrick, DO        naloxone (NARCAN) injection 0.2 mg  0.2 mg Intravenous Q2 Min PRN Rocky Uribe MD        Or    naloxone (NARCAN) injection 0.4 mg  0.4 mg Intravenous Q2 Min PRN Rocky Uribe MD        Or    naloxone (NARCAN) injection 0.2 mg  0.2 mg Intramuscular Q2 Min PRN Rocky Uribe MD        Or    naloxone (NARCAN) injection 0.4 mg  0.4 mg Intramuscular Q2 Min PRRocky Goldman MD        ondansetron (ZOFRAN ODT) ODT tab 4 mg  4 mg Oral Q6H PRN Rocky Uribe MD   4 mg at 09/16/24 2006    Or    ondansetron (ZOFRAN) injection 4 mg  4 mg Intravenous Q6H PRN Rocky Uribe MD   4 mg at 09/19/24 0830    oxyCODONE (ROXICODONE) tablet 5 mg  5 mg Oral Q4H PRN Jorge Holliday MD        polyethylene glycol (MIRALAX) Packet 17 g  17 g Oral Daily PRN Jorge Holliday MD        prochlorperazine (COMPAZINE) injection 5 mg  5 mg Intravenous Q6H PRN Rocky Uribe MD   5 mg at 09/17/24 1137    Or    prochlorperazine (COMPAZINE) tablet 5 mg  5 mg Oral Q6H PRN Rocky Uribe MD   5 mg at 09/09/24 1414    Or    prochlorperazine (COMPAZINE) suppository 12.5 mg  12.5 mg Rectal Q12H PRN Rocky Uribe MD        senna-docusate (SENOKOT-S/PERICOLACE) 8.6-50 MG per tablet 1-2 tablet  1-2 tablet Oral BID PRN Jorge Holliday MD   2 tablet at 09/15/24 4371    sodium chloride (PF) 0.9% PF  flush 10-20 mL  10-20 mL Intracatheter q1 min prn Rocky Uribe MD        sodium chloride (PF) 0.9% PF flush 10-20 mL  10-20 mL Intracatheter Q1H PRN Rocky Uribe MD   20 mL at 09/19/24 0832

## 2024-09-19 NOTE — PROVIDER NOTIFICATION
MD Notification    Notified Person: MD    Notified Person Name: Dr. Philip     Notification Date/Time:  1134 9/19/24    Notification Interaction: vocera    Purpose of Notification:Hey pt complaining of dry eyes and dry mouth. Pt brought PTA biotene dry mouth gel and PTA advanced eye relief eye drops? Can we get an order for biotene gel and eye drops PRN? Thanks    Orders Received:MD approved    Comments:

## 2024-09-19 NOTE — PROGRESS NOTES
[facility] Progress Note     Primary Oncologist/Hematologist:  Dr. 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   - most recently, elected a chemotherapy holiday over the summer  - 7/29/24 increase in size of mets as well as increased tumor marker while off chemotherapy f  - CT 9/4/2024 showed disease progression in the liver and lymph nodes  - She was due to resume treatment on 9/10/2024 with paclitaxel and bevacizumab  - 9/17:  resumed home medication methylphenidate as withdrawal from this medication can be contributing to her fatigue. She feels quite a bit more energetic after resuming on 9/17/24.      THELMA  - Resolved      Anemia / fatigue   - Due to malignancy and its treatment  - Started on EPO 11/29/22, Hg increased to 11.0 and she feels much less fatigued   - 11/14/23 -> IV iron   - Fatigue negatively affecting her QOL, slight dose reduction without noticeable improvement   - EPO for Hg <10  - Hg has not improved as much as we would expect on chemotherapy holiday    - Given her prior chemotherapy history we must look for evidence of MDS, bone marrow biopsy on 9/3 did not show MDS  - PRBC if Hb<7     LFT abnormalities  - Has known extensive liver metastases which likely impact liver function  - bilirubin increased to 2.6  - Most likely due to infiltrative mets   - CT 9/12 showed stable liver mets, no biliary obstructive findings.      Nausea   Improved with antiemetics     Leukocytosis   - New abdominal pain symptoms with ascites on CT 9/12.  - paracentesis on 9/13/24. Specimen clotted so no cell count obtained.   - on ceftriaxone  - elevated WBC may be due to malignancy.      Weakness:  Being evaluated for weakness/ disposition   - PT may recommend in home PT upon discharge   -Continue OT PT in the hospital     Goals of care.     Patient is too weak to tolerate chemotherapy at this time. She is open to hearing different options which will allow her to go home, including hospice and home health nursing etc. I reviewed with her that her liver is starting to fail due to infiltrative disease from her cancer, which is a bag prognostic sign. I will continue to follow while admitted.             Interval History:     Much more alert on ritalin.  Pain currently well controlled.               Review of Systems:     The 5 point Review of Systems is negative other than noted in the HPI                Medications:   Scheduled Medications  Current Facility-Administered Medications   Medication Dose Route Frequency Provider Last Rate Last Admin    aspirin EC tablet 81 mg  81 mg Oral Daily Jorge Holliday MD   81 mg at 09/19/24 0926    [Held by provider] bumetanide (BUMEX) tablet 1 mg  1 mg Oral BID Daniela Philip MD   1 mg at 09/16/24 0907    fluticasone-vilanterol (BREO ELLIPTA) 100-25 MCG/ACT inhaler 1 puff  1 puff Inhalation Daily Rocky Uribe MD   1 puff at 09/19/24 1021    heparin lock flush 10 unit/mL injection 5-10 mL  5-10 mL Intracatheter Q24H Rocky Uribe MD   5 mL at 09/19/24 0523    heparin lock flush 100 unit/mL injection 5-10 mL  5-10 mL Intracatheter Q28 Days Rocky Uribe MD        lidocaine (PF) (XYLOCAINE) 1 % injection 10 mL  10 mL Other Once Long Toribio MD        [START ON 9/20/2024] methylphenidate (RITALIN) tablet 10 mg  10 mg Oral BID Daniela Philip MD        pantoprazole (PROTONIX) EC tablet 40 mg  40 mg Oral BID AC Rocky Uribe MD   40 mg at 09/19/24 1618    polyethylene glycol (MIRALAX) Packet 17 g  17 g Oral Daily Daniela Philip MD        senna-docusate (SENOKOT-S/PERICOLACE) 8.6-50 MG per tablet 1-2 tablet  1-2 tablet Oral BID Jorge Holliday MD   1 tablet at 09/19/24 0926    sodium chloride (PF) 0.9% PF flush 10-20 mL  10-20 mL Intracatheter Q28 Days Rocky Uribe  MD Abran        sodium chloride tablet 1 g  1 g Oral TID w/meals Sara Nicole MD   1 g at 09/19/24 1803    sucralfate (CARAFATE) suspension 1 g  1 g Oral 4x Daily AC & HS NegraChris brandt MD   1 g at 09/19/24 1618    traMADol (ULTRAM) tablet 100 mg  100 mg Oral TID Daniela Philip MD   100 mg at 09/19/24 1633     PRN Medications  Current Facility-Administered Medications   Medication Dose Route Frequency Provider Last Rate Last Admin    albuterol (PROVENTIL HFA/VENTOLIN HFA) inhaler  2 puff Inhalation Q6H PRN Rocky Uribe MD   2 puff at 09/10/24 2055    artificial saliva (BIOTENE DRY MOUTHWASH) liquid 10 mL  10 mL Swish & Spit 4x Daily PRN Daniela Philip MD        bisacodyl (DULCOLAX) suppository 10 mg  10 mg Rectal Daily PRN Jorge Holliday MD   10 mg at 09/09/24 0954    calcium carbonate (TUMS) chewable tablet 500-1,000 mg  500-1,000 mg Oral TID PRN Jorge Holliday MD   1,000 mg at 09/17/24 2213    carboxymethylcellulose PF (REFRESH PLUS) 0.5 % ophthalmic solution 2 drop  2 drop Both Eyes 4x Daily PRN Daniela Philip MD   2 drop at 09/19/24 1205    heparin lock flush 10 unit/mL injection 5-10 mL  5-10 mL Intracatheter Q1H PRN Rocky Uribe MD   5 mL at 09/19/24 1453    hydrALAZINE (APRESOLINE) tablet 10 mg  10 mg Oral Q4H PRN Rocky Uribe MD        Or    hydrALAZINE (APRESOLINE) injection 10 mg  10 mg Intravenous Q4H PRN Rocky Uribe MD        HYDROmorphone (PF) (DILAUDID) injection 0.3-0.5 mg  0.3-0.5 mg Intravenous Q4H PRN Jorge Holliday MD   0.5 mg at 09/16/24 2233    methylphenidate (RITALIN) tablet 10 mg  10 mg Oral Daily PRN Dreyer, Patrick, DO        naloxone (NARCAN) injection 0.2 mg  0.2 mg Intravenous Q2 Min PRN Rocky Uribe MD        Or    naloxone (NARCAN) injection 0.4 mg  0.4 mg Intravenous Q2 Min PRN Rocky Uribe MD        Or    naloxone (NARCAN) injection 0.2 mg  0.2 mg Intramuscular Q2 Min PRN Rocky Uribe  "MD Abran        Or    naloxone (NARCAN) injection 0.4 mg  0.4 mg Intramuscular Q2 Min PRN Rocky Uribe MD        ondansetron (ZOFRAN ODT) ODT tab 4 mg  4 mg Oral Q6H PRN Rocky Uribe MD   4 mg at 09/16/24 2006    Or    ondansetron (ZOFRAN) injection 4 mg  4 mg Intravenous Q6H PRN Rocky Uribe MD   4 mg at 09/19/24 1453    polyethylene glycol (MIRALAX) Packet 17 g  17 g Oral Daily PRN Jorge Holliday MD        prochlorperazine (COMPAZINE) injection 5 mg  5 mg Intravenous Q6H PRN Rocky Uribe MD   5 mg at 09/17/24 1137    Or    prochlorperazine (COMPAZINE) tablet 5 mg  5 mg Oral Q6H PRN Rocky Uribe MD   5 mg at 09/09/24 1414    Or    prochlorperazine (COMPAZINE) suppository 12.5 mg  12.5 mg Rectal Q12H PRN Rocky Uribe MD        senna-docusate (SENOKOT-S/PERICOLACE) 8.6-50 MG per tablet 1-2 tablet  1-2 tablet Oral BID PRN Jorge Holliday MD   2 tablet at 09/15/24 2117    sodium chloride (PF) 0.9% PF flush 10-20 mL  10-20 mL Intracatheter q1 min prn Rocky Uribe MD        sodium chloride (PF) 0.9% PF flush 10-20 mL  10-20 mL Intracatheter Q1H PRN Rocky Uribe MD   20 mL at 09/19/24 0832                  Physical Exam:   Vitals were reviewed  Blood pressure 125/76, pulse 95, temperature 98.1  F (36.7  C), temperature source Oral, resp. rate 18, height 1.664 m (5' 5.51\"), weight 76.8 kg (169 lb 6.4 oz), SpO2 96%.  Wt Readings from Last 4 Encounters:   09/17/24 76.8 kg (169 lb 6.4 oz)   08/04/20 82.1 kg (181 lb)   11/21/18 90.7 kg (200 lb)   03/25/16 86.2 kg (190 lb)       I/O last 3 completed shifts:  In: 1150 [P.O.:1150]  Out: -                Data:   All laboratory data and imaging studies reviewed.    CMP  Recent Labs   Lab 09/19/24  0521 09/18/24  0551 09/17/24  1945 09/17/24  0630 09/16/24  2153 09/16/24  1450 09/16/24  0635   * 127*  --  126*  126* 124*   < > 126*   POTASSIUM 3.7 3.6 3.6 3.0*  --   --  3.5 "   CHLORIDE 88* 89*  --  86*  --   --  88*   CO2 26 25  --  24  --   --  24   ANIONGAP 11 13  --  16*  --   --  14   GLC 99 104*  --  115*  --   --  101*   BUN 34.3* 27.4*  --  20.8  --   --  20.3   CR 1.04* 1.05*  --  1.03*  --   --  1.07*   GFRESTIMATED 55* 54*  --  55*  --   --  53*   LOUISA 8.2* 7.9*  --  7.9*  --   --  7.9*   PROTTOTAL  --  5.8*  --  6.1*  --   --   --    ALBUMIN  --  2.6*  --  2.8*  --   --   --    BILITOTAL  --  2.7*  --  2.6*  --   --   --    ALKPHOS  --  1,875*  --  1,859*  --   --   --    AST  --  98*  --  103*  --   --   --    ALT  --  22  --  23  --   --   --     < > = values in this interval not displayed.     CBC  Recent Labs   Lab 09/19/24  0521 09/18/24  0551 09/17/24  0630 09/16/24  0635   WBC 10.9 13.3* 15.7* 15.9*   RBC 3.51* 3.47* 3.61* 3.54*   HGB 10.0* 9.9* 10.1* 10.3*   HCT 29.6* 29.2* 30.7* 30.7*   MCV 84 84 85 87   MCH 28.5 28.5 28.0 29.1   MCHC 33.8 33.9 32.9 33.6   RDW 15.4* 15.3* 15.2* 15.3*   * 542* 590* 610*     INRNo lab results found in last 7 days.

## 2024-09-19 NOTE — PROVIDER NOTIFICATION
MD Notification    Notified Person: MD    Notified Person Name: Dr. Philip    Notification Date/Time: 1507 9/19/24    Notification Interaction: vocera    Purpose of Notification: Hey, family is wondering if we can change the times of her Ritalin from 1000 and 1330 to 0900 and 1200? They think the afternoon dose keeps the pt up all night.    Orders Received: MD approved    Comments:

## 2024-09-19 NOTE — PROGRESS NOTES
Care Management Follow Up    Length of Stay (days): 13    Expected Discharge Date: 09/19/2024     Concerns to be Addressed: discharge planning     Patient plan of care discussed at interdisciplinary rounds: Yes    Anticipated Discharge Disposition:                Anticipated Discharge Services:    Anticipated Discharge DME:      Patient/family educated on Medicare website which has current facility and service quality ratings:    Education Provided on the Discharge Plan:    Patient/Family in Agreement with the Plan: yes    Referrals Placed by CM/SW:    Private pay costs discussed: Not applicable    Discussed  Partnership in Safe Discharge Planning  document with patient/family: No     Handoff Completed: No, handoff not indicated or clinically appropriate    Additional Information:  Writer was informed that pt/ family had questions for social work. Writer met with pt at bedside. Pt's son, pt's daughter in law, and pt's  were all present as well. Writer introduced self and role. Pt and family had questions about meals on wheels. Writer will print off information for this and for senior linkage line for additional senior resources. Writer informed pt and family that if pt has issues arise after hospitalization or would like to discuss resources then she can certainly reach out to her PCP to see if there is a  at pt's PCP clinic that can assist with any other needs. Pt and family state understanding. Pt and family then had questions about hospital bed, and DME equipment as well as to how to go about getting this. Writer informed them that writer will have care coordinator come and chat further about this. Pt and family state understanding. Pt and family are also wondering about home care. Writer informed them that care coordinator can discuss this as well. Writer informed pt and family that writer will print out senior linkage line contact and meals on wheels for pt and have care coordinator bring  this in when she comes to see them. Pt and family state understanding. Writer did inform pt and family that likely care coordinator would not be able to see them until tomorrow. Pt and family state understanding.     Next Steps: awaiting pt medical stability.    Jodi Plascencia, BSW  Social Work  [facility]

## 2024-09-20 NOTE — PROGRESS NOTES
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   BID and PRN   1. Follow incontinence protocol for cleansing. No moist wipes. Use Renee spray and white cloths. Pat dry.   2. Dust shasha-wound skin with miconazole powder. Gently rub in and wipe away excess with dry cloth.   3. Apply Triad paste to wounded skin. Do not scrub to remove paste. Wipe away soiled paste and reapply PRN.   4. Encourage patient to turn side to side and off load coccyx at least every 2 hours.   Orders In Epic    Recommended provider order: NA    WOC Nurse follow-up plan: Two times weekly  Nursing to notify the Provider(s) and re-consult the WOC Nurse if wound(s) deteriorates or new skin concern.    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   - most recently, elected a chemotherapy holiday over the summer  - 7/29/24 increase in size of mets as well as increased tumor marker while off chemotherapy f  - CT 9/4/2024 showed disease progression in the liver and lymph nodes  - She was due to resume treatment on 9/10/2024 with paclitaxel and bevacizumab  - Will restart her on her home medication methylphenidate, withdrawal from this medication can be contributing to her fatigue.     THELMA  - Resolved      Anemia / fatigue   - Due to malignancy and its treatment  - Started on EPO 11/29/22, Hg increased to 11.0 and she feels much less fatigued   - 11/14/23 -> IV iron   - Fatigue negatively affecting her QOL, slight dose reduction without noticeable improvement   - EPO for Hg <10  - Hg has  not improved as much as we would expect on chemotherapy holiday    - Given her prior chemotherapy history we must look for evidence of MDS, bone marrow biopsy on 9/3 did not show MDS  - PRBC if Hb<7     LFT abnormalities  - Has known liver metastases which likely impact liver function  Last checked 9/12-were stable  - Most likely due to infiltrative mets   - CT 9/12 showed stable liver mets, no biliary issues     Nausea   Improved with antiemetics     Leukocytosis   - New abdominal pain symptoms with ascites on CT 9/12.  - Procalcitonin  elevated. WBC elevated up to 18.8 yesterday.  No CBC done today.  - Plan paracentesis and antibiotics for SBP. If negative, this might be due to underlying malignancy.  On IV ceftriaxone.     Weakness:    Objective Data  Containment of urine/stool: Continent of bowel and bladder    Active Diet Order:  Orders Placed This Encounter      Diet      Low Fiber Diet    Output:   I/O last 3 completed shifts:  In: 840 [P.O.:840]  Out: 740 [Urine:740]    Risk Assessment:   Sensory Perception: 3-->slightly limited  Moisture: 4-->rarely moist  Activity: 3-->walks occasionally  Mobility: 3-->slightly limited  Nutrition: 2-->probably inadequate  Friction and Shear: 1-->problem  Cain Score: 16                          Labs:   Recent Labs   Lab 09/20/24  0640   ALBUMIN 2.7*   HGB 9.9*   WBC 10.4       Physical Exam  Skin inspection: Sacrum and coccyx    Wound Location: Distal base of coccyx crease and gluteal cleft   Date of last WOC photo:   9-20-24           Measurements (length x width x depth, in cm):  .4cm x .4cm x .2cm   Wound Base: Smooth, pale yellow-white tissue  Tunneling: NA  Undermining: NA  Palpation of the wound bed: normal  Periwound skin: satellite lesions, blanchable erythema moist  Color: pink/red  Temperature: warm  Drainage: scant sweat  Odor: none  Pain: denies     Interventions  Current support surface: atmos air  Current off-loading measures: pillows  Visual inspection of  "wound(s) completed  Wound Care: completed   Supplies: Supplies stored on unit  Education provided: Discussed with patient. Provided tips for PIP measures   Discussed plan of care with Nursing     Meghan Casillas RN, CWOCN  Please contact via [employer] at name or group \"[facility] nurse\"- M-F [phone]-4P  Leave VM @ *[id number] for non-urgent needs. Checked occasionally M-F.   "

## 2024-09-20 NOTE — PLAN OF CARE
Orientation: A&O x4  Aggression Stop Light: Green  Activity: SBA w/ GBW  Diet/BS Checks: Low fiber, 1500ml fluid restriction  Tele:  n/a  IV Access/Drains: Left port HL  Pain Management: Denies pain, on scheduled toradol  Abnormal VS/Results: VSS on RA  Bowel/Bladder: Continent of b/b  Skin/Wounds: Wound on inner gluteal cleft and coccyx crease-mepi in place, Scattered bruising and BLE edema, lymph wraps in place.   Consults: PT/OT, heme/onc, neph, WOC  D/C Disposition: Pending-    Other Info:   - PRN zofran given for nausea.

## 2024-09-20 NOTE — PLAN OF CARE
Orientation: A&Ox4  Aggression Stop Light: Green  Activity: SBA with walker and gait belt  Diet/BS Checks: Low fiber, 1500ml fluid restriction.  Poor appetite.  Tele:  n/a  IV Access/Drains: Left port hep locked  Pain Management: Denies pain, on scheduled toradol  Abnormal VS/Results: VSS on RA  Bowel/Bladder: Continent of bowel and bladder- no bm during shift  Skin/Wounds: Wound on inner coccyx -mepi in place, Scattered bruising and LE edema  Consults: PT/OT, heme/onc, neph,   D/C Disposition: Pending-  Other Info: Denied nausea this shift   K protocol, wnl recheck in AM.   Lymph wraps on, OT to redress in AM.

## 2024-09-20 NOTE — PROGRESS NOTES
[facility]    Internal Medicine Hospitalist Progress Note  09/20/2024  I evaluated patient on the above date.    Assessment & Plan   Jany Park is a 79 year old female with past medical history significant for endometrial cancer; HTN; asthma; fibromyalgia with chronic pain; and GERD; who presented 9/5/2024 with abnormal outpatient labs and nausea/vomiting, found with THELMA.     On initial evaluation, pt was afebrile, VSS. Labs notable for CBC with WBC normal, hgb 10.3, platelets 577K; BMP with sodium 134, chloride 91, BUN 49.5, cr 2.1, calcium 9.7, AG 18; LFT's with ALP 1633, AST 70, tbili 1.6; UA with 6 WBC, small LE, 19 hyaline casts.        Endometrial cancer, metastatic  Abnormal LFTs, suspect related to metastatic disease.  Nausea and vomiting, suspect multifactorial including related to above, also possibly constipation.  Abdominal pain, suspect related to above.  Poor intake related to above.  * Followed by Dr. Dreyer of [facility]. See Oncology note for oncologic history. Noted diagnosed and had initial surgery 2015 with subsequently chemotherapy. Noted subsequently development of liver metastases. Noted last chemotherapy in 5/2024. Noted that pt underwent CT imaging and BM biopsy 9/4 (results not available on admit). Noted was suppose to undergo chemotherapy on 9/10/2024.  * Initial presentation as above. On admit, reported ~3 weeks of nausea and vomiting, intake of ~25% of baseline. LFT's elevated.  [facility]  * On 9/6, per Oncology, noted that CT 9/4 showed disease progression in the liver and lymph nodes. Also noted that BM biopsy showed dysplasia, pending cytogenetics. Noted that her anemia may be due to an exhausted bone marrow from her years of chemotherapy. ?nausea related to IV iron transfusion. Brain MRI ordered that was negative for acute findings.  * On 9/8, tbili normalized, AST and ALP stable/slightly improved.  * On 9/9, more nauseated but some  improvement after BM. Seen by Dr. Dreyer (primary Oncologist) and recommended holding chemotherapy; discussed about hospice, but pt not ready to consider yet.   * On 9/11, noted abdominal burning pain, started on sucralfate.  * On 9/12, increased leukocytosis and burning abdominal pain. CT CAP showed innumerable pulmonary and hepatic metastases unchanged; modest ascites now present, slightly increased, peritoneal implants consistent with carcinomatosis; mckenzie hepatis retroperitoneal and mesenteric adenopathy unchanged; increasing anasarca; tiny volume right pleural effusion minimally larger.  * On 9/13-9/14, had more discussions with Oncology about worsened functional status and goals of care.  Underwent paracentesis 9/13, 300 ml removed, specimen noted to be clotted so no cell count noted.  * On 9/16, abdominal pain better.  - Continue to monitor LFT's.  - Continue PPI and sucralfate.  Schedule tramadol 50 mg 3 times daily patient and family wanting it to increase the dosage to 100 mg 3 times daily.  Order changed on 9/17/2024  - Continue PRN IV hydromorphone; minimize opioids as able.  - Continue PRN ondansetron, PRN prochlorperazine.  - Follow-up with Dr. Dreyer, [facility], outpatient, can discuss further goals of cares.  - Appreciate help from [facility].  [place] [facility] team consulted on 9/17/2024.  Signed off on 9/17/2024   elevated LFTs were felt to be secondary to metastatic disease to the liver as per GI team. No concern  biliary obstruction.  They recommended monitoring LFTs    LFTs are stable currently    [place] [facility] continues to have goals of care discussions with patient and family.  Patient wants to give it a few days before making decisions regarding her medical cares     Acute kidney injury, suspect prerenal (from N/V, poor oral intake), contrast nephropathy +/- NSAID, with ATN, resolved.  * Initial presentation as above. Cr 2.1, BUN 49.5, UA significant for 19 hyaline casts. Noted that pt had a contrast  CT 9/3 as well. Started on IVF's on admit. PTA lisinopril and meloxicam held on admit.  * IVF's completed 9/7.  * Cr normalized 9/9.  * Started on diuretics for anasarca 9/14.  Recent Labs   Lab 09/20/24  0640 09/19/24  0521 09/18/24  0551 09/17/24  0630 09/16/24  0635   CR 1.02* 1.04* 1.05* 1.03* 1.07*   Estimated Creatinine Clearance: 47.1 mL/min (A) (based on SCr of 1.02 mg/dL (H)).  - Continue to monitor BMP periodically.    Leukocytosis, suspect more malignancy related   * Pt with gradually increasing WBC this hospitalization.   * On 9/12, procalcitonin 1.06. CT of chest/abd/pelvis as above showed increasing anasarca and stable metastatic disease, no inflammation.  * On 9/13, empirically on ceftriaxone. Thoracentesis cultures NGTD.  Recent Labs   Lab 09/20/24  0640 09/19/24  0521 09/18/24  0551 09/17/24  0630 09/16/24  0635   WBC 10.4 10.9 13.3* 15.7* 15.9*   - Continue to monitor CBC.  - Continue ceftriaxone (started 9/13) - stopped  antibiotics on 9/17/2024     Constipation due to inactivity and opioids.  * On 9/8, pt c/o constipation. Bowel regimen intensified.  * On 9/9, had BM after suppository.  * On 9/16, pt notes no BM since last suppository (9/9).  - Schedule polyethylene glycol twice daily and senna-docusate.  - Continue PRN polyethylene glycol, senna-docusate and PRN bisacodyl suppository.  - Continue to increase activity as able.  Patient had 2 bowel movements on 9/18/2024     Dyspnea with chest tightness.  * On 9/8, pt noted feeling dyspneic with chest tightness. ECG showed SR, no acute ischemic changes. CXR showed small pleural effusions, no focal infiltrates/edema.  * On 9/9, chest tightness better.  - Continue to monitor clinically.     Hyponatremia, suspect secondary to SIADH  * Sodium 134 on admit.   * Sodium normalized 9/8.  * Started diuretics 9/14.  Recent Labs   Lab 09/20/24  1303 09/20/24  0640 09/19/24  0521 09/18/24  0551 09/17/24  0630 09/16/24  2153   * 126* 125* 127* 126*   126* 124*   - Continue to monitor BMP.   Started on Urena supplementation twice daily.  Patient was unable to tolerate Urena due to nausea  On fluid restriction 1500 mL daily  Sodium down to 125 on 9/19/2024  Nephrology consultation requested.  Salt tablets 1 g 3 times daily started on 9/19/2024  Follow BMP closely  Bumex on hold currently.  Continue to hold 9/20, nephrology gave fluid bolus this AM       Chronic normocytic anemia.  * Hgb 10.3 on admit. Noted most recently 11.2 g/dl 3/2024.   * As above, per Oncology, noted that BM biopsy showed dysplasia, pending cytogenetics. Noted that her anemia may be due to an exhausted bone marrow from her years of chemotherapy.   Recent Labs   Lab 09/20/24  0640 09/19/24  0521 09/18/24  0551 09/17/24  0630 09/16/24  0635   HGB 9.9* 10.0* 9.9* 10.1* 10.3*   - Continue to monitor CBC      Hypertension (benign essential).  Third spacing/anasarca with ascites and pleural effusion, suspect from malnutrition with low albumin.  Chronic lymphedema.  [PTA: lisinopril 10 mg daily; PRN bumetanide 1 mg BID.]  * Lisinopril held on admit.  * BP's normal off meds.  * Initial issues with THELMA as above and received IVF's this hospitalization (cr subsequently normalized).  * CT 9/12 showed increasing anasarca, ascites and right pleural effusion.   * Underwent right thoracentesis 9/13, 100 ml removed and cultures NGTD. Also underwent paracentesis  9/13 as above.  * Started bumetanide 9/14.  - Continue bumetanide 1 mg PO BID currently on hold due to hyponatremia.  - Continue to hold lisinopril.  - Continue to monitor i/o's, daily wts.     Fibromyalgia with chronic pain.  * PTA on regimen including meloxicam, scheduled tramadol 100 mg TID and PRN oxycodone.  * PTA tramadol held on admit given THELMA.  * On 9/6, pt with withdrawal symptoms.   * On 9/7 am, scheduled tramadol restarted.  - Continue tramadol 100 gm TID.  - Continue PRN IV hydromorphone; minimize opioids as able.  - Continue to hold  "meloxicam.    Moderate malnutrition related to acute and chronic medical issues.  * Noted by Nutrition; see their documentation.  - Continue diet as tolerated.  - Continue supplements.    Weakness and physical deconditioning due to multiple acute and chronic medical issues.  * Pt very weak since hospitalization  * On 9/10, seen by PT and recommended home with assist and home cares vs TCU.  -  Overall, pt and family feels she will be capable to go home with assistance from  with home health cares/therapies.    Hypokalemia;  Potassium low at 3  Started on replacement protocol     GERD.  * Pt with burning pain this hospitalization.  * Started on sucralfate 9/11, noted helped only a little.  - Continue pantoprazole BID and sucralfate QID.     Asthma.  - Continue fluticasone-vilanterol and PRN albuterol.        Clinically Significant Risk Factors         # Hyponatremia: Lowest Na = 125 mmol/L in last 2 days, will monitor as appropriate      # Hypoalbuminemia: Lowest albumin = 2.6 g/dL at 9/18/2024  5:51 AM, will monitor as appropriate               # Overweight: Estimated body mass index is 27.75 kg/m  as calculated from the following:    Height as of this encounter: 1.664 m (5' 5.51\").    Weight as of this encounter: 76.8 kg (169 lb 6.4 oz).   # Moderate Malnutrition: based on nutrition assessment      # Financial/Environmental Concerns: none (denies)  # Asthma: noted on problem list              Diet: Diet  Low Fiber Diet  Fluid restriction 1500 ML FLUID  Snacks/Supplements Adult: Other; apple CLEAR at brkfst; orange GelateinPLUS at lunch; With Meals    Prophylaxis: PCD's and ambulation  Ochoa Catheter: Not present  Lines: PRESENT      Port A Cath Single 07/30/20 Left Chest wall-Site Assessment: WDL      Code Status: Full Code    Disposition Plan     - No acute events  - Patient would like to pursue home hospice  - Her ritalin dose was late this morning and it made her very tired  - Updated her family at " "bedside  - No other acute concerns     Expected discharge to home with family.    Entered: Abran Mann, DO 09/20/2024 at 1:37 PM    Discussed with daughter at bedside           Interval History   Chart reviewed.   Patient is resting comfortably in bed.  Complains of ongoing nausea.  Received Zofran.  Abdominal pain is currently well-controlled.  Sodium down to 125.  Discussed with patient and family about nephrology consult.  No other acute issues    * Data reviewed today: I reviewed all new labs and imaging over the last 24 hours. I personally reviewed no images or ECG's today.    Physical Exam   Most recent vitals:   , Blood pressure 118/69, pulse 91, temperature 97.5  F (36.4  C), temperature source Oral, resp. rate 16, height 1.664 m (5' 5.51\"), weight 76.8 kg (169 lb 6.4 oz), SpO2 99%. O2 Device: None (Room air)    Vitals:    09/09/24 0633 09/17/24 1135 09/17/24 1143   Weight: 76.4 kg (168 lb 8 oz) 76.8 kg (169 lb 6.4 oz) 76.8 kg (169 lb 6.4 oz)     Vital signs with ranges:  Temp:  [97.1  F (36.2  C)-98.1  F (36.7  C)] 97.5  F (36.4  C)  Pulse:  [83-95] 91  Resp:  [16-18] 16  BP: (118-138)/(69-84) 118/69  SpO2:  [96 %-99 %] 99 %  Patient Vitals for the past 24 hrs:   BP Temp Temp src Pulse Resp SpO2   09/20/24 1245 118/69 97.5  F (36.4  C) Oral 91 16 99 %   09/20/24 0815 138/84 97.3  F (36.3  C) Oral 92 16 99 %   09/20/24 0441 126/71 97.1  F (36.2  C) Oral 83 18 98 %   09/20/24 0025 124/70 97.5  F (36.4  C) Oral 87 18 99 %   09/19/24 1947 121/69 97.9  F (36.6  C) Oral 93 18 97 %   09/19/24 1604 125/76 98.1  F (36.7  C) Oral 95 18 96 %     I/O's last 24 hours:  I/O last 3 completed shifts:  In: 840 [P.O.:840]  Out: 740 [Urine:740]    Constitutional: awake, alert, oriented, pleasant, conversant   Cardiovascular: regular rate and rhythm; no murmurs, rubs or gallops  Lungs: diminished in the bases, no crackles or wheezes  Gastrointestinal/Abdomen: mildly tender with some firmness without rebound or guarding, " "mildly distended, + bowel sounds  :   Musculoskeletal:   Skin/Extremities:  bilateral lower extremity lymphedema, trace to 1+ pitting in legs  Neurologic:   Psychiatric:   Hematologic/Lymphatic/Immunologic:        Data    Labs reviewed.  Recent Labs   Lab 09/20/24  1303 09/20/24  0640 09/19/24  0521 09/18/24  0551 09/17/24  1945 09/17/24  0630   WBC  --  10.4 10.9 13.3*  --  15.7*   HGB  --  9.9* 10.0* 9.9*  --  10.1*   MCV  --  84 84 84  --  85   PLT  --  517* 562* 542*  --  590*   * 126* 125* 127*  --  126*  126*   POTASSIUM  --  3.9 3.7 3.6   < > 3.0*   CHLORIDE  --  87* 88* 89*  --  86*   CO2  --  25 26 25  --  24   BUN  --  36.7* 34.3* 27.4*  --  20.8   CR  --  1.02* 1.04* 1.05*  --  1.03*   ANIONGAP  --  14 11 13  --  16*   LOUISA  --  8.3* 8.2* 7.9*  --  7.9*   GLC  --  93 99 104*  --  115*   ALBUMIN  --  2.7*  --  2.6*  --  2.8*   PROTTOTAL  --  6.0*  --  5.8*  --  6.1*   BILITOTAL  --  3.4*  --  2.7*  --  2.6*   ALKPHOS  --  2,272*  --  1,875*  --  1,859*   ALT  --  28  --  22  --  23   AST  --  147*  --  98*  --  103*    < > = values in this interval not displayed.     Recent Labs   Lab Test 11/21/18  1714   NT-PROBNP, INPATIENT 196     Recent Labs   Lab 09/20/24  0640 09/19/24  0521 09/18/24  0551 09/17/24  0630 09/16/24  0635   GLC 93 99 104* 115* 101*     No lab results found.    No results for input(s): \"INR\", \"[id number]\" in the last 168 hours.  Recent Labs   Lab 09/20/24  0640 09/19/24  0521 09/18/24  0551 09/17/24  0630 09/16/24  0635   WBC 10.4 10.9 13.3* 15.7* 15.9*       MICRO:  Cultures (including blood and urine):  Recent Labs   Lab 09/13/24  1609   CULTURE No Growth       No results found for this or any previous visit (from the past 24 hour(s)).    Medications   All medications were reviewed. MAR.    Infusions:  Current Facility-Administered Medications   Medication Dose Route Frequency Provider Last Rate Last Admin     Scheduled Medications:  Current Facility-Administered Medications "   Medication Dose Route Frequency Provider Last Rate Last Admin    aspirin EC tablet 81 mg  81 mg Oral Daily Jorge Holliday MD   81 mg at 09/20/24 0859    [Held by provider] bumetanide (BUMEX) tablet 1 mg  1 mg Oral BID Daniela Philip MD   1 mg at 09/16/24 0907    fluticasone-vilanterol (BREO ELLIPTA) 100-25 MCG/ACT inhaler 1 puff  1 puff Inhalation Daily Rocky Uribe MD   1 puff at 09/20/24 0900    heparin lock flush 10 unit/mL injection 5-10 mL  5-10 mL Intracatheter Q24H Rocky Uribe MD   5 mL at 09/20/24 0644    heparin lock flush 100 unit/mL injection 5-10 mL  5-10 mL Intracatheter Q28 Days Rocky Uribe MD        lidocaine (PF) (XYLOCAINE) 1 % injection 10 mL  10 mL Other Once Long Toribio MD        methylphenidate (RITALIN) tablet 10 mg  10 mg Oral BID Anastacio Dasilva APRN CNP   10 mg at 09/20/24 1300    miconazole (MICATIN) 2 % powder   Topical BID Abran Mann,         pantoprazole (PROTONIX) EC tablet 40 mg  40 mg Oral BID AC Rocky Uribe MD   40 mg at 09/20/24 0646    polyethylene glycol (MIRALAX) Packet 17 g  17 g Oral Daily Daniela Philip MD        senna-docusate (SENOKOT-S/PERICOLACE) 8.6-50 MG per tablet 1-2 tablet  1-2 tablet Oral BID Jorge Holliday MD   1 tablet at 09/20/24 0900    sodium chloride (PF) 0.9% PF flush 10-20 mL  10-20 mL Intracatheter Q28 Days Rocky Uribe MD        sodium chloride tablet 1 g  1 g Oral TID w/meals Sara Nicole MD   1 g at 09/20/24 1219    sucralfate (CARAFATE) suspension 1 g  1 g Oral 4x Daily AC & HS Chris Omer MD   1 g at 09/20/24 1218    traMADol (ULTRAM) tablet 100 mg  100 mg Oral TID Daniela Philip MD   100 mg at 09/20/24 0900     PRN Medications:  Current Facility-Administered Medications   Medication Dose Route Frequency Provider Last Rate Last Admin    albuterol (PROVENTIL HFA/VENTOLIN HFA) inhaler  2 puff Inhalation Q6H PRN Rocky Uribe MD   2 puff at  09/10/24 2055    artificial saliva (BIOTENE DRY MOUTHWASH) liquid 10 mL  10 mL Swish & Spit 4x Daily PRN Daniela Philip MD        bisacodyl (DULCOLAX) suppository 10 mg  10 mg Rectal Daily PRN Jorge Holliday MD   10 mg at 09/09/24 0954    calcium carbonate (TUMS) chewable tablet 500-1,000 mg  500-1,000 mg Oral TID PRN Jorge Holliday MD   1,000 mg at 09/17/24 2213    carboxymethylcellulose PF (REFRESH PLUS) 0.5 % ophthalmic solution 2 drop  2 drop Both Eyes 4x Daily PRN Daniela Philip MD   2 drop at 09/20/24 1013    heparin lock flush 10 unit/mL injection 5-10 mL  5-10 mL Intracatheter Q1H PRN Rocky Uribe MD   5 mL at 09/20/24 1218    hydrALAZINE (APRESOLINE) tablet 10 mg  10 mg Oral Q4H PRN Rocky Uribe MD        Or    hydrALAZINE (APRESOLINE) injection 10 mg  10 mg Intravenous Q4H PRN Rocky Uribe MD        HYDROmorphone (PF) (DILAUDID) injection 0.3-0.5 mg  0.3-0.5 mg Intravenous Q4H PRN Jorge Holliday MD   0.5 mg at 09/16/24 2233    methylphenidate (RITALIN) tablet 10 mg  10 mg Oral Daily PRN Dreyer, Patrick,         naloxone (NARCAN) injection 0.2 mg  0.2 mg Intravenous Q2 Min PRN Rocky Uribe MD        Or    naloxone (NARCAN) injection 0.4 mg  0.4 mg Intravenous Q2 Min PRN Rocky Uribe MD        Or    naloxone (NARCAN) injection 0.2 mg  0.2 mg Intramuscular Q2 Min Rocky Stevens MD        Or    naloxone (NARCAN) injection 0.4 mg  0.4 mg Intramuscular Q2 Min PRRocky Goldman MD        ondansetron (ZOFRAN ODT) ODT tab 4 mg  4 mg Oral Q6H PRN Rocky Uribe MD   4 mg at 09/20/24 0442    Or    ondansetron (ZOFRAN) injection 4 mg  4 mg Intravenous Q6H PRN Rocky Uribe MD   4 mg at 09/20/24 0912    polyethylene glycol (MIRALAX) Packet 17 g  17 g Oral Daily PRN Jorge Holliday MD        prochlorperazine (COMPAZINE) injection 5 mg  5 mg Intravenous Q6H PRN Rocky Uribe MD   5 mg at 09/17/24  1137    Or    prochlorperazine (COMPAZINE) tablet 5 mg  5 mg Oral Q6H PRN Rocky Uribe MD   5 mg at 09/09/24 1414    Or    prochlorperazine (COMPAZINE) suppository 12.5 mg  12.5 mg Rectal Q12H PRN Rocky Uribe MD        senna-docusate (SENOKOT-S/PERICOLACE) 8.6-50 MG per tablet 1-2 tablet  1-2 tablet Oral BID PRN Jorge Holliday MD   2 tablet at 09/15/24 2117    sodium chloride (PF) 0.9% PF flush 10-20 mL  10-20 mL Intracatheter q1 min prn Rocky Uribe MD        sodium chloride (PF) 0.9% PF flush 10-20 mL  10-20 mL Intracatheter Q1H PRN Rocky Uribe MD   20 mL at 09/19/24 0832

## 2024-09-20 NOTE — PROGRESS NOTES
Progress Note     Primary Oncologist/Hematologist:  Dr. Dreyer          Assessment and Plan:New actions/orders today (09/20/2024) are underlined.     Metastatic endometrial cancer  Deconditioning  Goals of care  - We spent some time talking about how Imelda has been feeling. She is getting increasingly weak.  - She acknowledges that she remains weak and that further treatment would be challenging. Dr. Dreyer has recommended against further chemotherapy.  - We talked about how we can best care for her going forward. In particular, we focused our attention on best supportive care with support of hospice. We reviewed what hospice might help with (medications, DME, some nursing support) and what they do not (24 hour care). Imelda would like to be at home. Her  and daughter are in support of this. We will consult with the hospice staff and have them meet to discuss the details of hospice care. We will get a discharge plan in place before we send her home. We talked about things they might need and the biggest on their list is a hospital bed.  - Imelda looks forward to being surrounded by her family and friends.  - We reviewed that the goal going forward will be towards achieving comfort and limiting suffering.  - She does feel better with methylphenidate and has had some challenges with getting it timely which has created some issues with sleep. Will ask the pharmacy staff to time methylphenidate for 8 AM and noon.    Time spent: 36 minutes spent talking with Imelda and her family as well as her care team about the plan going forward.    Anastacio KU, CNP  [facility]  [phone] (office)         Interval History:     Wants to focus on comfort at home. We discussed a plan to discharge to home with hospice care.              Review of Systems:     The 5 point Review of Systems is negative other than noted in the HPI             Medications:   Scheduled Medications  Current Facility-Administered  Medications   Medication Dose Route Frequency Provider Last Rate Last Admin    aspirin EC tablet 81 mg  81 mg Oral Daily Jorge Holliday MD   81 mg at 09/20/24 0859    [Held by provider] bumetanide (BUMEX) tablet 1 mg  1 mg Oral BID Daniela Philip MD   1 mg at 09/16/24 0907    fluticasone-vilanterol (BREO ELLIPTA) 100-25 MCG/ACT inhaler 1 puff  1 puff Inhalation Daily Rocky Uribe MD   1 puff at 09/20/24 0900    heparin lock flush 10 unit/mL injection 5-10 mL  5-10 mL Intracatheter Q24H Rocky Uribe MD   5 mL at 09/20/24 0644    heparin lock flush 100 unit/mL injection 5-10 mL  5-10 mL Intracatheter Q28 Days Rocky Uribe MD        lidocaine (PF) (XYLOCAINE) 1 % injection 10 mL  10 mL Other Once Long Toribio MD        methylphenidate (RITALIN) tablet 10 mg  10 mg Oral See Admin Instructions Anastacio Dasilva, APRN CNP        pantoprazole (PROTONIX) EC tablet 40 mg  40 mg Oral BID AC Rocky Uribe MD   40 mg at 09/20/24 0646    polyethylene glycol (MIRALAX) Packet 17 g  17 g Oral Daily Daniela Philip MD        senna-docusate (SENOKOT-S/PERICOLACE) 8.6-50 MG per tablet 1-2 tablet  1-2 tablet Oral BID Jorge Holliday MD   1 tablet at 09/20/24 0900    sodium chloride (PF) 0.9% PF flush 10-20 mL  10-20 mL Intracatheter Q28 Days Rocky Uribe MD        sodium chloride tablet 1 g  1 g Oral TID w/meals Sara Nicole MD   1 g at 09/20/24 1219    sucralfate (CARAFATE) suspension 1 g  1 g Oral 4x Daily AC & HS Chris Omer MD   1 g at 09/20/24 1218    traMADol (ULTRAM) tablet 100 mg  100 mg Oral TID Daniela Philip MD   100 mg at 09/20/24 0900     PRN Medications  Current Facility-Administered Medications   Medication Dose Route Frequency Provider Last Rate Last Admin    albuterol (PROVENTIL HFA/VENTOLIN HFA) inhaler  2 puff Inhalation Q6H PRN Rocky Uribe MD   2 puff at 09/10/24 2055    artificial saliva (BIOTENE DRY MOUTHWASH) liquid  10 mL  10 mL Swish & Spit 4x Daily PRN Daniela Philip MD        bisacodyl (DULCOLAX) suppository 10 mg  10 mg Rectal Daily PRN Jorge Holliday MD   10 mg at 09/09/24 0954    calcium carbonate (TUMS) chewable tablet 500-1,000 mg  500-1,000 mg Oral TID PRN Jorge Holliday MD   1,000 mg at 09/17/24 2213    carboxymethylcellulose PF (REFRESH PLUS) 0.5 % ophthalmic solution 2 drop  2 drop Both Eyes 4x Daily PRN Daniela Philip MD   2 drop at 09/20/24 1013    heparin lock flush 10 unit/mL injection 5-10 mL  5-10 mL Intracatheter Q1H PRN Rocky Uribe MD   5 mL at 09/20/24 1218    hydrALAZINE (APRESOLINE) tablet 10 mg  10 mg Oral Q4H PRN Rocky Uribe MD        Or    hydrALAZINE (APRESOLINE) injection 10 mg  10 mg Intravenous Q4H PRN Rocky Uribe MD        HYDROmorphone (PF) (DILAUDID) injection 0.3-0.5 mg  0.3-0.5 mg Intravenous Q4H PRN Jorge Holliday MD   0.5 mg at 09/16/24 2233    methylphenidate (RITALIN) tablet 10 mg  10 mg Oral Daily PRN Dreyer, Patrick, DO        naloxone (NARCAN) injection 0.2 mg  0.2 mg Intravenous Q2 Min PRN Rocky Uribe MD        Or    naloxone (NARCAN) injection 0.4 mg  0.4 mg Intravenous Q2 Min PRN Rocky Uribe MD        Or    naloxone (NARCAN) injection 0.2 mg  0.2 mg Intramuscular Q2 Min PRN Rocky Uribe MD        Or    naloxone (NARCAN) injection 0.4 mg  0.4 mg Intramuscular Q2 Min PRRocky Goldman MD        ondansetron (ZOFRAN ODT) ODT tab 4 mg  4 mg Oral Q6H PRN Rocky Uribe MD   4 mg at 09/20/24 0442    Or    ondansetron (ZOFRAN) injection 4 mg  4 mg Intravenous Q6H PRN Rocky Uribe MD   4 mg at 09/20/24 0912    polyethylene glycol (MIRALAX) Packet 17 g  17 g Oral Daily PRN Jorge Holliday MD        prochlorperazine (COMPAZINE) injection 5 mg  5 mg Intravenous Q6H PRN Rocky Uribe MD   5 mg at 09/17/24 1137    Or    prochlorperazine (COMPAZINE) tablet 5 mg  5 mg Oral  "Q6H PRN Rocky Uribe MD   5 mg at 09/09/24 1414    Or    prochlorperazine (COMPAZINE) suppository 12.5 mg  12.5 mg Rectal Q12H PRN Rocky Uribe MD        senna-docusate (SENOKOT-S/PERICOLACE) 8.6-50 MG per tablet 1-2 tablet  1-2 tablet Oral BID PRN Jorge Holliday MD   2 tablet at 09/15/24 2117    sodium chloride (PF) 0.9% PF flush 10-20 mL  10-20 mL Intracatheter q1 min prn Rocky Uribe MD        sodium chloride (PF) 0.9% PF flush 10-20 mL  10-20 mL Intracatheter Q1H PRN Rocky Uribe MD   20 mL at 09/19/24 0832                  Physical Exam:   Vitals were reviewed  Blood pressure 138/84, pulse 92, temperature 97.3  F (36.3  C), temperature source Oral, resp. rate 16, height 1.664 m (5' 5.51\"), weight 76.8 kg (169 lb 6.4 oz), SpO2 99%.  Wt Readings from Last 4 Encounters:   09/17/24 76.8 kg (169 lb 6.4 oz)   08/04/20 82.1 kg (181 lb)   11/21/18 90.7 kg (200 lb)   03/25/16 86.2 kg (190 lb)       I/O last 3 completed shifts:  In: 840 [P.O.:840]  Out: 740 [Urine:740]    Constitutional: Awake, alert, cooperative, no apparent distress            Data:   All laboratory data and imaging studies reviewed.    CMP  Recent Labs   Lab 09/20/24  0640 09/19/24  0521 09/18/24  0551 09/17/24  1945 09/17/24  0630   * 125* 127*  --  126*  126*   POTASSIUM 3.9 3.7 3.6 3.6 3.0*   CHLORIDE 87* 88* 89*  --  86*   CO2 25 26 25  --  24   ANIONGAP 14 11 13  --  16*   GLC 93 99 104*  --  115*   BUN 36.7* 34.3* 27.4*  --  20.8   CR 1.02* 1.04* 1.05*  --  1.03*   GFRESTIMATED 56* 55* 54*  --  55*   LOUISA 8.3* 8.2* 7.9*  --  7.9*   PROTTOTAL 6.0*  --  5.8*  --  6.1*   ALBUMIN 2.7*  --  2.6*  --  2.8*   BILITOTAL 3.4*  --  2.7*  --  2.6*   ALKPHOS 2,272*  --  1,875*  --  1,859*   *  --  98*  --  103*   ALT 28  --  22  --  23     CBC  Recent Labs   Lab 09/20/24  0640 09/19/24  0521 09/18/24  0551 09/17/24  0630   WBC 10.4 10.9 13.3* 15.7*   RBC 3.48* 3.51* 3.47* 3.61*   HGB 9.9* 10.0* " 9.9* 10.1*   HCT 29.1* 29.6* 29.2* 30.7*   MCV 84 84 84 85   MCH 28.4 28.5 28.5 28.0   MCHC 34.0 33.8 33.9 32.9   RDW 15.7* 15.4* 15.3* 15.2*   * 562* 542* 590*

## 2024-09-20 NOTE — PLAN OF CARE
Goal Outcome Evaluation:      Orientation: A/Ox4  Aggression Stop Light: Green  Activity: A1 GB+W  Diet/BS Checks: Low fiber diet, fair appetite today. 1500ml FR, 920 remaining  Tele:  n/a  IV Access/Drains: Port HL, blood return noted  Pain Management: Scheduled tramadol, no prns needed  Abnormal VS/Results: VSS on RA  Bowel/Bladder: Continent B/B  Skin/Wounds: Inner coccyx wound, mepi in place. Lymphedema wraps changed this afternoon  Consults: Nephrology, PT/OT, Hem/Onc  D/C Disposition: Pending  Other Info:   -Zofran givenn 1x for intermittent nausea   -K protocol WNL  -Bolus given per neprhology, sodium recheck 125 -continus with sodium chloride tablets

## 2024-09-20 NOTE — PROGRESS NOTES
Care Management Follow Up    Length of Stay (days): 14    Expected Discharge Date: 09/22/2024     Concerns to be Addressed: discharge planning     Patient plan of care discussed at interdisciplinary rounds: Yes    Anticipated Discharge Disposition: Hospice              Anticipated Discharge Services: None  Anticipated Discharge DME: None    Patient/family educated on Medicare website which has current facility and service quality ratings: yes  Education Provided on the Discharge Plan: Yes  Patient/Family in Agreement with the Plan: yes    Referrals Placed by CM/SW: Hospice  Private pay costs discussed: transportation costs and insurance costs out of pocket expenses    Discussed  Partnership in Safe Discharge Planning  document with patient/family: No     Handoff Completed: No, handoff not indicated or clinically appropriate    Additional Information:  Writer was contacted by Anastacio Dasilva with [facility]  that he spoke with patient and family and they are wanting to pursue home discharge on hospice.     Writer met with patient and spouse Rudy at bedside to discuss hospice. Writer provided them with a list of hospice agencies. Family was interested in [facility]. Writer stepped out and called [facility] liaison Jose Alejandro to see if they can stop for informational meeting. Jose Alejandro stated he would be able to stop in and talk with family shortly.     Writer went back in and spoke with family, writer explained services that hospice can provide and what discharge would look like. Writer spoke with the family regarding transport and the different options. Writer explained that cost of wheelchair 90 and 6/mile  vs stretcher 1000 and 20/mile. Patients  stated at this point they believe family will help transport patient home. Writer stepped out as therapy was coming in and stated they would follow up after [facility] met with them.     ADD 1547:   Patient and family are in agreement with writer [facility] sent  referral. SW will contact [facility] the day before discharge so they are able to get equipment to the home.     Next Steps: Discharge on hospice    KEREN BARKLEY  [facility]  INPATIENT CARE COORDINATION

## 2024-09-20 NOTE — PROGRESS NOTES
Renal Medicine Progress Note            Assessment/Plan:     Assessment: Jany Park is a 79 year old female with PMH metastatic endometrial cancer, HTN, asthma, fibromyalgia, GERD who was admitted on 9/5/2024 with n/v.       Hyponatremia due to SIADH   Hypoalbuminemia   Lymphedema  Persistent hyponatremia this admission in setting of n/v, metastatic endometrial cancer, poor PO intake related to abdominal discomfort. Suspect SIADH. Also on chronic tramadol which can exacerbate hyponatremia, but would not discontinue it given how long she's been on it and issues with withdrawal in past. Diuretics resumed 9/14-9/16. 9/16 had urine Na 45, urine Osm 333. Fluid restriction 1.5L started 9/16, urena 15 g BID started 9/17 though patient only received 2 doses due to significant nausea and vomiting from it. She also has significant LE edema, lymphedema but hasn't been doing her wraps or exercises as regularly. Lower suspicion that edema is causing worsened sodiums. For now would hold diuretics, will likely resume in 1-2 days. Repeated urine studies 9/19, urine Na <20 suggesting hypovolemia, likely has mixed hypovolemia and SIADH picture.   - continue sodium chloride 1 g TID as tolerated   - 1.5L fluid restriction   - daily BMP   - agree with lymphedema wraps/treatment   - hold diuretics for today   - encouraged increased salt and protein intake   - daily weight   - 500 ml NS bolus   - Na recheck at 1pm     THELMA, stable   Initial THELMA on admission with Cr 2 due to prerenal (n/v, poor PO intake), NSAID use. Cr had normalized, then worsened slightly to Cr 1.05, likely related to contrast (9/12) and ongoing poor PO intake. CT abd/pelvis without hydronephrosis or concern for retention. Anasarca and ascites noted on CT.      Metastatic endometrial cancer  Abnormal LFTs  GOC  Has undergone extensive treatments with chemo holiday this summer but mets have progressed. Follows with [facility]. Ongoing GOC discussions given her  decline.      Anemia   Hgb 10 in setting of malignancy. Management per oncology team.     Plan/Recs:  1) current sodium shouldn't be barrier to discharge as long as doesn't worsen   2) 500 ml NS bolus   3) repeat Na 1 pm   4) continue FR and salt tabs for now     Discussed with Dr. Philip. Reviewed notes from Dr. Philip (hospitalist), Dr. Dreyer ([facility] onc).       Sara Nicole MD   [facility]  Office: [phone]          Interval History:      No acute events overnight   Concerned about timing of ritalin getting delayed and worsening sleep   Denies SOB   Lyphedema wraps in place   Continued poor PO intake   No nausea currently, but had nausea with salt tab this morning          Medications and Allergies:     Current Facility-Administered Medications   Medication Dose Route Frequency Provider Last Rate Last Admin    aspirin EC tablet 81 mg  81 mg Oral Daily Jorge Holliday MD   81 mg at 09/20/24 0859    [Held by provider] bumetanide (BUMEX) tablet 1 mg  1 mg Oral BID Daniela Philip MD   1 mg at 09/16/24 0907    fluticasone-vilanterol (BREO ELLIPTA) 100-25 MCG/ACT inhaler 1 puff  1 puff Inhalation Daily Rocky Uribe MD   1 puff at 09/20/24 0900    heparin lock flush 10 unit/mL injection 5-10 mL  5-10 mL Intracatheter Q24H Rocky Uribe MD   5 mL at 09/20/24 0644    heparin lock flush 100 unit/mL injection 5-10 mL  5-10 mL Intracatheter Q28 Days Rocky Uribe MD        lidocaine (PF) (XYLOCAINE) 1 % injection 10 mL  10 mL Other Once Long Toribio MD        methylphenidate (RITALIN) tablet 10 mg  10 mg Oral BID Daniela Philip MD        pantoprazole (PROTONIX) EC tablet 40 mg  40 mg Oral BID AC Rocky Uribe MD   40 mg at 09/20/24 0646    polyethylene glycol (MIRALAX) Packet 17 g  17 g Oral Daily Daniela Philip MD        senna-docusate (SENOKOT-S/PERICOLACE) 8.6-50 MG per tablet 1-2 tablet  1-2 tablet Oral BID Jorge Holliday MD   1 tablet at 09/20/24  "0900    sodium chloride (PF) 0.9% PF flush 10-20 mL  10-20 mL Intracatheter Q28 Days Rocky Uribe MD        sodium chloride 0.9% BOLUS 500 mL  500 mL Intravenous Once Sara Nicole MD        sodium chloride tablet 1 g  1 g Oral TID w/meals Sara Nicole MD   1 g at 09/20/24 0900    sucralfate (CARAFATE) suspension 1 g  1 g Oral 4x Daily AC & HS NegraChris brandt MD   1 g at 09/20/24 0646    traMADol (ULTRAM) tablet 100 mg  100 mg Oral TID Daniela Philip MD   100 mg at 09/20/24 0900        Allergies   Allergen Reactions    Morphine And Codeine Nausea and Vomiting            Physical Exam:   Vitals were reviewed  /84 (BP Location: Left arm)   Pulse 92   Temp 97.3  F (36.3  C) (Oral)   Resp 16   Ht 1.664 m (5' 5.51\")   Wt 76.8 kg (169 lb 6.4 oz)   SpO2 99%   BMI 27.75 kg/m      Wt Readings from Last 3 Encounters:   09/17/24 76.8 kg (169 lb 6.4 oz)   08/04/20 82.1 kg (181 lb)   11/21/18 90.7 kg (200 lb)       Intake/Output Summary (Last 24 hours) at 9/20/2024 1003  Last data filed at 9/19/2024 2350  Gross per 24 hour   Intake 840 ml   Output 740 ml   Net 100 ml       GENERAL APPEARANCE: NAD, frail appearing  HEENT: normocephalic, dry MM   RESP: clear anteriorly   CV: RRR,   EXTREMITIES/SKIN: 2+ LE edema, lypmhedema  NEURO:  alert, orietned, normal speech   LINES:  L chest port            Data:     BMP  Recent Labs   Lab 09/20/24  0640 09/19/24  0521 09/18/24  0551 09/17/24  1945 09/17/24  0630   * 125* 127*  --  126*  126*   POTASSIUM 3.9 3.7 3.6 3.6 3.0*   CHLORIDE 87* 88* 89*  --  86*   LOUISA 8.3* 8.2* 7.9*  --  7.9*   CO2 25 26 25  --  24   BUN 36.7* 34.3* 27.4*  --  20.8   CR 1.02* 1.04* 1.05*  --  1.03*   GLC 93 99 104*  --  115*     CBC  Recent Labs   Lab 09/20/24  0640 09/19/24  0521 09/18/24  0551 09/17/24  0630   WBC 10.4 10.9 13.3* 15.7*   HGB 9.9* 10.0* 9.9* 10.1*   HCT 29.1* 29.6* 29.2* 30.7*   MCV 84 84 84 85   * 562* 542* 590*     Lab Results   Component " Value Date     (H) 09/20/2024    ALT 28 09/20/2024    ALKPHOS 2,272 (H) 09/20/2024    BILITOTAL 3.4 (H) 09/20/2024     Lab Results   Component Value Date    INR 1.05 11/21/2018     Color Urine (no units)   Date Value   09/05/2024 Yellow   03/06/2007 Yellow     Appearance Urine (no units)   Date Value   09/05/2024 Clear   03/06/2007 Clear     Glucose Urine (mg/dL)   Date Value   09/05/2024 Negative   03/06/2007 Negative     Bilirubin Urine (no units)   Date Value   09/05/2024 Negative   03/06/2007 Negative     Ketones Urine (mg/dL)   Date Value   09/05/2024 Negative   03/06/2007 Negative     Specific Gravity Urine (no units)   Date Value   09/05/2024 1.015   03/06/2007 1.020     pH Urine   Date Value   09/05/2024 5.5   03/06/2007 8.0 pH (H)     Protein Albumin Urine (mg/dL)   Date Value   09/05/2024 Negative   03/06/2007 Negative     Urobilinogen Urine (EU/dL)   Date Value   03/06/2007 0.2     Nitrite Urine (no units)   Date Value   09/05/2024 Negative   03/06/2007 Negative     Leukocyte Esterase Urine (no units)   Date Value   09/05/2024 Small (A)   03/06/2007 Small (A)         Attestation:  I have reviewed today's vital signs, notes, medications, labs and imaging.    Sara Nicole MD  [facility] - Nephrology  Office: [phone]

## 2024-09-21 NOTE — PROGRESS NOTES
Oncology Chart Check    Stopped by to give me support to the patient  She will be discharged to home with hospice care     Patrick Dreyer,   [facility]    2023 10:23

## 2024-09-21 NOTE — PLAN OF CARE
Goal Outcome Evaluation:     Orientation: A/Ox4  Aggression Stop Light: Green  Activity: A1 GB+W, up with PT/OT  Diet/BS Checks: Low fiber with 1500FR, 630 remaining  Tele:  n/a  IV Access/Drains: Port HL with blood return  Pain Management: Denies, scheduled tramadol given  Abnormal VS/Results: VSS on RA  Bowel/Bladder: Continent B/B   Skin/Wounds: Coccyx wound cares completed per orders. Lymph wraps replaced with compression stockings by OT  Consults: Consults signed off  D/C Disposition: Plan to discharge home with [facility]  Other Info:   -Zofran given 2x for nausea/emesis - pt feels this is triggered by sodium chloride tab at times

## 2024-09-21 NOTE — PROGRESS NOTES
Plan if for her to go home with hospice care.    Under these conditions, I think we do not focus on her serum sodium.  Rather on comfort.    Nephrology will sign off.    Thanks.    Addi Mccoy MD

## 2024-09-21 NOTE — PROGRESS NOTES
[facility]    Internal Medicine Hospitalist Progress Note  09/21/2024  I evaluated patient on the above date.    Assessment & Plan   Jany Park is a 79 year old female with past medical history significant for endometrial cancer; HTN; asthma; fibromyalgia with chronic pain; and GERD; who presented 9/5/2024 with abnormal outpatient labs and nausea/vomiting, found with THELMA.     On initial evaluation, pt was afebrile, VSS. Labs notable for CBC with WBC normal, hgb 10.3, platelets 577K; BMP with sodium 134, chloride 91, BUN 49.5, cr 2.1, calcium 9.7, AG 18; LFT's with ALP 1633, AST 70, tbili 1.6; UA with 6 WBC, small LE, 19 hyaline casts.        Endometrial cancer, metastatic  Abnormal LFTs, suspect related to metastatic disease.  Nausea and vomiting, suspect multifactorial including related to above, also possibly constipation.  Abdominal pain, suspect related to above.  Poor intake related to above.  * Followed by Dr. Dreyer of [facility]. See Oncology note for oncologic history. Noted diagnosed and had initial surgery 2015 with subsequently chemotherapy. Noted subsequently development of liver metastases. Noted last chemotherapy in 5/2024. Noted that pt underwent CT imaging and BM biopsy 9/4 (results not available on admit). Noted was suppose to undergo chemotherapy on 9/10/2024.  * Initial presentation as above. On admit, reported ~3 weeks of nausea and vomiting, intake of ~25% of baseline. LFT's elevated.  [facility]  * On 9/6, per Oncology, noted that CT 9/4 showed disease progression in the liver and lymph nodes. Also noted that BM biopsy showed dysplasia, pending cytogenetics. Noted that her anemia may be due to an exhausted bone marrow from her years of chemotherapy. ?nausea related to IV iron transfusion. Brain MRI ordered that was negative for acute findings.  * On 9/8, tbili normalized, AST and ALP stable/slightly improved.  * On 9/9, more nauseated but some  improvement after BM. Seen by Dr. Dreyer (primary Oncologist) and recommended holding chemotherapy; discussed about hospice, but pt not ready to consider yet.   * On 9/11, noted abdominal burning pain, started on sucralfate.  * On 9/12, increased leukocytosis and burning abdominal pain. CT CAP showed innumerable pulmonary and hepatic metastases unchanged; modest ascites now present, slightly increased, peritoneal implants consistent with carcinomatosis; mckenzie hepatis retroperitoneal and mesenteric adenopathy unchanged; increasing anasarca; tiny volume right pleural effusion minimally larger.  * On 9/13-9/14, had more discussions with Oncology about worsened functional status and goals of care.  Underwent paracentesis 9/13, 300 ml removed, specimen noted to be clotted so no cell count noted.  * On 9/16, abdominal pain better.  - Continue to monitor LFT's.  - Continue PPI and sucralfate.  Schedule tramadol 50 mg 3 times daily patient and family wanting it to increase the dosage to 100 mg 3 times daily.  Order changed on 9/17/2024  - Continue PRN IV hydromorphone; minimize opioids as able.  - Continue PRN ondansetron, PRN prochlorperazine.  - Follow-up with Dr. Dreyer, [facility], outpatient, can discuss further goals of cares.  - Appreciate help from [facility].  [place] [facility] team consulted on 9/17/2024.  Signed off on 9/17/2024   elevated LFTs were felt to be secondary to metastatic disease to the liver as per GI team. No concern  biliary obstruction.  They recommended monitoring LFTs    LFTs are stable currently    [place] oncology continues to have goals of care discussions with patient and family.  Patient decided to discharge with hospice care  Social work consulted and are following.  Patient will establish with [facility] on discharge    Discussed about CODE STATUS being full code and with her going with the option of comfort cares changing CODE STATUS to DNR/DNI discussed with the patient.  She is agreeable.   CODE STATUS changed to DNR/DNI on 9/21/2024     Acute kidney injury, suspect prerenal (from N/V, poor oral intake), contrast nephropathy +/- NSAID, with ATN, resolved.  * Initial presentation as above. Cr 2.1, BUN 49.5, UA significant for 19 hyaline casts. Noted that pt had a contrast CT 9/3 as well. Started on IVF's on admit. PTA lisinopril and meloxicam held on admit.  * IVF's completed 9/7.  * Cr normalized 9/9.  * Started on diuretics for anasarca 9/14.  Recent Labs   Lab 09/20/24  0640 09/19/24  0521 09/18/24  0551 09/17/24  0630 09/16/24  0635   CR 1.02* 1.04* 1.05* 1.03* 1.07*   Estimated Creatinine Clearance: 47.1 mL/min (A) (based on SCr of 1.02 mg/dL (H)).  - Continue to monitor BMP periodically.    Leukocytosis, suspect more malignancy related   * Pt with gradually increasing WBC this hospitalization.   * On 9/12, procalcitonin 1.06. CT of chest/abd/pelvis as above showed increasing anasarca and stable metastatic disease, no inflammation.  * On 9/13, empirically on ceftriaxone. Thoracentesis cultures NGTD.  Recent Labs   Lab 09/20/24  0640 09/19/24  0521 09/18/24  0551 09/17/24  0630 09/16/24  0635   WBC 10.4 10.9 13.3* 15.7* 15.9*   - Continue to monitor CBC.  - Continue ceftriaxone (started 9/13) - stopped  antibiotics on 9/17/2024     Constipation due to inactivity and opioids.  * On 9/8, pt c/o constipation. Bowel regimen intensified.  * On 9/9, had BM after suppository.  * On 9/16, pt notes no BM since last suppository (9/9).  - Schedule polyethylene glycol twice daily and senna-docusate.  - Continue PRN polyethylene glycol, senna-docusate and PRN bisacodyl suppository.  - Continue to increase activity as able.  Patient had 2 bowel movements on 9/18/2024     Dyspnea with chest tightness.  * On 9/8, pt noted feeling dyspneic with chest tightness. ECG showed SR, no acute ischemic changes. CXR showed small pleural effusions, no focal infiltrates/edema.  * On 9/9, chest tightness better.  - Continue to  monitor clinically.     Hyponatremia, suspect secondary to SIADH  * Sodium 134 on admit.   * Sodium normalized 9/8.  * Started diuretics 9/14.  Recent Labs   Lab 09/21/24  0707 09/20/24  1303 09/20/24  0640 09/19/24  0521 09/18/24  0551 09/17/24  0630   * 125* 126* 125* 127* 126*  126*   - Continue to monitor BMP.   Started on Urena supplementation twice daily.  Patient was unable to tolerate Urena due to nausea  On fluid restriction 1500 mL daily  Sodium down to 125 on 9/19/2024  Nephrology consultation requested.  Salt tablets 1 g 3 times daily started on 9/19/2024  Follow BMP closely  Bumex on hold currently.     Nephrology consulted and are following for hyponatremia.       Chronic normocytic anemia.  * Hgb 10.3 on admit. Noted most recently 11.2 g/dl 3/2024.   * As above, per Oncology, noted that BM biopsy showed dysplasia, pending cytogenetics. Noted that her anemia may be due to an exhausted bone marrow from her years of chemotherapy.   Recent Labs   Lab 09/20/24  0640 09/19/24  0521 09/18/24  0551 09/17/24  0630 09/16/24  0635   HGB 9.9* 10.0* 9.9* 10.1* 10.3*   - Continue to monitor CBC      Hypertension (benign essential).  Third spacing/anasarca with ascites and pleural effusion, suspect from malnutrition with low albumin.  Chronic lymphedema.  [PTA: lisinopril 10 mg daily; PRN bumetanide 1 mg BID.]  * Lisinopril held on admit.  * BP's normal off meds.  * Initial issues with THELMA as above and received IVF's this hospitalization (cr subsequently normalized).  * CT 9/12 showed increasing anasarca, ascites and right pleural effusion.   * Underwent right thoracentesis 9/13, 100 ml removed and cultures NGTD. Also underwent paracentesis  9/13 as above.  * Started bumetanide 9/14.  - Continue bumetanide 1 mg PO BID currently on hold due to hyponatremia.  - Continue to hold lisinopril.  - Continue to monitor i/o's, daily wts.     Fibromyalgia with chronic pain.  * PTA on regimen including meloxicam,  "scheduled tramadol 100 mg TID and PRN oxycodone.  * PTA tramadol held on admit given THELMA.  * On 9/6, pt with withdrawal symptoms.   * On 9/7 am, scheduled tramadol restarted.  - Continue tramadol 100 gm TID.  - Continue PRN IV hydromorphone; minimize opioids as able.  - Continue to hold meloxicam.    Moderate malnutrition related to acute and chronic medical issues.  * Noted by Nutrition; see their documentation.  - Continue diet as tolerated.  - Continue supplements.    Weakness and physical deconditioning due to multiple acute and chronic medical issues.  * Pt very weak since hospitalization  * On 9/10, seen by PT and recommended home with assist and home cares vs TCU.  -  Overall, pt and family feels she will be capable to go home with assistance from  with home health cares/therapies.    Hypokalemia;  Potassium low at 3  Started on replacement protocol     GERD.  * Pt with burning pain this hospitalization.  * Started on sucralfate 9/11, noted helped only a little.  - Continue pantoprazole BID and sucralfate QID.     Asthma.  - Continue fluticasone-vilanterol and PRN albuterol.        Clinically Significant Risk Factors         # Hyponatremia: Lowest Na = 123 mmol/L in last 2 days, will monitor as appropriate      # Hypoalbuminemia: Lowest albumin = 2.6 g/dL at 9/18/2024  5:51 AM, will monitor as appropriate               # Overweight: Estimated body mass index is 27.75 kg/m  as calculated from the following:    Height as of this encounter: 1.664 m (5' 5.51\").    Weight as of this encounter: 76.8 kg (169 lb 6.4 oz).   # Moderate Malnutrition: based on nutrition assessment      # Financial/Environmental Concerns: none (denies)  # Asthma: noted on problem list              Diet: Diet  Low Fiber Diet  Fluid restriction 1500 ML FLUID  Snacks/Supplements Adult: Other; apple CLEAR at brkfst; orange GelateinPLUS at lunch; With Meals    Prophylaxis: PCD's and ambulation  Ochoa Catheter: Not present  Lines: " "PRESENT      Port A Cath Single 07/30/20 Left Chest wall-Site Assessment: WDL      Code Status: No CPR- Do NOT Intubate changed from full code after discussed with patient on 9/21/2024        Expected discharge to home with family once hospice in place    Entered: Daniela Philip MD 09/21/2024 at 1:37 PM    Discussed with Patient and bedside RN        Interval History   Chart reviewed.   Patient is resting comfortably in bed.  Denies any pain currently.  No nausea currently.  Wants to go home with hospice.  Social work consulted and are following.  No other acute issues  * Data reviewed today: I reviewed all new labs and imaging over the last 24 hours. I personally reviewed no images or ECG's today.    Physical Exam   Most recent vitals:   , Blood pressure 109/69, pulse 98, temperature (!) 96.5  F (35.8  C), temperature source Axillary, resp. rate 16, height 1.664 m (5' 5.51\"), weight 76.8 kg (169 lb 6.4 oz), SpO2 97%. O2 Device: None (Room air)    Vitals:    09/09/24 0633 09/17/24 1135 09/17/24 1143   Weight: 76.4 kg (168 lb 8 oz) 76.8 kg (169 lb 6.4 oz) 76.8 kg (169 lb 6.4 oz)     Vital signs with ranges:  Temp:  [96.5  F (35.8  C)-97.5  F (36.4  C)] 96.5  F (35.8  C)  Pulse:  [92-98] 98  Resp:  [16] 16  BP: (109-129)/(69-79) 109/69  SpO2:  [97 %-99 %] 97 %  Patient Vitals for the past 24 hrs:   BP Temp Temp src Pulse Resp SpO2   09/21/24 0752 109/69 (!) 96.5  F (35.8  C) Axillary 98 16 97 %   09/20/24 1953 129/75 97.4  F (36.3  C) Oral 92 16 99 %   09/20/24 1531 115/79 97.5  F (36.4  C) Oral 95 16 99 %     I/O's last 24 hours:  I/O last 3 completed shifts:  In: 550 [P.O.:550]  Out: 400 [Urine:400]    Constitutional: awake, alert, oriented, pleasant, conversant   Cardiovascular: regular rate and rhythm; no murmurs, rubs or gallops  Lungs: diminished in the bases, no crackles or wheezes  Gastrointestinal/Abdomen: mildly tender with some firmness without rebound or guarding, mildly distended, + bowel sounds  : " "  Musculoskeletal:   Skin/Extremities:  bilateral lower extremity lymphedema, trace to 1+ pitting in legs  Neurologic:   Psychiatric:   Hematologic/Lymphatic/Immunologic:        Data    Labs reviewed.  Recent Labs   Lab 09/21/24  0707 09/20/24  1303 09/20/24  0640 09/19/24  0521 09/18/24  0551 09/17/24  1945 09/17/24  0630   WBC  --   --  10.4 10.9 13.3*  --  15.7*   HGB  --   --  9.9* 10.0* 9.9*  --  10.1*   MCV  --   --  84 84 84  --  85   PLT  --   --  517* 562* 542*  --  590*   * 125* 126* 125* 127*  --  126*  126*   POTASSIUM 3.7  --  3.9 3.7 3.6   < > 3.0*   CHLORIDE  --   --  87* 88* 89*  --  86*   CO2  --   --  25 26 25  --  24   BUN  --   --  36.7* 34.3* 27.4*  --  20.8   CR  --   --  1.02* 1.04* 1.05*  --  1.03*   ANIONGAP  --   --  14 11 13  --  16*   LOUISA  --   --  8.3* 8.2* 7.9*  --  7.9*   GLC  --   --  93 99 104*  --  115*   ALBUMIN  --   --  2.7*  --  2.6*  --  2.8*   PROTTOTAL  --   --  6.0*  --  5.8*  --  6.1*   BILITOTAL  --   --  3.4*  --  2.7*  --  2.6*   ALKPHOS  --   --  2,272*  --  1,875*  --  1,859*   ALT  --   --  28  --  22  --  23   AST  --   --  147*  --  98*  --  103*    < > = values in this interval not displayed.     Recent Labs   Lab Test 11/21/18  1714   NT-PROBNP, INPATIENT 196     Recent Labs   Lab 09/20/24  0640 09/19/24  0521 09/18/24  0551 09/17/24  0630 09/16/24  0635   GLC 93 99 104* 115* 101*     No lab results found.    No results for input(s): \"INR\", \"[id number]\" in the last 168 hours.  Recent Labs   Lab 09/20/24  0640 09/19/24  0521 09/18/24  0551 09/17/24  0630 09/16/24  0635   WBC 10.4 10.9 13.3* 15.7* 15.9*       MICRO:  Cultures (including blood and urine):  No lab results found in last 7 days.      No results found for this or any previous visit (from the past 24 hour(s)).    Medications   All medications were reviewed. MAR.    Infusions:  Current Facility-Administered Medications   Medication Dose Route Frequency Provider Last Rate Last Admin     Scheduled " Medications:  Current Facility-Administered Medications   Medication Dose Route Frequency Provider Last Rate Last Admin    aspirin EC tablet 81 mg  81 mg Oral Daily Jorge Holliday MD   81 mg at 09/21/24 0919    [Held by provider] bumetanide (BUMEX) tablet 1 mg  1 mg Oral BID Daniela Philip MD   1 mg at 09/16/24 0907    fluticasone-vilanterol (BREO ELLIPTA) 100-25 MCG/ACT inhaler 1 puff  1 puff Inhalation Daily Rocky Uribe MD   1 puff at 09/20/24 0900    heparin lock flush 10 unit/mL injection 5-10 mL  5-10 mL Intracatheter Q24H Rocky Uribe MD   5 mL at 09/20/24 0644    heparin lock flush 100 unit/mL injection 5-10 mL  5-10 mL Intracatheter Q28 Days Rocky Uribe MD   5 mL at 09/21/24 0703    lidocaine (PF) (XYLOCAINE) 1 % injection 10 mL  10 mL Other Once Long Toribio MD        methylphenidate (RITALIN) tablet 10 mg  10 mg Oral BID Anastacio Dasilva APRN CNP   10 mg at 09/21/24 1252    miconazole (MICATIN) 2 % powder   Topical BID Abran Mann,    Given at 09/21/24 1125    pantoprazole (PROTONIX) EC tablet 40 mg  40 mg Oral BID AC Rocky Uribe MD   40 mg at 09/20/24 1644    polyethylene glycol (MIRALAX) Packet 17 g  17 g Oral Daily Daniela Philip MD   17 g at 09/21/24 0919    senna-docusate (SENOKOT-S/PERICOLACE) 8.6-50 MG per tablet 1-2 tablet  1-2 tablet Oral BID Jorge Holliday MD   2 tablet at 09/21/24 0919    sodium chloride (PF) 0.9% PF flush 10-20 mL  10-20 mL Intracatheter Q28 Days Rocky Uribe MD        sodium chloride tablet 1 g  1 g Oral TID w/meals Sara Nicole MD   1 g at 09/21/24 0919    sucralfate (CARAFATE) suspension 1 g  1 g Oral 4x Daily AC & HS Chris Omer MD   1 g at 09/21/24 0648    traMADol (ULTRAM) tablet 100 mg  100 mg Oral TID Daniela Philip MD   100 mg at 09/21/24 0919     PRN Medications:  Current Facility-Administered Medications   Medication Dose Route Frequency Provider Last Rate Last  Admin    albuterol (PROVENTIL HFA/VENTOLIN HFA) inhaler  2 puff Inhalation Q6H PRN Rocky Uribe MD   2 puff at 09/10/24 2055    artificial saliva (BIOTENE DRY MOUTHWASH) liquid 10 mL  10 mL Swish & Spit 4x Daily PRN Daniela Philip MD        bisacodyl (DULCOLAX) suppository 10 mg  10 mg Rectal Daily PRN Jorge Holliday MD   10 mg at 09/09/24 0954    calcium carbonate (TUMS) chewable tablet 500-1,000 mg  500-1,000 mg Oral TID PRN Jorge Holliday MD   1,000 mg at 09/17/24 2213    carboxymethylcellulose PF (REFRESH PLUS) 0.5 % ophthalmic solution 2 drop  2 drop Both Eyes 4x Daily PRN Daniela Philip MD   2 drop at 09/21/24 1252    heparin lock flush 10 unit/mL injection 5-10 mL  5-10 mL Intracatheter Q1H PRN Rocky Uribe MD   5 mL at 09/21/24 0932    hydrALAZINE (APRESOLINE) tablet 10 mg  10 mg Oral Q4H PRN Rocky Uribe MD        Or    hydrALAZINE (APRESOLINE) injection 10 mg  10 mg Intravenous Q4H PRN Rocky Uribe MD        HYDROmorphone (PF) (DILAUDID) injection 0.3-0.5 mg  0.3-0.5 mg Intravenous Q4H PRN Jorge Holliday MD   0.5 mg at 09/21/24 0642    methylphenidate (RITALIN) tablet 10 mg  10 mg Oral Daily PRN Dreyer, Patrick, DO        naloxone (NARCAN) injection 0.2 mg  0.2 mg Intravenous Q2 Min PRN Rocky Uribe MD        Or    naloxone (NARCAN) injection 0.4 mg  0.4 mg Intravenous Q2 Min PRN Rocky Uribe MD        Or    naloxone (NARCAN) injection 0.2 mg  0.2 mg Intramuscular Q2 Min PRN Rocky Uribe MD        Or    naloxone (NARCAN) injection 0.4 mg  0.4 mg Intramuscular Q2 Min PRN Rocky Uribe MD        ondansetron (ZOFRAN ODT) ODT tab 4 mg  4 mg Oral Q6H PRN Rocky Uribe MD   4 mg at 09/20/24 0442    Or    ondansetron (ZOFRAN) injection 4 mg  4 mg Intravenous Q6H PRN Rocky Uribe MD   4 mg at 09/21/24 0927    polyethylene glycol (MIRALAX) Packet 17 g  17 g Oral Daily PRN Jorge Holliday MD    17 g at 09/21/24 0251    prochlorperazine (COMPAZINE) injection 5 mg  5 mg Intravenous Q6H PRN Rocky Uribe MD   5 mg at 09/17/24 1137    Or    prochlorperazine (COMPAZINE) tablet 5 mg  5 mg Oral Q6H PRN Rocky Uribe MD   5 mg at 09/09/24 1414    Or    prochlorperazine (COMPAZINE) suppository 12.5 mg  12.5 mg Rectal Q12H PRN Rocky Uribe MD        senna-docusate (SENOKOT-S/PERICOLACE) 8.6-50 MG per tablet 1-2 tablet  1-2 tablet Oral BID PRN Jorge Holliday MD   2 tablet at 09/15/24 2117    sodium chloride (PF) 0.9% PF flush 10-20 mL  10-20 mL Intracatheter q1 min prn Rocky Uribe MD        sodium chloride (PF) 0.9% PF flush 10-20 mL  10-20 mL Intracatheter Q1H PRN Rocky Uribe MD   20 mL at 09/19/24 0832

## 2024-09-21 NOTE — PLAN OF CARE
9/20  9700 - 3520    Orientation: A&Ox4  Aggression Stop Light: Green  Activity: A1 gb/w  Diet/BS Checks: Low fiber, 1500ml fluid restriction  Tele: n/a  IV Access/Drains: Port HL, good blood return  Pain Management: 7/10 upper R abdominal pain. IV dilaudid x2 effective for immediate pain. Miralax given to help with possible constipation  Abnormal VS/Results: VSS on RA  Bowel/Bladder: Continent. Constipation.  Skin/Wounds: Sacral gluteal fold wound, miconazole powder and barrier cream applied  Consults: WOC, nephrology, SW  D/C Disposition: [place] [facility] will follow once discharged home  Other Info:    - Lymph wraps in place overnight

## 2024-09-22 NOTE — PROGRESS NOTES
Care Management Follow Up    Length of Stay (days): 16    Expected Discharge Date: 09/23/2024     Concerns to be Addressed: discharge planning     Patient plan of care discussed at interdisciplinary rounds: Yes    Anticipated Discharge Disposition: Hospice              Anticipated Discharge Services: None  Anticipated Discharge DME: None    Patient/family educated on Medicare website which has current facility and service quality ratings: yes  Education Provided on the Discharge Plan: Yes  Patient/Family in Agreement with the Plan: yes    Referrals Placed by CM/SW: Hospice  Private pay costs discussed: Not applicable    Discussed  Partnership in Safe Discharge Planning  document with patient/family: No     Handoff Completed: No, handoff not indicated or clinically appropriate    Additional Information:  Writer spoke with patient and spouse Rudy. They need a Bed, Bedside Table and Commode for discharge. They are okay with discharge for tomorrow. Writer spoke with Lou from [facility]. Lou will order equipment. She can have a RN out to the home for initial start of care around [id number] tomorrow. Writer updated patient and spouse. Rudy is convinced he is transporting patient. Writer explained that he would have to get her in the car himself and expressed concern with that. Rudy states he is transporting her at 11am tomorrow. Writer updated bedside RN who will discuss with patient and spouse about setting transport up. MD updated with the need for 3 days of medication at  discharge.    Next Steps: Discharge tomorrow 9/23 at 11am to home with [facility]    KEREN Price

## 2024-09-22 NOTE — PLAN OF CARE
Goal Outcome Evaluation:      Orientation: A/Ox4  Aggression Stop Light: Green  Activity: A1 GB+W, up with PT/OT  Diet/BS Checks: Low fiber   Tele:  n/a  IV Access/Drains: Port HL with blood return  Pain Management: Denies, scheduled tramadol given  Abnormal VS/Results: VSS on RA - comfort cares initiated this morning  Bowel/Bladder: Continent B/B  Skin/Wounds: Coccyx wound cares completed per orders. Compression stockings in place  Consults: Consults signed off  D/C Disposition: Plan to discharge home with [facility]  Other Info:   Plan to discharge home tomorrow on hospice.  and son are planning to transport. Writer and SW expressed safety concerns with transportation - pt and spouse are aware. Early discharge at 11am.

## 2024-09-22 NOTE — PROGRESS NOTES
Care Management Follow Up    Length of Stay (days): 16    Expected Discharge Date: 09/23/2024     Concerns to be Addressed: discharge planning     Patient plan of care discussed at interdisciplinary rounds: Yes    Anticipated Discharge Disposition: Hospice              Anticipated Discharge Services: None  Anticipated Discharge DME: None    Patient/family educated on Medicare website which has current facility and service quality ratings: yes  Education Provided on the Discharge Plan: Yes  Patient/Family in Agreement with the Plan: yes    Referrals Placed by CM/SW: Hospice  Private pay costs discussed: Not applicable    Discussed  Partnership in Safe Discharge Planning  document with patient/family: No     Handoff Completed: No, handoff not indicated or clinically appropriate    Additional Information:  Writer called [facility] and spoke with Grace. She was going to call intake and see if equipment has been delivered and see when they can admit. SW will await a call back    Next Steps: discharge home on hospice once [place] is able to admit     KEREN Price

## 2024-09-22 NOTE — PLAN OF CARE
9/21  9120 - 2565    Orientation: A&Ox4  Aggression Stop Light: Green  Activity: A1 gb/w  Diet/BS Checks: Low fiber, 1500ml fluid restriction  Tele: n/a  IV Access/Drains: Port HL, good blood return  Pain Management: Denied pain overnight  Abnormal VS/Results: VSS on RA  Bowel/Bladder: Continent, constipation.  Skin/Wounds: Sacral gluteal fold wound, miconazole powder and barrier cream applied  Consults: WOC, nephrology, SW  D/C Disposition: [facility] will follow once discharged home  Other Info:    - Compazine given for nausea

## 2024-09-22 NOTE — PROGRESS NOTES
[facility]    Internal Medicine Hospitalist Progress Note  09/22/2024  I evaluated patient on the above date.    Assessment & Plan   Jany Park is a 79 year old female with past medical history significant for endometrial cancer; HTN; asthma; fibromyalgia with chronic pain; and GERD; who presented 9/5/2024 with abnormal outpatient labs and nausea/vomiting, found with THELMA.     On initial evaluation, pt was afebrile, VSS. Labs notable for CBC with WBC normal, hgb 10.3, platelets 577K; BMP with sodium 134, chloride 91, BUN 49.5, cr 2.1, calcium 9.7, AG 18; LFT's with ALP 1633, AST 70, tbili 1.6; UA with 6 WBC, small LE, 19 hyaline casts.        Endometrial cancer, metastatic  Abnormal LFTs, suspect related to metastatic disease.  Nausea and vomiting, suspect multifactorial including related to above, also possibly constipation.  Abdominal pain, suspect related to above.  Poor intake related to above.  * Followed by Dr. Dreyer of [facility]. See Oncology note for oncologic history. Noted diagnosed and had initial surgery 2015 with subsequently chemotherapy. Noted subsequently development of liver metastases. Noted last chemotherapy in 5/2024. Noted that pt underwent CT imaging and BM biopsy 9/4 (results not available on admit). Noted was suppose to undergo chemotherapy on 9/10/2024.  * Initial presentation as above. On admit, reported ~3 weeks of nausea and vomiting, intake of ~25% of baseline. LFT's elevated.  [facility]  * On 9/6, per Oncology, noted that CT 9/4 showed disease progression in the liver and lymph nodes. Also noted that BM biopsy showed dysplasia, pending cytogenetics. Noted that her anemia may be due to an exhausted bone marrow from her years of chemotherapy. ?nausea related to IV iron transfusion. Brain MRI ordered that was negative for acute findings.  * On 9/8, tbili normalized, AST and ALP stable/slightly improved.  * On 9/9, more nauseated but some  improvement after BM. Seen by Dr. Dreyer (primary Oncologist) and recommended holding chemotherapy; discussed about hospice, but pt not ready to consider yet.   * On 9/11, noted abdominal burning pain, started on sucralfate.  * On 9/12, increased leukocytosis and burning abdominal pain. CT CAP showed innumerable pulmonary and hepatic metastases unchanged; modest ascites now present, slightly increased, peritoneal implants consistent with carcinomatosis; mckenzie hepatis retroperitoneal and mesenteric adenopathy unchanged; increasing anasarca; tiny volume right pleural effusion minimally larger.  * On 9/13-9/14, had more discussions with Oncology about worsened functional status and goals of care.  Underwent paracentesis 9/13, 300 ml removed, specimen noted to be clotted so no cell count noted.  * On 9/16, abdominal pain better.  - Continue to monitor LFT's.  - Continue PPI and sucralfate.  Schedule tramadol 50 mg 3 times daily patient and family wanting it to increase the dosage to 100 mg 3 times daily.  Order changed on 9/17/2024  - Continue PRN IV hydromorphone; minimize opioids as able.  - Continue PRN ondansetron, PRN prochlorperazine.  - Follow-up with Dr. Dreyer, [facility], outpatient, can discuss further goals of cares.  - Appreciate help from [facility].  [place] [facility] consulted on 9/17/2024.  Signed off on 9/17/2024   elevated LFTs were felt to be secondary to metastatic disease to the liver as per GI team. No concern  biliary obstruction.  They recommended monitoring LFTs    LFTs are stable currently    [place] [facility] continues to have goals of care discussions with patient and family.  Patient decided to discharge with hospice care  Social work consulted and are following.  Patient will establish with [facility] on discharge    Discussed about CODE STATUS being full code and with her going with the option of comfort cares changing CODE STATUS to DNR/DNI discussed with the patient.  She is agreeable.   CODE STATUS changed to DNR/DNI on 9/21/2024    After discussion with patient on 9/22/2024 comfort care orders initiated.  Vital signs and lab work discontinued     Acute kidney injury, suspect prerenal (from N/V, poor oral intake), contrast nephropathy +/- NSAID, with ATN, resolved.  * Initial presentation as above. Cr 2.1, BUN 49.5, UA significant for 19 hyaline casts. Noted that pt had a contrast CT 9/3 as well. Started on IVF's on admit. PTA lisinopril and meloxicam held on admit.  * IVF's completed 9/7.  * Cr normalized 9/9.  * Started on diuretics for anasarca 9/14.  Recent Labs   Lab 09/20/24  0640 09/19/24  0521 09/18/24  0551 09/17/24  0630 09/16/24  0635   CR 1.02* 1.04* 1.05* 1.03* 1.07*   Estimated Creatinine Clearance: 47.1 mL/min (A) (based on SCr of 1.02 mg/dL (H)).    Patient is on comfort care only  Leukocytosis, suspect more malignancy related   * Pt with gradually increasing WBC this hospitalization.   * On 9/12, procalcitonin 1.06. CT of chest/abd/pelvis as above showed increasing anasarca and stable metastatic disease, no inflammation.  * On 9/13, empirically on ceftriaxone. Thoracentesis cultures NGTD.  Recent Labs   Lab 09/20/24  0640 09/19/24  0521 09/18/24  0551 09/17/24  0630 09/16/24  0635   WBC 10.4 10.9 13.3* 15.7* 15.9*   - Continue to monitor CBC.  - Continue ceftriaxone (started 9/13) - stopped  antibiotics on 9/17/2024     Constipation due to inactivity and opioids.  * On 9/8, pt c/o constipation. Bowel regimen intensified.  * On 9/9, had BM after suppository.  * On 9/16, pt notes no BM since last suppository (9/9).  - Schedule polyethylene glycol twice daily and senna-docusate.  - Continue PRN polyethylene glycol, senna-docusate and PRN bisacodyl suppository.  - Continue to increase activity as able.  Patient had 2 bowel movements on 9/18/2024     Dyspnea with chest tightness.  * On 9/8, pt noted feeling dyspneic with chest tightness. ECG showed SR, no acute ischemic changes. CXR  showed small pleural effusions, no focal infiltrates/edema.  * On 9/9, chest tightness better.  - Continue to monitor clinically.     Hyponatremia, suspect secondary to SIADH  * Sodium 134 on admit.   * Sodium normalized 9/8.  * Started diuretics 9/14.  Recent Labs   Lab 09/21/24  0707 09/20/24  1303 09/20/24  0640 09/19/24  0521 09/18/24  0551 09/17/24  0630   * 125* 126* 125* 127* 126*  126*   - Continue to monitor BMP.   Started on Urena supplementation twice daily.  Patient was unable to tolerate Urena due to nausea  On fluid restriction 1500 mL daily  Sodium down to 125 on 9/19/2024  Nephrology consultation requested.  Salt tablets 1 g 3 times daily started on 9/19/2024  Follow BMP closely  Bumex on hold currently.     Nephrology consulted and currently signed off as patient is comfort care only       Chronic normocytic anemia.  * Hgb 10.3 on admit. Noted most recently 11.2 g/dl 3/2024.   * As above, per Oncology, noted that BM biopsy showed dysplasia, pending cytogenetics. Noted that her anemia may be due to an exhausted bone marrow from her years of chemotherapy.   Recent Labs   Lab 09/20/24  0640 09/19/24  0521 09/18/24  0551 09/17/24  0630 09/16/24  0635   HGB 9.9* 10.0* 9.9* 10.1* 10.3*   - Continue to monitor CBC      Hypertension (benign essential).  Third spacing/anasarca with ascites and pleural effusion, suspect from malnutrition with low albumin.  Chronic lymphedema.  [PTA: lisinopril 10 mg daily; PRN bumetanide 1 mg BID.]  * Lisinopril held on admit.  * BP's normal off meds.  * Initial issues with THELMA as above and received IVF's this hospitalization (cr subsequently normalized).  * CT 9/12 showed increasing anasarca, ascites and right pleural effusion.   * Underwent right thoracentesis 9/13, 100 ml removed and cultures NGTD. Also underwent paracentesis  9/13 as above.  * Started bumetanide 9/14.  - Continue bumetanide 1 mg PO BID currently on hold due to hyponatremia.  - Continue to hold  "lisinopril.  - Continue to monitor i/o's, daily wts.     Fibromyalgia with chronic pain.  * PTA on regimen including meloxicam, scheduled tramadol 100 mg TID and PRN oxycodone.  * PTA tramadol held on admit given THELMA.  * On 9/6, pt with withdrawal symptoms.   * On 9/7 am, scheduled tramadol restarted.  - Continue tramadol 100 gm TID.  - Continue PRN IV hydromorphone; minimize opioids as able.  - Continue to hold meloxicam.    Moderate malnutrition related to acute and chronic medical issues.  * Noted by Nutrition; see their documentation.  - Continue diet as tolerated.  - Continue supplements.    Weakness and physical deconditioning due to multiple acute and chronic medical issues.  * Pt very weak since hospitalization  * On 9/10, seen by PT and recommended home with assist and home cares vs TCU.  -  Overall, pt and family feels she will be capable to go home with assistance from  with home health cares/therapies.    Hypokalemia;  Potassium low at 3  Started on replacement protocol     GERD.  * Pt with burning pain this hospitalization.  * Started on sucralfate 9/11, noted helped only a little.  - Continue pantoprazole BID and sucralfate QID.     Asthma.  - Continue fluticasone-vilanterol and PRN albuterol.        Clinically Significant Risk Factors         # Hyponatremia: Lowest Na = 123 mmol/L in last 2 days, will monitor as appropriate      # Hypoalbuminemia: Lowest albumin = 2.6 g/dL at 9/18/2024  5:51 AM, will monitor as appropriate               # Overweight: Estimated body mass index is 27.75 kg/m  as calculated from the following:    Height as of this encounter: 1.664 m (5' 5.51\").    Weight as of this encounter: 76.8 kg (169 lb 6.4 oz).   # Moderate Malnutrition: based on nutrition assessment      # Financial/Environmental Concerns: none (denies)  # Asthma: noted on problem list              Diet: Diet  Low Fiber Diet  Fluid restriction 1500 ML FLUID  Snacks/Supplements Adult: Other; apple CLEAR at " "brkfst; orange GelateinPLUS at lunch; With Meals    Prophylaxis: PCD's and ambulation  Ochoa Catheter: Not present  Lines: PRESENT      Port A Cath Single 07/30/20 Left Chest wall-Site Assessment: WDL      Code Status: No CPR- Do NOT Intubate changed from full code after discussed with patient on 9/21/2024        Expected discharge to home with family once hospice in place    Entered: Daniela Philip MD 09/22/2024 at 1:37 PM    Discussed with Patient and bedside RN        Interval History   Chart reviewed.   Patient is resting comfortably in bed.  Denies any pain currently.  No nausea currently.  Wants to go home with hospice.  Social work consulted and are following.  After discussion with the patient comfort care orders initiated on 9/22/2024 to stop the vital signs and lab checks.  No other acute issues    * Data reviewed today: I reviewed all new labs and imaging over the last 24 hours. I personally reviewed no images or ECG's today.    Physical Exam   Most recent vitals:   , Blood pressure 122/70, pulse 89, temperature 97.1  F (36.2  C), temperature source Oral, resp. rate 16, height 1.664 m (5' 5.51\"), weight 76.8 kg (169 lb 6.4 oz), SpO2 98%. O2 Device: None (Room air)    Vitals:    09/09/24 0633 09/17/24 1135 09/17/24 1143   Weight: 76.4 kg (168 lb 8 oz) 76.8 kg (169 lb 6.4 oz) 76.8 kg (169 lb 6.4 oz)     Vital signs with ranges:  Temp:  [97.1  F (36.2  C)-97.4  F (36.3  C)] 97.1  F (36.2  C)  Pulse:  [89-94] 89  Resp:  [16] 16  BP: (110-122)/(63-76) 122/70  SpO2:  [97 %-99 %] 98 %  Patient Vitals for the past 24 hrs:   BP Temp Temp src Pulse Resp SpO2   09/22/24 0758 122/70 97.1  F (36.2  C) Oral 89 16 98 %   09/22/24 0215 112/63 97.3  F (36.3  C) Axillary 91 16 97 %   09/21/24 1928 110/69 97.4  F (36.3  C) Axillary 94 16 98 %   09/21/24 1527 116/76 97.1  F (36.2  C) Axillary 94 16 99 %     I/O's last 24 hours:  I/O last 3 completed shifts:  In: 1090 [P.O.:1090]  Out: -     Constitutional: awake, alert, " "oriented, pleasant, conversant   Cardiovascular: regular rate and rhythm; no murmurs, rubs or gallops  Lungs: diminished in the bases, no crackles or wheezes  Gastrointestinal/Abdomen: mildly tender with some firmness without rebound or guarding, mildly distended, + bowel sounds  :   Musculoskeletal:   Skin/Extremities:  bilateral lower extremity lymphedema, trace to 1+ pitting in legs  Neurologic:   Psychiatric:   Hematologic/Lymphatic/Immunologic:        Data    Labs reviewed.  Recent Labs   Lab 09/22/24  0642 09/21/24  0707 09/20/24  1303 09/20/24  0640 09/19/24  0521 09/18/24  0551 09/17/24  1945 09/17/24  0630   WBC  --   --   --  10.4 10.9 13.3*  --  15.7*   HGB  --   --   --  9.9* 10.0* 9.9*  --  10.1*   MCV  --   --   --  84 84 84  --  85   PLT  --   --   --  517* 562* 542*  --  590*   NA  --  123* 125* 126* 125* 127*  --  126*  126*   POTASSIUM 3.8 3.7  --  3.9 3.7 3.6   < > 3.0*   CHLORIDE  --   --   --  87* 88* 89*  --  86*   CO2  --   --   --  25 26 25  --  24   BUN  --   --   --  36.7* 34.3* 27.4*  --  20.8   CR  --   --   --  1.02* 1.04* 1.05*  --  1.03*   ANIONGAP  --   --   --  14 11 13  --  16*   LOUISA  --   --   --  8.3* 8.2* 7.9*  --  7.9*   GLC  --   --   --  93 99 104*  --  115*   ALBUMIN  --   --   --  2.7*  --  2.6*  --  2.8*   PROTTOTAL  --   --   --  6.0*  --  5.8*  --  6.1*   BILITOTAL  --   --   --  3.4*  --  2.7*  --  2.6*   ALKPHOS  --   --   --  2,272*  --  1,875*  --  1,859*   ALT  --   --   --  28  --  22  --  23   AST  --   --   --  147*  --  98*  --  103*    < > = values in this interval not displayed.     Recent Labs   Lab Test 11/21/18  1714   NT-PROBNP, INPATIENT 196     Recent Labs   Lab 09/20/24  0640 09/19/24  0521 09/18/24  0551 09/17/24  0630 09/16/24  0635   GLC 93 99 104* 115* 101*     No lab results found.    No results for input(s): \"INR\", \"[id number]\" in the last 168 hours.  Recent Labs   Lab 09/20/24  0640 09/19/24  0521 09/18/24  0551 09/17/24  0630 09/16/24  0635 "   WBC 10.4 10.9 13.3* 15.7* 15.9*       MICRO:  Cultures (including blood and urine):  No lab results found in last 7 days.      No results found for this or any previous visit (from the past 24 hour(s)).    Medications   All medications were reviewed. MAR.    Infusions:  Current Facility-Administered Medications   Medication Dose Route Frequency Provider Last Rate Last Admin     Scheduled Medications:  Current Facility-Administered Medications   Medication Dose Route Frequency Provider Last Rate Last Admin    aspirin EC tablet 81 mg  81 mg Oral Daily Jorge Holliday MD   81 mg at 09/22/24 0935    [Held by provider] bumetanide (BUMEX) tablet 1 mg  1 mg Oral BID Daniela Philip MD   1 mg at 09/16/24 0907    fluticasone-vilanterol (BREO ELLIPTA) 100-25 MCG/ACT inhaler 1 puff  1 puff Inhalation Daily Rocky Uribe MD   1 puff at 09/20/24 0900    heparin lock flush 10 unit/mL injection 5-10 mL  5-10 mL Intracatheter Q24H Rocky Uribe MD   5 mL at 09/22/24 0642    heparin lock flush 100 unit/mL injection 5-10 mL  5-10 mL Intracatheter Q28 Days Rocky Uribe MD   5 mL at 09/21/24 0703    lidocaine (PF) (XYLOCAINE) 1 % injection 10 mL  10 mL Other Once Long Toribio MD        methylphenidate (RITALIN) tablet 10 mg  10 mg Oral BID Anastacio Dasilva APRN CNP   10 mg at 09/22/24 0800    miconazole (MICATIN) 2 % powder   Topical BID Abran Mann DO   Given at 09/22/24 0938    pantoprazole (PROTONIX) EC tablet 40 mg  40 mg Oral BID AC Rocky Uribe MD   40 mg at 09/22/24 0642    polyethylene glycol (MIRALAX) Packet 17 g  17 g Oral BID Daniela Philip MD   17 g at 09/22/24 0935    senna-docusate (SENOKOT-S/PERICOLACE) 8.6-50 MG per tablet 1-2 tablet  1-2 tablet Oral BID Jorge Holliday MD   2 tablet at 09/22/24 0935    sodium chloride (PF) 0.9% PF flush 10-20 mL  10-20 mL Intracatheter Q28 Days Rocky Uribe MD        sodium chloride tablet 1 g  1 g Oral TID  w/meals Sara Nicole MD   1 g at 09/21/24 1805    sucralfate (CARAFATE) suspension 1 g  1 g Oral 4x Daily AC & HS Chris Omer MD   1 g at 09/22/24 0642    traMADol (ULTRAM) tablet 100 mg  100 mg Oral TID Daniela Philip MD   100 mg at 09/22/24 0808     PRN Medications:  Current Facility-Administered Medications   Medication Dose Route Frequency Provider Last Rate Last Admin    albuterol (PROVENTIL HFA/VENTOLIN HFA) inhaler  2 puff Inhalation Q6H PRN Rocky Uribe MD   2 puff at 09/10/24 2055    artificial saliva (BIOTENE DRY MOUTHWASH) liquid 10 mL  10 mL Swish & Spit 4x Daily PRN Daniela Philip MD        atropine 1 % ophthalmic solution 2 drop  2 drop Sublingual Q4H PRN Daniela Philip MD        [START ON 9/25/2024] bisacodyl (DULCOLAX) suppository 10 mg  10 mg Rectal Once PRN Daniela Philip MD        bisacodyl (DULCOLAX) suppository 10 mg  10 mg Rectal Daily PRN Jorge Holliday MD   10 mg at 09/09/24 0954    calcium carbonate (TUMS) chewable tablet 500-1,000 mg  500-1,000 mg Oral TID PRN Jorge Holliday MD   1,000 mg at 09/17/24 2213    carboxymethylcellulose PF (REFRESH PLUS) 0.5 % ophthalmic solution 1-2 drop  1-2 drop Both Eyes Q1H PRN Daniela Philip MD        heparin lock flush 10 unit/mL injection 5-10 mL  5-10 mL Intracatheter Q1H PRN Rocky Uribe MD   5 mL at 09/22/24 0939    hydrALAZINE (APRESOLINE) tablet 10 mg  10 mg Oral Q4H PRN Rocky Uribe MD        Or    hydrALAZINE (APRESOLINE) injection 10 mg  10 mg Intravenous Q4H PRN Rocky Uribe MD        HYDROmorphone (PF) (DILAUDID) injection 0.3-0.5 mg  0.3-0.5 mg Intravenous Q4H PRN Jorge Holliday MD   0.5 mg at 09/21/24 0642    methylphenidate (RITALIN) tablet 10 mg  10 mg Oral Daily PRN Dreyer, Patrick, DO        mineral oil-hydrophilic petrolatum (AQUAPHOR)   Topical Q1H PRN Daniela Philip MD        naloxone (NARCAN) injection 0.1 mg  0.1 mg Intravenous Q2 Min PRN Daniela Philip MD         naloxone (NARCAN) injection 0.2 mg  0.2 mg Intravenous Q2 Min PRN Daniela Philip MD        ondansetron (ZOFRAN ODT) ODT tab 4 mg  4 mg Oral Q6H PRN Rocky Uribe MD   4 mg at 09/20/24 0442    Or    ondansetron (ZOFRAN) injection 4 mg  4 mg Intravenous Q6H PRN Rocky Uribe MD   4 mg at 09/22/24 0936    polyethylene glycol (MIRALAX) Packet 17 g  17 g Oral Daily PRN Jorge Holliday MD   17 g at 09/21/24 0251    prochlorperazine (COMPAZINE) injection 5 mg  5 mg Intravenous Q6H PRN Rocky Uribe MD   5 mg at 09/22/24 0224    Or    prochlorperazine (COMPAZINE) tablet 5 mg  5 mg Oral Q6H PRN Rocky Uribe MD   5 mg at 09/09/24 1414    Or    prochlorperazine (COMPAZINE) suppository 12.5 mg  12.5 mg Rectal Q12H PRN Rocky Uribe MD        senna-docusate (SENOKOT-S/PERICOLACE) 8.6-50 MG per tablet 1-2 tablet  1-2 tablet Oral BID PRN Jorge Holliday MD   2 tablet at 09/15/24 2117    sennosides (SENOKOT) tablet 1 tablet  1 tablet Oral BID PRN Daniela Philip MD        sodium chloride (PF) 0.9% PF flush 10-20 mL  10-20 mL Intracatheter q1 min prn Rocky Uribe MD        sodium chloride (PF) 0.9% PF flush 10-20 mL  10-20 mL Intracatheter Q1H PRN Rocky Uribe MD   20 mL at 09/19/24 0832

## 2024-09-22 NOTE — PROGRESS NOTES
Oncology Chart Check    Patient has elected for comfort measures which we support.   Plan for discharge home with hospice care     Patrick Dreyer, DO  [facility]

## 2024-09-22 NOTE — PLAN OF CARE
Physical Therapy Discharge Summary    Reason for therapy discharge:    Change in medical status.    Progress towards therapy goal(s). See goals on Care Plan in [facility] electronic health record for goal details.  Goals not met.  Barriers to achieving goals:   pt transitioned to comfort cares.    Therapy recommendation(s):    No further therapy is recommended.

## 2024-09-23 NOTE — DISCHARGE SUMMARY
Discharge Note    Patient discharged to home with services via private vehicle  accompanied by significant other  and son.  Port : Discontinued and Heparinized and De-accessed   Prescriptions filled and given to patient/family.   Belongings reviewed and sent with patient and family.   Home medications returned to patient: Yes  Equipment sent with: patient, N/A.   patient and family verbalizes understanding of discharge instructions. AVS given to patient and family.

## 2024-09-23 NOTE — PROGRESS NOTES
Care Management Discharge Note    Discharge Date: 09/23/2024       Discharge Disposition: Hospice    Discharge Services: None    Discharge DME: None    Discharge Transportation: family or friend will provide    Private pay costs discussed: Not applicable    Does the patient's insurance plan have a 3 day qualifying hospital stay waiver?  No    PAS Confirmation Code:    Patient/family educated on Medicare website which has current facility and service quality ratings: yes    Education Provided on the Discharge Plan: Yes  Persons Notified of Discharge Plans: HUC, Charge Nurse, Bedside Nurse, MD, Hospice agency, Patient and family  Patient/Family in Agreement with the Plan: yes    Handoff Referral Completed: No, handoff not indicated or clinically appropriate    Additional Information:  Patient will be discharging home with family. Patient will be signing on with [facility]. JACOBY Jones spoke with Lou from [facility] yesterday. Start of care will be between [id number]. Spouse Rudy would like to transport the patient however he is in a wheelchair himself. Writer updated bedside nurse of concerns with spouse transporting the patient. Patient will discharge with 3 day supply of medication. Orders sent via DOD to [facility]. Patient is to discharge home with family at 11.     Joseph Guerrero MSW, TLJEROW  [facility]  Care Management

## 2024-09-23 NOTE — DISCHARGE SUMMARY
[facility]  Hospitalist Discharge Summary      Date of Admission:  9/5/2024  Date of Discharge:  9/23/2024 10:59 AM  Discharging Provider: Daniela Philip MD  Discharge Service: Hospitalist Service    Discharge Diagnoses   Jany Park is a 79 year old female with past medical history significant for endometrial cancer; HTN; asthma; fibromyalgia with chronic pain; and GERD; who presented 9/5/2024 with abnormal outpatient labs and nausea/vomiting, found with THELMA.     On initial evaluation, pt was afebrile, VSS. Labs notable for CBC with WBC normal, hgb 10.3, platelets 577K; BMP with sodium 134, chloride 91, BUN 49.5, cr 2.1, calcium 9.7, AG 18; LFT's with ALP 1633, AST 70, tbili 1.6; UA with 6 WBC, small LE, 19 hyaline casts.     Goals of care discussion was done with patient and family and oncology.  Due to her functional decline and severe deconditioning hospice recommended.  Patient and family decided to discharge to home with hospice        Endometrial cancer, metastatic  Abnormal LFTs, suspect related to metastatic disease.  Nausea and vomiting, suspect multifactorial including related to above, also possibly constipation.  Abdominal pain, suspect related to above.  Poor intake related to above.  * Followed by Dr. Dreyer of [facility]. See Oncology note for oncologic history. Noted diagnosed and had initial surgery 2015 with subsequently chemotherapy. Noted subsequently development of liver metastases. Noted last chemotherapy in 5/2024. Noted that pt underwent CT imaging and BM biopsy 9/4 (results not available on admit). Noted was suppose to undergo chemotherapy on 9/10/2024.  * Initial presentation as above. On admit, reported ~3 weeks of nausea and vomiting, intake of ~25% of baseline. LFT's elevated.  [facility]  * On 9/6, per Oncology, noted that CT 9/4 showed disease progression in the liver and lymph nodes. Also noted that BM biopsy showed dysplasia, pending  cytogenetics. Noted that her anemia may be due to an exhausted bone marrow from her years of chemotherapy. ?nausea related to IV iron transfusion. Brain MRI ordered that was negative for acute findings.  * On 9/8, tbili normalized, AST and ALP stable/slightly improved.  * On 9/9, more nauseated but some improvement after BM. Seen by Dr. Dreyer (primary Oncologist) and recommended holding chemotherapy; discussed about hospice, but pt not ready to consider yet.   * On 9/11, noted abdominal burning pain, started on sucralfate.  * On 9/12, increased leukocytosis and burning abdominal pain. CT CAP showed innumerable pulmonary and hepatic metastases unchanged; modest ascites now present, slightly increased, peritoneal implants consistent with carcinomatosis; mckenzie hepatis retroperitoneal and mesenteric adenopathy unchanged; increasing anasarca; tiny volume right pleural effusion minimally larger.  * On 9/13-9/14, had more discussions with Oncology about worsened functional status and goals of care.  Underwent paracentesis 9/13, 300 ml removed, specimen noted to be clotted so no cell count noted.  * On 9/16, abdominal pain better.  - Continue to monitor LFT's.  - Continue PPI and sucralfate.  Schedule tramadol 50 mg 3 times daily patient and family wanting it to increase the dosage to 100 mg 3 times daily.  Order changed on 9/17/2024  - Continue PRN IV hydromorphone; minimize opioids as able.  - Continue PRN ondansetron, PRN prochlorperazine.  - Follow-up with Dr. Dreyer, [facility], outpatient, can discuss further goals of cares.  - Appreciate help from [facility].  [place] [facility] team consulted on 9/17/2024.  Signed off on 9/17/2024   elevated LFTs were felt to be secondary to metastatic disease to the liver as per GI team. No concern  biliary obstruction.  They recommended monitoring LFTs     LFTs are stable currently     [place] onc[facility] continues to have goals of care discussions with patient and family.  Patient decided to  discharge with hospice care  Social work consulted and are following.  Patient will establish with [facility] on discharge     Discussed about CODE STATUS being full code and with her going with the option of comfort cares changing CODE STATUS to DNR/DNI discussed with the patient.  She is agreeable.  CODE STATUS changed to DNR/DNI on 9/21/2024     After discussion with patient on 9/22/2024 comfort care orders initiated.  Vital signs and lab work discontinued     Acute kidney injury, suspect prerenal (from N/V, poor oral intake), contrast nephropathy +/- NSAID, with ATN, resolved.  * Initial presentation as above. Cr 2.1, BUN 49.5, UA significant for 19 hyaline casts. Noted that pt had a contrast CT 9/3 as well. Started on IVF's on admit. PTA lisinopril and meloxicam held on admit.  * IVF's completed 9/7.  * Cr normalized 9/9.  * Started on diuretics for anasarca 9/14.          Recent Labs   Lab 09/20/24  0640 09/19/24  0521 09/18/24  0551 09/17/24  0630 09/16/24  0635   CR 1.02* 1.04* 1.05* 1.03* 1.07*   Estimated Creatinine Clearance: 47.1 mL/min (A) (based on SCr of 1.02 mg/dL (H)).     Patient is on comfort care only  Leukocytosis, suspect more malignancy related   * Pt with gradually increasing WBC this hospitalization.   * On 9/12, procalcitonin 1.06. CT of chest/abd/pelvis as above showed increasing anasarca and stable metastatic disease, no inflammation.  * On 9/13, empirically on ceftriaxone. Thoracentesis cultures NGTD.          Recent Labs   Lab 09/20/24  0640 09/19/24  0521 09/18/24  0551 09/17/24  0630 09/16/24  0635   WBC 10.4 10.9 13.3* 15.7* 15.9*   - Continue to monitor CBC.  - Continue ceftriaxone (started 9/13) - stopped  antibiotics on 9/17/2024     Constipation due to inactivity and opioids.  * On 9/8, pt c/o constipation. Bowel regimen intensified.  * On 9/9, had BM after suppository.  * On 9/16, pt notes no BM since last suppository (9/9).  - Schedule polyethylene glycol twice daily and  senna-docusate.  - Continue PRN polyethylene glycol, senna-docusate and PRN bisacodyl suppository.  - Continue to increase activity as able.  Patient had 2 bowel movements on 9/18/2024     Dyspnea with chest tightness.  * On 9/8, pt noted feeling dyspneic with chest tightness. ECG showed SR, no acute ischemic changes. CXR showed small pleural effusions, no focal infiltrates/edema.  * On 9/9, chest tightness better.  - Continue to monitor clinically.     Hyponatremia, suspect secondary to SIADH  * Sodium 134 on admit.   * Sodium normalized 9/8.  * Started diuretics 9/14.           Recent Labs   Lab 09/21/24  0707 09/20/24  1303 09/20/24  0640 09/19/24  0521 09/18/24  0551 09/17/24  0630   * 125* 126* 125* 127* 126*  126*   - Continue to monitor BMP.   Started on Urena supplementation twice daily.  Patient was unable to tolerate Urena due to nausea  On fluid restriction 1500 mL daily  Sodium down to 125 on 9/19/2024  Nephrology consultation requested.  Salt tablets 1 g 3 times daily started on 9/19/2024  Follow BMP closely  Bumex on hold currently.      Nephrology consulted and currently signed off as patient is comfort care only        Chronic normocytic anemia.  * Hgb 10.3 on admit. Noted most recently 11.2 g/dl 3/2024.   * As above, per Oncology, noted that BM biopsy showed dysplasia, pending cytogenetics. Noted that her anemia may be due to an exhausted bone marrow from her years of chemotherapy.           Recent Labs   Lab 09/20/24  0640 09/19/24  0521 09/18/24  0551 09/17/24  0630 09/16/24  0635   HGB 9.9* 10.0* 9.9* 10.1* 10.3*   - Continue to monitor CBC      Hypertension (benign essential).  Third spacing/anasarca with ascites and pleural effusion, suspect from malnutrition with low albumin.  Chronic lymphedema.  [PTA: lisinopril 10 mg daily; PRN bumetanide 1 mg BID.]  * Lisinopril held on admit.  * BP's normal off meds.  * Initial issues with THELMA as above and received IVF's this hospitalization (cr  subsequently normalized).  * CT 9/12 showed increasing anasarca, ascites and right pleural effusion.   * Underwent right thoracentesis 9/13, 100 ml removed and cultures NGTD. Also underwent paracentesis  9/13 as above.  * Started bumetanide 9/14.  - Continue bumetanide 1 mg PO BID currently on hold due to hyponatremia.  - Continue to hold lisinopril.  - Continue to monitor i/o's, daily wts.     Fibromyalgia with chronic pain.  * PTA on regimen including meloxicam, scheduled tramadol 100 mg TID and PRN oxycodone.  * PTA tramadol held on admit given THELMA.  * On 9/6, pt with withdrawal symptoms.   * On 9/7 am, scheduled tramadol restarted.  - Continue tramadol 100 gm TID.  - Continue PRN IV hydromorphone; minimize opioids as able.  - Continue to hold meloxicam.     Moderate malnutrition related to acute and chronic medical issues.  * Noted by Nutrition; see their documentation.  - Continue diet as tolerated.  - Continue supplements.     Weakness and physical deconditioning due to multiple acute and chronic medical issues.  * Pt very weak since hospitalization  * On 9/10, seen by PT and recommended home with assist and home cares vs TCU.  -  Overall, pt and family feels she will be capable to go home with assistance from  with home health cares/therapies.     Hypokalemia;  Potassium low at 3  Started on replacement protocol     GERD.  * Pt with burning pain this hospitalization.  * Started on sucralfate 9/11, noted helped only a little.  - Continue pantoprazole BID and sucralfate QID.     Asthma.  - Continue fluticasone-vilanterol and PRN albuterol.               Clinically Significant Risk Factors         # Hyponatremia: Lowest Na = 123 mmol/L in last 2 days, will monitor as appropriate      # Hypoalbuminemia: Lowest albumin = 2.6 g/dL at 9/18/2024  5:51 AM, will monitor as appropriate                # Overweight: Estimated body mass index is 27.75 kg/m  as calculated from the following:    Height as of this  "encounter: 1.664 m (5' 5.51\").    Weight as of this encounter: 76.8 kg (169 lb 6.4 oz).   # Moderate Malnutrition: based on nutrition assessment       # Financial/Environmental Concerns: none (denies)  # Asthma: noted on problem list                     Diet: Diet  Low Fiber Diet  Fluid restriction 1500 ML FLUID  Snacks/Supplements Adult: Other; apple CLEAR at brkfst; orange GelateinPLUS at lunch; With Meals    Prophylaxis: PCD's and ambulation  Ochoa Catheter: Not present  Lines: PRESENT      Port A Cath Single 07/30/20 Left Chest wall-Site Assessment: WDL      Code Status: No CPR- Do NOT Intubate changed from full code after discussed with patient on 9/21/2024      Discharge disposition;  Patient discharged home in stable condition with family with home hospice    Daniela Philip MD   Page [phone](7AM-6PM)         Clinically Significant Risk Factors     # Overweight: Estimated body mass index is 27.75 kg/m  as calculated from the following:    Height as of this encounter: 1.664 m (5' 5.51\").    Weight as of this encounter: 76.8 kg (169 lb 6.4 oz).  # Moderate Malnutrition: based on nutrition assessment      Follow-ups Needed After Discharge   Follow-up Appointments     Follow-up and recommended labs and tests       Follow-up with hospice team PRN               Discharge Disposition   Discharged to home  Condition at discharge: Stable        Consultations This Hospital Stay   NUTRITION SERVICES ADULT IP CONSULT  HEMATOLOGY & ONCOLOGY IP CONSULT  PHYSICAL THERAPY ADULT IP CONSULT  OCCUPATIONAL THERAPY ADULT IP CONSULT  CARE MANAGEMENT / SOCIAL WORK IP CONSULT  GASTROENTEROLOGY IP CONSULT  WOUND OSTOMY CONTINENCE NURSE  IP CONSULT  SPIRITUAL HEALTH SERVICES IP CONSULT  SPIRITUAL HEALTH SERVICES IP CONSULT  LYMPHEDEMA THERAPY IP CONSULT  NEPHROLOGY IP CONSULT  SPIRITUAL HEALTH SERVICES IP CONSULT    Code Status   Prior    Time Spent on this Encounter   Daniela PEREIRA MD, personally saw the patient today and spent " greater than 30 minutes discharging this patient.       Daniela Philip MD  [place] [id number] [facility]  [address]  [place] [address]  Phone: [phone]  ______________________________________________________________________    Physical Exam   Vital Signs:                    Weight: 169 lbs 6.4 oz  Constitutional: awake, alert, oriented, pleasant, conversant, appears chronically ill and tired  Cardiovascular: regular rate and rhythm; no murmurs, rubs or gallops  Lungs: diminished in the bases, no crackles or wheezes  Gastrointestinal/Abdomen: mildly tender with some firmness without rebound or guarding, mildly distended, + bowel sounds  :   Musculoskeletal:   Skin/Extremities:  bilateral lower extremity lymphedema, trace to 1+ pitting in legs  Neurologic:   Psychiatric:   Hematologic/Lymphatic/Immunologic:           Primary Care Physician   Selvin Torres    Discharge Orders      Hospice Referral      Activity    Your activity upon discharge: activity as tolerated     Reason for your hospital stay    1. Acute kidney injury, suspect prerenal (from N/V, poor oral intake), contrast nephropathy +/- NSAID, with ATN.  2. Endometrial cancer, metastatic.  3. Abnormal LFTs, suspect related to metastatic disease.  4. Nausea and vomiting, suspect multifactorial including related to above, also possibly constipation.  5. Poor intake related to above.  6. Constipation due to inactivity and opioids.  7. Dyspnea with chest tightness.  8. Hyponatremia, suspect nutritional/hypovolemia.  9. Chronic normocytic anemia.  10. Weakness and physical deconditioning due to multiple acute and chronic medical issues.     Reason for your hospital stay    1. Acute kidney injury, suspect prerenal (from N/V, poor oral intake), contrast nephropathy +/- NSAID, with ATN.  2. Endometrial cancer, metastatic.  3. Abnormal LFTs, suspect related to metastatic disease.  4. Nausea and vomiting, suspect multifactorial  including related to above, also possibly constipation.  5. Poor intake related to above.  6. Leukocytosis, suspect malignancy related.  7. Constipation due to inactivity and opioids.  8. Dyspnea with chest tightness.  9. Hyponatremia, suspect nutritional/hypovolemia, question subsequently hypervolemia component.  10. Third spacing/anasarca with ascites and pleural effusion, suspect from malnutrition with low albumin.  11. Moderate malnutrition related to acute and chronic medical issues.  12. Weakness and physical deconditioning due to multiple acute and chronic medical issues.     Follow-up and recommended labs and tests     Follow-up with hospice team PRN     Diet    Follow this diet upon discharge: Orders Placed This Encounter      Snacks/Supplements Adult: Ensure Clear; With Meals      Regular Diet Adult         Significant Results and Procedures   Most Recent 3 CBC's:  Recent Labs   Lab Test 09/20/24  0640 09/19/24  0521 09/18/24  0551   WBC 10.4 10.9 13.3*   HGB 9.9* 10.0* 9.9*   MCV 84 84 84   * 562* 542*     Most Recent 3 BMP's:  Recent Labs   Lab Test 09/22/24  0642 09/21/24  0707 09/20/24  1303 09/20/24  0640 09/19/24  0521 09/18/24  0551   NA  --  123* 125* 126* 125* 127*   POTASSIUM 3.8 3.7  --  3.9 3.7 3.6   CHLORIDE  --   --   --  87* 88* 89*   CO2  --   --   --  25 26 25   BUN  --   --   --  36.7* 34.3* 27.4*   CR  --   --   --  1.02* 1.04* 1.05*   ANIONGAP  --   --   --  14 11 13   LOUISA  --   --   --  8.3* 8.2* 7.9*   GLC  --   --   --  93 99 104*     Most Recent 2 LFT's:  Recent Labs   Lab Test 09/20/24  0640 09/18/24  0551   * 98*   ALT 28 22   ALKPHOS 2,272* 1,875*   BILITOTAL 3.4* 2.7*     Most Recent 3 INR's:  Recent Labs   Lab Test 11/21/18  1714 04/13/18  0735   INR 1.05 0.92     Most Recent INR's and Anticoagulation Dosing History:  Anticoagulation Dose History          Latest Ref Rng & Units 5/22/2013 5/23/2013 5/24/2013 5/25/2013 4/13/2018 11/21/2018   Recent Dosing and Labs    warfarin ANTICOAGULANT (COUMADIN) 4 MG tablet - - 4 mg, $Given - - - -   warfarin ANTICOAGULANT (COUMADIN) 7.5 MG tablet - 7.5 mg, $Given - - - - -   INR 0.86 - 1.14 0.95  1.33  2.12  1.79  0.92  1.05       Details                 Most Recent 3 Creatinines:  Recent Labs   Lab Test 09/20/24  0640 09/19/24  0521 09/18/24  0551   CR 1.02* 1.04* 1.05*     Most Recent 3 Hemoglobins:  Recent Labs   Lab Test 09/20/24  0640 09/19/24  0521 09/18/24  0551   HGB 9.9* 10.0* 9.9*     Most Recent 3 Troponin's:  Recent Labs   Lab Test 11/21/18  1714   TROPI <0.015     Most Recent 3 BNP's:  Recent Labs   Lab Test 11/21/18  1714   NTBNPI 196     Most Recent D-dimer:  Recent Labs   Lab Test 11/21/18  1230   DD 1.8*     Most Recent Cholesterol Panel:No lab results found.  7-Day Micro Results       No results found for the last 168 hours.          Most Recent TSH and T4:No lab results found.  Most Recent Hemoglobin A1c:No lab results found.  Most Recent 6 glucoses:  Recent Labs   Lab Test 09/20/24  0640 09/19/24  0521 09/18/24  0551 09/17/24  0630 09/16/24  0635 09/13/24  0635   GLC 93 99 104* 115* 101* 124*     Most Recent Urinalysis:  Recent Labs   Lab Test 09/05/24  1857   COLOR Yellow   APPEARANCE Clear   URINEGLC Negative   URINEBILI Negative   URINEKETONE Negative   SG 1.015   UBLD Negative   URINEPH 5.5   PROTEIN Negative   NITRITE Negative   LEUKEST Small*   RBCU <1   WBCU 6*     Most Recent ABG:No lab results found.  Most Recent ESR & CRP:  Recent Labs   Lab Test 07/30/20  1343   .0*     Most Recent Anemia Panel:  Recent Labs   Lab Test 09/20/24  0640   WBC 10.4   HGB 9.9*   HCT 29.1*   MCV 84   *     Most Recent CPK:No lab results found.,   Results for orders placed or performed during the hospital encounter of 09/05/24   MR Brain w/o & w Contrast    Narrative    EXAM: MR BRAIN W/O and W CONTRAST  LOCATION: [facility]  DATE: 9/6/2024    INDICATION: Recalcitrant nausea, metastatic  endometrial cancer, evaluate for brain mets  COMPARISON: None.  CONTRAST: 7 mL Gadavist  TECHNIQUE: Routine multiplanar multisequence head MRI without and with intravenous contrast.    FINDINGS:  INTRACRANIAL CONTENTS: No acute or subacute infarct. No mass, acute hemorrhage, or extra-axial fluid collections. Scattered nonspecific T2/FLAIR hyperintensities within the cerebral white matter most consistent with mild chronic microvascular ischemic   change. Mild generalized cerebral atrophy. No hydrocephalus. Normal position of the cerebellar tonsils. No pathologic contrast enhancement is identified within the limitation of motion degraded postcontrast imaging.    SELLA: No abnormality accounting for technique.    OSSEOUS STRUCTURES/SOFT TISSUES: Normal marrow signal. The major intracranial vascular flow voids are maintained.     ORBITS: No abnormality accounting for technique.     SINUSES/MASTOIDS: No paranasal sinus mucosal disease. No middle ear or mastoid effusion.       Impression    IMPRESSION:  1.  No acute intracranial process. Specifically, no evidence of intracranial metastatic disease.  2.  Generalized brain atrophy and presumed microvascular ischemic changes as detailed above.   XR Chest Port 1 View    Narrative    EXAM: XR CHEST PORT 1 VIEW  LOCATION: [facility]  DATE: 9/8/2024    INDICATION: Dyspnea, hypoxia, evaluate for focal infiltrates edema.  COMPARISON: CT 9/4/2024      Impression    IMPRESSION: Left chest port. Stable elevated right hemidiaphragm. Small pleural effusions. The cardiac silhouette and pulmonary vasculature are normal. The known pulmonary nodules are not well visualized.   XR Chest Port 1 View    Narrative    XR CHEST PORT 1 VIEW  9/11/2024 3:52 PM       INDICATION: elevated wbc  COMPARISON: 9/8/2024       Impression    IMPRESSION: Elevated right hemidiaphragm. Left IJ port tip is in good  position in the low SVC. No acute infiltrate or significant  change.    JABIER ABARCA MD         SYSTEM ID:  [id number]   CT Chest/Abdomen/Pelvis w Contrast    Narrative    EXAM: CT CHEST/ABDOMEN/PELVIS W CONTRAST  LOCATION: [facility]  DATE: 9/12/2024    INDICATION: burning in chest and upper abdomen, endometrial cancer, also has rising white count  COMPARISON: 9/4/2024  TECHNIQUE: CT scan of the chest, abdomen, and pelvis was performed following injection of IV contrast. Multiplanar reformats were obtained. Dose reduction techniques were used.   CONTRAST: 84ml isovue 370    FINDINGS:   LUNGS AND PLEURA: Numerous bilateral pulmonary nodules unchanged consistent metastases. Trace volume right pleural effusion slightly more prominent. Minimal atelectasis at right lung base.    MEDIASTINUM/AXILLAE: Low-density subcarinal and right hilar adenopathy unchanged. Subcarinal node measures approximately 2.2 x 1.3 cm. Largest right hilar node measures 1.8 x 1.4 cm left-sided Port-A-Cath.    CORONARY ARTERY CALCIFICATION: Moderate.    HEPATOBILIARY: Innumerable hepatic metastases unchanged gallbladder and bile ducts remain within normal limits.    PANCREAS: Normal.    SPLEEN: Stable low-attenuation within splenic hilum unchanged favored to represent metastatic disease.    ADRENAL GLANDS: Normal.    KIDNEYS/BLADDER: Normal.    BOWEL: No obstruction or inflammatory change. Mild but increased volume ascites. Enhancing soft tissue density along right gutter consistent with metastatic implants. Implants also seen within the omentum.    LYMPH NODES: Bulky mckenzie hepatis lymph nodes measuring up to 3.9 x 3.7 cm unchanged. Mesenteric adenopathy versus tumor implants throughout small bowel mesentery unchanged. Mild periaortic adenopathy stable.    VASCULATURE: Unremarkable.    PELVIC ORGANS: Ascites. Hysterectomy.    MUSCULOSKELETAL: Increasing subcutaneous edema throughout the abdomen and pelvis consistent with anasarca.      Impression    IMPRESSION:  1.  Innumerable  pulmonary and hepatic metastases unchanged.  2.  Modest ascites now present slightly increased. Peritoneal implants consistent with carcinomatosis.  3.  Maria Esther hepatis retroperitoneal and mesenteric adenopathy unchanged.  4.  Increasing anasarca.  5.  Tiny volume right pleural effusion minimally larger.   US Paracentesis with Albumin    Narrative    US PARACENTESIS WITH ALBUMIN 9/13/2024 4:15 PM    CLINICAL HISTORY: History of endometrial carcinoma. Metastatic disease  within the chest and abdomen.    PROCEDURE: Informed consent obtained. Time out performed. A limited  ultrasound of the abdomen was performed demonstrating small to  moderate volume of ascites. Largest collection of fluid is localized  to the right lower quadrant and the overlying skin was prepped and  draped in a sterile fashion. 10 mL of 1% lidocaine was infused into  local soft tissues. An 8 Arabic catheter system was introduced into  the abdominal ascites under ultrasound guidance.    0.3 liters of clear fluid were removed and sent for specified labs.    Patient tolerated procedure well.    Ultrasound imaging was obtained and placed in the patient's permanent  medical record.      Impression    IMPRESSION:  1.  Ultrasound-guided diagnostic/therapeutic paracentesis performed  without complication.    DANA ATKINSON MD         SYSTEM ID:  [id number]   US Thoracentesis    Narrative    EXAM:  1. RIGHT THORACENTESIS  2. ULTRASOUND GUIDANCE  LOCATION: [facility]  DATE/TIME: 9/13/2024 4:15 PM    INDICATION: History of endometrial carcinoma. Metastatic disease  within the chest and abdomen.    PROCEDURE: Informed consent obtained. Time out performed. A limited  ultrasound of the right chest was performed demonstrating a small  right pleural effusion. The largest collection of fluid was localized  and the overlying skin was prepped and draped in a sterile fashion. 10  mL of 1% lidocaine was infused into local soft tissues. A 5 Arabic  catheter  system was introduced into the pleural effusion under  ultrasound guidance.    0.1 liters of clear fluid were removed and sent for specified labs.    Patient tolerated procedure well.    Ultrasound images have been permanently captured for documentation.      Impression    IMPRESSION:  Ultrasound-guided diagnostic/therapeutic right thoracentesis performed  without complication.    DANA ATKINSON MD         SYSTEM ID:  [id number]       Discharge Medications   Discharge Medication List as of 9/23/2024 10:35 AM        START taking these medications    Details   atropine 1 % ophthalmic solution Place 2 drops under the tongue every 4 hours as needed for secretions., Disp-1 mL, R-0, E-Prescribe      bisacodyl (DULCOLAX) 10 MG suppository Place 1 suppository (10 mg) rectally once as needed for other (for no bowel movement for 72 hours)., Disp-10 suppository, R-0, E-Prescribe      calcium carbonate (TUMS) 500 MG chewable tablet Take 2 tablets (1,000 mg) by mouth 3 times daily as needed for heartburn., Disp-60 tablet, R-1, E-Prescribe      ciprofloxacin (CIPRO) 500 MG tablet Take 1 tablet (500 mg) by mouth 2 times daily., Disp-4 tablet, R-0, E-Prescribe      !! polyethylene glycol (MIRALAX) 17 GM/Dose powder Take 17 g by mouth daily., Disp-510 g, R-0, E-Prescribe      !! polyethylene glycol (MIRALAX) 17 GM/Dose powder Take 17 g by mouth daily., Disp-510 g, R-1, E-Prescribe      senna-docusate (SENOKOT-S/PERICOLACE) 8.6-50 MG tablet Take 1-2 tablets by mouth 2 times daily as needed for constipation., Disp-60 tablet, R-1, E-Prescribe      sucralfate (CARAFATE) 1 GM/10ML suspension Take 10 mLs (1 g) by mouth 4 times daily (before meals and nightly)., Disp-1200 mL, R-1, E-Prescribe       !! - Potential duplicate medications found. Please discuss with provider.        CONTINUE these medications which have CHANGED    Details   oxyCODONE (ROXICODONE) 5 MG tablet Take 1 tablet (5 mg) by mouth every 8 hours as needed for severe  pain. DURING CHEMO ONLY, Disp-10 tablet, R-0, E-Prescribe      traMADol (ULTRAM) 50 MG tablet Take 2 tablets (100 mg) by mouth every 8 hours., Disp-10 tablet, R-0, E-Prescribe           CONTINUE these medications which have NOT CHANGED    Details   ALBUTEROL 90 MCG/ACT IN AERS Inhale 1-2 puffs into the lungs every 6 hours as needed for shortness of breath., Historical      aspirin 81 MG EC tablet Take 81 mg by mouth daily., Historical      Biotin (BIOTIN FORTE) 5 MG TABS Take 1 tablet by mouth daily., Historical      clobetasol propionate (TEMOVATE) 0.05 % external cream Apply topically daily as needed (SKIN IRRITATION ON ARMS).Historical      fluticasone (FLONASE) 50 MCG/ACT nasal spray Spray 2 sprays into both nostrils daily as needed for rhinitis or allergies., Historical      fluticasone-salmeterol (ADVAIR) 250-50 MCG/ACT inhaler Inhale 1-2 puffs into the lungs daily., Historical      ipratropium (ATROVENT) 0.03 % nasal spray Spray 2 sprays into both nostrils every 12 hours., Historical      lisinopril (ZESTRIL) 10 MG tablet Take 10 mg by mouth daily., Historical      methylphenidate (RITALIN) 10 MG tablet Take 10 mg by mouth 2 times daily as needed. DURING CHEMO ONLY, Historical      omeprazole (PRILOSEC) 40 MG DR capsule Take 40 mg by mouth every morning , Historical      ondansetron (ZOFRAN ODT) 8 MG ODT tab Take 8 mg by mouth every 8 hours as needed for nausea., Historical      prochlorperazine (COMPAZINE) 10 MG tablet Take 10 mg by mouth every 6 hours as needed for nausea or vomiting., Historical           STOP taking these medications       bumetanide (BUMEX) 1 MG tablet Comments:   Reason for Stopping:         meloxicam (MOBIC) 15 MG tablet Comments:   Reason for Stopping:             Allergies   Allergies   Allergen Reactions    Morphine And Codeine Nausea and Vomiting

## 2024-09-23 NOTE — PLAN OF CARE
"Occupational Therapy Discharge Summary    Reason for therapy discharge:    Discharged to home with hospice    Progress towards therapy goal(s). See goals on Care Plan in [facility] electronic health record for goal details.  Goals not met.  Barriers to achieving goals:   limited tolerance for therapy and discharge from facility.    Therapy recommendation(s):    Per last treatment session, discharge rec was \"Patient planning to go home with hospice. Will need A with ADLs/IADLs. Ax2 sit > supine today, Ax2 STS from WC, very fatigued \"      "

## 2024-09-23 NOTE — PLAN OF CARE
Goal Outcome Evaluation:       9/22  8028 - [id number]    Orientation: A&Ox4  Aggression Stop Light: Green  Activity: A1 gb/w, pivot to BSC  Diet/BS Checks: Low fiber, 1500ml fluid restriction  Tele: n/a  IV Access/Drains: Port HL  Pain Management: Denies. Ultram scheduled  Abnormal VS/Results: Comfort cares, no vitals  Bowel/Bladder: Continent, constipation.- taking senna/miralax  Skin/Wounds: Sacral gluteal fold wound, minerva cleanse, miconazole powder and barrier cream applied  Consults:  signed-off  D/C Disposition: discharge to [facility] at home today.   Other Info:

## (undated) RX ORDER — FENTANYL CITRATE 50 UG/ML
INJECTION, SOLUTION INTRAMUSCULAR; INTRAVENOUS
Status: DISPENSED
Start: 2018-04-13

## (undated) RX ORDER — FLUMAZENIL 0.1 MG/ML
INJECTION, SOLUTION INTRAVENOUS
Status: DISPENSED
Start: 2018-04-13

## (undated) RX ORDER — NALOXONE HYDROCHLORIDE 0.4 MG/ML
INJECTION, SOLUTION INTRAMUSCULAR; INTRAVENOUS; SUBCUTANEOUS
Status: DISPENSED
Start: 2018-04-13